# Patient Record
Sex: FEMALE | Race: WHITE | NOT HISPANIC OR LATINO | Employment: OTHER | URBAN - METROPOLITAN AREA
[De-identification: names, ages, dates, MRNs, and addresses within clinical notes are randomized per-mention and may not be internally consistent; named-entity substitution may affect disease eponyms.]

---

## 2017-01-06 ENCOUNTER — ALLSCRIPTS OFFICE VISIT (OUTPATIENT)
Dept: OTHER | Facility: OTHER | Age: 67
End: 2017-01-06

## 2017-02-20 ENCOUNTER — APPOINTMENT (OUTPATIENT)
Dept: LAB | Age: 67
End: 2017-02-20
Payer: MEDICARE

## 2017-02-20 DIAGNOSIS — C43.59 MALIGNANT MELANOMA OF OTHER PART OF TRUNK (HCC): ICD-10-CM

## 2017-02-20 DIAGNOSIS — Z85.71 HISTORY OF HODGKIN'S LYMPHOMA: ICD-10-CM

## 2017-02-20 DIAGNOSIS — C50.919 MALIGNANT NEOPLASM OF FEMALE BREAST (HCC): ICD-10-CM

## 2017-02-20 LAB
ALBUMIN SERPL BCP-MCNC: 3 G/DL (ref 3.5–5)
ALP SERPL-CCNC: 122 U/L (ref 46–116)
ALT SERPL W P-5'-P-CCNC: 13 U/L (ref 12–78)
ANION GAP SERPL CALCULATED.3IONS-SCNC: 6 MMOL/L (ref 4–13)
AST SERPL W P-5'-P-CCNC: 14 U/L (ref 5–45)
BASOPHILS # BLD AUTO: 0.08 THOUSANDS/ΜL (ref 0–0.1)
BASOPHILS NFR BLD AUTO: 1 % (ref 0–1)
BILIRUB SERPL-MCNC: 0.37 MG/DL (ref 0.2–1)
BUN SERPL-MCNC: 20 MG/DL (ref 5–25)
CALCIUM SERPL-MCNC: 9.6 MG/DL (ref 8.3–10.1)
CANCER AG27-29 SERPL-ACNC: 37.5 U/ML (ref 0–42.3)
CHLORIDE SERPL-SCNC: 107 MMOL/L (ref 100–108)
CO2 SERPL-SCNC: 29 MMOL/L (ref 21–32)
CREAT SERPL-MCNC: 1.29 MG/DL (ref 0.6–1.3)
EOSINOPHIL # BLD AUTO: 0.5 THOUSAND/ΜL (ref 0–0.61)
EOSINOPHIL NFR BLD AUTO: 7 % (ref 0–6)
ERYTHROCYTE [DISTWIDTH] IN BLOOD BY AUTOMATED COUNT: 13.8 % (ref 11.6–15.1)
ERYTHROCYTE [SEDIMENTATION RATE] IN BLOOD: 80 MM/HOUR (ref 0–20)
GFR SERPL CREATININE-BSD FRML MDRD: 41 ML/MIN/1.73SQ M
GLUCOSE SERPL-MCNC: 147 MG/DL (ref 65–140)
HCT VFR BLD AUTO: 37.8 % (ref 34.8–46.1)
HGB BLD-MCNC: 11.8 G/DL (ref 11.5–15.4)
LDH SERPL-CCNC: 136 U/L (ref 81–234)
LYMPHOCYTES # BLD AUTO: 1.7 THOUSANDS/ΜL (ref 0.6–4.47)
LYMPHOCYTES NFR BLD AUTO: 23 % (ref 14–44)
MCH RBC QN AUTO: 28 PG (ref 26.8–34.3)
MCHC RBC AUTO-ENTMCNC: 31.2 G/DL (ref 31.4–37.4)
MCV RBC AUTO: 90 FL (ref 82–98)
MONOCYTES # BLD AUTO: 0.63 THOUSAND/ΜL (ref 0.17–1.22)
MONOCYTES NFR BLD AUTO: 8 % (ref 4–12)
NEUTROPHILS # BLD AUTO: 4.54 THOUSANDS/ΜL (ref 1.85–7.62)
NEUTS SEG NFR BLD AUTO: 61 % (ref 43–75)
NRBC BLD AUTO-RTO: 0 /100 WBCS
PLATELET # BLD AUTO: 498 THOUSANDS/UL (ref 149–390)
PMV BLD AUTO: 10.1 FL (ref 8.9–12.7)
POTASSIUM SERPL-SCNC: 5.4 MMOL/L (ref 3.5–5.3)
PROT SERPL-MCNC: 9 G/DL (ref 6.4–8.2)
RBC # BLD AUTO: 4.22 MILLION/UL (ref 3.81–5.12)
SODIUM SERPL-SCNC: 142 MMOL/L (ref 136–145)
WBC # BLD AUTO: 7.46 THOUSAND/UL (ref 4.31–10.16)

## 2017-02-20 PROCEDURE — 85652 RBC SED RATE AUTOMATED: CPT

## 2017-02-20 PROCEDURE — 83615 LACTATE (LD) (LDH) ENZYME: CPT

## 2017-02-20 PROCEDURE — 36415 COLL VENOUS BLD VENIPUNCTURE: CPT

## 2017-02-20 PROCEDURE — 85025 COMPLETE CBC W/AUTO DIFF WBC: CPT

## 2017-02-20 PROCEDURE — 86300 IMMUNOASSAY TUMOR CA 15-3: CPT

## 2017-02-20 PROCEDURE — 80053 COMPREHEN METABOLIC PANEL: CPT

## 2017-02-28 ENCOUNTER — ALLSCRIPTS OFFICE VISIT (OUTPATIENT)
Dept: OTHER | Facility: OTHER | Age: 67
End: 2017-02-28

## 2017-04-04 ENCOUNTER — GENERIC CONVERSION - ENCOUNTER (OUTPATIENT)
Dept: OTHER | Facility: OTHER | Age: 67
End: 2017-04-04

## 2017-04-04 PROCEDURE — 88305 TISSUE EXAM BY PATHOLOGIST: CPT | Performed by: SPECIALIST

## 2017-04-05 ENCOUNTER — LAB REQUISITION (OUTPATIENT)
Dept: LAB | Facility: HOSPITAL | Age: 67
End: 2017-04-05
Payer: MEDICARE

## 2017-04-05 DIAGNOSIS — D48.5 NEOPLASM OF UNCERTAIN BEHAVIOR OF SKIN: ICD-10-CM

## 2017-04-10 DIAGNOSIS — I10 ESSENTIAL (PRIMARY) HYPERTENSION: ICD-10-CM

## 2017-04-10 DIAGNOSIS — Z85.71 HISTORY OF HODGKIN'S LYMPHOMA: ICD-10-CM

## 2017-04-10 DIAGNOSIS — E78.5 HYPERLIPIDEMIA: ICD-10-CM

## 2017-04-10 DIAGNOSIS — R77.9 ABNORMALITY OF PLASMA PROTEIN: ICD-10-CM

## 2017-04-11 ENCOUNTER — TRANSCRIBE ORDERS (OUTPATIENT)
Dept: LAB | Facility: CLINIC | Age: 67
End: 2017-04-11

## 2017-04-11 ENCOUNTER — APPOINTMENT (OUTPATIENT)
Dept: LAB | Facility: CLINIC | Age: 67
End: 2017-04-11
Payer: MEDICARE

## 2017-04-11 DIAGNOSIS — Z85.71 HISTORY OF HODGKIN'S LYMPHOMA: ICD-10-CM

## 2017-04-11 LAB
ANION GAP SERPL CALCULATED.3IONS-SCNC: 9 MMOL/L (ref 4–13)
BUN SERPL-MCNC: 19 MG/DL (ref 5–25)
CALCIUM SERPL-MCNC: 9 MG/DL (ref 8.3–10.1)
CHLORIDE SERPL-SCNC: 105 MMOL/L (ref 100–108)
CO2 SERPL-SCNC: 28 MMOL/L (ref 21–32)
CREAT SERPL-MCNC: 1.2 MG/DL (ref 0.6–1.3)
ERYTHROCYTE [SEDIMENTATION RATE] IN BLOOD: 36 MM/HOUR (ref 0–20)
GFR SERPL CREATININE-BSD FRML MDRD: 44.5 ML/MIN/1.73SQ M
GLUCOSE SERPL-MCNC: 105 MG/DL (ref 65–140)
LDH SERPL-CCNC: 166 U/L (ref 81–234)
POTASSIUM SERPL-SCNC: 4.2 MMOL/L (ref 3.5–5.3)
SODIUM SERPL-SCNC: 142 MMOL/L (ref 136–145)

## 2017-04-11 PROCEDURE — 83615 LACTATE (LD) (LDH) ENZYME: CPT

## 2017-04-11 PROCEDURE — 80048 BASIC METABOLIC PNL TOTAL CA: CPT

## 2017-04-11 PROCEDURE — 36415 COLL VENOUS BLD VENIPUNCTURE: CPT

## 2017-04-11 PROCEDURE — 85652 RBC SED RATE AUTOMATED: CPT

## 2017-05-04 ENCOUNTER — ALLSCRIPTS OFFICE VISIT (OUTPATIENT)
Dept: OTHER | Facility: OTHER | Age: 67
End: 2017-05-04

## 2017-07-07 ENCOUNTER — APPOINTMENT (OUTPATIENT)
Dept: RADIOLOGY | Facility: CLINIC | Age: 67
End: 2017-07-07
Payer: MEDICARE

## 2017-07-07 ENCOUNTER — TRANSCRIBE ORDERS (OUTPATIENT)
Dept: RADIOLOGY | Facility: CLINIC | Age: 67
End: 2017-07-07

## 2017-07-07 DIAGNOSIS — N20.0 URIC ACID NEPHROLITHIASIS: Primary | ICD-10-CM

## 2017-07-07 DIAGNOSIS — N20.0 URIC ACID NEPHROLITHIASIS: ICD-10-CM

## 2017-07-07 PROCEDURE — 74000 HB X-RAY EXAM OF ABDOMEN (SINGLE ANTEROPOSTERIOR VIEW): CPT

## 2017-07-11 ENCOUNTER — GENERIC CONVERSION - ENCOUNTER (OUTPATIENT)
Dept: OTHER | Facility: OTHER | Age: 67
End: 2017-07-11

## 2017-07-12 ENCOUNTER — GENERIC CONVERSION - ENCOUNTER (OUTPATIENT)
Dept: OTHER | Facility: OTHER | Age: 67
End: 2017-07-12

## 2017-08-07 DIAGNOSIS — N20.0 CALCULUS OF KIDNEY: ICD-10-CM

## 2017-08-07 DIAGNOSIS — Z85.3 PERSONAL HISTORY OF MALIGNANT NEOPLASM OF BREAST: ICD-10-CM

## 2017-08-07 DIAGNOSIS — C50.919 MALIGNANT NEOPLASM OF FEMALE BREAST (HCC): ICD-10-CM

## 2017-08-11 ENCOUNTER — APPOINTMENT (OUTPATIENT)
Dept: LAB | Facility: HOSPITAL | Age: 67
End: 2017-08-11
Attending: UROLOGY
Payer: MEDICARE

## 2017-08-11 ENCOUNTER — ALLSCRIPTS OFFICE VISIT (OUTPATIENT)
Dept: OTHER | Facility: OTHER | Age: 67
End: 2017-08-11

## 2017-08-11 DIAGNOSIS — N20.0 CALCULUS OF KIDNEY: ICD-10-CM

## 2017-08-11 LAB
BILIRUB UR QL STRIP: NORMAL
CLARITY UR: NORMAL
COLOR UR: YELLOW
GLUCOSE (HISTORICAL): NORMAL
HGB UR QL STRIP.AUTO: NORMAL
KETONES UR STRIP-MCNC: NORMAL MG/DL
LEUKOCYTE ESTERASE UR QL STRIP: NORMAL
NITRITE UR QL STRIP: POSITIVE
PH UR STRIP.AUTO: 6.5 [PH]
PROT UR STRIP-MCNC: NORMAL MG/DL
SP GR UR STRIP.AUTO: 1.01
UROBILINOGEN UR QL STRIP.AUTO: 0.2

## 2017-08-11 PROCEDURE — 87086 URINE CULTURE/COLONY COUNT: CPT

## 2017-08-12 LAB — BACTERIA UR CULT: NORMAL

## 2017-08-15 ENCOUNTER — APPOINTMENT (OUTPATIENT)
Dept: LAB | Facility: CLINIC | Age: 67
End: 2017-08-15
Payer: MEDICARE

## 2017-08-15 ENCOUNTER — HOSPITAL ENCOUNTER (OUTPATIENT)
Dept: NUCLEAR MEDICINE | Facility: HOSPITAL | Age: 67
Discharge: HOME | End: 2017-08-15
Attending: UROLOGY
Payer: MEDICARE

## 2017-08-15 DIAGNOSIS — I10 ESSENTIAL (PRIMARY) HYPERTENSION: ICD-10-CM

## 2017-08-15 DIAGNOSIS — E78.5 HYPERLIPIDEMIA: ICD-10-CM

## 2017-08-15 DIAGNOSIS — C50.919 MALIGNANT NEOPLASM OF FEMALE BREAST (HCC): ICD-10-CM

## 2017-08-15 DIAGNOSIS — R77.9 ABNORMALITY OF PLASMA PROTEIN: ICD-10-CM

## 2017-08-15 DIAGNOSIS — N20.0 CALCULUS OF KIDNEY: ICD-10-CM

## 2017-08-15 LAB
ALBUMIN SERPL BCP-MCNC: 3.4 G/DL (ref 3.5–5)
ALP SERPL-CCNC: 119 U/L (ref 46–116)
ALT SERPL W P-5'-P-CCNC: 23 U/L (ref 12–78)
ANION GAP SERPL CALCULATED.3IONS-SCNC: 8 MMOL/L (ref 4–13)
AST SERPL W P-5'-P-CCNC: 21 U/L (ref 5–45)
BASOPHILS # BLD AUTO: 0.08 THOUSANDS/ΜL (ref 0–0.1)
BASOPHILS NFR BLD AUTO: 1 % (ref 0–1)
BILIRUB SERPL-MCNC: 0.4 MG/DL (ref 0.2–1)
BUN SERPL-MCNC: 26 MG/DL (ref 5–25)
CALCIUM SERPL-MCNC: 9.3 MG/DL (ref 8.3–10.1)
CANCER AG27-29 SERPL-ACNC: 17.2 U/ML (ref 0–42.3)
CHLORIDE SERPL-SCNC: 105 MMOL/L (ref 100–108)
CHOLEST SERPL-MCNC: 171 MG/DL (ref 50–200)
CO2 SERPL-SCNC: 28 MMOL/L (ref 21–32)
CREAT SERPL-MCNC: 1.24 MG/DL (ref 0.6–1.3)
EOSINOPHIL # BLD AUTO: 0.85 THOUSAND/ΜL (ref 0–0.61)
EOSINOPHIL NFR BLD AUTO: 12 % (ref 0–6)
ERYTHROCYTE [DISTWIDTH] IN BLOOD BY AUTOMATED COUNT: 15 % (ref 11.6–15.1)
GFR SERPL CREATININE-BSD FRML MDRD: 44 ML/MIN/1.73SQ M
GLUCOSE P FAST SERPL-MCNC: 90 MG/DL (ref 65–99)
HCT VFR BLD AUTO: 40.7 % (ref 34.8–46.1)
HDLC SERPL-MCNC: 55 MG/DL (ref 40–60)
HGB BLD-MCNC: 12.7 G/DL (ref 11.5–15.4)
LDLC SERPL CALC-MCNC: 98 MG/DL (ref 0–100)
LYMPHOCYTES # BLD AUTO: 1.6 THOUSANDS/ΜL (ref 0.6–4.47)
LYMPHOCYTES NFR BLD AUTO: 23 % (ref 14–44)
MCH RBC QN AUTO: 27.5 PG (ref 26.8–34.3)
MCHC RBC AUTO-ENTMCNC: 31.2 G/DL (ref 31.4–37.4)
MCV RBC AUTO: 88 FL (ref 82–98)
MONOCYTES # BLD AUTO: 0.75 THOUSAND/ΜL (ref 0.17–1.22)
MONOCYTES NFR BLD AUTO: 11 % (ref 4–12)
NEUTROPHILS # BLD AUTO: 3.82 THOUSANDS/ΜL (ref 1.85–7.62)
NEUTS SEG NFR BLD AUTO: 53 % (ref 43–75)
PLATELET # BLD AUTO: 315 THOUSANDS/UL (ref 149–390)
PMV BLD AUTO: 10.1 FL (ref 8.9–12.7)
POTASSIUM SERPL-SCNC: 4.5 MMOL/L (ref 3.5–5.3)
PROT SERPL-MCNC: 8.2 G/DL (ref 6.4–8.2)
RBC # BLD AUTO: 4.62 MILLION/UL (ref 3.81–5.12)
SODIUM SERPL-SCNC: 141 MMOL/L (ref 136–145)
T4 FREE SERPL-MCNC: 1.08 NG/DL (ref 0.76–1.46)
TRIGL SERPL-MCNC: 92 MG/DL
TSH SERPL DL<=0.05 MIU/L-ACNC: 3.78 UIU/ML (ref 0.36–3.74)
WBC # BLD AUTO: 7.1 THOUSAND/UL (ref 4.31–10.16)

## 2017-08-15 PROCEDURE — 84439 ASSAY OF FREE THYROXINE: CPT

## 2017-08-15 PROCEDURE — A9562 TC99M MERTIATIDE: HCPCS

## 2017-08-15 PROCEDURE — 84443 ASSAY THYROID STIM HORMONE: CPT

## 2017-08-15 PROCEDURE — 85025 COMPLETE CBC W/AUTO DIFF WBC: CPT

## 2017-08-15 PROCEDURE — 84165 PROTEIN E-PHORESIS SERUM: CPT

## 2017-08-15 PROCEDURE — 80061 LIPID PANEL: CPT

## 2017-08-15 PROCEDURE — 36415 COLL VENOUS BLD VENIPUNCTURE: CPT

## 2017-08-15 PROCEDURE — 78707 K FLOW/FUNCT IMAGE W/O DRUG: CPT

## 2017-08-15 PROCEDURE — 86300 IMMUNOASSAY TUMOR CA 15-3: CPT

## 2017-08-15 PROCEDURE — 84166 PROTEIN E-PHORESIS/URINE/CSF: CPT

## 2017-08-15 PROCEDURE — 80053 COMPREHEN METABOLIC PANEL: CPT

## 2017-08-16 LAB
ALBUMIN SERPL ELPH-MCNC: 3.88 G/DL (ref 3.5–5)
ALBUMIN SERPL ELPH-MCNC: 51.1 % (ref 52–65)
ALPHA1 GLOB SERPL ELPH-MCNC: 0.32 G/DL (ref 0.1–0.4)
ALPHA1 GLOB SERPL ELPH-MCNC: 4.2 % (ref 2.5–5)
ALPHA2 GLOB SERPL ELPH-MCNC: 0.82 G/DL (ref 0.4–1.2)
ALPHA2 GLOB SERPL ELPH-MCNC: 10.8 % (ref 7–13)
BETA GLOB ABNORMAL SERPL ELPH-MCNC: 0.46 G/DL (ref 0.4–0.8)
BETA1 GLOB SERPL ELPH-MCNC: 6.1 % (ref 5–13)
BETA2 GLOB SERPL ELPH-MCNC: 5.8 % (ref 2–8)
BETA2+GAMMA GLOB SERPL ELPH-MCNC: 0.44 G/DL (ref 0.2–0.5)
GAMMA GLOB ABNORMAL SERPL ELPH-MCNC: 1.67 G/DL (ref 0.5–1.6)
GAMMA GLOB SERPL ELPH-MCNC: 22 % (ref 12–22)
IGG/ALB SER: 1.04 {RATIO} (ref 1.1–1.8)
PROT PATTERN SERPL ELPH-IMP: ABNORMAL
PROT SERPL-MCNC: 7.6 G/DL (ref 6.4–8.2)

## 2017-08-17 ENCOUNTER — GENERIC CONVERSION - ENCOUNTER (OUTPATIENT)
Dept: OTHER | Facility: OTHER | Age: 67
End: 2017-08-17

## 2017-08-18 LAB
ALBUMIN UR ELPH-MCNC: 52 %
ALPHA1 GLOB MFR UR ELPH: 2.8 %
ALPHA2 GLOB MFR UR ELPH: 11.6 %
B-GLOBULIN MFR UR ELPH: 11.7 %
GAMMA GLOB MFR UR ELPH: 21.9 %
PROT PATTERN UR ELPH-IMP: ABNORMAL
PROT UR-MCNC: 75 MG/DL

## 2017-08-22 ENCOUNTER — GENERIC CONVERSION - ENCOUNTER (OUTPATIENT)
Dept: OTHER | Facility: OTHER | Age: 67
End: 2017-08-22

## 2017-08-24 ENCOUNTER — GENERIC CONVERSION - ENCOUNTER (OUTPATIENT)
Dept: OTHER | Facility: OTHER | Age: 67
End: 2017-08-24

## 2017-08-29 ENCOUNTER — ALLSCRIPTS OFFICE VISIT (OUTPATIENT)
Dept: OTHER | Facility: OTHER | Age: 67
End: 2017-08-29

## 2017-11-08 ENCOUNTER — ALLSCRIPTS OFFICE VISIT (OUTPATIENT)
Dept: OTHER | Facility: OTHER | Age: 67
End: 2017-11-08

## 2017-11-10 NOTE — PROGRESS NOTES
Assessment    1  Encounter for preventive health examination (V70 0) (Z00 00)   2  Hypertension (401 9) (I10)   3  Hyperlipidemia (272 4) (E78 5)   4  Hypothyroidism (244 9) (E03 9)   5  Osteoporosis (733 00) (M81 0)   6  Vitamin D deficiency (268 9) (E55 9)   7  Alkaline phosphatase elevation (790 5) (R74 8)   8  Melanoma of skin of trunk (172 5) (C43 59)   9  Mitral regurgitation (424 0) (I34 0)   10  Pulmonary artery hypertension (416 8) (I27 21)    Plan  Hyperlipidemia, Hypothyroidism    · (1) LIPID PANEL, FASTING; Status:Active; Requested for:16Apr2018;    · (1) TSH WITH FT4 REFLEX; Status:Active; Requested for:16Apr2018;   Hyperlipidemia, Hypothyroidism, Vitamin D deficiency    · (1) VITAMIN D 25-HYDROXY; Status:Active; Requested for:16Apr2018;   Hypertension    · Bystolic 5 MG Oral Tablet; take 1 tablet by mouth every day   · Follow-up visit in 6 months Evaluation and Treatment  Follow-up  Status: Hold For -Scheduling  Requested for: 64RXB2337   · Eat a low fat and low cholesterol diet ; Status:Complete;   Done: 14KSJ5051 02:43PM   · We encourage you to begin to make lifestyle changes to help control your bloodpressure  These may include losing weight, increasing your activity level, limiting salt inyour diet, decreasing alcohol intake, and eating a diet low in fat and rich in fruitsand vegetables ; Status:Complete;   Done: 71BTC4021 02:43PM    Discussion/Summary    Continue with current medications  No changes in thyroid dosage at this time  Repeat labs prior to OV 6 months  follow up with various specialists including Urology  Stay hydrated  Avoid NSAIDs  Up-to-date with flu vaccine, pneumococcal vaccine and Zostavax  She is not interested colonoscopy  Possible side effects of new medications were reviewed with the patient/guardian today  The treatment plan was reviewed with the patient/guardian   The patient/guardian understands and agrees with the treatment plan      History of Present Illness  Follow-up visit follow-up visit  Medications reviewed  Hyperlipidemia on Simvastatin 40 mg daily  Lipid profile 08/2017 Cholesterol 171  Triglycerides 92  HDL 55  LDL 98  LFTs normal except for alkaline phosphatase 119 decreased from 122    Albumin low at 3 4  total protein 8 2  SPEP hypergammaglobulinemia  No monoclonal bands  UPEP no monoclonal bands  Hypertension blood pressure stable on Bystolic 5 mg daily  08/2017 creatinine 1 24  Electrolytes normal  FBS 90 Hgb 12 7  Review of Systems   Constitutional: no recent weight gain-- and-- no recent weight loss  Eyes: no eyesight problems  ENT: no earache,-- no sore throat,-- no nasal discharge-- and-- no hoarseness  Cardiovascular: no chest pain,-- no palpitations-- and-- no lower extremity edema  Respiratory: no cough,-- no orthopnea,-- no wheezing-- and-- no shortness of breath during exertion  Gastrointestinal:  no prior colonoscopy  repeat CT scan abdomen 12/2016 + cholelithiasis asymptomatic  Hernia left flank containing fat and nonobstructive bowel  Small subscapular area of enhancement right hepatic lobe  Stable retroperitoneal nodule  hiatal hernia  Stable nephrolithiasis and renal scarring/complex renal cystic mass on the right , but-- no abdominal pain,-- no nausea,-- no vomiting,-- no constipation,-- no diarrhea-- and-- no blood in stools  Genitourinary: see GI ROS  history of nephrolithiasis including staghorn calculi  08/2017 nuclear renal flow scan abnormal delayed perfusion and diminished functioning within the right kidney with a split function measuring only 17% on the right  83% on the left , but-- no dysuria-- and-- no incontinence  Musculoskeletal: no arthralgias-- and-- no myalgias  Integumentary: history of melanoma right flank  biopsy left arm 11/2016 SCC in situ  Neurological: no falls  , but-- no headache-- and-- no dizziness  Endocrine: Hypothyroidism on Levothyroxine 75 Âµg daily  TSH 08/2017 3 783   Osteoporosis on vitamin-D supplement  Hematologic/Lymphatic: remote history of Hodgkin's disease  breast CA s/p left mastectomy with chemotherapy  she completed a course of Arimidex in 2011  followed by oncology  Active Problems  1  Advance directive declined by patient (V49 89) (Z78 9)   2  Alkaline phosphatase elevation (790 5) (R74 8)   3  Elevated serum protein level (790 99) (R77 9)   4  History of Hodgkin's disease (V10 72) (Z85 71)   5  History of left breast cancer (V10 3) (Z85 3)   6  Hyperlipidemia (272 4) (E78 5)   7  Hypertension (401 9) (I10)   8  Hypothyroidism (244 9) (E03 9)   9  Melanoma of skin of trunk (172 5) (C43 59)   10  Mitral regurgitation (424 0) (I34 0)   11  Osteoporosis (733 00) (M81 0)   12  Pulmonary artery hypertension (416 8) (I27 21)   13  Retroperitoneal mass (789 30) (R19 00)   14  Screening for genitourinary condition (V81 6) (Z13 89)   15  Vitamin D deficiency (268 9) (E55 9)    Past Medical History    1  History of Age At First Period 13 Years Old (Menarche)   2  History of Age At First Pregnancy 19 Years Old   3  History of Cervical Cancer (V10 41)   4  History of Colon cancer screening (V76 51) (Z12 11)   5  History of Ganglion (727 43) (M67 40)   6  History of osteopenia (V13 59) (Z87 39)   7  History of paroxysmal supraventricular tachycardia (V12 59) (Z86 79)   8  History of renal calculi (V13 01) (Z87 442)   9  History of Previously Pregnant With 20  Term Deliveries   10  History of Shoulder Impingement (726 2)    Surgical History  1  History of Appendectomy   2  History of Breast Surgery Mastectomy   3  History of Breast Surgery Reconstruction   4  History of Chemotherapeutics (V87 41)   5  History of Hysterectomy   6  History of Kidney Surgery   7  History of Radiation Therapy   8  History of [place] Lymph Node Biopsy   9  History of Splenectomy    Family History  Mother    1  Family history of Leukemia (V16 6)  Paternal Grandmother    2   Family history of Colon Cancer (V16 0)  Family History    3  Family history of Reported Family History Of Heart Disease    Social History     · Advance directive declined by patient (V49 89) (Z78 9)   · Being A Social Drinker   · Currently sexually active   · Drinks coffee   · Exercise habits   · Former smoker (V15 82) (S08 534)   · Denied: History of Uses drugs daily    Current Meds   1  Aspirin 81 MG TABS; Therapy: (Recorded:25Llv3030) to Recorded   2  Bystolic 5 MG Oral Tablet; take 1 tablet by mouth every day; Therapy: 41AVJ3210 to ()  Requested for: 16Gal2263; Last Rx:39Esx5899 Ordered   3  Claritin CAPS; Therapy: (Recorded:85Cdl4979) to Recorded   4  Levothyroxine Sodium 75 MCG Oral Tablet; take 1 tablet by mouth daily; Therapy: 08ZMF5273 to (Evaluate:22Bef9227)  Requested for: 74Pkf8630; Last Rx:68Vlb7973 Ordered   5  Simvastatin 40 MG Oral Tablet (Zocor); take 1 tablet by mouth every day; Therapy: 88ZDD6550 to (Evaluate:56Yzd7851)  Requested for: 41Yfe3199; Last Rx:28Ogf9128 Ordered   6  Vitamin D TABS; Therapy: (Recorded:45Qxv7767) to Recorded    Allergies  1  Hydrocodone-Acetaminophen CAPS   2  Bactrim   3  Ciprofloxacin-Ciproflox HCl ER TB24  4  Grass    Vitals  Vital Signs    Recorded: 83JFL2161 11:12AM   Temperature 96 5 F   Heart Rate 78   Respiration 16   Systolic 505   Diastolic 76   Height 5 ft 2 in   Weight 165 lb    BMI Calculated 30 18   BSA Calculated 1 76       Physical Exam   Constitutional  General appearance: No acute distress, well appearing and well nourished  Eyes  Conjunctiva and lids: No swelling, erythema or discharge  Pupils and irises: Equal, round, reactive to light  Ophthalmoscopic examination: Normal fundi and optic discs  Ears, Nose, Mouth, and Throat  Otoscopic examination: Tympanic membranes translucent with normal light reflex  Canals patent without erythema  Oropharynx: Normal with no erythema, edema, exudate or lesions     Neck  Neck: Supple, symmetric, trachea midline, no masses  Thyroid: Normal, no thyromegaly  There were no thyroid nodules  Pulmonary  Auscultation of lungs: Clear to auscultation  Cardiovascular  Auscultation of heart: Normal rate and rhythm, normal S1 and S2, no murmurs  Heart sounds: no gallop heard  Carotid pulses: 2+ bilaterally  no bruit heard over the right carotid-- and-- no bruit heard over the left carotid  Examination of extremities for edema and/or varicosities: Normal    Lymphatic  Palpation of lymph nodes in neck: No lymphadenopathy  no anterior cervical node enlargement,-- no posterior cervical node enlargement,-- no submandibular node enlargement-- and-- no supraclavicular node enlargement  Musculoskeletal  Gait and station: Normal    Skin  Skin and subcutaneous tissue: Normal without rashes or lesions  Psychiatric  Recent and remote memory: Intact  Mood and affect: Normal        Results/Data  PHQ-2 Adult Depression Screening 46MFR7562 01:14PM Evonne Ross     Test Name Result Flag Reference   PHQ-2 Adult Depression Score 0       Over the last two weeks, how often have you been bothered by any of the following problems? Little interest or pleasure in doing things: Not at all - 0 Feeling down, depressed, or hopeless: Not at all - 0   PHQ-2 Adult Depression Screening Negative       * NM KIDNEY W FLOW AND FUNCTION 12Dgf5587 10:13AM Radha Number Order Number: [id number]   - Patient Instructions: To schedule this appointment, please contact Central Scheduling at [phone]  Test Name Result Flag Reference   NM KIDNEY W FLOW AND FUNCTION (Report)       RENAL SCAN    INDICATION: History of extensive renal calculus with large right staghorn calculus noted on recent CT complex lesion noted within the lower pole right kidney   COMPARISON:  CT December 27, 2016   TECHNIQUE:   The study was obtained following the intravenous administration of 10 0 mCi Tc-99m MAG-3   Posterior, dynamic flow images were obtained at one minute intervals for thirty minutes  Static, posterior, pre and post void images were then obtained  FINDINGS:   PERFUSION:    Left kidney: 78%  Right kidney: 22%  Perfusion images of the kidneys demonstrated delayed perfusion of both kidneys relative to the spleen  There is marked delayed perfusion to the right kidney  UPTAKE/EXCRETION:   Time to 1/2 Peak:  Left kidney: 1 3 minutes  Right kidney: 1 3 minutes  The right kidney demonstrates only minimal function within the mid to lower pole region  The left kidney demonstrated a normal cortical transit time of 2 to 3 m  Time to Peak:  Left kidney: 4 9 minutes  Right kidney: 6 9 minutes      Peak to 1/2 Peak:  Left kidney: 16 minutes  Right kidney: 37 minutes      Split renal Function:  Left kidney: 83%  Right kidney: 17%   The dynamic images continue to demonstrate clearance of the radiotracer from the renal parenchyma on the left with mild persistent activity in the renal pelvis on the right  IMPRESSION:   1  Abnormal delayed perfusion and diminished function within the right kidney with split function measuring only 17% on the right and 83% on the left  Workstation performed: [id number]   Signed by:  Bianca Edge MD  8/15/17     (1) CBC/PLT/DIFF 90OUO2127 09:10AM Lyndon Bedoya Chance Order Number: [id number]     Test Name Result Flag Reference   WBC COUNT 7 10 Thousand/uL  4 31-10 16   RBC COUNT 4 62 Million/uL  3 81-5 12   HEMOGLOBIN 12 7 g/dL  11 5-15 4   HEMATOCRIT 40 7 %  34 8-46  1   MCV 88 fL  82-98   MCH 27 5 pg  26 8-34 3   MCHC 31 2 g/dL L 31 4-37 4   RDW 15 0 %  11 6-15 1   MPV 10 1 fL  8 9-12 7   PLATELET COUNT 744 Thousands/uL  149-390   NEUTROPHILS RELATIVE PERCENT 53 %  43-75   LYMPHOCYTES RELATIVE PERCENT 23 %  14-44   MONOCYTES RELATIVE PERCENT 11 %  4-12   EOSINOPHILS RELATIVE PERCENT 12 % H 0-6   BASOPHILS RELATIVE PERCENT 1 %  0-1   NEUTROPHILS ABSOLUTE COUNT 3 82 Thousands/? ??L  1 85-7 62   LYMPHOCYTES ABSOLUTE COUNT 1 60 Thousands/? ??L  0 60-4 47   MONOCYTES ABSOLUTE COUNT 0 75 Thousand/? ??L  0 17-1 22   EOSINOPHILS ABSOLUTE COUNT 0 85 Thousand/? ??L H 0 00-0 61   BASOPHILS ABSOLUTE COUNT 0 08 Thousands/? ??L  0 00-0 10     - Patient Instructions: This bloodwork is non-fasting  Please drink two glasses of water morning of bloodwork  (1) COMPREHENSIVE METABOLIC PANEL 45WOT6364 13:37TU Garnett Juan Ramon Order Number: [id number]     Test Name Result Flag Reference   SODIUM 141 mmol/L  136-145   POTASSIUM 4 5 mmol/L  3 5-5 3   CHLORIDE 105 mmol/L  100-108   CARBON DIOXIDE 28 mmol/L  21-32   ANION GAP (CALC) 8 mmol/L  4-13   BLOOD UREA NITROGEN 26 mg/dL H 5-25   CREATININE 1 24 mg/dL  0 60-1 30   Standardized to IDMS reference method   CALCIUM 9 3 mg/dL  8 3-10 1   BILI, TOTAL 0 40 mg/dL  0 20-1 00   ALK PHOSPHATAS 119 U/L H    ALT (SGPT) 23 U/L  12-78   Specimen collection should occur prior to Sulfasalazine administration due to the potential for falsely depressed results  AST(SGOT) 21 U/L  5-45   Specimen collection should occur prior to Sulfasalazine administration due to the potential for falsely depressed results  ALBUMIN 3 4 g/dL L 3 5-5 0   TOTAL PROTEIN 8 2 g/dL  6 4-8 2   eGFR 44 ml/min/1 73sq m       [facility] Disease Education Program recommendations are as follows: GFR calculation is accurate only with a steady state creatinine Chronic Kidney disease less than 60 ml/min/1 73 sq  meters Kidney failure less than 15 ml/min/1 73 sq  meters  GLUCOSE FASTING 90 mg/dL  65-99   Specimen collection should occur prior to Sulfasalazine administration due to the potential for falsely depressed results  Specimen collection should occur prior to Sulfapyridine administration due to the potential for falsely elevated results       (1) CA 27 29 82Unf0901 09:10AM Zoey Bedoya Order Number: [id number]     Test Name Result Flag Reference   CA 27 29(NEW) 17 2 U/mL  0 0-42 3     (1) LIPID PANEL, FASTING 51XDU8860 09:10AM Philippe Nam     Test Name Result Flag Reference   CHOLESTEROL 171 mg/dL     HDL,DIRECT 55 mg/dL  40-60   Specimen collection should occur prior to Metamizole administration due to the potential for falsley depressed results  LDL CHOLESTEROL CALCULATED 98 mg/dL  0-100     Triglyceride:       Normal ??? ??? ??? ??? ??? ??? ??? <150 mg/dl  ??? ??? ???Borderline High ??? ??? 150-199 mg/dl  ??? ??? ? ?? High ??? ??? ??? ??? ??? ??? ??? 200-499 mg/dl  ??? ??? ? ??Very High ??? ??? ??? ??? ??? >499 mg/dl   Cholesterol:      Desirable ??? ??? ??? ??? <200 mg/dl  ??? ??? Borderline High ??? 200-239 mg/dl  ??? ??? High ??? ??? ??? ??? ??? ??? >239 mg/dl   HDL Cholesterol:      High ??? ???>59 mg/dL  ??? ??? Low ??? ??? <41 mg/dL   This screening LDL is a calculated result  It does not have the accuracy of the Direct Measured LDL in the monitoring of patients with hyperlipidemia and/or statin therapy  Direct Measure LDL (QAB311) must be ordered separately in these patients  TRIGLYCERIDES 92 mg/dL  <=150   Specimen collection should occur prior to N-Acetylcysteine or Metamizole administration due to the potential for falsely depressed results  (1) TSH WITH FT4 REFLEX 25Lbw9847 09:10AM Philippe Nam     Test Name Result Flag Reference   TSH 3 783 uIU/mL H 0 358-3 740     A FT4 has been ordered   Patients undergoing fluorescein dye angiography may retain small amounts of fluorescein in the body for 48-72 hours post procedure  Samples containing fluorescein can produce falsely depressed TSH values  If the patient had this procedure,a specimen should be resubmitted post fluorescein clearance       The recommended reference ranges for TSH during pregnancy are as follows: First trimester 0 1 to 2 5 uIU/mL Second trimester  0 2 to 3 0 uIU/mL Third trimester 0 3 to 3 0 uIU/m   T4,FREE 1 08 ng/dL  0 76-1 46   Specimen collection should occur prior to Sulfasalazine administration due to the potential for falsely elevated results  (1) PROTEIN ELECTRO, SERUM 44Fvs9470 09:10AM Gabino Ferguson     Test Name Result Flag Reference   A/G RATIO 1 04 L 1 10-1 80   Albumin 51 1 % L 52 0-65 0   Albumin Conc  3 88 g/dl  3 50-5 00   Alpha 1 Conc  0 32 g/dL  0 10-0 40   ALPHA 1 4 2 %  2 5-5 0   Alpha 2 Conc  0 82 g/dL  0 40-1 20   ALPHA 2 10 8 %  7 0-13 0   Beta 1 Conc  0 46 g/dL  0 40-0 80   BETA-1 6 1 %  5 0-13 0   Beta 2 Conc 0 44 g/dL  0 20-0 50   BETA-2 5 8 %  2 0-8 0   Gamma Conc 1 67 g/dL H 0 50-1 60   GAMMA GLOBULIN 22 0 %  12 0-22 0   Interpretation      The serum total protein and albumin are within normal limits  The serum protein electrophoresis shows hypergammaglobulinemia  No monoclonal bands noted  Reviewed by: Iván Gasteulm MD ([id number]) **Electronic Signature**   TOTAL PROTEIN  7 6 g/dL  6 4-8 2     (1) PROTEIN ELECTRO, URINE 44Ucv3734 09:10AM Janusz Castillo Order Number: [id number]     Test Name Result Flag Reference   ALPHA 1 URINE 2 8 %     ALPHA 2 URINE 11 6 %     BETA URINE 11 7 %     GAMMA GLOBULIN URINE 21 9 %     UPEP INTERPRETATION      The urine total protein is increased  The urine protein electrophoresis shows non-selective proteinuria  No monoclonal bands noted  Reviewed by: Kelli Novoa MD ([id number]) **Electronic Signature**   ALBUMIN URINE ELP 52 0 %     URINE PROTEIN 75 0 mg/dL H 2 0-17 5     (1) URINE CULTURE 11Aug2017 07:45PM Mathilda Gitelman Order Number: [id number]     Test Name Result Flag Reference   CLINICAL REPORT (Report)       Test:        Urine culture Specimen Type:   Urine Specimen Date:   8/11/2017 7:45 PM Result Date:    8/12/2017 4:06 PM Result Status:   Final result Resulting Lab:   [facility] [address] [address]           Tel: [phone]   CULTURE                                      ------------------                                 60,000-69,000 cfu/ml Mixed Contaminants X4   *** Suggest repeat specimen ***     * XR ABDOMEN 1 VIEW KUB [id number] 10:16AM Antonio Masterson     Test Name Result Flag Reference   XR ABDOMEN 1 VIEW KUB (Report)       ABDOMEN   INDICATION: History of previous calculus  COMPARISON: CT December 27, 2016   VIEWS: AP supine   IMAGES: 2   FINDINGS:   There are extensive renal calculi within the right kidney filling majority of the pelvis  The largest calcification appears bilobed measuring 3 2 x 1 9 cm  The left kidney also demonstrates cluster of calcification in the mid upper pole region measuring   1 4 x 0 9 cm  No ureteral calculi identified  Nonobstructive bowel gas pattern  Extensive surgical clips are seen throughout the abdomen and pelvis  IMPRESSION:   Extensive bilateral renal calculi are present  Workstation performed: [id number]   Signed by:  Sushil Taylor MD  7/10/17     (1) LD (LDH) 08Dku7030 10:22AM [employer]   TW Order Number: [id number]     Test Name Result Flag Reference   LD (LDH) 166 U/L       (1) SED RATE 48Egd3990 10:22AM [employer]   TW Order Number: [id number]     Test Name Result Flag Reference   SED RATE 36 mm/hour H 0-20     (1) TISSUE EXAM 53GQE9447 12:00AM EPIC, Provider   Test ordered by: Fidelia Contreras     Test Name Result Flag Reference   LAB AP CASE REPORT (Report)       Surgical Pathology Report             Case: [id number]                  Authorizing Provider: Eduarda Salgado  Collected:      04/04/2017          Pathologist:      Nghia Knapp MD        Received:      04/05/2017 1511       Specimen:  Skin, Other, Right thigh   LAB AP FINAL DIAGNOSIS (Report)       A  Skin, Right thigh, shave biopsy: - Epidermal hyperplasia with cornoid lamellae; see note  Note: Sections of the bisected shave biopsy show epidermal hyperplasia  On one of the tissue profile, there are two cornoid lamellae  The  superficial dermis shows mild lichenoid inflammation   Although the  cornoid lamellae are not placed at both ends of the lesion, in an  appropriate clinical setting, the changes may still represent  porokeratosis  The differential diagnosis could also include  proliferative keratosis  Interpretation performed at [facility], [address] [place] [address], [address] [place] [address]    Electronically signed by Lynda Everett MD on 4/10/2017 at 1:57 PM   LAB AP SURGICAL ADDITIONAL INFORMATION (Report)       These tests were developed and their performance characteristics  determined by [facility] or [facility]  They may not be cleared or approved by the [place]  Food and  Drug Administration  The FDA has determined that such clearance or  approval is not necessary  These tests are used for clinical purposes  They should not be regarded as investigational or for research  This  laboratory has been approved by [place] [address], designated as a high-complexity  laboratory and is qualified to perform these tests  LAB AP GROSS DESCRIPTION (Report)       A  The specimen is received in formalin, labeled with the patient's name  and hospital number, and is designated right thigh shave  The specimen  consists of a tan superficial shave of skin measuring 0 8 x 0 6 x 0 1 cm  The skin surface appears somewhat keratotic  The margin of resection is  inked green  The skin surface is inked red  The specimen is bisected  lengthwise  Entirely submitted  One cassette  Note: The estimated total formalin fixation time based upon information  provided by the submitting clinician and the standard processing schedule  is 27 5 hours  MAC     MAMMO SCREENING RIGHT W CAD 35Mae1112 02:09PM Flaco LAWRENCE Order Number: [id number]     Test Name Result Flag Reference   MAMMO SCREENING RIGHT W CAD (Report)       Patient History:  Patient is postmenopausal, has history of breast cancer at age   60, and has history of other cancer at age 26    Family history of colorectal cancer in father at age 80 and   colorectal cancer in paternal grandmother at age 80  Malignant mastectomy of the left breast, November 9, 2006  Malignant ultrasound-guided core biopsy of the left breast,   September 29, 2006  Reduction of the right breast, 2006  Took hormonal contraceptives for 11 years beginning at age 19  Patient is a former smoker, and smoked for 10 years  Patient's   BMI is 30 6  Reason for exam: screening (asymptomatic)  Mammo Screening Right W CAD: December 27, 2016 - Check In #:   [de-identified]  CC, MLO, and XCCL view(s) were taken of the right breast    Technologist: Marika Bender, RT(R)(M)  Prior study comparison: December 24, 2015, right breast DIGITAL   UNILATERAL SCREENING MAMMOGRAM WITH CAD performed at [address] [place] [address]  November 17, 2014, bilateral DIGITAL UNILATERAL  SCREENING MAMMOGRAM WITH CAD performed at [address] [place] [address]  November 11, 2013, right breast digital unilat mammo/CAD   performed at [address] [place] [address]  November 5, 2012,   bilateral DIGITAL UNILATERAL SCREENING MAMMOGRAM WITH CAD   performed at [address] [place] [address]  October 7, 2011, right  breast unilateral diagnostic mammogram, performed at [address] [place] [address]  There are scattered fibroglandular densities  The parenchymal pattern appears stable  No dominant soft tissue   mass or suspicious calcifications are noted  The skin and nipple  contours are within normal limits  No mammographic evidence of malignancy  No   significant changes when compared with prior studies  ASSESSMENT: BiRad:1 - Negative   Recommendation:  Routine screening mammogram in 1 year  A reminder letter will be  scheduled  Analyzed by CAD   8-10% of cancers will be missed on mammography  Management of a   palpable abnormality must be based on clinical grounds  Patients  will be notified of their results via letter from our facility  Accredited by [employer] of Radiology and FDA     Transcription Location: [address] [place], [place] [address]way: [id number]   Risk Value(s):  Myriad Table: 2 2%  Signed by:  Bertha Peters MD  12/27/16     * CT ABDOMEN PELVIS W CONTRAST 70Jgm3691 02:08PM Irais LAWRENCE Order Number: [id number]  Performing Comments: Rusty 66  - Patient Instructions: To schedule this appointment, please contact Central Scheduling at [phone]  Test Name Result Flag Reference   CT ABDOMEN PELVIS W CONTRAST (Report)       CT ABDOMEN AND PELVIS WITH IV CONTRAST   INDICATION: History of melanoma and breast cancer and Hodgkin's lymphoma  Mastectomy  Chemotherapy  Retroperitoneal nodules  Follow-up  COMPARISON: 3/21/2016 as well as a PET scan from 5/11/2009   TECHNIQUE: CT examination of the abdomen and pelvis was performed  In addition to typical postcontrast scanning, delayed phase postcontrast scanning was performed through the upper abdominal viscera  Axial, sagittal and coronal reformatted projections   were created  This examination, like all CT scans performed in the [facility], was performed utilizing techniques to minimize radiation dose exposure, including the use of iterative reconstruction and automated exposure control  IV Contrast: iodixanol (VISIPAQUE) 320 MG/ML injection 100 mL Note: (SINGLE DOSE/MULTI DOSE) information refers to the container from which the contrast was acquired  Contrast was injected one time intravenously without immediate complication  Because of borderline renal function, standard department hydration protocol was recommended to minimize risk of contrast induced nephropathy  Enteric Contrast: Enteric contrast was administered  FINDINGS:  ABDOMEN   LOWER CHEST: The base of the chest appears stable from previous exam  Left mastectomy and implant noted  Coronary calcifications are present  Left axillary surgical clips  Possible small sliding hiatal hernia     LIVER/BILIARY TREE: Near the anterior surface of the right lobe of the liver on image 19 series 2 there is a small area of enhancement which is 6 x 9 mm  It does not appear to distort the surface of the liver  In retrospect, this was probably present   on the prior study  Seems slightly more prominent on today's exam although it may be nothing other than a small transient flow phenomenon  The difference from the prior study is very minimal  The remainder of the liver appears within normal limits  GALLBLADDER: There are gallstone(s) within the gallbladder, without pericholecystic inflammatory changes  SPLEEN: Splenectomy is suggested although there appears to be a small amount of soft tissue adjacent to surgical clips in the left upper quadrant which might represent some minimal splenic tissue  This is stable  PANCREAS: Unremarkable  ADRENAL GLANDS: Unremarkable  KIDNEYS/URETERS: No suspicious interval change from prior  There are small cysts and nonobstructing stones present in the left kidney and there is cortical thinning posteriorly in the upper and lower poles left kidney  The right kidney contains   staghorn type calculus and there is a large area of fluid attenuation at the mid to upper pole with severe thinning of the cortex again suggesting chronic obstruction  A complex cystic mass posteriorly at the mid to lower pole contains internal   septations  It measures 2 9 x 3 9 cm on image 35 series 2 which is stable from prior study  There is thickened slightly inflamed appearance of the right ureteral wall along its upper half although this is unchanged from prior study  STOMACH AND BOWEL: Unremarkable  APPENDIX: No findings to suggest appendicitis  ABDOMINOPELVIC CAVITY: The small retroperitoneal nodules seen on the previous examination and appear unchanged  No ascites or free air  No suspicious lymphadenopathy identified  VESSELS: Atherosclerotic changes are noted  PELVIS   REPRODUCTIVE ORGANS: Patient is status post hysterectomy     URINARY BLADDER: Unremarkable  ABDOMINAL WALL/INGUINAL REGIONS: There is a hernia along the left flank containing fat and nonobstructed bowel  OSSEOUS STRUCTURES: No acute fracture or destructive osseous lesion  IMPRESSION:   Small subcapsular area of enhancement in the right hepatic lobe is only very slightly more prominent than on the previous examination and only is visible during the 1st phase of contrast suggesting that it may simply be a transient flow phenomenon  Consider reevaluation in 6 months  Stable retroperitoneal nodules  Small hiatal hernia   Stable nephrolithiasis and renal scarring and complex renal cystic mass on the right  The complex right renal cystic mass should be reassessed on the next follow-up to exclude any growth  Cholelithiasis    ##sigslh##sigslh   ##fuslh6##fuslh6    Workstation performed: [id number]   Signed by:  Sid Cisse MD  12/28/16     Future Appointments    Date/Time Provider Specialty Site   01/03/2018 01:00 PM GEORGIA Emery  Surgical Oncology CANCER CARE [facility] SURGICAL ONCOLOGY   12/08/2017 11:45 AM Fadi Rausch MD Urology [address]   05/08/2018 11:00 AM GEORGIA Sage   Family Medicine [address] [place] [address],[address]   08/28/2018 10:00 AM Goldy Bedoya MD Hematology Oncology CANCER [address] [place] [address],[address] [facility]   Electronically signed by : GEORGIA Owusu ; Nov 8 2017  2:45PM EST                       (Author)

## 2017-12-08 ENCOUNTER — TRANSCRIBE ORDERS (OUTPATIENT)
Dept: ADMINISTRATIVE | Facility: HOSPITAL | Age: 67
End: 2017-12-08

## 2017-12-08 ENCOUNTER — ALLSCRIPTS OFFICE VISIT (OUTPATIENT)
Dept: OTHER | Facility: OTHER | Age: 67
End: 2017-12-08

## 2017-12-08 DIAGNOSIS — N20.0 KIDNEY STONE: Primary | ICD-10-CM

## 2017-12-08 LAB
BILIRUB UR QL STRIP: NORMAL
CLARITY UR: NORMAL
COLOR UR: YELLOW
GLUCOSE (HISTORICAL): NORMAL
HGB UR QL STRIP.AUTO: NORMAL
KETONES UR STRIP-MCNC: NORMAL MG/DL
LEUKOCYTE ESTERASE UR QL STRIP: NORMAL
NITRITE UR QL STRIP: POSITIVE
PH UR STRIP.AUTO: 7 [PH]
PROT UR STRIP-MCNC: NORMAL MG/DL
SP GR UR STRIP.AUTO: 1.02
UROBILINOGEN UR QL STRIP.AUTO: 0.2

## 2018-01-02 ENCOUNTER — HOSPITAL ENCOUNTER (OUTPATIENT)
Dept: RADIOLOGY | Age: 68
Discharge: HOME | End: 2018-01-02
Payer: MEDICARE

## 2018-01-02 DIAGNOSIS — Z12.31 ENCOUNTER FOR SCREENING MAMMOGRAM FOR MALIGNANT NEOPLASM OF BREAST: ICD-10-CM

## 2018-01-02 DIAGNOSIS — C50.919 MALIGNANT NEOPLASM OF FEMALE BREAST (HCC): ICD-10-CM

## 2018-01-02 PROCEDURE — 77067 SCR MAMMO BI INCL CAD: CPT

## 2018-01-09 ENCOUNTER — GENERIC CONVERSION - ENCOUNTER (OUTPATIENT)
Dept: OTHER | Facility: OTHER | Age: 68
End: 2018-01-09

## 2018-01-10 NOTE — MISCELLANEOUS
Message   Recorded as Task   Date: 08/22/2017 01:39 PM, Created By: Clarence Boyce   Task Name: Call Patient with results   Assigned To: Clarence Boyce   Regarding Patient: Ruthie Sue, Status: Active   Comment:    John Sherwood - 22 Aug 2017 1:39 PM     Patient Phone: [phone]    call pt- right kidney is functioning poorly- keep f/u   Mandi Moore - 24 Aug 2017 9:48 AM     TASK EDITED  Pt notified  Active Problems    1  Advance directive declined by patient (V49 89) (Z78 9)   2  Alkaline phosphatase elevation (790 5) (R74 8)   3  Colon cancer screening (V76 51) (Z12 11)   4  Elevated serum protein level (790 99) (R77 9)   5  Encounter for screening mammogram for high-risk patient (V76 11) (Z12 31)   6  History of Hodgkin's disease (V10 72) (Z85 71)   7  Hyperlipidemia (272 4) (E78 5)   8  Hypertension (401 9) (I10)   9  Hypothyroidism (244 9) (E03 9)   10  Melanoma of skin of trunk (172 5) (C43 59)   11  Mitral regurgitation (424 0) (I34 0)   12  Nephrolithiasis (592 0) (N20 0)   13  Osteoporosis (733 00) (M81 0)   14  Pulmonary artery hypertension (416 8) (I27 2)   15  Retroperitoneal mass (789 30) (R19 00)   16  Screening for genitourinary condition (V81 6) (Z13 89)   17  Vitamin D deficiency (268 9) (E55 9)    Current Meds   1  Aspirin 81 MG TABS; Therapy: (Recorded:12Egx2652) to Recorded   2  Bystolic 5 MG Oral Tablet; take 1 tablet by mouth every day; Therapy: 72LUI9984 to ()  Requested for: 28Hnf2108; Last   Rx:12Nbl0049 Ordered   3  Claritin CAPS; Therapy: (Recorded:37Sys7268) to Recorded   4  Levothyroxine Sodium 75 MCG Oral Tablet; take 1 tablet by mouth daily; Therapy: 74OMG3514 to (Evaluate:81Izx2708)  Requested for: 37Jwk8997; Last   Rx:38Zpu7809 Ordered   5  Simvastatin 40 MG Oral Tablet (Zocor); take 1 tablet by mouth every day; Therapy: 87PLN3980 to (Evaluate:39Kli2887)  Requested for: 94LZR0921; Last   Rx:08Owl8180 Ordered   6   Vitamin D TABS; Therapy: (Recorded:77Hfd0725) to Recorded    Allergies    1  Hydrocodone-Acetaminophen CAPS   2  Bactrim   3  Ciprofloxacin-Ciproflox HCl ER TB24    4   Grass    Signatures   Electronically signed by : Inge Gil, ; Aug 24 2017  9:49AM EST                       (Author)

## 2018-01-11 ENCOUNTER — GENERIC CONVERSION - ENCOUNTER (OUTPATIENT)
Dept: SURGICAL ONCOLOGY | Facility: CLINIC | Age: 68
End: 2018-01-11

## 2018-01-11 NOTE — MISCELLANEOUS
Message  Post-procedure call  Pt reports that she was nauseous and dizzy after taking vicodin yesterday; I added it to her chart as an allergy  Otherwise, pt has no complaints  Active Problems    1  Alkaline phosphatase elevation (790 5) (R74 8)   2  Atypical mole (709 00) (L81 9)   3  History of Hodgkin's disease (V10 72) (Z85 71)   4  Hyperlipidemia (272 4) (E78 5)   5  Hypertension (401 9) (I10)   6  Hypothyroidism (244 9) (E03 9)   7  Malignant neoplasm of breast (174 9) (C50 919)   8  History of Malignant neoplasm of lower-outer quadrant of left female breast (174 5)   (C50 512)   9  Melanoma of skin of trunk (172 5) (C43 59)   10  Mitral regurgitation (424 0) (I34 0)   11  Osteoporosis (733 00) (M81 0)   12  Pulmonary artery hypertension (416 8) (I27 2)   13  Skin lesion (709 9) (L98 9)   14  Vitamin D deficiency (268 9) (E55 9)    Current Meds   1  Aspirin 81 MG Oral Tablet; Therapy: (Recorded:83Sds1443) to Recorded   2  Bystolic 5 MG Oral Tablet; take 1 tablet by mouth every day; Therapy: 11CHU6022 to (Evaluate:55Uhw5153)  Requested for: 51Htf2643; Last   Rx:93Nkr9461 Ordered   3  Hydrocodone-Acetaminophen 5-325 MG Oral Tablet (Norco); TAKE 1 TABLET EVERY 4   TO 6 HOURS AS NEEDED; Therapy: 67LBB7619 to (Evaluate:19Mar2016); Last Rx:14Mar2016 Ordered   4  Levothyroxine Sodium 75 MCG Oral Tablet; take 1 tablet by mouth daily; Therapy: 56KDG1051 to (Evaluate:29Cpl8990)  Requested for: 22Xjv5472; Last   Rx:37Jru3295 Ordered   5  LORazepam 1 MG Oral Tablet; Take one table one hour prior to procedure  May repeat if   needed; Therapy: 60GQD5533 to (Last Rx:11Mar2016) Ordered   6  Simvastatin 40 MG Oral Tablet (Zocor); take 1 tablet by mouth once daily; Therapy: 93RLG3605 to (Evaluate:15Oct2016)  Requested for: 59QOI0061; Last   Rx:21Oct2015 Ordered   7  Vitamin D TABS; Therapy: (Recorded:29Lgk4914) to Recorded    Allergies    1   Ciprofloxacin-Ciproflox HCl ER TB24    Signatures Electronically signed by :  Glenn Friedman, ; Mar 25 2016  1:07PM EST                       (Author)

## 2018-01-12 VITALS
WEIGHT: 166 LBS | DIASTOLIC BLOOD PRESSURE: 62 MMHG | SYSTOLIC BLOOD PRESSURE: 118 MMHG | HEIGHT: 62 IN | BODY MASS INDEX: 30.55 KG/M2

## 2018-01-13 VITALS
OXYGEN SATURATION: 98 % | DIASTOLIC BLOOD PRESSURE: 70 MMHG | TEMPERATURE: 97.9 F | BODY MASS INDEX: 30.36 KG/M2 | WEIGHT: 165 LBS | RESPIRATION RATE: 16 BRPM | HEIGHT: 62 IN | SYSTOLIC BLOOD PRESSURE: 128 MMHG | HEART RATE: 93 BPM

## 2018-01-13 VITALS
RESPIRATION RATE: 18 BRPM | BODY MASS INDEX: 29.74 KG/M2 | HEART RATE: 84 BPM | WEIGHT: 161.6 LBS | HEIGHT: 62 IN | TEMPERATURE: 97.1 F | SYSTOLIC BLOOD PRESSURE: 130 MMHG | DIASTOLIC BLOOD PRESSURE: 74 MMHG

## 2018-01-13 VITALS
OXYGEN SATURATION: 96 % | WEIGHT: 168 LBS | RESPIRATION RATE: 15 BRPM | SYSTOLIC BLOOD PRESSURE: 146 MMHG | TEMPERATURE: 98.3 F | DIASTOLIC BLOOD PRESSURE: 92 MMHG | HEIGHT: 62 IN | HEART RATE: 106 BPM | BODY MASS INDEX: 30.91 KG/M2

## 2018-01-13 NOTE — RESULT NOTES
Verified Results  * NM KIDNEY W FLOW AND FUNCTION [id number] 10:13AM Levell Guise Order Number: [id number]    - Patient Instructions: To schedule this appointment, please contact Central Scheduling at [phone]  Test Name Result Flag Reference   NM KIDNEY W FLOW AND FUNCTION (Report)     RENAL SCAN      INDICATION: History of extensive renal calculus with large right staghorn calculus noted on recent CT complex lesion noted within the lower pole right kidney     COMPARISON:  CT December 27, 2016     TECHNIQUE:    The study was obtained following the intravenous administration of 10 0 mCi Tc-99m MAG-3  Posterior, dynamic flow images were obtained at one minute intervals for thirty minutes  Static, posterior, pre and post void images were then obtained  FINDINGS:     PERFUSION:     Left kidney: 78%   Right kidney: 22%   Perfusion images of the kidneys demonstrated delayed perfusion of both kidneys relative to the spleen  There is marked delayed perfusion to the right kidney  UPTAKE/EXCRETION:     Time to 1/2 Peak:   Left kidney: 1 3 minutes   Right kidney: 1 3 minutes   The right kidney demonstrates only minimal function within the mid to lower pole region  The left kidney demonstrated a normal cortical transit time of 2 to 3 m  Time to Peak:   Left kidney: 4 9 minutes   Right kidney: 6 9 minutes         Peak to 1/2 Peak:   Left kidney: 16 minutes   Right kidney: 37 minutes         Split renal Function:   Left kidney: 83%   Right kidney: 17%     The dynamic images continue to demonstrate clearance of the radiotracer from the renal parenchyma on the left with mild persistent activity in the renal pelvis on the right  IMPRESSION:     1  Abnormal delayed perfusion and diminished function within the right kidney with split function measuring only 17% on the right and 83% on the left         Workstation performed: [id number]     Signed by:   Fabian Mackay MD   8/15/17

## 2018-01-14 VITALS
RESPIRATION RATE: 16 BRPM | WEIGHT: 165 LBS | TEMPERATURE: 96.5 F | HEIGHT: 62 IN | DIASTOLIC BLOOD PRESSURE: 76 MMHG | BODY MASS INDEX: 30.36 KG/M2 | HEART RATE: 78 BPM | SYSTOLIC BLOOD PRESSURE: 128 MMHG

## 2018-01-14 VITALS
SYSTOLIC BLOOD PRESSURE: 126 MMHG | HEART RATE: 95 BPM | DIASTOLIC BLOOD PRESSURE: 72 MMHG | RESPIRATION RATE: 16 BRPM | WEIGHT: 165.31 LBS | BODY MASS INDEX: 30.42 KG/M2 | TEMPERATURE: 96.8 F | OXYGEN SATURATION: 92 % | HEIGHT: 62 IN

## 2018-01-16 NOTE — RESULT NOTES
Discussion/Summary   Maribel Stanton TSH slightly out of range  no changes in Levothyroxine dose at this time  repeat TSH 6 months  Verified Results  (1) LIPID PANEL, FASTING [id number] 09:10AM [facility]     Test Name Result Flag Reference   CHOLESTEROL 171 mg/dL     HDL,DIRECT 55 mg/dL  40-60   Specimen collection should occur prior to Metamizole administration due to the potential for falsley depressed results  LDL CHOLESTEROL CALCULATED 98 mg/dL  0-100   Triglyceride:        Normal ??? ??? ??? ??? ??? ??? ??? <150 mg/dl   ??? ??? ???Borderline High ??? ??? 150-199 mg/dl   ??? ??? ? ?? High ??? ??? ??? ??? ??? ??? ??? 200-499 mg/dl   ??? ??? ? ??Very High ??? ??? ??? ??? ??? >499 mg/dl      Cholesterol:       Desirable ??? ??? ??? ??? <200 mg/dl   ??? ??? Borderline High ??? 200-239 mg/dl   ??? ??? High ??? ??? ??? ??? ??? ??? >239 mg/dl      HDL Cholesterol:       High ??? ???>59 mg/dL   ??? ??? Low ??? ??? <41 mg/dL      This screening LDL is a calculated result  It does not have the accuracy of the Direct Measured LDL in the monitoring of patients with hyperlipidemia and/or statin therapy  Direct Measure LDL (QYH847) must be ordered separately in these patients  TRIGLYCERIDES 92 mg/dL  <=150   Specimen collection should occur prior to N-Acetylcysteine or Metamizole administration due to the potential for falsely depressed results  (1) TSH WITH FT4 REFLEX [id number] 09:10AM [facility]     Test Name Result Flag Reference   TSH 3 783 uIU/mL H 0 358-3 740   A FT4 has been ordered      Patients undergoing fluorescein dye angiography may retain small amounts of fluorescein in the body for 48-72 hours post procedure  Samples containing fluorescein can produce falsely depressed TSH values  If the patient had this procedure,a specimen should be resubmitted post fluorescein clearance            The recommended reference ranges for TSH during pregnancy are as follows:  First trimester 0 1 to 2 5 uIU/mL  Second trimester  0 2 to 3 0 uIU/mL  Third trimester 0 3 to 3 0 uIU/m   T4,FREE 1 08 ng/dL  0 76-1 46   Specimen collection should occur prior to Sulfasalazine administration due to the potential for falsely elevated results

## 2018-01-17 NOTE — RESULT NOTES
Leon Whittaker I have enclosed a copy of your recent labs  continue with current medications        Results/Data     (1) LIPID PANEL, FASTING   CHOLESTEROL: 148 mg/dL Reference Range   TRIGLYCERIDES: 107 mg/dL Reference Range <=150  HDL,DIRECT: 51 mg/dL Reference Range 40-60  LDL CHOLESTEROL CALCULATED: 76 mg/dL Reference Range 0-100  (1) TSH   TSH: 1 520 uIU/mL Reference Range 0 358-3 740    Signatures   Electronically signed by : GEORGIA Balderrama ; Dec 12 2016  5:27PM EST                       (Author)

## 2018-01-17 NOTE — RESULT NOTES
Verified Results  * XR ABDOMEN 1 VIEW KUB [id number] 10:16AM Fadi Rausch     Test Name Result Flag Reference   XR ABDOMEN 1 VIEW KUB (Report)     ABDOMEN     INDICATION: History of previous calculus  COMPARISON: CT December 27, 2016     VIEWS: AP supine     IMAGES: 2     FINDINGS:     There are extensive renal calculi within the right kidney filling majority of the pelvis  The largest calcification appears bilobed measuring 3 2 x 1 9 cm  The left kidney also demonstrates cluster of calcification in the mid upper pole region measuring    1 4 x 0 9 cm  No ureteral calculi identified  Nonobstructive bowel gas pattern  Extensive surgical clips are seen throughout the abdomen and pelvis  IMPRESSION:     Extensive bilateral renal calculi are present         Workstation performed: [id number]     Signed by:   Mira Pearl MD   7/10/17

## 2018-01-22 VITALS
SYSTOLIC BLOOD PRESSURE: 118 MMHG | HEIGHT: 61 IN | DIASTOLIC BLOOD PRESSURE: 62 MMHG | BODY MASS INDEX: 31.72 KG/M2 | WEIGHT: 168 LBS

## 2018-01-23 NOTE — PROGRESS NOTES
Assessment    1  Encounter for preventive health examination (V70 0) (Z00 00)   2  Hypertension (401 9) (I10)   3  Hyperlipidemia (272 4) (E78 5)   4  Hypothyroidism (244 9) (E03 9)   5  Osteoporosis (733 00) (M81 0)   6  Vitamin D deficiency (268 9) (E55 9)   7  Alkaline phosphatase elevation (790 5) (R74 8)   8  Melanoma of skin of trunk (172 5) (C43 59)   9  Mitral regurgitation (424 0) (I34 0)   10  Pulmonary artery hypertension (416 8) (I27 21)    Plan  Hyperlipidemia, Hypothyroidism    · (1) LIPID PANEL, FASTING; Status:Active; Requested for:74Ayl2166;    · (1) TSH WITH FT4 REFLEX; Status:Active; Requested for:36Jgs6111;   Hyperlipidemia, Hypothyroidism, Vitamin D deficiency    · (1) VITAMIN D 25-HYDROXY; Status:Active; Requested for:16Apr2018;   Hypertension    · Bystolic 5 MG Oral Tablet; take 1 tablet by mouth every day    Discussion/Summary    Continue with current medications  No changes in thyroid dosage at this time  Repeat labs prior to OV 6 months  follow up with various specialists including Urology  Stay hydrated  Avoid NSAIDs  Up-to-date with flu vaccine, pneumococcal vaccine and Zostavax  She is not interested colonoscopy  Impression: Subsequent Annual Wellness Visit, with preventive exam as well as age and risk appropriate counseling completed  Cardiovascular screening and counseling: screening not indicated, counseling was given on maintaining a healthy diet and counseling was given on maintaining a healthy weight  Diabetes screening and counseling: screening is current  Colorectal cancer screening and counseling: the risks and benefits of screening were discussed and the patient declines screening  Breast cancer screening and counseling: screening is current and mammogram 12/2016  Cervical cancer screening and counseling: screening not indicated  Osteoporosis screening and counseling: screening is current     Abdominal aortic aneurysm screening and counseling: screening not indicated  Glaucoma screening and counseling: screening is current  Immunizations: influenza vaccine is due today, the lifetime pneumococcal vaccine has been completed and Zostavax vaccination up to date  Advice and education were given regarding increasing physical activity, nutrition (non-diabetic), skin self-exam, sunscreen use and weight loss  She was referred to hematology/oncology, urology and surgical oncology  Patient Discussion: follow-up visit needed in 6 montths  Possible side effects of new medications were reviewed with the patient/guardian today  The treatment plan was reviewed with the patient/guardian  The patient/guardian understands and agrees with the treatment plan      History of Present Illness  HPI: Follow-up visit follow-up visit  Medications reviewed  Hyperlipidemia on Simvastatin 40 mg daily  Lipid profile 08/2017 Cholesterol 171  Triglycerides 92  HDL 55  LDL 98  LFTs normal except for alkaline phosphatase 119 decreased from 122    Albumin low at 3 4  total protein 8 2  SPEP hypergammaglobulinemia  No monoclonal bands  UPEP no monoclonal bands  Hypertension blood pressure stable on Bystolic 5 mg daily  08/2017 creatinine 1 24  Electrolytes normal  FBS 90 Hgb 12 7  Welcome to [employer] and Wellness Visits: The patient is being seen for the subsequent annual wellness visit  Medicare Screening and Risk Factors   Hospitalizations: she has been previously hospitalizied  Once per lifetime medicare screening tests: ECG  Medicare Screening Tests Risk Questions   Abdominal aortic aneurysm risk assessment: none indicated  Osteoporosis risk assessment: , female gender and over 48years of age  Drug and Alcohol Use: The patient is a former cigarette smoker  The patient reports occasional alcohol use  Diet and Physical Activity: Current diet includes well balanced meals  Exercise: walking     Mood Disorder and Cognitive Impairment Screening: She denies feeling down, depressed, or hopeless over the past two weeks  She denies feeling little interest or pleasure in doing things over the past two weeks  Cognitive impairment screening: denies difficulty learning/retaining new information, denies difficulty handling complex tasks, denies difficulty with reasoning, denies difficulty with spatial ability and orientation, denies difficulty with language and denies difficulty with behavior  Functional Ability/Level of Safety: Hearing is normal bilaterally  She denies hearing difficulties  The patient is currently able to do activities of daily living without limitations, able to do instrumental activities of daily living without limitations, able to participate in social activities without limitations and able to drive without limitations  Fall risk factors: The patient fell 0 times in the past 12 months  Injury History: no polypharmacy, no mobility impairment, no antidepressant use, no deconditioning, no postural hypotension, no sedative use, no visual impairment, no urinary incontinence, no antihypertensive use, no cognitive impairment and previous fall  Advance Directives: Advance directives: durable power of  for health care directives, but no living will  Co-Managers and Medical Equipment/Suppliers: See Patient Care Team      Patient Care Team    Care Team Member Role Specialty Office Number   [id number] [facility] Specialist Hematology Oncology [phone]   Lisy Barrientos MD  Surgical Oncology [phone]   Ashley Osuna MD  Urology [phone]     Review of Systems    Constitutional: no recent weight gain and no recent weight loss  Eyes: no eyesight problems  ENT: no earache, no sore throat, no nasal discharge and no hoarseness  Cardiovascular: no chest pain, no palpitations and no lower extremity edema  Respiratory: no cough, no orthopnea, no wheezing and no shortness of breath during exertion  Gastrointestinal:  no prior colonoscopy   repeat CT scan abdomen 12/2016 + cholelithiasis asymptomatic  Hernia left flank containing fat and nonobstructive bowel  Small subscapular area of enhancement right hepatic lobe  Stable retroperitoneal nodule  hiatal hernia  Stable nephrolithiasis and renal scarring/complex renal cystic mass on the right , but no abdominal pain, no nausea, no vomiting, no constipation, no diarrhea and no blood in stools  Genitourinary: see GI ROS  history of nephrolithiasis including staghorn calculi  08/2017 nuclear renal flow scan abnormal delayed perfusion and diminished functioning within the right kidney with a split function measuring only 17% on the right  83% on the left , but no dysuria and no incontinence  Musculoskeletal: no arthralgias and no myalgias  Integumentary: history of melanoma right flank  biopsy left arm 11/2016 SCC in situ  Neurological: no falls  , but no headache and no dizziness  Endocrine: Hypothyroidism on Levothyroxine 75 Âµg daily  TSH 08/2017 3 783  Hematologic/Lymphatic: remote history of Hodgkin's disease  breast CA s/p left mastectomy with chemotherapy  she completed a course of Arimidex in 2011  followed by oncology  Active Problems    1  Advance directive declined by patient (V49 89) (Z78 9)   2  Alkaline phosphatase elevation (790 5) (R74 8)   3  Elevated serum protein level (790 99) (R77 9)   4  History of Hodgkin's disease (V10 72) (Z85 71)   5  History of left breast cancer (V10 3) (Z85 3)   6  Hyperlipidemia (272 4) (E78 5)   7  Hypertension (401 9) (I10)   8  Hypothyroidism (244 9) (E03 9)   9  Melanoma of skin of trunk (172 5) (C43 59)   10  Mitral regurgitation (424 0) (I34 0)   11  Osteoporosis (733 00) (M81 0)   12  Pulmonary artery hypertension (416 8) (I27 21)   13  Retroperitoneal mass (789 30) (R19 00)   14  Screening for genitourinary condition (V81 6) (Z13 89)   15   Vitamin D deficiency (268 9) (E55 9)    Past Medical History    · History of Age At First Period 13 Years Old (Menarche)   · History of Age At First Pregnancy 19 Years Old   · History of Cervical Cancer (V10 41)   · History of Ganglion (727 43) (M67 40)   · History of osteopenia (V13 59) (Z87 39)   · History of paroxysmal supraventricular tachycardia (V12 59) (Z86 79)   · History of Previously Pregnant With 20  [employer]iveries   · History of Shoulder Impingement (726 2)    Surgical History    · History of Appendectomy   · History of Breast Surgery Mastectomy   · History of Breast Surgery Reconstruction   · History of Chemotherapeutics (V87 41)   · History of Hysterectomy   · History of Kidney Surgery   · History of Radiation Therapy   · History of [place] Lymph Node Biopsy   · History of Splenectomy    Family History  Mother    · Family history of Leukemia (V16 6)  Paternal Grandmother    · Family history of Colon Cancer (V16 0)  Family History    · Family history of Reported Family History Of Heart Disease    The family history was reviewed and updated today  Social History    · Advance directive declined by patient (V49 89) (Z78 9)   · Being A Social Drinker   · Currently sexually active   · Drinks coffee   · Exercise habits   · Former smoker (N49 26) (J92 312)   · Denied: History of Uses drugs daily  The social history was reviewed and updated today  Current Meds   1  Aspirin 81 MG TABS; Therapy: (Recorded:56Kpe7788) to Recorded   2  Bystolic 5 MG Oral Tablet; take 1 tablet by mouth every day; Therapy: 42PFL4280 to ()  Requested for: 78Qsv7287; Last   Rx:79Int0175 Ordered   3  Claritin CAPS; Therapy: (Recorded:90Lfc2928) to Recorded   4  Levothyroxine Sodium 75 MCG Oral Tablet; take 1 tablet by mouth daily; Therapy: 83WEK5150 to (Evaluate:84Ogu2047)  Requested for: 75Fpw8143; Last   Rx:38Ghr9070 Ordered   5  Simvastatin 40 MG Oral Tablet; take 1 tablet by mouth every day; Therapy: 97FEF0967 to (Evaluate:71Awz8762)  Requested for: 74Nbl7141; Last   Rx:21Yzl3498 Ordered   6   Vitamin D TABS; Therapy: (Recorded:40Tjn1746) to Recorded    Allergies    1  Hydrocodone-Acetaminophen CAPS   2  Bactrim   3  Ciprofloxacin-Ciproflox HCl ER TB24    4  Grass    Immunizations   ** Printed in Appendix #1 below  Vitals  Signs    Temperature: 96 5 F  Heart Rate: 78  Respiration: 16  Systolic: 389  Diastolic: 76  Height: 5 ft 2 in  Weight: 165 lb   BMI Calculated: 30 18  BSA Calculated: 1 76    Physical Exam    Constitutional   General appearance: No acute distress, well appearing and well nourished  Eyes   Conjunctiva and lids: No swelling, erythema or discharge  Pupils and irises: Equal, round, reactive to light  Ophthalmoscopic examination: Normal fundi and optic discs  Ears, Nose, Mouth, and Throat   Otoscopic examination: Tympanic membranes translucent with normal light reflex  Canals patent without erythema  Oropharynx: Normal with no erythema, edema, exudate or lesions  Neck   Neck: Supple, symmetric, trachea midline, no masses  Thyroid: Normal, no thyromegaly  There were no thyroid nodules  Pulmonary   Auscultation of lungs: Clear to auscultation  Cardiovascular   Auscultation of heart: Normal rate and rhythm, normal S1 and S2, no murmurs  Heart sounds: no gallop heard  Carotid pulses: 2+ bilaterally  no bruit heard over the right carotid and no bruit heard over the left carotid  Examination of extremities for edema and/or varicosities: Normal     Lymphatic   Palpation of lymph nodes in neck: No lymphadenopathy  no anterior cervical node enlargement, no posterior cervical node enlargement, no submandibular node enlargement and no supraclavicular node enlargement  Musculoskeletal   Gait and station: Normal     Skin   Skin and subcutaneous tissue: Normal without rashes or lesions  Psychiatric   Recent and remote memory: Intact      Mood and affect: Normal        Results/Data  PHQ-2 Adult Depression Screening 64USL3405 01:14PM User, [employer]     Test Name Result Flag Reference   PHQ-2 Adult Depression Score 0     Over the last two weeks, how often have you been bothered by any of the following problems? Little interest or pleasure in doing things: Not at all - 0  Feeling down, depressed, or hopeless: Not at all - 0   PHQ-2 Adult Depression Screening Negative       * NM KIDNEY W FLOW AND FUNCTION 35Lnj9119 10:13AM Pamjuanita Ortiz Order Number: [id number]    - Patient Instructions: To schedule this appointment, please contact Central Scheduling at [phone]  Test Name Result Flag Reference   NM KIDNEY W FLOW AND FUNCTION (Report)     RENAL SCAN      INDICATION: History of extensive renal calculus with large right staghorn calculus noted on recent CT complex lesion noted within the lower pole right kidney     COMPARISON:  CT December 27, 2016     TECHNIQUE:    The study was obtained following the intravenous administration of 10 0 mCi Tc-99m MAG-3  Posterior, dynamic flow images were obtained at one minute intervals for thirty minutes  Static, posterior, pre and post void images were then obtained  FINDINGS:     PERFUSION:     Left kidney: 78%   Right kidney: 22%   Perfusion images of the kidneys demonstrated delayed perfusion of both kidneys relative to the spleen  There is marked delayed perfusion to the right kidney  UPTAKE/EXCRETION:     Time to 1/2 Peak:   Left kidney: 1 3 minutes   Right kidney: 1 3 minutes   The right kidney demonstrates only minimal function within the mid to lower pole region  The left kidney demonstrated a normal cortical transit time of 2 to 3 m       Time to Peak:   Left kidney: 4 9 minutes   Right kidney: 6 9 minutes         Peak to 1/2 Peak:   Left kidney: 16 minutes   Right kidney: 37 minutes         Split renal Function:   Left kidney: 83%   Right kidney: 17%     The dynamic images continue to demonstrate clearance of the radiotracer from the renal parenchyma on the left with mild persistent activity in the renal pelvis on the right  IMPRESSION:     1  Abnormal delayed perfusion and diminished function within the right kidney with split function measuring only 17% on the right and 83% on the left  Workstation performed: [id number]     Signed by:   Christina Monteiro MD   8/15/17     (1) CBC/PLT/DIFF 20GZA7221 09:10AM Proothi, Myrtie Merlin Order Number: [id number]     Test Name Result Flag Reference   WBC COUNT 7 10 Thousand/uL  4 31-10 16   RBC COUNT 4 62 Million/uL  3 81-5 12   HEMOGLOBIN 12 7 g/dL  11 5-15 4   HEMATOCRIT 40 7 %  34 8-46  1   MCV 88 fL  82-98   MCH 27 5 pg  26 8-34 3   MCHC 31 2 g/dL L 31 4-37 4   RDW 15 0 %  11 6-15 1   MPV 10 1 fL  8 9-12 7   PLATELET COUNT 512 Thousands/uL  149-390   NEUTROPHILS RELATIVE PERCENT 53 %  43-75   LYMPHOCYTES RELATIVE PERCENT 23 %  14-44   MONOCYTES RELATIVE PERCENT 11 %  4-12   EOSINOPHILS RELATIVE PERCENT 12 % H 0-6   BASOPHILS RELATIVE PERCENT 1 %  0-1   NEUTROPHILS ABSOLUTE COUNT 3 82 Thousands/? ??L  1 85-7 62   LYMPHOCYTES ABSOLUTE COUNT 1 60 Thousands/? ??L  0 60-4 47   MONOCYTES ABSOLUTE COUNT 0 75 Thousand/? ??L  0 17-1 22   EOSINOPHILS ABSOLUTE COUNT 0 85 Thousand/? ??L H 0 00-0 61   BASOPHILS ABSOLUTE COUNT 0 08 Thousands/? ??L  0 00-0 10   - Patient Instructions: This bloodwork is non-fasting  Please drink two glasses of water morning of bloodwork       (1) COMPREHENSIVE METABOLIC PANEL 50OBW6077 00:02NV Anh Clark Order Number: [id number]     Test Name Result Flag Reference   SODIUM 141 mmol/L  136-145   POTASSIUM 4 5 mmol/L  3 5-5 3   CHLORIDE 105 mmol/L  100-108   CARBON DIOXIDE 28 mmol/L  21-32   ANION GAP (CALC) 8 mmol/L  4-13   BLOOD UREA NITROGEN 26 mg/dL H 5-25   CREATININE 1 24 mg/dL  0 60-1 30   Standardized to IDMS reference method   CALCIUM 9 3 mg/dL  8 3-10 1   BILI, TOTAL 0 40 mg/dL  0 20-1 00   ALK PHOSPHATAS 119 U/L H    ALT (SGPT) 23 U/L  12-78   Specimen collection should occur prior to Sulfasalazine administration due to the potential for falsely depressed results  AST(SGOT) 21 U/L  5-45   Specimen collection should occur prior to Sulfasalazine administration due to the potential for falsely depressed results  ALBUMIN 3 4 g/dL L 3 5-5 0   TOTAL PROTEIN 8 2 g/dL  6 4-8 2   eGFR 44 ml/min/1 73sq m     [facility] Disease Education Program recommendations are as follows:  GFR calculation is accurate only with a steady state creatinine  Chronic Kidney disease less than 60 ml/min/1 73 sq  meters  Kidney failure less than 15 ml/min/1 73 sq  meters  GLUCOSE FASTING 90 mg/dL  65-99   Specimen collection should occur prior to Sulfasalazine administration due to the potential for falsely depressed results  Specimen collection should occur prior to Sulfapyridine administration due to the potential for falsely elevated results  (1) CA 27 29 87Bpp8557 09:10AM Elke Rebecca Null Order Number: [id number]     Test Name Result Flag Reference   CA 27 29(NEW) 17 2 U/mL  0 0-42 3     (1) LIPID PANEL, FASTING 73Nnb4263 09:10AM Antoine Nevarez     Test Name Result Flag Reference   CHOLESTEROL 171 mg/dL     HDL,DIRECT 55 mg/dL  40-60   Specimen collection should occur prior to Metamizole administration due to the potential for falsley depressed results  LDL CHOLESTEROL CALCULATED 98 mg/dL  0-100   Triglyceride:        Normal ??? ??? ??? ??? ??? ??? ??? <150 mg/dl   ??? ??? ???Borderline High ??? ??? 150-199 mg/dl   ??? ??? ? ?? High ??? ??? ??? ??? ??? ??? ??? 200-499 mg/dl   ??? ??? ? ??Very High ??? ??? ??? ??? ??? >499 mg/dl      Cholesterol:       Desirable ??? ??? ??? ??? <200 mg/dl   ??? ??? Borderline High ??? 200-239 mg/dl   ??? ??? High ??? ??? ??? ??? ??? ??? >239 mg/dl      HDL Cholesterol:       High ??? ???>59 mg/dL   ??? ??? Low ??? ??? <41 mg/dL      This screening LDL is a calculated result     It does not have the accuracy of the Direct Measured LDL in the monitoring of patients with hyperlipidemia and/or statin therapy  Direct Measure LDL (CYF076) must be ordered separately in these patients  TRIGLYCERIDES 92 mg/dL  <=150   Specimen collection should occur prior to N-Acetylcysteine or Metamizole administration due to the potential for falsely depressed results  (1) TSH WITH FT4 REFLEX 81Tub1434 09:10AM [employer]     Test Name Result Flag Reference   TSH 3 783 uIU/mL H 0 358-3 740   A FT4 has been ordered      Patients undergoing fluorescein dye angiography may retain small amounts of fluorescein in the body for 48-72 hours post procedure  Samples containing fluorescein can produce falsely depressed TSH values  If the patient had this procedure,a specimen should be resubmitted post fluorescein clearance  The recommended reference ranges for TSH during pregnancy are as follows:  First trimester 0 1 to 2 5 uIU/mL  Second trimester  0 2 to 3 0 uIU/mL  Third trimester 0 3 to 3 0 uIU/m   T4,FREE 1 08 ng/dL  0 76-1 46   Specimen collection should occur prior to Sulfasalazine administration due to the potential for falsely elevated results  (1) PROTEIN ELECTRO, SERUM 72Psu8016 09:10AM [employer]     Test Name Result Flag Reference   A/G RATIO 1 04 L 1 10-1 80   Albumin 51 1 % L 52 0-65 0   Albumin Conc  3 88 g/dl  3 50-5 00   Alpha 1 Conc  0 32 g/dL  0 10-0 40   ALPHA 1 4 2 %  2 5-5 0   Alpha 2 Conc  0 82 g/dL  0 40-1 20   ALPHA 2 10 8 %  7 0-13 0   Beta 1 Conc  0 46 g/dL  0 40-0 80   BETA-1 6 1 %  5 0-13 0   Beta 2 Conc 0 44 g/dL  0 20-0 50   BETA-2 5 8 %  2 0-8 0   Gamma Conc 1 67 g/dL H 0 50-1 60   GAMMA GLOBULIN 22 0 %  12 0-22 0   Interpretation      The serum total protein and albumin are within normal limits  The serum protein electrophoresis shows hypergammaglobulinemia  No monoclonal bands noted  Reviewed by: Valerie Bahena MD ([id number]) **Electronic Signature**   TOTAL PROTEIN   7 6 g/dL  6 4-8 2     (1) PROTEIN ELECTRO, URINE 69GCJ0000 09:10AM [employer]   TW Order Number: [id number]     Test Name Result Flag Reference   ALPHA 1 URINE 2 8 %     ALPHA 2 URINE 11 6 %     BETA URINE 11 7 %     GAMMA GLOBULIN URINE 21 9 %     UPEP INTERPRETATION      The urine total protein is increased  The urine protein electrophoresis shows non-selective proteinuria  No monoclonal bands noted  Reviewed by: Frank Novoa MD ([id number]) **Electronic Signature**   ALBUMIN URINE ELP 52 0 %     URINE PROTEIN 75 0 mg/dL H 2 0-17 5     (1) URINE CULTURE 11Aug2017 07:45PM Lisandro Ortiz Order Number: [id number]     Test Name Result Flag Reference   CLINICAL REPORT (Report)     Test:        Urine culture  Specimen Type:   Urine  Specimen Date:   8/11/2017 7:45 PM  Result Date:    8/12/2017 4:06 PM  Result Status:   Final result  Resulting Lab:   [facility] [address] [place] [address]            Tel: [phone]      CULTURE                                       ------------------                                   60,000-69,000 cfu/ml Mixed Contaminants X4     *** Suggest repeat specimen ***     * XR ABDOMEN 1 VIEW KUB 12Jcz9450 10:16AM Mariluz Matthew     Test Name Result Flag Reference   XR ABDOMEN 1 VIEW KUB (Report)     ABDOMEN     INDICATION: History of previous calculus  COMPARISON: CT December 27, 2016     VIEWS: AP supine     IMAGES: 2     FINDINGS:     There are extensive renal calculi within the right kidney filling majority of the pelvis  The largest calcification appears bilobed measuring 3 2 x 1 9 cm  The left kidney also demonstrates cluster of calcification in the mid upper pole region measuring    1 4 x 0 9 cm  No ureteral calculi identified  Nonobstructive bowel gas pattern  Extensive surgical clips are seen throughout the abdomen and pelvis  IMPRESSION:     Extensive bilateral renal calculi are present         Workstation performed: [id number]     Signed by:   Pacheco Argueta MD   7/10/17 (1) LD (LDH) 91Pbx5011 10:22AM Marika Pluck   TW Order Number: [id number]     Test Name Result Flag Reference   LD (LDH) 166 U/L       (1) SED RATE [id number] 10:22AM Marika Pluck   TW Order Number: [id number]     Test Name Result Flag Reference   SED RATE 36 mm/hour H 0-20     Future Appointments    Date/Time Provider Specialty Site   2018 01:00 PM Ingrid Mohs, M D  Surgical Oncology [facility]   2017 11:45 AM Lydia Da Silva MD Urology [facility] [address] [facility]   2018 11:00 AM GE[place]GIA Mchugh   [facility] [address] [facility]   2018 10:00 AM Omkar Bedoya MD Hematology Oncology [facility]     Signatures   Electronically signed by : GE[place]IA Ayers ; 2017  2:41PM EST                       (Author)    Appendix #1     Patient: Deni Fishman ; : 1950; MRN: [id number]      1 2 3 4 5    Influenza  10-Nov-2012  (62y) 18-Oct-2013  (63y) 01-Oct-2014  (64y) 21-Oct-2015  (65y) 2016  (66y)    PCV  21-Oct-2015  (65y)        PPSV  2013  (63y) 2013  (63y)

## 2018-01-24 VITALS
BODY MASS INDEX: 31.34 KG/M2 | RESPIRATION RATE: 16 BRPM | HEIGHT: 61 IN | HEART RATE: 101 BPM | TEMPERATURE: 98.3 F | SYSTOLIC BLOOD PRESSURE: 126 MMHG | WEIGHT: 166 LBS | OXYGEN SATURATION: 98 % | DIASTOLIC BLOOD PRESSURE: 78 MMHG

## 2018-03-05 ENCOUNTER — HOSPITAL ENCOUNTER (OUTPATIENT)
Dept: ULTRASOUND IMAGING | Facility: HOSPITAL | Age: 68
Discharge: HOME | End: 2018-03-05
Attending: UROLOGY
Payer: MEDICARE

## 2018-03-05 DIAGNOSIS — N20.0 KIDNEY STONE: ICD-10-CM

## 2018-03-05 PROCEDURE — 76770 US EXAM ABDO BACK WALL COMP: CPT

## 2018-03-08 DIAGNOSIS — N20.0 CALCULUS OF KIDNEY: ICD-10-CM

## 2018-03-08 RX ORDER — NEBIVOLOL 5 MG/1
1 TABLET ORAL DAILY
COMMUNITY
Start: 2011-06-30 | End: 2018-06-26 | Stop reason: SDUPTHER

## 2018-03-08 RX ORDER — SIMVASTATIN 40 MG
1 TABLET ORAL DAILY
COMMUNITY
Start: 2013-02-25 | End: 2018-09-20 | Stop reason: SDUPTHER

## 2018-03-08 RX ORDER — LORATADINE 10 MG/1
CAPSULE, LIQUID FILLED ORAL AS NEEDED
COMMUNITY
End: 2020-01-01

## 2018-03-08 RX ORDER — LEVOTHYROXINE SODIUM 0.07 MG/1
1 TABLET ORAL DAILY
COMMUNITY
Start: 2012-11-27 | End: 2018-08-26 | Stop reason: SDUPTHER

## 2018-03-09 ENCOUNTER — OFFICE VISIT (OUTPATIENT)
Dept: UROLOGY | Facility: MEDICAL CENTER | Age: 68
End: 2018-03-09
Payer: MEDICARE

## 2018-03-09 VITALS
DIASTOLIC BLOOD PRESSURE: 70 MMHG | HEIGHT: 61 IN | WEIGHT: 162 LBS | BODY MASS INDEX: 30.58 KG/M2 | SYSTOLIC BLOOD PRESSURE: 118 MMHG

## 2018-03-09 DIAGNOSIS — N28.1 ACQUIRED COMPLEX RENAL CYST: ICD-10-CM

## 2018-03-09 DIAGNOSIS — N20.0 BILATERAL KIDNEY STONES: Primary | ICD-10-CM

## 2018-03-09 PROCEDURE — 99214 OFFICE O/P EST MOD 30 MIN: CPT | Performed by: UROLOGY

## 2018-03-09 NOTE — PROGRESS NOTES
Assessment/Plan:    Bilateral kidney stones  Right staghorn-the patient is asymptomatic  We discussed the natural history of staghorn calculi and their  destructive affects on kidney function and in general   She is not yet ready to proceed with treatment  She will return in 1 year  We will repeat her upper tract testing at that time  If she develops symptoms or if she desires treatment she will return sooner  Acquired complex renal cyst  Right renal complex cysts are stable and asymptomatic  We will re-evaluate in 1 year  Diagnoses and all orders for this visit:    Bilateral kidney stones    Acquired complex renal cyst    Other orders  -     aspirin 81 MG tablet; Take by mouth  -     nebivolol (BYSTOLIC) 5 mg tablet; Take 1 tablet by mouth daily  -     Loratadine (CLARITIN) 10 MG CAPS; Take by mouth  -     levothyroxine 75 mcg tablet; Take 1 tablet by mouth daily  -     simvastatin (ZOCOR) 40 mg tablet; Take 1 tablet by mouth daily  -     cholecalciferol (VITAMIN D3) 1,000 units tablet; Take by mouth          Subjective:      Patient ID: Josy Hill is a 77 y o  female  25-year-old female followed for history of kidney stones and complex right renal cysts  She has a right-sided staghorn calculus with a poorly functioning right kidney and an asymptomatic left renal stone  She notes that she has been doing well lately  She has no flank pain  She has not had any urinary infections  She feels well  She understands that the staghorn stone should be treated or the kidney removed  She is not yet ready to do this  She has no fevers or constitutional symptoms  The following portions of the patient's history were reviewed and updated as appropriate: allergies, current medications, past family history, past medical history, past social history, past surgical history and problem list     Review of Systems   Constitutional: Negative for activity change, appetite change, fatigue and fever  HENT: Negative  Eyes: Negative  Respiratory: Negative  Cardiovascular: Negative  Gastrointestinal: Negative for blood in stool, constipation, diarrhea and vomiting  Endocrine: Negative  Musculoskeletal: Negative  Skin: Negative  Allergic/Immunologic: Negative  Neurological: Negative  Hematological: Negative  Psychiatric/Behavioral: Negative  Objective:      /70   Ht 5' 1" (1 549 m)   Wt 73 5 kg (162 lb)   BMI 30 61 kg/m²          Physical Exam   Constitutional: She is oriented to person, place, and time  She appears well-developed and well-nourished  HENT:   Head: Normocephalic and atraumatic  Eyes: Conjunctivae are normal    Neck: Neck supple  Cardiovascular: Normal rate  Pulmonary/Chest: Effort normal    Abdominal: Soft  She exhibits no distension and no mass  There is no tenderness  There is no rebound and no guarding  No CVA tenderness   Neurological: She is alert and oriented to person, place, and time  Skin: Skin is warm and dry  Psychiatric: She has a normal mood and affect   Her behavior is normal  Judgment and thought content normal

## 2018-03-09 NOTE — PATIENT INSTRUCTIONS
Kidney Stones   WHAT YOU NEED TO KNOW:   What is a kidney stone? Kidney stones form in the urinary system when the water and waste in your urine are out of balance  When this happens, certain types of waste crystals separate from the urine  The crystals build up and form kidney stones  Kidney stones can be made of uric acid, calcium, phosphate, or oxalate crystals  You may have 1 or more kidney stones  What increases my risk for kidney stones? · You do not drink enough liquids (especially water) each day  · You have urinary tract infections often  · You follow a certain type of diet  For example, people who eat a diet high in meat or salt may be at higher risk for kidney stones  People who eat foods high in oxalate may also be at higher risk  Foods that are high in oxalate include nuts, chocolate, coffee, and green leafy vegetables  · You take certain medicines such as diuretics, steroids, and antacids  · A family member has had kidney stones  · You were born with a kidney or bowel disorder, or you have other medical problems such as gout  What are the signs and symptoms of kidney stones? · Pain in the middle of your back that moves across to your side or that may spread to your groin    · Nausea and vomiting    · Urge to urinate often, burning feeling when you urinate, or pink or red urine    · Tenderness in your lower back, side, or stomach  How are kidney stones diagnosed? Your healthcare provider will ask about your health, diet, and lifestyle  He may refer you to a urologist  Sohail Silverman may need tests to find out what type of kidney stones you have  Tests can show the size of your kidney stones and where they are in your urinary system  You may have one or more of the following:  · Urine tests  may show if you have blood in your urine  They may also show high amounts of the substances that form kidney stones, such as uric acid  · Blood tests  show how well your kidneys are working   They may also be used to check the levels of calcium or uric acid in your blood  · A noncontrast helical CT scan  is a type of x-ray that uses a computer to take pictures of your kidneys  Healthcare providers use the pictures to check for kidney stones or other problems  · X-rays  of your kidneys, bladder, and ureters may be done  You may be given a dye before the pictures are taken to help healthcare providers see the pictures better  You may need to have more than one x-ray  Tell the healthcare provider if you have ever had an allergic reaction to contrast dye  · An abdominal ultrasound  uses sound waves to show pictures of your abdomen on a monitor  How are kidney stones treated? · NSAIDs , such as ibuprofen, help decrease swelling, pain, and fever  This medicine is available with or without a doctor's order  NSAIDs can cause stomach bleeding or kidney problems in certain people  If you take blood thinner medicine, always ask your healthcare provider if NSAIDs are safe for you  Always read the medicine label and follow directions  · Prescription medicine  may be given  Ask how to take this medicine safely  · Medicines  to balance your electrolytes may be needed  · A procedure or surgery  to remove the kidney stones may be needed if they do not pass on their own  Your treatment will depend on the size and location of your kidney stones  How can I manage my symptoms? · Drink plenty of liquids  Your healthcare provider may tell you to drink at least 8 to 12 (eight-ounce) cups of liquids each day  This helps flush out the kidney stones when you urinate  Water is the best liquid to drink  · Strain your urine every time you go to the bathroom  Urinate through a strainer or a piece of thin cloth to catch the stones  Take the stones to your healthcare provider so they can be sent to the lab for tests  This will help your healthcare providers plan the best treatment for you             · Eat a variety of healthy foods  Healthy foods include fruits, vegetables, whole-grain breads, low-fat dairy products, beans, and fish  You may need to limit how much sodium (salt) or protein you eat  Ask for information about the best foods for you  · Exercise regularly  Your stones may pass more easily if you stay active  Ask about the best activities for you  When should I seek immediate care? · You have vomiting that is not relieved by medicine  When should I contact my healthcare provider? · You have a fever  · You have trouble passing urine  · You see blood in your urine  · You have severe pain  · You have any questions or concerns about your condition or care  CARE AGREEMENT:   You have the right to help plan your care  Learn about your health condition and how it may be treated  Discuss treatment options with your caregivers to decide what care you want to receive  You always have the right to refuse treatment  The above information is an  only  It is not intended as medical advice for individual conditions or treatments  Talk to your doctor, nurse or pharmacist before following any medical regimen to see if it is safe and effective for you  © 2017 [employer] Information is for End User's use only and may not be sold, redistributed or otherwise used for commercial purposes  All illustrations and images included in CareNotes® are the copyrighted property of A D [employer] M [employer]  or Vinay Guerrero

## 2018-03-09 NOTE — LETTER
March 9, 2018     Yahaira Gacria MD  [address] [place] [address]    Patient: Heather Joel   YOB: 1950   Date of Visit: 3/9/2018       Dear Dr Marley Sacks:    Thank you for referring Erika Taveras to me for evaluation  Below are my notes for this consultation  If you have questions, please do not hesitate to call me  I look forward to following your patient along with you  Sincerely,        Chela Madrigal MD        CC: No Recipients  Chela Madrigal MD  3/9/2018  2:12 PM  Sign at close encounter  Assessment/Plan:    Bilateral kidney stones  Right staghorn-the patient is asymptomatic  We discussed the natural history of staghorn calculi and their  destructive affects on kidney function and in general   She is not yet ready to proceed with treatment  She will return in 1 year  We will repeat her upper tract testing at that time  If she develops symptoms or if she desires treatment she will return sooner  Acquired complex renal cyst  Right renal complex cysts are stable and asymptomatic  We will re-evaluate in 1 year  Diagnoses and all orders for this visit:    Bilateral kidney stones    Acquired complex renal cyst    Other orders  -     aspirin 81 MG tablet; Take by mouth  -     nebivolol (BYSTOLIC) 5 mg tablet; Take 1 tablet by mouth daily  -     Loratadine (CLARITIN) 10 MG CAPS; Take by mouth  -     levothyroxine 75 mcg tablet; Take 1 tablet by mouth daily  -     simvastatin (ZOCOR) 40 mg tablet; Take 1 tablet by mouth daily  -     cholecalciferol (VITAMIN D3) 1,000 units tablet; Take by mouth          Subjective:      Patient ID: Heather Joel is a 77 y o  female  64-year-old female followed for history of kidney stones and complex right renal cysts  She has a right-sided staghorn calculus with a poorly functioning right kidney and an asymptomatic left renal stone  She notes that she has been doing well lately  She has no flank pain    She has not had any urinary infections  She feels well  She understands that the staghorn stone should be treated or the kidney removed  She is not yet ready to do this  She has no fevers or constitutional symptoms  The following portions of the patient's history were reviewed and updated as appropriate: allergies, current medications, past family history, past medical history, past social history, past surgical history and problem list     Review of Systems   Constitutional: Negative for activity change, appetite change, fatigue and fever  HENT: Negative  Eyes: Negative  Respiratory: Negative  Cardiovascular: Negative  Gastrointestinal: Negative for blood in stool, constipation, diarrhea and vomiting  Endocrine: Negative  Musculoskeletal: Negative  Skin: Negative  Allergic/Immunologic: Negative  Neurological: Negative  Hematological: Negative  Psychiatric/Behavioral: Negative  Objective:      /70   Ht 5' 1" (1 549 m)   Wt 73 5 kg (162 lb)   BMI 30 61 kg/m²           Physical Exam   Constitutional: She is oriented to person, place, and time  She appears well-developed and well-nourished  HENT:   Head: Normocephalic and atraumatic  Eyes: Conjunctivae are normal    Neck: Neck supple  Cardiovascular: Normal rate  Pulmonary/Chest: Effort normal    Abdominal: Soft  She exhibits no distension and no mass  There is no tenderness  There is no rebound and no guarding  No CVA tenderness   Neurological: She is alert and oriented to person, place, and time  Skin: Skin is warm and dry  Psychiatric: She has a normal mood and affect   Her behavior is normal  Judgment and thought content normal

## 2018-03-09 NOTE — ASSESSMENT & PLAN NOTE
Right staghorn-the patient is asymptomatic  We discussed the natural history of staghorn calculi and their  destructive affects on kidney function and in general   She is not yet ready to proceed with treatment  She will return in 1 year  We will repeat her upper tract testing at that time  If she develops symptoms or if she desires treatment she will return sooner

## 2018-04-16 DIAGNOSIS — E03.9 HYPOTHYROIDISM: ICD-10-CM

## 2018-04-16 DIAGNOSIS — E55.9 VITAMIN D DEFICIENCY: ICD-10-CM

## 2018-04-16 DIAGNOSIS — E78.5 HYPERLIPIDEMIA: ICD-10-CM

## 2018-06-15 ENCOUNTER — APPOINTMENT (OUTPATIENT)
Dept: LAB | Facility: CLINIC | Age: 68
End: 2018-06-15
Payer: MEDICARE

## 2018-06-15 ENCOUNTER — TRANSCRIBE ORDERS (OUTPATIENT)
Dept: LAB | Facility: CLINIC | Age: 68
End: 2018-06-15

## 2018-06-15 DIAGNOSIS — E78.5 HYPERLIPIDEMIA: ICD-10-CM

## 2018-06-15 DIAGNOSIS — E03.9 HYPOTHYROIDISM: ICD-10-CM

## 2018-06-15 DIAGNOSIS — E55.9 VITAMIN D DEFICIENCY: ICD-10-CM

## 2018-06-15 LAB
25(OH)D3 SERPL-MCNC: 24.9 NG/ML (ref 30–100)
CHOLEST SERPL-MCNC: 164 MG/DL (ref 50–200)
HDLC SERPL-MCNC: 51 MG/DL (ref 40–60)
LDLC SERPL CALC-MCNC: 92 MG/DL (ref 0–100)
NONHDLC SERPL-MCNC: 113 MG/DL
TRIGL SERPL-MCNC: 107 MG/DL
TSH SERPL DL<=0.05 MIU/L-ACNC: 2.25 UIU/ML (ref 0.36–3.74)

## 2018-06-15 PROCEDURE — 82306 VITAMIN D 25 HYDROXY: CPT

## 2018-06-15 PROCEDURE — 84443 ASSAY THYROID STIM HORMONE: CPT

## 2018-06-15 PROCEDURE — 80061 LIPID PANEL: CPT

## 2018-06-15 PROCEDURE — 36415 COLL VENOUS BLD VENIPUNCTURE: CPT

## 2018-06-18 PROCEDURE — 88305 TISSUE EXAM BY PATHOLOGIST: CPT | Performed by: PATHOLOGY

## 2018-06-19 ENCOUNTER — LAB REQUISITION (OUTPATIENT)
Dept: LAB | Facility: HOSPITAL | Age: 68
End: 2018-06-19
Payer: MEDICARE

## 2018-06-19 DIAGNOSIS — D48.5 NEOPLASM OF UNCERTAIN BEHAVIOR OF SKIN: ICD-10-CM

## 2018-06-26 ENCOUNTER — OFFICE VISIT (OUTPATIENT)
Dept: FAMILY MEDICINE CLINIC | Facility: CLINIC | Age: 68
End: 2018-06-26
Payer: MEDICARE

## 2018-06-26 VITALS
BODY MASS INDEX: 30.55 KG/M2 | RESPIRATION RATE: 16 BRPM | DIASTOLIC BLOOD PRESSURE: 70 MMHG | TEMPERATURE: 99 F | HEART RATE: 80 BPM | SYSTOLIC BLOOD PRESSURE: 126 MMHG | HEIGHT: 62 IN | WEIGHT: 166 LBS

## 2018-06-26 DIAGNOSIS — E03.9 ACQUIRED HYPOTHYROIDISM: ICD-10-CM

## 2018-06-26 DIAGNOSIS — I10 ESSENTIAL HYPERTENSION: Primary | ICD-10-CM

## 2018-06-26 DIAGNOSIS — E78.00 PURE HYPERCHOLESTEROLEMIA: ICD-10-CM

## 2018-06-26 DIAGNOSIS — E55.9 VITAMIN D DEFICIENCY: ICD-10-CM

## 2018-06-26 PROBLEM — R77.9 ELEVATED SERUM PROTEIN LEVEL: Status: ACTIVE | Noted: 2017-05-04

## 2018-06-26 PROCEDURE — 99214 OFFICE O/P EST MOD 30 MIN: CPT | Performed by: FAMILY MEDICINE

## 2018-06-26 RX ORDER — NEBIVOLOL 5 MG/1
5 TABLET ORAL DAILY
Qty: 90 TABLET | Refills: 3 | Status: SHIPPED | OUTPATIENT
Start: 2018-06-26 | End: 2019-01-15 | Stop reason: HOSPADM

## 2018-06-26 NOTE — PROGRESS NOTES
Assessment/Plan:     Diagnoses and all orders for this visit:    Essential hypertension  -     nebivolol (BYSTOLIC) 5 mg tablet; Take 1 tablet (5 mg total) by mouth daily for 90 days    Pure hypercholesterolemia  -     Lipid panel    Vitamin D deficiency  -     Vitamin D 25 hydroxy    Acquired hypothyroidism  -     TSH, 3rd generation with Free T4 reflex         continue with current medications  Increased vitamin D to 2000 units daily  Follow up with Dermatology regarding BCC  Office visit in 6 months with repeat labs at that time     Patient ID: Yina Schuler is a 77 y o  female  Follow-up visit follow-up visit  Medications reviewed  Hyperlipidemia on Simvastatin 40 mg daily  Lipid profile 06/2018 Cholesterol 164  Triglycerides 107  HDL 51  LDL 92  08/2017 LFTs normal except for alkaline phosphatase 119 decreased from 122    Albumin low at 3 4  total protein 8 2  SPEP hypergammaglobulinemia  No monoclonal bands  UPEP no monoclonal bands  Hypertension blood pressure stable on Bystolic 5 mg daily  08/2017 creatinine 1 24  Electrolytes normal  FBS 90 Hgb 12 7  The following portions of the patient's history were reviewed and updated as appropriate: allergies, current medications, past family history, past medical history, past social history, past surgical history and problem list     Review of Systems   Constitutional: Negative for chills, fever and unexpected weight change  HENT: Negative for congestion, ear pain, rhinorrhea, sore throat, trouble swallowing and voice change  Eyes: Negative for visual disturbance  Respiratory: Negative for cough, shortness of breath and wheezing  Cardiovascular: Negative for chest pain, palpitations and leg swelling  Gastrointestinal: Negative for abdominal pain, blood in stool, constipation, diarrhea, nausea and vomiting  No prior colonoscopy  repeat CT scan abdomen 12/2016 + cholelithiasis asymptomatic   Hernia left flank containing fat and nonobstructive bowel  Small subscapular area of enhancement right hepatic lobe  Stable retroperitoneal nodule  hiatal hernia  Stable nephrolithiasis and renal scarring/complex renal cystic mass on the right  Endocrine:        Hypothyroidism on Levothyroxine 75 mcg daily  06/2018 TSH 2 251 Osteoporosis on vitamin D 1,000 IU daily  Genitourinary: Negative for difficulty urinating, dysuria and hematuria  History of nephrolithiasis including staghorn calculi followed by urology  03/2018 renal ultrasound large right kidney with upper pole complex cystic mass and lower pole complex solid mass  Large right-sided staghorn calculus  No obvious hydronephrosis  Nonobstructing left renal collecting system calculus  Lower pole cortical scarring  08/2017 nuclear renal flow scan abnormal delayed perfusion and diminished functioning within the right kidney with a split function measuring only 17% on the right  83% on the left  Musculoskeletal: Negative for arthralgias and myalgias  Skin: Negative for rash  history of melanoma right flank  biopsy left arm 11/2016 SCC in situ  06/2018 biopsy lesion left side of nose BCC  Allergic/Immunologic: Positive for environmental allergies  On Loratadine  Neurological: Negative for dizziness and headaches  Hematological: Negative for adenopathy  Does not bruise/bleed easily  remote history of Hodgkin's disease  breast CA s/p left mastectomy with chemotherapy  she completed a course of Arimidex in 2011  followed by oncology  Objective:      /70   Pulse 80   Temp 99 °F (37 2 °C)   Resp 16   Ht 5' 1 6" (1 565 m)   Wt 75 3 kg (166 lb)   BMI 30 76 kg/m²          Physical Exam   Constitutional: She is oriented to person, place, and time  She appears well-developed and well-nourished     HENT:   Right Ear: Tympanic membrane normal    Left Ear: Tympanic membrane normal    Mouth/Throat: Oropharynx is clear and moist and mucous membranes are normal  No oral lesions  Normal dentition  No oropharyngeal exudate  Eyes: Conjunctivae and EOM are normal  Pupils are equal, round, and reactive to light  No scleral icterus  Neck: Neck supple  No JVD present  No tracheal deviation present  No thyroid mass and no thyromegaly present  Cardiovascular: Normal rate, regular rhythm and normal heart sounds  Exam reveals no gallop  No murmur heard  Pulmonary/Chest: Effort normal and breath sounds normal  No respiratory distress  She has no wheezes  She has no rales  Abdominal: Soft  Bowel sounds are normal  She exhibits no distension and no mass  There is no tenderness  There is no rebound and no guarding  Musculoskeletal: Normal range of motion  She exhibits no edema  Lymphadenopathy:     She has no cervical adenopathy  Neurological: She is alert and oriented to person, place, and time  No cranial nerve deficit  Skin: No rash noted  Psychiatric: She has a normal mood and affect  Her behavior is normal    Nursing note and vitals reviewed  Recent Results (from the past 672 hour(s))   Lipid panel    Collection Time: 06/15/18 10:42 AM   Result Value Ref Range    Cholesterol 164 50 - 200 mg/dL    Triglycerides 107 <=150 mg/dL    HDL, Direct 51 40 - 60 mg/dL    LDL Calculated 92 0 - 100 mg/dL    Non-HDL-Chol (CHOL-HDL) 113 mg/dl   TSH, 3rd generation with T4 reflex    Collection Time: 06/15/18 10:42 AM   Result Value Ref Range    TSH 3RD GENERATON 2 251 0 358 - 3 740 uIU/mL   Vitamin D 25 hydroxy    Collection Time: 06/15/18 10:42 AM   Result Value Ref Range    Vit D, 25-Hydroxy 24 9 (L) 30 0 - 100 0 ng/mL   Tissue Exam    Collection Time: 06/18/18 12:00 AM   Result Value Ref Range    Case Report       Surgical Pathology Report                         Case: [id number]                                   Authorizing Provider:  [facility]       Collected:           06/18/2018                   Pathologist:           Javier Mazariegos MD Received:            06/19/2018 1033              Specimen:    Skin, Other, Upper left nose                                                               Final Diagnosis       A  Skin, Upper left nose, shave biopsy:  - Basal cell carcinoma, extends to base of biopsy  Interpretation performed at [facility], [address] [place] [address], [address]             Additional Information       All controls performed with the immunohistochemical stains reported above reacted appropriately  These tests were developed and their performance characteristics determined by [facility] or [facility]  They may not be cleared or approved by the [place]  Food and Drug Administration  The FDA has determined that such clearance or approval is not necessary  These tests are used for clinical purposes  They should not be regarded as investigational or for research  This laboratory has been approved by [facility], designated as a high-complexity laboratory and is qualified to perform these tests  Gross Description        A  The specimen is received in formalin, labeled with the patient's name and hospital number, and is designated "upper left nose, is a shave biopsy of skin measuring 0 5 x 0 4 x 0 1 cm  The epidermal surface is tan-white and roughened  The resection margin is inked green  The specimen is bisected and entirely submitted  1 cassette  Note: The estimated total formalin fixation time based upon information provided by the submitting clinician and the standard processing schedule is under 72 hours    Gabriela Check

## 2018-07-30 ENCOUNTER — OFFICE VISIT (OUTPATIENT)
Dept: PLASTIC SURGERY | Facility: CLINIC | Age: 68
End: 2018-07-30
Payer: MEDICARE

## 2018-07-30 VITALS — HEIGHT: 62 IN | BODY MASS INDEX: 29.44 KG/M2 | WEIGHT: 160 LBS

## 2018-07-30 DIAGNOSIS — C44.311 BASAL CELL CARCINOMA (BCC) OF LEFT SIDE OF NOSE: Primary | ICD-10-CM

## 2018-07-30 PROCEDURE — 99213 OFFICE O/P EST LOW 20 MIN: CPT | Performed by: PHYSICIAN ASSISTANT

## 2018-07-30 NOTE — LETTER
July 30, 2018     Gavin Nichols MD  [address]    Patient: Thi Cedeno   YOB: 1950   Date of Visit: 7/30/2018       Dear Dr Allison Carcamo:    Thank you for referring Hoa Gillis to me for evaluation  Below are my notes for this consultation  If you have questions, please do not hesitate to call me  I look forward to following your patient along with you  Sincerely,        Lennox Pellegrini, PA-C        CC: Charlies Hoard, MD Lennox Pellegrini, PA-C  7/30/2018  3:48 PM  Sign at close encounter  Assessment/Plan:  Cindy Muse is a pleasant 78-year-old female who presents for evaluation of a left lateral nose basal cell carcinoma  Please see HPI  I discussed with her excision of the left lateral nose with frozen section and reconstruction with possible complex closure, possible flap and possible full-thickness skin graft  She understood and agreed  We discussed with the patient the options, benefits, and risks of surgery such as anesthesia, bleeding, infection, scarring and the need for additional procedures  Consent was obtained and all questions answered to her satisfaction  We will plan for surgery at her earliest convenience  There are no diagnoses linked to this encounter  Subjective:      Patient ID: Thi Cedeno is a 77 y o  female  HPI  Cindy Muse is a pleasant 78-year-old female who presents for evaluation of a left lateral nose basal cell carcinoma  We have treated her back in 2006 for breast reconstruction  She presents now with left nose basal cell carcinoma that was biopsied by Dr Allison Carcamo  She states that the lesion had been present within a year        The following portions of the patient's history were reviewed and updated as appropriate: allergies, current medications, past family history, past medical history, past social history, past surgical history and problem list     Review of Systems   HENT: Negative for hearing loss  Eyes: Negative for visual disturbance  Respiratory: Negative for shortness of breath  Cardiovascular: Negative for chest pain  Gastrointestinal: Negative for abdominal pain, blood in stool, constipation, diarrhea, nausea and vomiting  Genitourinary: Negative for hematuria  Musculoskeletal: Negative for gait problem  Skin:        As per HPI  She also reports a history of melanoma  Neurological: Negative for seizures and headaches  Hematological: Does not bruise/bleed easily  Psychiatric/Behavioral: The patient is not nervous/anxious  Objective: There were no vitals taken for this visit  Physical Exam   Constitutional: She is oriented to person, place, and time  She appears well-developed and well-nourished  No distress  HENT:   Head: Normocephalic and atraumatic  Eyes: EOM are normal  Pupils are equal, round, and reactive to light  No scleral icterus  Neck: Neck supple  No tracheal deviation present  No thyromegaly present  Cardiovascular: Normal rate and regular rhythm  Exam reveals no gallop and no friction rub  No murmur heard  Pulmonary/Chest: Effort normal and breath sounds normal  She has no wheezes  She has no rales  Abdominal: Soft  Bowel sounds are normal  She exhibits no distension  There is no tenderness  There is no rebound and no guarding  Musculoskeletal: Normal range of motion  Lymphadenopathy:     She has no cervical adenopathy  Neurological: She is alert and oriented to person, place, and time  No cranial nerve deficit  Skin:   Left lateral side of nose biopsy site noted to be well healed  The area measures approximately 8 mm  It is located approximately 1 5 cm from the left nasal alar crease and approximately 1 and 0 5 cm from the medial left brow line  Please see photos  Psychiatric: She has a normal mood and affect

## 2018-08-13 PROBLEM — C44.310 BCC (BASAL CELL CARCINOMA), FACE: Status: ACTIVE | Noted: 2018-08-13

## 2018-08-22 ENCOUNTER — APPOINTMENT (OUTPATIENT)
Dept: LAB | Facility: CLINIC | Age: 68
End: 2018-08-22
Payer: MEDICARE

## 2018-08-22 DIAGNOSIS — Z85.3 PERSONAL HISTORY OF MALIGNANT NEOPLASM OF BREAST: ICD-10-CM

## 2018-08-22 LAB
ALBUMIN SERPL BCP-MCNC: 3.3 G/DL (ref 3.5–5)
ALP SERPL-CCNC: 134 U/L (ref 46–116)
ALT SERPL W P-5'-P-CCNC: 23 U/L (ref 12–78)
ANION GAP SERPL CALCULATED.3IONS-SCNC: 7 MMOL/L (ref 4–13)
AST SERPL W P-5'-P-CCNC: 23 U/L (ref 5–45)
BASOPHILS # BLD AUTO: 0.07 THOUSANDS/ΜL (ref 0–0.1)
BASOPHILS NFR BLD AUTO: 1 % (ref 0–1)
BILIRUB SERPL-MCNC: 0.4 MG/DL (ref 0.2–1)
BUN SERPL-MCNC: 24 MG/DL (ref 5–25)
CALCIUM SERPL-MCNC: 9.1 MG/DL (ref 8.3–10.1)
CHLORIDE SERPL-SCNC: 104 MMOL/L (ref 100–108)
CO2 SERPL-SCNC: 28 MMOL/L (ref 21–32)
CREAT SERPL-MCNC: 1.38 MG/DL (ref 0.6–1.3)
EOSINOPHIL # BLD AUTO: 0.61 THOUSAND/ΜL (ref 0–0.61)
EOSINOPHIL NFR BLD AUTO: 7 % (ref 0–6)
ERYTHROCYTE [DISTWIDTH] IN BLOOD BY AUTOMATED COUNT: 14.4 % (ref 11.6–15.1)
GFR SERPL CREATININE-BSD FRML MDRD: 39 ML/MIN/1.73SQ M
GLUCOSE SERPL-MCNC: 89 MG/DL (ref 65–140)
HCT VFR BLD AUTO: 41.7 % (ref 34.8–46.1)
HGB BLD-MCNC: 12.8 G/DL (ref 11.5–15.4)
IMM GRANULOCYTES # BLD AUTO: 0.02 THOUSAND/UL (ref 0–0.2)
IMM GRANULOCYTES NFR BLD AUTO: 0 % (ref 0–2)
LYMPHOCYTES # BLD AUTO: 1.8 THOUSANDS/ΜL (ref 0.6–4.47)
LYMPHOCYTES NFR BLD AUTO: 21 % (ref 14–44)
MCH RBC QN AUTO: 27.4 PG (ref 26.8–34.3)
MCHC RBC AUTO-ENTMCNC: 30.7 G/DL (ref 31.4–37.4)
MCV RBC AUTO: 89 FL (ref 82–98)
MONOCYTES # BLD AUTO: 0.75 THOUSAND/ΜL (ref 0.17–1.22)
MONOCYTES NFR BLD AUTO: 9 % (ref 4–12)
NEUTROPHILS # BLD AUTO: 5.26 THOUSANDS/ΜL (ref 1.85–7.62)
NEUTS SEG NFR BLD AUTO: 62 % (ref 43–75)
NRBC BLD AUTO-RTO: 0 /100 WBCS
PLATELET # BLD AUTO: 335 THOUSANDS/UL (ref 149–390)
PMV BLD AUTO: 9.8 FL (ref 8.9–12.7)
POTASSIUM SERPL-SCNC: 4.5 MMOL/L (ref 3.5–5.3)
PROT SERPL-MCNC: 8.2 G/DL (ref 6.4–8.2)
RBC # BLD AUTO: 4.67 MILLION/UL (ref 3.81–5.12)
SODIUM SERPL-SCNC: 139 MMOL/L (ref 136–145)
WBC # BLD AUTO: 8.51 THOUSAND/UL (ref 4.31–10.16)

## 2018-08-22 PROCEDURE — 86300 IMMUNOASSAY TUMOR CA 15-3: CPT

## 2018-08-22 PROCEDURE — 36415 COLL VENOUS BLD VENIPUNCTURE: CPT

## 2018-08-22 PROCEDURE — 85025 COMPLETE CBC W/AUTO DIFF WBC: CPT

## 2018-08-22 PROCEDURE — 80053 COMPREHEN METABOLIC PANEL: CPT

## 2018-08-23 LAB — CANCER AG27-29 SERPL-ACNC: 6.3 U/ML (ref 0–42.3)

## 2018-08-26 DIAGNOSIS — E03.9 ACQUIRED HYPOTHYROIDISM: Primary | ICD-10-CM

## 2018-08-27 RX ORDER — LEVOTHYROXINE SODIUM 0.07 MG/1
TABLET ORAL DAILY
Qty: 90 TABLET | Refills: 3 | Status: SHIPPED | OUTPATIENT
Start: 2018-08-27 | End: 2019-10-18 | Stop reason: SDUPTHER

## 2018-08-28 ENCOUNTER — OFFICE VISIT (OUTPATIENT)
Dept: HEMATOLOGY ONCOLOGY | Facility: CLINIC | Age: 68
End: 2018-08-28
Payer: MEDICARE

## 2018-08-28 VITALS
DIASTOLIC BLOOD PRESSURE: 80 MMHG | RESPIRATION RATE: 16 BRPM | OXYGEN SATURATION: 94 % | SYSTOLIC BLOOD PRESSURE: 124 MMHG | HEIGHT: 62 IN | HEART RATE: 91 BPM | WEIGHT: 164 LBS | TEMPERATURE: 97.4 F | BODY MASS INDEX: 30.18 KG/M2

## 2018-08-28 DIAGNOSIS — Z85.3 HISTORY OF BREAST CANCER: ICD-10-CM

## 2018-08-28 DIAGNOSIS — C43.59 MELANOMA OF SKIN OF TRUNK (HCC): Primary | ICD-10-CM

## 2018-08-28 DIAGNOSIS — Z85.71 HISTORY OF HODGKIN'S DISEASE: ICD-10-CM

## 2018-08-28 PROBLEM — Z85.828 HISTORY OF BASAL CELL CANCER: Status: ACTIVE | Noted: 2018-08-28

## 2018-08-28 PROCEDURE — 99214 OFFICE O/P EST MOD 30 MIN: CPT | Performed by: INTERNAL MEDICINE

## 2018-08-28 NOTE — LETTER
August 28, 2018     Nilda Rivero MD  [address]    Patient: Charu Helton   YOB: 1950   Date of Visit: 8/28/2018       Dear Dr Lisa Salmon:    Thank you for referring Francisca Sintiathomas to me for evaluation  Below are my notes for this consultation  If you have questions, please do not hesitate to call me  I look forward to following your patient along with you  Sincerely,        Sabrina Crowe MD        CC: No Recipients  Sabrina Crowe MD  8/28/2018 11:16 AM  Sign at close encounter    HPI:  Follow-up visit for multiple cancers  Remote past history of Hodgkin's disease  History of left breast cancer  History of malignant melanoma  History of basal cell cancers  She does not want genetic testing  She will be undergoing surgery are freezing for basal cell cancer near her left eye  All the other cancers have been in remission  in [address] [place] [address] and patient was diagnosed to have stage II Hodgkin's disease above the diaphragm  Patient had staging laparotomy, splenectomy and extended field radiation therapy and she has been getting pneumococcal vaccine every 5 years from her primary physician  No chemotherapy  No recurrence of Hodgkin's disease  In 2006 patient was diagnosed to have left breast cancer, hormone receptor positive, HER-2 negative T1 N1, stage IIa  Patient had left mastectomy, reconstruction surgery, 4 cycles of Taxotere plus Cytoxan chemotherapy followed by adjuvant Arimidex for 5 years that she finished in 2011  She did not receive radiation because of previous radiation for Hodgkin's disease in 1995  No recurrence of breast cancer  March 2016 she had melanoma surgery for T1a, 0 6 mm melanoma in the right flank area  No recurrence     She has left flank hernia and she uses a belt for that  She used to get frequent kidney infections but none lately  She follows with her urologist     She has some tiredness and minor arthritic symptoms   states she goes to her dermatologist twice a year for skin mapping  Current Outpatient Prescriptions:     aspirin 81 MG tablet, Take by mouth, Disp: , Rfl:     cholecalciferol (VITAMIN D3) 1,000 units tablet, Take by mouth, Disp: , Rfl:     levothyroxine 75 mcg tablet, TAKE 1 TABLET BY MOUTH DAILY, Disp: 90 tablet, Rfl: 3    nebivolol (BYSTOLIC) 5 mg tablet, Take 1 tablet (5 mg total) by mouth daily for 90 days, Disp: 90 tablet, Rfl: 3    simvastatin (ZOCOR) 40 mg tablet, Take 1 tablet by mouth daily, Disp: , Rfl:     Loratadine (CLARITIN) 10 MG CAPS, Take by mouth, Disp: , Rfl:     Allergies   Allergen Reactions    Ciprofloxacin Other (See Comments)     Reaction Date: [id number]; Hives/Uticaria    Other     Oxycodone-Acetaminophen Other (See Comments) and Nausea Only    Sulfamethoxazole-Trimethoprim         No history exists  ROS:  08/28/18 Reviewed 13 systems:  Presently no headaches, seizures, dizziness, diplopia, dysphagia, hoarseness, chest pain, palpitations, shortness of breath, cough, hemoptysis, abdominal pain, nausea, vomiting, change in bowel habits, melena, hematuria, fever, chills, bleeding, bone pains, skin rash, weight loss,  weakness, numbness,  claudication and gait problem  No frequent infections  Not unusually sensitive to heat or cold  No swelling of the ankles  No swollen glands  Patient is anxious   No GYN symptoms Other symptoms are in HPI        /80 (BP Location: Left arm, Cuff Size: Standard)   Pulse 91   Temp (!) 97 4 °F (36 3 °C) (Tympanic)   Resp 16   Ht 5' 1 5" (1 562 m)   Wt 74 4 kg (164 lb)   SpO2 94%   BMI 30 49 kg/m²      Physical Exam:  Alert, oriented, not in distress, no icterus, no oral thrush, no palpable neck mass, clear lung fields, regular heart rate, abdomen  soft and non tender, no palpable abdominal mass except for a large left flank hernia, no ascites, no edema of ankles, no calf tenderness, no focal neurological deficit, no skin rash, no palpable lymphadenopathy, good arterial pulses, no clubbing  Patient is anxious  No lymphedema  She goes to her breast surgeon for examination and imaging studies  Performance status 1      IMAGING:  No orders to display       LABS:  Results for orders placed or performed in visit on 08/22/18   Cancer antigen 27 29   Result Value Ref Range    CA 27 29 6 3 0 0 - 42 3 U/mL   CBC and differential   Result Value Ref Range    WBC 8 51 4 31 - 10 16 Thousand/uL    RBC 4 67 3 81 - 5 12 Million/uL    Hemoglobin 12 8 11 5 - 15 4 g/dL    Hematocrit 41 7 34 8 - 46 1 %    MCV 89 82 - 98 fL    MCH 27 4 26 8 - 34 3 pg    MCHC 30 7 (L) 31 4 - 37 4 g/dL    RDW 14 4 11 6 - 15 1 %    MPV 9 8 8 9 - 12 7 fL    Platelets 793 540 - 258 Thousands/uL    nRBC 0 /100 WBCs    Neutrophils Relative 62 43 - 75 %    Immat GRANS % 0 0 - 2 %    Lymphocytes Relative 21 14 - 44 %    Monocytes Relative 9 4 - 12 %    Eosinophils Relative 7 (H) 0 - 6 %    Basophils Relative 1 0 - 1 %    Neutrophils Absolute 5 26 1 85 - 7 62 Thousands/µL    Immature Grans Absolute 0 02 0 00 - 0 20 Thousand/uL    Lymphocytes Absolute 1 80 0 60 - 4 47 Thousands/µL    Monocytes Absolute 0 75 0 17 - 1 22 Thousand/µL    Eosinophils Absolute 0 61 0 00 - 0 61 Thousand/µL    Basophils Absolute 0 07 0 00 - 0 10 Thousands/µL   Comprehensive metabolic panel   Result Value Ref Range    Sodium 139 136 - 145 mmol/L    Potassium 4 5 3 5 - 5 3 mmol/L    Chloride 104 100 - 108 mmol/L    CO2 28 21 - 32 mmol/L    ANION GAP 7 4 - 13 mmol/L    BUN 24 5 - 25 mg/dL    Creatinine 1 38 (H) 0 60 - 1 30 mg/dL    Glucose 89 65 - 140 mg/dL    Calcium 9 1 8 3 - 10 1 mg/dL    AST 23 5 - 45 U/L    ALT 23 12 - 78 U/L    Alkaline Phosphatase 134 (H) 46 - 116 U/L    Total Protein 8 2 6 4 - 8 2 g/dL    Albumin 3 3 (L) 3 5 - 5 0 g/dL    Total Bilirubin 0 40 0 20 - 1 00 mg/dL    eGFR 39 ml/min/1 73sq m     Labs, Imaging, & Other studies:   All pertinent labs and imaging studies were personally reviewed      Reviewed and discussed with patient  Assessment and plan: Follow-up visit for multiple cancers  Remote past history of Hodgkin's disease  History of left breast cancer  History of malignant melanoma  History of basal cell cancers  She does not want genetic testing  She will be undergoing surgery are freezing for basal cell cancer near her left eye  All the other cancers have been in remission  in [address] [place] [address],[facility] and patient was diagnosed to have stage II Hodgkin's disease above the diaphragm  Patient had staging laparotomy, splenectomy and extended field radiation therapy and she has been getting pneumococcal vaccine every 5 years from her primary physician  No chemotherapy  No recurrence of Hodgkin's disease  In 2006 patient was diagnosed to have left breast cancer, hormone receptor positive, HER-2 negative T1 N1, stage IIa  Patient had left mastectomy, reconstruction surgery, 4 cycles of Taxotere plus Cytoxan chemotherapy followed by adjuvant Arimidex for 5 years that she finished in 2011  She did not receive radiation because of previous radiation for Hodgkin's disease in 1995  No recurrence of breast cancer  March 2016 she had melanoma surgery for T1a, 0 6 mm melanoma in the right flank area  No recurrence     She has left flank hernia and she uses a belt for that  She used to get frequent kidney infections but none lately  She follows with her urologist     She has some tiredness and minor arthritic symptoms  states she goes to her dermatologist twice a year for skin mapping  Jasbir Kent Physical examination and test results are as recorded and discussed  Patient will come back in 1 year with blood tests  Condition discussed and explained  Questions answered  See above  Also discussed the importance of self-breast examination, eating healthy foods, staying active and health screening tests  Goal is cure from all the above cancers    1  Melanoma of skin of trunk (HCC)    - CBC and differential; Future  - Comprehensive metabolic panel; Future    2  History of breast cancer    - Cancer antigen 27 29; Future  - CBC and differential; Future  - Comprehensive metabolic panel; Future    3  History of Hodgkin's disease    - CBC and differential; Future  - Comprehensive metabolic panel; Future        Patient voiced understanding and agreement in the discussion  Counseling / Coordination of Care   Greater than 50% of total time was spent with the patient and / or family counseling and / or coordination of care

## 2018-08-28 NOTE — PROGRESS NOTES
HPI:  Follow-up visit for multiple cancers  Remote past history of Hodgkin's disease  History of left breast cancer  History of malignant melanoma  History of basal cell cancers  She does not want genetic testing  She will be undergoing surgery are freezing for basal cell cancer near her left eye  All the other cancers have been in remission  in [address] [place] [address],[facility] and patient was diagnosed to have stage II Hodgkin's disease above the diaphragm  Patient had staging laparotomy, splenectomy and extended field radiation therapy and she has been getting pneumococcal vaccine every 5 years from her primary physician  No chemotherapy  No recurrence of Hodgkin's disease  In 2006 patient was diagnosed to have left breast cancer, hormone receptor positive, HER-2 negative T1 N1, stage IIa  Patient had left mastectomy, reconstruction surgery, 4 cycles of Taxotere plus Cytoxan chemotherapy followed by adjuvant Arimidex for 5 years that she finished in 2011  She did not receive radiation because of previous radiation for Hodgkin's disease in 1995  No recurrence of breast cancer  March 2016 she had melanoma surgery for T1a, 0 6 mm melanoma in the right flank area  No recurrence     She has left flank hernia and she uses a belt for that  She used to get frequent kidney infections but none lately  She follows with her urologist     She has some tiredness and minor arthritic symptoms  states she goes to her dermatologist twice a year for skin mapping           Current Outpatient Prescriptions:     aspirin 81 MG tablet, Take by mouth, Disp: , Rfl:     cholecalciferol (VITAMIN D3) 1,000 units tablet, Take by mouth, Disp: , Rfl:     levothyroxine 75 mcg tablet, TAKE 1 TABLET BY MOUTH DAILY, Disp: 90 tablet, Rfl: 3    nebivolol (BYSTOLIC) 5 mg tablet, Take 1 tablet (5 mg total) by mouth daily for 90 days, Disp: 90 tablet, Rfl: 3    simvastatin (ZOCOR) 40 mg tablet, Take 1 tablet by mouth daily, Disp: , Rfl:     Loratadine (CLARITIN) 10 MG CAPS, Take by mouth, Disp: , Rfl:     Allergies   Allergen Reactions    Ciprofloxacin Other (See Comments)     Reaction Date: [id number]; Hives/Uticaria    Other     Oxycodone-Acetaminophen Other (See Comments) and Nausea Only    Sulfamethoxazole-Trimethoprim         No history exists  ROS:  08/28/18 Reviewed 13 systems:  Presently no headaches, seizures, dizziness, diplopia, dysphagia, hoarseness, chest pain, palpitations, shortness of breath, cough, hemoptysis, abdominal pain, nausea, vomiting, change in bowel habits, melena, hematuria, fever, chills, bleeding, bone pains, skin rash, weight loss,  weakness, numbness,  claudication and gait problem  No frequent infections  Not unusually sensitive to heat or cold  No swelling of the ankles  No swollen glands  Patient is anxious  No GYN symptoms Other symptoms are in HPI        /80 (BP Location: Left arm, Cuff Size: Standard)   Pulse 91   Temp (!) 97 4 °F (36 3 °C) (Tympanic)   Resp 16   Ht 5' 1 5" (1 562 m)   Wt 74 4 kg (164 lb)   SpO2 94%   BMI 30 49 kg/m²     Physical Exam:  Alert, oriented, not in distress, no icterus, no oral thrush, no palpable neck mass, clear lung fields, regular heart rate, abdomen  soft and non tender, no palpable abdominal mass except for a large left flank hernia, no ascites, no edema of ankles, no calf tenderness, no focal neurological deficit, no skin rash, no palpable lymphadenopathy, good arterial pulses, no clubbing  Patient is anxious  No lymphedema  She goes to her breast surgeon for examination and imaging studies  Performance status 1      IMAGING:  No orders to display       LABS:  Results for orders placed or performed in visit on 08/22/18   Cancer antigen 27 29   Result Value Ref Range    CA 27 29 6 3 0 0 - 42 3 U/mL   CBC and differential   Result Value Ref Range    WBC 8 51 4 31 - 10 16 Thousand/uL    RBC 4 67 3 81 - 5 12 Million/uL    Hemoglobin 12 8 11 5 - 15 4 g/dL    Hematocrit 41 7 34 8 - 46 1 %    MCV 89 82 - 98 fL    MCH 27 4 26 8 - 34 3 pg    MCHC 30 7 (L) 31 4 - 37 4 g/dL    RDW 14 4 11 6 - 15 1 %    MPV 9 8 8 9 - 12 7 fL    Platelets 295 595 - 056 Thousands/uL    nRBC 0 /100 WBCs    Neutrophils Relative 62 43 - 75 %    Immat GRANS % 0 0 - 2 %    Lymphocytes Relative 21 14 - 44 %    Monocytes Relative 9 4 - 12 %    Eosinophils Relative 7 (H) 0 - 6 %    Basophils Relative 1 0 - 1 %    Neutrophils Absolute 5 26 1 85 - 7 62 Thousands/µL    Immature Grans Absolute 0 02 0 00 - 0 20 Thousand/uL    Lymphocytes Absolute 1 80 0 60 - 4 47 Thousands/µL    Monocytes Absolute 0 75 0 17 - 1 22 Thousand/µL    Eosinophils Absolute 0 61 0 00 - 0 61 Thousand/µL    Basophils Absolute 0 07 0 00 - 0 10 Thousands/µL   Comprehensive metabolic panel   Result Value Ref Range    Sodium 139 136 - 145 mmol/L    Potassium 4 5 3 5 - 5 3 mmol/L    Chloride 104 100 - 108 mmol/L    CO2 28 21 - 32 mmol/L    ANION GAP 7 4 - 13 mmol/L    BUN 24 5 - 25 mg/dL    Creatinine 1 38 (H) 0 60 - 1 30 mg/dL    Glucose 89 65 - 140 mg/dL    Calcium 9 1 8 3 - 10 1 mg/dL    AST 23 5 - 45 U/L    ALT 23 12 - 78 U/L    Alkaline Phosphatase 134 (H) 46 - 116 U/L    Total Protein 8 2 6 4 - 8 2 g/dL    Albumin 3 3 (L) 3 5 - 5 0 g/dL    Total Bilirubin 0 40 0 20 - 1 00 mg/dL    eGFR 39 ml/min/1 73sq m     Labs, Imaging, & Other studies:   All pertinent labs and imaging studies were personally reviewed      Reviewed and discussed with patient  Assessment and plan: Follow-up visit for multiple cancers  Remote past history of Hodgkin's disease  History of left breast cancer  History of malignant melanoma  History of basal cell cancers  She does not want genetic testing  She will be undergoing surgery are freezing for basal cell cancer near her left eye  All the other cancers have been in remission  in [address] [place] [address] and patient was diagnosed to have stage II Hodgkin's disease above the diaphragm   Patient had staging laparotomy, splenectomy and extended field radiation therapy and she has been getting pneumococcal vaccine every 5 years from her primary physician  No chemotherapy  No recurrence of Hodgkin's disease  In 2006 patient was diagnosed to have left breast cancer, hormone receptor positive, HER-2 negative T1 N1, stage IIa  Patient had left mastectomy, reconstruction surgery, 4 cycles of Taxotere plus Cytoxan chemotherapy followed by adjuvant Arimidex for 5 years that she finished in 2011  She did not receive radiation because of previous radiation for Hodgkin's disease in 1995  No recurrence of breast cancer  March 2016 she had melanoma surgery for T1a, 0 6 mm melanoma in the right flank area  No recurrence     She has left flank hernia and she uses a belt for that  She used to get frequent kidney infections but none lately  She follows with her urologist     She has some tiredness and minor arthritic symptoms  states she goes to her dermatologist twice a year for skin mapping  Kristen Moses Physical examination and test results are as recorded and discussed  Patient will come back in 1 year with blood tests  Condition discussed and explained  Questions answered  See above  Also discussed the importance of self-breast examination, eating healthy foods, staying active and health screening tests  Goal is cure from all the above cancers  1  Melanoma of skin of trunk (HCC)    - CBC and differential; Future  - Comprehensive metabolic panel; Future    2  History of breast cancer    - Cancer antigen 27 29; Future  - CBC and differential; Future  - Comprehensive metabolic panel; Future    3  History of Hodgkin's disease    - CBC and differential; Future  - Comprehensive metabolic panel; Future        Patient voiced understanding and agreement in the discussion  Counseling / Coordination of Care   Greater than 50% of total time was spent with the patient and / or family counseling and / or coordination of care

## 2018-09-13 NOTE — PRE-PROCEDURE INSTRUCTIONS
Pre-Surgery Instructions:   Medication Instructions    aspirin 81 MG tablet Instructed patient per Anesthesia Guidelines   cholecalciferol (VITAMIN D3) 1,000 units tablet Instructed patient per Anesthesia Guidelines   levothyroxine 75 mcg tablet Instructed patient per Anesthesia Guidelines   Loratadine (CLARITIN) 10 MG CAPS Instructed patient per Anesthesia Guidelines   nebivolol (BYSTOLIC) 5 mg tablet Instructed patient per Anesthesia Guidelines   simvastatin (ZOCOR) 40 mg tablet Instructed patient per Anesthesia Guidelines  Pre-op and bathing instructions given  Patient will ask for hibiclens when she has EKG done at [facility] (9-14-18)

## 2018-09-14 ENCOUNTER — OFFICE VISIT (OUTPATIENT)
Dept: LAB | Facility: CLINIC | Age: 68
End: 2018-09-14
Payer: MEDICARE

## 2018-09-14 ENCOUNTER — TRANSCRIBE ORDERS (OUTPATIENT)
Dept: LAB | Facility: CLINIC | Age: 68
End: 2018-09-14

## 2018-09-14 DIAGNOSIS — C44.310 BCC (BASAL CELL CARCINOMA), FACE: ICD-10-CM

## 2018-09-14 LAB
ATRIAL RATE: 93 BPM
P AXIS: 61 DEGREES
PR INTERVAL: 146 MS
QRS AXIS: 17 DEGREES
QRSD INTERVAL: 78 MS
QT INTERVAL: 358 MS
QTC INTERVAL: 445 MS
T WAVE AXIS: 246 DEGREES
VENTRICULAR RATE: 93 BPM

## 2018-09-14 PROCEDURE — 93010 ELECTROCARDIOGRAM REPORT: CPT | Performed by: INTERNAL MEDICINE

## 2018-09-14 PROCEDURE — 93005 ELECTROCARDIOGRAM TRACING: CPT

## 2018-09-20 DIAGNOSIS — E78.00 HYPERCHOLESTEREMIA: Primary | ICD-10-CM

## 2018-09-20 RX ORDER — SIMVASTATIN 40 MG
TABLET ORAL
Qty: 90 TABLET | Refills: 3 | Status: SHIPPED | OUTPATIENT
Start: 2018-09-20 | End: 2019-10-18

## 2018-09-24 ENCOUNTER — ANESTHESIA EVENT (OUTPATIENT)
Dept: PERIOP | Facility: AMBULARY SURGERY CENTER | Age: 68
End: 2018-09-24
Payer: MEDICARE

## 2018-10-02 NOTE — H&P
Assessment/Plan:  Arturo Valentin is a pleasant 78-year-old female who presents for evaluation of a left lateral nose basal cell carcinoma  Please see HPI  I discussed with her excision of the left lateral nose with frozen section and reconstruction with possible complex closure, possible flap and possible full-thickness skin graft  She understood and agreed  We discussed with the patient the options, benefits, and risks of surgery such as anesthesia, bleeding, infection, scarring and the need for additional procedures  Consent was obtained and all questions answered to her satisfaction  We will plan for surgery at her earliest convenience       There are no diagnoses linked to this encounter        Subjective:       Patient ID: Isai Schwartz is a 77 y o  female      HPI  Arturo Valentin is a pleasant 78-year-old female who presents for evaluation of a left lateral nose basal cell carcinoma  We have treated her back in 2006 for breast reconstruction  She presents now with left nose basal cell carcinoma that was biopsied by Dr Tristan Samuel  She states that the lesion had been present within a year        The following portions of the patient's history were reviewed and updated as appropriate: allergies, current medications, past family history, past medical history, past social history, past surgical history and problem list      Review of Systems   HENT: Negative for hearing loss  Eyes: Negative for visual disturbance  Respiratory: Negative for shortness of breath  Cardiovascular: Negative for chest pain  Gastrointestinal: Negative for abdominal pain, blood in stool, constipation, diarrhea, nausea and vomiting  Genitourinary: Negative for hematuria  Musculoskeletal: Negative for gait problem  Skin:        As per HPI  She also reports a history of melanoma  Neurological: Negative for seizures and headaches  Hematological: Does not bruise/bleed easily     Psychiatric/Behavioral: The patient is not nervous/anxious            Objective:        There were no vitals taken for this visit             Physical Exam   Constitutional: She is oriented to person, place, and time  She appears well-developed and well-nourished  No distress  HENT:   Head: Normocephalic and atraumatic  Eyes: EOM are normal  Pupils are equal, round, and reactive to light  No scleral icterus  Neck: Neck supple  No tracheal deviation present  No thyromegaly present  Cardiovascular: Normal rate and regular rhythm  Exam reveals no gallop and no friction rub  No murmur heard  Pulmonary/Chest: Effort normal and breath sounds normal  She has no wheezes  She has no rales  Abdominal: Soft  Bowel sounds are normal  She exhibits no distension  There is no tenderness  There is no rebound and no guarding  Musculoskeletal: Normal range of motion  Lymphadenopathy:     She has no cervical adenopathy  Neurological: She is alert and oriented to person, place, and time  No cranial nerve deficit  Skin:   Left lateral side of nose biopsy site noted to be well healed  The area measures approximately 8 mm  It is located approximately 1 5 cm from the left nasal alar crease and approximately 1 and 0 5 cm from the medial left brow line  Please see photos  Psychiatric: She has a normal mood and affect

## 2018-10-09 ENCOUNTER — ANESTHESIA (OUTPATIENT)
Dept: PERIOP | Facility: AMBULARY SURGERY CENTER | Age: 68
End: 2018-10-09
Payer: MEDICARE

## 2018-10-09 ENCOUNTER — HOSPITAL ENCOUNTER (OUTPATIENT)
Facility: AMBULARY SURGERY CENTER | Age: 68
Setting detail: OUTPATIENT SURGERY
Discharge: HOME | End: 2018-10-09
Attending: SURGERY | Admitting: SURGERY
Payer: MEDICARE

## 2018-10-09 VITALS
WEIGHT: 160 LBS | RESPIRATION RATE: 16 BRPM | HEIGHT: 61 IN | SYSTOLIC BLOOD PRESSURE: 117 MMHG | DIASTOLIC BLOOD PRESSURE: 59 MMHG | OXYGEN SATURATION: 96 % | HEART RATE: 90 BPM | BODY MASS INDEX: 30.21 KG/M2 | TEMPERATURE: 97.4 F

## 2018-10-09 DIAGNOSIS — C44.310 BCC (BASAL CELL CARCINOMA), FACE: ICD-10-CM

## 2018-10-09 PROCEDURE — 11642 EXC F/E/E/N/L MAL+MRG 1.1-2: CPT | Performed by: SURGERY

## 2018-10-09 PROCEDURE — 88331 PATH CONSLTJ SURG 1 BLK 1SPC: CPT | Performed by: PATHOLOGY

## 2018-10-09 PROCEDURE — 88305 TISSUE EXAM BY PATHOLOGIST: CPT | Performed by: PATHOLOGY

## 2018-10-09 PROCEDURE — 88332 PATH CONSLTJ SURG EA ADD BLK: CPT | Performed by: PATHOLOGY

## 2018-10-09 PROCEDURE — 15260 FTH/GFT FR N/E/E/L 20 SQCM/<: CPT | Performed by: SURGERY

## 2018-10-09 RX ORDER — LIDOCAINE HYDROCHLORIDE 10 MG/ML
INJECTION, SOLUTION INFILTRATION; PERINEURAL AS NEEDED
Status: DISCONTINUED | OUTPATIENT
Start: 2018-10-09 | End: 2018-10-09 | Stop reason: SURG

## 2018-10-09 RX ORDER — GINSENG 100 MG
CAPSULE ORAL AS NEEDED
Status: DISCONTINUED | OUTPATIENT
Start: 2018-10-09 | End: 2018-10-09 | Stop reason: HOSPADM

## 2018-10-09 RX ORDER — ACETAMINOPHEN AND CODEINE PHOSPHATE 300; 30 MG/1; MG/1
1 TABLET ORAL EVERY 4 HOURS PRN
Qty: 30 TABLET | Refills: 0 | Status: SHIPPED | OUTPATIENT
Start: 2018-10-09 | End: 2019-03-19 | Stop reason: ALTCHOICE

## 2018-10-09 RX ORDER — MAGNESIUM HYDROXIDE 1200 MG/15ML
LIQUID ORAL AS NEEDED
Status: DISCONTINUED | OUTPATIENT
Start: 2018-10-09 | End: 2018-10-09 | Stop reason: HOSPADM

## 2018-10-09 RX ORDER — PROPOFOL 10 MG/ML
INJECTION, EMULSION INTRAVENOUS AS NEEDED
Status: DISCONTINUED | OUTPATIENT
Start: 2018-10-09 | End: 2018-10-09 | Stop reason: SURG

## 2018-10-09 RX ORDER — ACETAMINOPHEN 325 MG/1
650 TABLET ORAL EVERY 6 HOURS
Status: DISCONTINUED | OUTPATIENT
Start: 2018-10-09 | End: 2018-10-09 | Stop reason: HOSPADM

## 2018-10-09 RX ORDER — ONDANSETRON 2 MG/ML
INJECTION INTRAMUSCULAR; INTRAVENOUS AS NEEDED
Status: DISCONTINUED | OUTPATIENT
Start: 2018-10-09 | End: 2018-10-09 | Stop reason: SURG

## 2018-10-09 RX ORDER — EPHEDRINE SULFATE 50 MG/ML
INJECTION, SOLUTION INTRAVENOUS AS NEEDED
Status: DISCONTINUED | OUTPATIENT
Start: 2018-10-09 | End: 2018-10-09 | Stop reason: SURG

## 2018-10-09 RX ORDER — LIDOCAINE HYDROCHLORIDE AND EPINEPHRINE 10; 10 MG/ML; UG/ML
INJECTION, SOLUTION INFILTRATION; PERINEURAL AS NEEDED
Status: DISCONTINUED | OUTPATIENT
Start: 2018-10-09 | End: 2018-10-09 | Stop reason: HOSPADM

## 2018-10-09 RX ORDER — SODIUM CHLORIDE, SODIUM LACTATE, POTASSIUM CHLORIDE, CALCIUM CHLORIDE 600; 310; 30; 20 MG/100ML; MG/100ML; MG/100ML; MG/100ML
125 INJECTION, SOLUTION INTRAVENOUS CONTINUOUS
Status: DISCONTINUED | OUTPATIENT
Start: 2018-10-09 | End: 2018-10-09 | Stop reason: HOSPADM

## 2018-10-09 RX ORDER — FENTANYL CITRATE 50 UG/ML
INJECTION, SOLUTION INTRAMUSCULAR; INTRAVENOUS AS NEEDED
Status: DISCONTINUED | OUTPATIENT
Start: 2018-10-09 | End: 2018-10-09 | Stop reason: SURG

## 2018-10-09 RX ORDER — MEPERIDINE HYDROCHLORIDE 25 MG/ML
12.5 INJECTION INTRAMUSCULAR; INTRAVENOUS; SUBCUTANEOUS ONCE AS NEEDED
Status: DISCONTINUED | OUTPATIENT
Start: 2018-10-09 | End: 2018-10-09 | Stop reason: HOSPADM

## 2018-10-09 RX ORDER — ONDANSETRON 2 MG/ML
4 INJECTION INTRAMUSCULAR; INTRAVENOUS ONCE AS NEEDED
Status: DISCONTINUED | OUTPATIENT
Start: 2018-10-09 | End: 2018-10-09 | Stop reason: HOSPADM

## 2018-10-09 RX ORDER — FENTANYL CITRATE/PF 50 MCG/ML
25 SYRINGE (ML) INJECTION
Status: DISCONTINUED | OUTPATIENT
Start: 2018-10-09 | End: 2018-10-09 | Stop reason: HOSPADM

## 2018-10-09 RX ADMIN — EPHEDRINE SULFATE 5 MG: 50 INJECTION, SOLUTION INTRAMUSCULAR; INTRAVENOUS; SUBCUTANEOUS at 12:25

## 2018-10-09 RX ADMIN — FENTANYL CITRATE 25 MCG: 50 INJECTION, SOLUTION INTRAMUSCULAR; INTRAVENOUS at 12:56

## 2018-10-09 RX ADMIN — FENTANYL CITRATE 25 MCG: 50 INJECTION, SOLUTION INTRAMUSCULAR; INTRAVENOUS at 12:30

## 2018-10-09 RX ADMIN — DEXAMETHASONE SODIUM PHOSPHATE 5 MG: 10 INJECTION INTRAMUSCULAR; INTRAVENOUS at 12:25

## 2018-10-09 RX ADMIN — ONDANSETRON 4 MG: 2 INJECTION INTRAMUSCULAR; INTRAVENOUS at 12:25

## 2018-10-09 RX ADMIN — PROPOFOL 200 MG: 10 INJECTION, EMULSION INTRAVENOUS at 12:10

## 2018-10-09 RX ADMIN — SODIUM CHLORIDE, SODIUM LACTATE, POTASSIUM CHLORIDE, AND CALCIUM CHLORIDE: .6; .31; .03; .02 INJECTION, SOLUTION INTRAVENOUS at 11:35

## 2018-10-09 RX ADMIN — LIDOCAINE HYDROCHLORIDE 50 MG: 10 INJECTION, SOLUTION INFILTRATION; PERINEURAL at 12:10

## 2018-10-09 RX ADMIN — FENTANYL CITRATE 25 MCG: 50 INJECTION, SOLUTION INTRAMUSCULAR; INTRAVENOUS at 12:50

## 2018-10-09 RX ADMIN — CEFAZOLIN SODIUM 1000 MG: 1 SOLUTION INTRAVENOUS at 12:10

## 2018-10-09 RX ADMIN — EPHEDRINE SULFATE 10 MG: 50 INJECTION, SOLUTION INTRAMUSCULAR; INTRAVENOUS; SUBCUTANEOUS at 12:20

## 2018-10-09 RX ADMIN — FENTANYL CITRATE 25 MCG: 50 INJECTION, SOLUTION INTRAMUSCULAR; INTRAVENOUS at 12:35

## 2018-10-09 NOTE — ANESTHESIA POSTPROCEDURE EVALUATION
Post-Op Assessment Note      CV Status:  Stable    Mental Status:  Alert and awake    Hydration Status:  Euvolemic    PONV Controlled:  Controlled    Airway Patency:  Patent    Post Op Vitals Reviewed: Yes          Staff: Anesthesiologist, CRNA           BP   136/62   Temp  97 4   Pulse  97   Resp   18   SpO2   99%

## 2018-10-09 NOTE — OP NOTE
OPERATIVE REPORT  PATIENT NAME: Barry Guan    :  1950  MRN: [id number]  Pt Location: [facility] OR ROOM 04    SURGERY DATE: 10/9/2018    Surgeon(s) and Role:     * Kelin Barber MD - Primary     * Alex Mckay PA-C - Assisting    Preop Diagnosis:  BCC (basal cell carcinoma), face [C44 310]    Post-Op Diagnosis Codes:     * BCC (basal cell carcinoma), face [C44 310]     Procedure:  1  Excision basal cell carcinoma of nose 1 2 cm, frozen section 2  Reconstruction left nose defect with full-thickness skin graft 1 2 x 1 2 cm   Specimen(s):  ID Type Source Tests Collected by Time Destination   1 : LEFT nose/cheek BCC excision- long suture 12 oclock superior/ short 6 oclock inferior Tissue Skin, Other TISSUE Iván Van MD 10/9/2018 1229        Estimated Blood Loss:   Minimal    Drains:       Anesthesia Type:   IV Sedation with Anesthesia    Operative Indications:  BCC (basal cell carcinoma), face [C44 310]      Operative Findings:  As above    Complications:   None    Procedure and Technique:  Meme Davenport was seen in the holding area preoperatively, the surgical site was marked with her participation, and we reviewed the planned procedure as well as potential risks, complications, and limitations  She was taken to the operating room and underwent induction of anesthesia by the anesthesia personnel  The operative field was prepped and draped in sterile fashion a proper time-out was performed  2 5 loupe magnification was used aid visualization  The area of concern was marked for excision with a narrow margin of normal-appearing skin and was infiltrated with xylocaine with epinephrine  A 15 blade used to create skin incision, this was carried down through the dermis and lesion was then excised in the plane of subcutaneous fat utilizing a 15 blade  It was marked with suture and sent for frozen section    The pathologist later called back into the room that the wound margins were free of invasive carcinoma  The reconstruction was then planned with a full-thickness skin graft  The graft was harvested from the left lateral neck utilizing a 15 blade  It was defatted on the back table  It was trimmed to fit the defect, and inset with 6 0 chromic sutures  The graft was protected with a bolster dressing of sterile foam coated in bacitracin, this was secured with 5 0 silk bolster type sutures  The donor site was closed with 5 O Monocryl buried at the level of the deep dermis and this was followed by running subcuticular 5 O Monocryl  Benzoin and Steri-Strips were applied to the donor site and the patient was transferred to the recovery room     I was present for the entire procedure    Patient Disposition:  PACU     SIGNATURE: Alejandra Vyas MD  DATE: October 9, 2018  TIME: 2:18 PM

## 2018-10-09 NOTE — DISCHARGE INSTRUCTIONS
[facility]  Dr Robinson Tapia   [address] [facility], [address]  Phone: [phone]     Postoperative Instructions for Outpatient Surgery     These instructions are being provided by your doctor to give you basic guidelines during your post-op recovery  Please let our office know if your contact information has changed       Please call the office today for an appointment on Friday afternoon (10/12/18) for postoperative care      Dressings: Keep nose dressing dry  Left neck steri strips, replace if they fall off       Activity Restrictions: Nothing strenuous for 48 hours       Bathing:  May shower tomorrow evening but, keep nose dressing dry  Pat neck incision dry       Medications:    Resume pre-op medications  You may take tylenol, aleve, or ibuprofen for pain control             Tylenol #3 as needed for pain  Other: Elevate head of bed at night to sleep for 48 hours  May apply ice to left neck for 15 min intervals as needed for pain or swelling

## 2018-10-09 NOTE — ANESTHESIA PREPROCEDURE EVALUATION
Review of Systems/Medical History  Patient summary reviewed    No history of anesthetic complications     Cardiovascular  Exercise tolerance (METS): >4,  Hyperlipidemia,   Pulmonary hypertension Pulmonary  Smoker ex-smoker  ,        GI/Hepatic       Kidney stones,        Endo/Other  History of thyroid disease , hypothyroidism,   Obesity    GYN    Hysterectomy, Breast cancer left mastectomy and axillary node dissection       Hematology  Negative hematology ROS      Musculoskeletal  Negative musculoskeletal ROS        Neurology  Negative neurology ROS      Psychology   Negative psychology ROS              Physical Exam    Airway    Mallampati score: II  TM Distance: >3 FB  Neck ROM: full     Dental   Comment: No loose,     Cardiovascular  Rhythm: regular, Rate: normal,     Pulmonary  Breath sounds clear to auscultation,     Other Findings        Anesthesia Plan  ASA Score- 2     Anesthesia Type- general with ASA Monitors  Additional Monitors:   Airway Plan: LMA  Plan Factors-  Patient did not smoke on day of surgery  Induction- intravenous  Postoperative Plan-     Informed Consent- Anesthetic plan and risks discussed with patient and spouse  I personally reviewed this patient with the CRNA  Discussed and agreed on the Anesthesia Plan with the CRNA  Odalys Mac

## 2018-10-09 NOTE — INTERIM OP NOTE
NOSE/CHEEK BCC EXCISION; FROZEN SECTION, COMPLEX CLOSURE RECONSTRUCTION, FLAP RECONSTRUCTION, FULL THICKNESS SKIN GRAFT RECONSTRUCTION  Postoperative Note  PATIENT NAME: Juan A Bagley  : 1950  MRN: [id number]  [facility] OR ROOM 04    Surgery Date: 10/9/2018    Preop Diagnosis:  BCC (basal cell carcinoma), face [C44 310]    Post-Op Diagnosis Codes:     * BCC (basal cell carcinoma), face [C44 310]    Procedure(s) (LRB):  NOSE/CHEEK BCC EXCISION; FROZEN SECTION (Left)  COMPLEX CLOSURE RECONSTRUCTION (Left)  FLAP RECONSTRUCTION (Left)  FULL THICKNESS SKIN GRAFT RECONSTRUCTION (Left)    Surgeon(s) and Role:     * Greta Calzada MD - Primary     * Pradeep Alonzo PA-C - Assisting    Specimens:  ID Type Source Tests Collected by Time Destination   1 : LEFT nose/cheek BCC excision- long suture 12 oclock superior/ short 6 oclock inferior Tissue Skin, Other TISSUE EXAM Greta Calzada MD 10/9/2018 1229        Estimated Blood Loss:   Minimal    Anesthesia Type:   IV Sedation with Anesthesia     Findings:    None  Complications:   None    SIGNATURE: Pradeep Alonzo PA-C   DATE: 2018   TIME: 1:10 PM

## 2018-10-10 ENCOUNTER — TELEPHONE (OUTPATIENT)
Dept: PLASTIC SURGERY | Facility: CLINIC | Age: 68
End: 2018-10-10

## 2018-10-12 ENCOUNTER — OFFICE VISIT (OUTPATIENT)
Dept: PLASTIC SURGERY | Facility: CLINIC | Age: 68
End: 2018-10-12

## 2018-10-12 VITALS — BODY MASS INDEX: 29.81 KG/M2 | WEIGHT: 162 LBS | HEIGHT: 62 IN

## 2018-10-12 DIAGNOSIS — C44.311 BASAL CELL CARCINOMA (BCC) OF LEFT SIDE OF NOSE: Primary | ICD-10-CM

## 2018-10-12 PROCEDURE — 99024 POSTOP FOLLOW-UP VISIT: CPT | Performed by: PHYSICIAN ASSISTANT

## 2018-10-12 NOTE — LETTER
October 12, 2018     Korye Ware MD  [address] [place] [address]    Patient: Derek Jones   YOB: 1950   Date of Visit: 10/12/2018       Dear Dr Ben Pretty:    Thank you for referring Kasey Gusman to me for evaluation  Below are my notes for this consultation  If you have questions, please do not hesitate to call me  I look forward to following your patient along with you  Sincerely,        Valdemar Neal PA-C        CC: MD Valdemar Car PA-C  10/12/2018  4:15 PM  Sign at close encounter  Assessment/Plan:   Sonia Traore is a pleasant 77-year-old female who is 3 days status post excision of a left nose basal cell carcinoma with full-thickness skin graft  She was originally referred to us by Dr Ines Peterson  Please see HPI  The bolster was removed today and the graft is adhered  She was given instructions on how to care for her graft  She will use Aquaphor and Xeroform gauze for 1 week  We will see her back in approximately 10-13 days for suture removal from the left neck  Diagnoses and all orders for this visit:    Basal cell carcinoma (BCC) of left side of nose          Subjective:     Patient ID: Derek Jones is a 77 y o  female  HPI   Sonia Traore is a pleasant 77-year-old female who is 3 days status post excision of a left nose basal cell carcinoma with full-thickness skin graft  She is feeling well and denies any complaints regarding her graft site  She reports feeling itchy  Review of Systems   Skin:        As per HPI  Objective:     Physical Exam   Skin:   Left nose graft site is healing well  It is adhered  Photos were taken  Left neck donor site Steri-Strips are intact

## 2018-10-12 NOTE — PROGRESS NOTES
Assessment/Plan:   Hernan Francisco is a pleasant 68-year-old female who is 3 days status post excision of a left nose basal cell carcinoma with full-thickness skin graft  She was originally referred to us by Dr Ivanna Achayra  Please see HPI  The bolster was removed today and the graft is adhered  She was given instructions on how to care for her graft  She will use Aquaphor and Xeroform gauze for 1 week  We will see her back in approximately 10-13 days for suture removal from the left neck  Diagnoses and all orders for this visit:    Basal cell carcinoma (BCC) of left side of nose          Subjective:     Patient ID: Lidia Chan is a 77 y o  female  HPI   Hernan Francisco is a pleasant 68-year-old female who is 3 days status post excision of a left nose basal cell carcinoma with full-thickness skin graft  She is feeling well and denies any complaints regarding her graft site  She reports feeling itchy  Review of Systems   Skin:        As per HPI  Objective:     Physical Exam   Skin:   Left nose graft site is healing well  It is adhered  Photos were taken  Left neck donor site Steri-Strips are intact

## 2018-10-22 ENCOUNTER — OFFICE VISIT (OUTPATIENT)
Dept: PLASTIC SURGERY | Facility: CLINIC | Age: 68
End: 2018-10-22

## 2018-10-22 DIAGNOSIS — Z98.890 POST-OPERATIVE STATE: Primary | ICD-10-CM

## 2018-10-22 PROCEDURE — 99024 POSTOP FOLLOW-UP VISIT: CPT

## 2018-10-22 NOTE — PATIENT INSTRUCTIONS
Wear SPF 30+ on graft and neck incision at all times  Follow up with Dr Iraida Leon or MARYANN Flynn in 4 weeks to discuss plan for re-excision of remaining lesion  Call with questions or concerns

## 2018-10-22 NOTE — PROGRESS NOTES
Patient presents for suture removal s/p BCC excision of left side of nose with skin graft reconstruction 10/9/18  Donor site benign  Suture loops removed  Skin graft healing well  Reviewed with patient that pathology showed there was a margin still positive for BCC  Aware that area will need to heal and then re-excision will be necessary  Will discuss further with MARYANN Lowery at follow up visit in 4 weeks  Encouraged to call sooner with questions

## 2018-11-20 ENCOUNTER — OFFICE VISIT (OUTPATIENT)
Dept: PLASTIC SURGERY | Facility: CLINIC | Age: 68
End: 2018-11-20

## 2018-11-20 VITALS — BODY MASS INDEX: 30.59 KG/M2 | HEIGHT: 62 IN | WEIGHT: 166.2 LBS

## 2018-11-20 DIAGNOSIS — C44.311 BASAL CELL CARCINOMA (BCC) OF LEFT SIDE OF NOSE: Primary | ICD-10-CM

## 2018-11-20 PROCEDURE — 99024 POSTOP FOLLOW-UP VISIT: CPT | Performed by: PHYSICIAN ASSISTANT

## 2018-11-20 NOTE — LETTER
November 26, 2018     David Siddiqui MD  [address] [place] [address]    Patient: Grayson Sultana   YOB: 1950   Date of Visit: 11/20/2018       Dear Dr Kelin Hassan:    Thank you for referring Brody Bedolla to me for evaluation  Below are my notes for this consultation  If you have questions, please do not hesitate to call me  I look forward to following your patient along with you  Sincerely,        Edmundo Guevara PA-C        CC: MD Edmundo Montana PA-C  11/20/2018 11:09 AM  Sign at close encounter  Assessment/Plan:  Lindsey Pederson is a pleasant 23-year-old female who is 6 weeks status post excision of a left nose basal cell carcinoma with full-thickness skin graft  Please see HPI  She is originally referred to us by Dr Somers Nearing  Her pathology revealed positive margin between the 6 and 9 o'clock location  This was reviewed with her  I recommended reexcision of this area with complex closure and frozen section  She understood and agreed  We discussed with the patient the options, benefits, and risks of surgery such as anesthesia, bleeding, infection, scarring and the need for additional procedures  Consent was obtained and all questions answered to her satisfaction  We will plan for surgery at her earliest convenience  Diagnoses and all orders for this visit:    Basal cell carcinoma (BCC) of left side of nose          Subjective:      Patient ID: Grayson Sultana is a 77 y o  female  HPI   Lindsey Pederson is a pleasant 23-year-old female who is 6 weeks status post excision of a left nose basal cell carcinoma with full-thickness skin graft  She denies any complaints regarding the graft site  She is pleased with the results      The following portions of the patient's history were reviewed and updated as appropriate: allergies, current medications, past family history, past medical history, past social history, past surgical history and problem list     Review of Systems   HENT: Negative for hearing loss  Eyes: Negative for visual disturbance  Respiratory: Negative for shortness of breath  Cardiovascular: Negative for chest pain  Gastrointestinal: Negative for abdominal pain, blood in stool, constipation, diarrhea, nausea and vomiting  Genitourinary: Negative for hematuria  Musculoskeletal: Negative for gait problem  Skin:        As per HPI  Neurological: Negative for seizures and headaches  Hematological: Does not bruise/bleed easily  Psychiatric/Behavioral: The patient is not nervous/anxious  Objective:      Ht 5' 1 5" (1 562 m)   Wt 75 4 kg (166 lb 3 2 oz)   BMI 30 89 kg/m²           Physical Exam   Constitutional: She is oriented to person, place, and time  She appears well-developed and well-nourished  No distress  HENT:   Head: Normocephalic and atraumatic  Eyes: Pupils are equal, round, and reactive to light  EOM are normal  No scleral icterus  Neck: Neck supple  No tracheal deviation present  No thyromegaly present  Cardiovascular: Normal rate and regular rhythm  Exam reveals no gallop and no friction rub  No murmur heard  Pulmonary/Chest: Effort normal and breath sounds normal  She has no wheezes  She has no rales  Abdominal: Soft  Bowel sounds are normal  She exhibits no distension  There is no tenderness  There is no rebound and no guarding  Musculoskeletal: Normal range of motion  Lymphadenopathy:     She has no cervical adenopathy  Neurological: She is alert and oriented to person, place, and time  No cranial nerve deficit  Skin:   Left nose graft site is healing well  Mild tan pigment noted  Psychiatric: She has a normal mood and affect

## 2018-11-20 NOTE — PROGRESS NOTES
Assessment/Plan:  Oly Edwards is a pleasant 13-year-old female who is 6 weeks status post excision of a left nose basal cell carcinoma with full-thickness skin graft  Please see HPI  She is originally referred to us by Dr Lb Chavez  Her pathology revealed positive margin between the 6 and 9 o'clock location  This was reviewed with her  I recommended reexcision of this area with complex closure and frozen section  She understood and agreed  We discussed with the patient the options, benefits, and risks of surgery such as anesthesia, bleeding, infection, scarring and the need for additional procedures  Consent was obtained and all questions answered to her satisfaction  We will plan for surgery at her earliest convenience  Diagnoses and all orders for this visit:    Basal cell carcinoma (BCC) of left side of nose          Subjective:      Patient ID: Lillian Corbett is a 77 y o  female  HPI   Oly Edwards is a pleasant 13-year-old female who is 6 weeks status post excision of a left nose basal cell carcinoma with full-thickness skin graft  She denies any complaints regarding the graft site  She is pleased with the results  The following portions of the patient's history were reviewed and updated as appropriate: allergies, current medications, past family history, past medical history, past social history, past surgical history and problem list     Review of Systems   HENT: Negative for hearing loss  Eyes: Negative for visual disturbance  Respiratory: Negative for shortness of breath  Cardiovascular: Negative for chest pain  Gastrointestinal: Negative for abdominal pain, blood in stool, constipation, diarrhea, nausea and vomiting  Genitourinary: Negative for hematuria  Musculoskeletal: Negative for gait problem  Skin:        As per HPI  Neurological: Negative for seizures and headaches  Hematological: Does not bruise/bleed easily     Psychiatric/Behavioral: The patient is not nervous/anxious  Objective:      Ht 5' 1 5" (1 562 m)   Wt 75 4 kg (166 lb 3 2 oz)   BMI 30 89 kg/m²          Physical Exam   Constitutional: She is oriented to person, place, and time  She appears well-developed and well-nourished  No distress  HENT:   Head: Normocephalic and atraumatic  Eyes: Pupils are equal, round, and reactive to light  EOM are normal  No scleral icterus  Neck: Neck supple  No tracheal deviation present  No thyromegaly present  Cardiovascular: Normal rate and regular rhythm  Exam reveals no gallop and no friction rub  No murmur heard  Pulmonary/Chest: Effort normal and breath sounds normal  She has no wheezes  She has no rales  Abdominal: Soft  Bowel sounds are normal  She exhibits no distension  There is no tenderness  There is no rebound and no guarding  Musculoskeletal: Normal range of motion  Lymphadenopathy:     She has no cervical adenopathy  Neurological: She is alert and oriented to person, place, and time  No cranial nerve deficit  Skin:   Left nose graft site is healing well  Mild tan pigment noted  Psychiatric: She has a normal mood and affect

## 2018-12-27 ENCOUNTER — APPOINTMENT (OUTPATIENT)
Dept: LAB | Facility: CLINIC | Age: 68
End: 2018-12-27
Payer: MEDICARE

## 2018-12-27 ENCOUNTER — TRANSCRIBE ORDERS (OUTPATIENT)
Dept: ADMINISTRATIVE | Age: 68
End: 2018-12-27

## 2018-12-27 ENCOUNTER — HOSPITAL ENCOUNTER (OUTPATIENT)
Dept: RADIOLOGY | Age: 68
Discharge: HOME | End: 2018-12-27
Payer: MEDICARE

## 2018-12-27 ENCOUNTER — OFFICE VISIT (OUTPATIENT)
Dept: FAMILY MEDICINE CLINIC | Facility: CLINIC | Age: 68
End: 2018-12-27
Payer: MEDICARE

## 2018-12-27 ENCOUNTER — APPOINTMENT (OUTPATIENT)
Dept: RADIOLOGY | Age: 68
End: 2018-12-27
Payer: MEDICARE

## 2018-12-27 VITALS
HEART RATE: 86 BPM | BODY MASS INDEX: 30.13 KG/M2 | SYSTOLIC BLOOD PRESSURE: 120 MMHG | WEIGHT: 159.6 LBS | DIASTOLIC BLOOD PRESSURE: 74 MMHG | TEMPERATURE: 100.6 F | HEIGHT: 61 IN

## 2018-12-27 DIAGNOSIS — R10.11 RIGHT UPPER QUADRANT ABDOMINAL PAIN: ICD-10-CM

## 2018-12-27 DIAGNOSIS — R05.9 COUGH: ICD-10-CM

## 2018-12-27 DIAGNOSIS — R11.0 NAUSEA: ICD-10-CM

## 2018-12-27 DIAGNOSIS — R11.0 NAUSEA: Primary | ICD-10-CM

## 2018-12-27 DIAGNOSIS — R07.81 PLEURITIC PAIN: ICD-10-CM

## 2018-12-27 DIAGNOSIS — R50.9 FEVER, UNSPECIFIED FEVER CAUSE: ICD-10-CM

## 2018-12-27 DIAGNOSIS — R10.11 RIGHT UPPER QUADRANT ABDOMINAL PAIN: Primary | ICD-10-CM

## 2018-12-27 DIAGNOSIS — E87.1 HYPONATREMIA: ICD-10-CM

## 2018-12-27 DIAGNOSIS — D64.9 ANEMIA, UNSPECIFIED TYPE: ICD-10-CM

## 2018-12-27 DIAGNOSIS — R19.00 RETROPERITONEAL MASS: ICD-10-CM

## 2018-12-27 DIAGNOSIS — R74.8 ELEVATED LIVER ENZYMES: ICD-10-CM

## 2018-12-27 LAB
ALBUMIN SERPL BCP-MCNC: 2.1 G/DL (ref 3.5–5)
ALP SERPL-CCNC: 261 U/L (ref 46–116)
ALT SERPL W P-5'-P-CCNC: 50 U/L (ref 12–78)
AMYLASE SERPL-CCNC: 38 IU/L (ref 25–115)
ANION GAP SERPL CALCULATED.3IONS-SCNC: 10 MMOL/L (ref 4–13)
AST SERPL W P-5'-P-CCNC: 68 U/L (ref 5–45)
BACTERIA UR QL AUTO: ABNORMAL /HPF
BASOPHILS # BLD AUTO: 0.06 THOUSANDS/ΜL (ref 0–0.1)
BASOPHILS NFR BLD AUTO: 1 % (ref 0–1)
BILIRUB SERPL-MCNC: 0.5 MG/DL (ref 0.2–1)
BILIRUB UR QL STRIP: NEGATIVE
BUN SERPL-MCNC: 24 MG/DL (ref 5–25)
CALCIUM SERPL-MCNC: 9.1 MG/DL (ref 8.3–10.1)
CHLORIDE SERPL-SCNC: 96 MMOL/L (ref 100–108)
CLARITY UR: ABNORMAL
CO2 SERPL-SCNC: 26 MMOL/L (ref 21–32)
COLOR UR: YELLOW
CREAT SERPL-MCNC: 1.32 MG/DL (ref 0.6–1.3)
EOSINOPHIL # BLD AUTO: 0.07 THOUSAND/ΜL (ref 0–0.61)
EOSINOPHIL NFR BLD AUTO: 1 % (ref 0–6)
ERYTHROCYTE [DISTWIDTH] IN BLOOD BY AUTOMATED COUNT: 14.3 % (ref 11.6–15.1)
ERYTHROCYTE [SEDIMENTATION RATE] IN BLOOD: 100 MM/HOUR (ref 0–20)
GFR SERPL CREATININE-BSD FRML MDRD: 41 ML/MIN/1.73SQ M
GLUCOSE P FAST SERPL-MCNC: 97 MG/DL (ref 65–99)
GLUCOSE UR STRIP-MCNC: NEGATIVE MG/DL
HCT VFR BLD AUTO: 32.6 % (ref 34.8–46.1)
HGB BLD-MCNC: 10.2 G/DL (ref 11.5–15.4)
HGB UR QL STRIP.AUTO: ABNORMAL
IMM GRANULOCYTES # BLD AUTO: 0.08 THOUSAND/UL (ref 0–0.2)
IMM GRANULOCYTES NFR BLD AUTO: 1 % (ref 0–2)
KETONES UR STRIP-MCNC: NEGATIVE MG/DL
LEUKOCYTE ESTERASE UR QL STRIP: ABNORMAL
LIPASE SERPL-CCNC: 105 U/L (ref 73–393)
LYMPHOCYTES # BLD AUTO: 0.94 THOUSANDS/ΜL (ref 0.6–4.47)
LYMPHOCYTES NFR BLD AUTO: 7 % (ref 14–44)
MCH RBC QN AUTO: 26.4 PG (ref 26.8–34.3)
MCHC RBC AUTO-ENTMCNC: 31.3 G/DL (ref 31.4–37.4)
MCV RBC AUTO: 85 FL (ref 82–98)
MONOCYTES # BLD AUTO: 1.33 THOUSAND/ΜL (ref 0.17–1.22)
MONOCYTES NFR BLD AUTO: 10 % (ref 4–12)
NEUTROPHILS # BLD AUTO: 10.47 THOUSANDS/ΜL (ref 1.85–7.62)
NEUTS SEG NFR BLD AUTO: 80 % (ref 43–75)
NITRITE UR QL STRIP: NEGATIVE
NON-SQ EPI CELLS URNS QL MICRO: ABNORMAL /HPF
NRBC BLD AUTO-RTO: 0 /100 WBCS
PH UR STRIP.AUTO: 6.5 [PH] (ref 4.5–8)
PLATELET # BLD AUTO: 530 THOUSANDS/UL (ref 149–390)
PMV BLD AUTO: 9.2 FL (ref 8.9–12.7)
POTASSIUM SERPL-SCNC: 4.3 MMOL/L (ref 3.5–5.3)
PROT SERPL-MCNC: 8.8 G/DL (ref 6.4–8.2)
PROT UR STRIP-MCNC: ABNORMAL MG/DL
RBC # BLD AUTO: 3.86 MILLION/UL (ref 3.81–5.12)
RBC #/AREA URNS AUTO: ABNORMAL /HPF
SODIUM SERPL-SCNC: 132 MMOL/L (ref 136–145)
SP GR UR STRIP.AUTO: 1.02 (ref 1–1.03)
UROBILINOGEN UR QL STRIP.AUTO: 2 E.U./DL
WBC # BLD AUTO: 12.95 THOUSAND/UL (ref 4.31–10.16)
WBC #/AREA URNS AUTO: ABNORMAL /HPF

## 2018-12-27 PROCEDURE — 80053 COMPREHEN METABOLIC PANEL: CPT

## 2018-12-27 PROCEDURE — 71101 X-RAY EXAM UNILAT RIBS/CHEST: CPT

## 2018-12-27 PROCEDURE — 83690 ASSAY OF LIPASE: CPT

## 2018-12-27 PROCEDURE — 36415 COLL VENOUS BLD VENIPUNCTURE: CPT

## 2018-12-27 PROCEDURE — 82150 ASSAY OF AMYLASE: CPT

## 2018-12-27 PROCEDURE — 80074 ACUTE HEPATITIS PANEL: CPT

## 2018-12-27 PROCEDURE — 85652 RBC SED RATE AUTOMATED: CPT

## 2018-12-27 PROCEDURE — 87147 CULTURE TYPE IMMUNOLOGIC: CPT

## 2018-12-27 PROCEDURE — 87086 URINE CULTURE/COLONY COUNT: CPT

## 2018-12-27 PROCEDURE — 81001 URINALYSIS AUTO W/SCOPE: CPT

## 2018-12-27 PROCEDURE — 99214 OFFICE O/P EST MOD 30 MIN: CPT | Performed by: NURSE PRACTITIONER

## 2018-12-27 PROCEDURE — 76705 ECHO EXAM OF ABDOMEN: CPT

## 2018-12-27 PROCEDURE — 85025 COMPLETE CBC W/AUTO DIFF WBC: CPT

## 2018-12-27 RX ORDER — NEBIVOLOL HYDROCHLORIDE 5 MG/1
5 TABLET ORAL DAILY
Refills: 3 | COMMUNITY
Start: 2018-11-27 | End: 2019-01-29 | Stop reason: SDUPTHER

## 2018-12-27 RX ORDER — ONDANSETRON HYDROCHLORIDE 8 MG/1
8 TABLET, FILM COATED ORAL EVERY 8 HOURS PRN
Qty: 20 TABLET | Refills: 1 | Status: SHIPPED | OUTPATIENT
Start: 2018-12-27 | End: 2019-01-15 | Stop reason: HOSPADM

## 2018-12-28 DIAGNOSIS — N30.90 CYSTITIS: Primary | ICD-10-CM

## 2018-12-28 LAB
BACTERIA UR CULT: ABNORMAL
HAV IGM SER QL: NORMAL
HBV CORE IGM SER QL: NORMAL
HBV SURFACE AG SER QL: NORMAL
HCV AB SER QL: NORMAL

## 2018-12-28 RX ORDER — CEFDINIR 300 MG/1
300 CAPSULE ORAL EVERY 12 HOURS SCHEDULED
Qty: 14 CAPSULE | Refills: 0 | Status: SHIPPED | OUTPATIENT
Start: 2018-12-28 | End: 2019-01-04

## 2019-01-03 ENCOUNTER — OFFICE VISIT (OUTPATIENT)
Dept: FAMILY MEDICINE CLINIC | Facility: CLINIC | Age: 69
End: 2019-01-03
Payer: MEDICARE

## 2019-01-03 VITALS
SYSTOLIC BLOOD PRESSURE: 120 MMHG | DIASTOLIC BLOOD PRESSURE: 70 MMHG | BODY MASS INDEX: 31.15 KG/M2 | WEIGHT: 165 LBS | TEMPERATURE: 99.1 F | HEIGHT: 61 IN | HEART RATE: 84 BPM

## 2019-01-03 DIAGNOSIS — R74.8 ALKALINE PHOSPHATASE ELEVATION: ICD-10-CM

## 2019-01-03 DIAGNOSIS — D64.9 ANEMIA, UNSPECIFIED TYPE: ICD-10-CM

## 2019-01-03 DIAGNOSIS — E87.1 HYPONATREMIA: ICD-10-CM

## 2019-01-03 DIAGNOSIS — R74.8 ELEVATED LIVER ENZYMES: ICD-10-CM

## 2019-01-03 DIAGNOSIS — R10.11 RIGHT UPPER QUADRANT ABDOMINAL PAIN: ICD-10-CM

## 2019-01-03 DIAGNOSIS — R19.00 RETROPERITONEAL MASS: Primary | ICD-10-CM

## 2019-01-03 PROBLEM — R11.0 NAUSEA: Status: RESOLVED | Noted: 2018-12-27 | Resolved: 2019-01-03

## 2019-01-03 PROCEDURE — 99214 OFFICE O/P EST MOD 30 MIN: CPT | Performed by: NURSE PRACTITIONER

## 2019-01-03 NOTE — PROGRESS NOTES
Assessment/Plan:         Problem List Items Addressed This Visit     Retroperitoneal mass + S/p complicated cystitis  --Symptoms significantly improved with antibiotic  Complete as ordered  --Continue frequent fluids  --Repeat UA, urine culture in 1 month  --Follow up with urologist as scheduled  Renal CT, as recommended by radiologist, to be done prior (orderered previously)  --Call for recurrent, worsening symptoms  Relevant Orders    Urine culture    Urinalysis with reflex to microscopic    Right upper quadrant abdominal pain   ALP elevation + elevated liver enzymes    Anemia  --Recent normal abdominal ultrasound except for kidney (per above)  --GI referral -->appointment scheduled in 2 weeks  --Slip given for repeat labs before the end of the month    Relevant Orders    CBC and differential    Ferritin    Iron Panel    Vitamin B12    Retic Count    Folate  Gamma GT      Hyponatremia (mild)    Relevant Orders    Comprehensive metabolic panel            Subjective:      Patient ID: Emmanuel Betancur is a 77 y o  female  Here as 1 week follow-up to abdominal pain, nausea, fevers, decreased appetite, fatigue  She previous note for details  Test results reviewed with patient  Notable for positive urine culture (>10^5 group B strep)  Also WBC 12 95, elevated , IRN204, AST 68, mild hyponatremia 132, stable mildly elevated creatinine 1 32, new onset normocytic anemia Hgb 10 2  Ultrasound with normal liver, gallbladder  Large complex right cystic mass  CT recommended-->ordered, but patient hasn't yet scheduled  Also referred to GI  Earliest available apparently was March  Started on antibiotic [facility]) for UTI which she has been taken as prescribed, without AE's  Feeling significantly better  Improved energy level, appetite  Nausea resolved, no further fevers  Improved urination, no dysuria, hematuria  Drinking plenty of fluids  No further loose stools    Right upper abdominal pain improved  No right mid back pain  No vomiting  Patient has appointment with her urologist in March also  The following portions of the patient's history were reviewed and updated as appropriate: current medications, past family history, past medical history, past social history, past surgical history and problem list     Review of Systems   Constitutional:        Per HPI   HENT: Negative for sore throat  Cardiovascular: Negative for chest pain  Gastrointestinal: Positive for abdominal pain  Per HPI   Genitourinary:        Per HPI   Musculoskeletal: Negative for myalgias  Neurological: Negative for dizziness and headaches  Objective:      /70 (BP Location: Right arm, Patient Position: Sitting, Cuff Size: Standard)   Pulse 84   Temp 99 1 °F (37 3 °C)   Ht 5' 1" (1 549 m)   Wt 74 8 kg (165 lb)   BMI 31 18 kg/m²          Physical Exam   Constitutional: She is oriented to person, place, and time  She appears well-developed and well-nourished  HENT:   Head: Normocephalic  Neck: Normal range of motion  Neck supple  Cardiovascular: Normal rate, regular rhythm and normal heart sounds  Pulmonary/Chest: Effort normal and breath sounds normal    Abdominal: Soft  Bowel sounds are normal  She exhibits no distension and no mass  There is tenderness  There is no rebound and no guarding  Continued mild RUQ abdominal tenderness with no guarding or rebound  No mass palpated  No CVA tenderness  Lymphadenopathy:     She has no cervical adenopathy  Neurological: She is alert and oriented to person, place, and time  She has normal reflexes  Skin: Skin is warm and dry  Psychiatric: She has a normal mood and affect

## 2019-01-04 DIAGNOSIS — Z85.3 PERSONAL HISTORY OF MALIGNANT NEOPLASM OF BREAST: ICD-10-CM

## 2019-01-11 ENCOUNTER — APPOINTMENT (EMERGENCY)
Dept: RADIOLOGY | Facility: HOSPITAL | Age: 69
DRG: 176 | End: 2019-01-11
Payer: MEDICARE

## 2019-01-11 ENCOUNTER — HOSPITAL ENCOUNTER (INPATIENT)
Facility: HOSPITAL | Age: 69
LOS: 3 days | Discharge: HOME | DRG: 176 | End: 2019-01-15
Attending: EMERGENCY MEDICINE | Admitting: INTERNAL MEDICINE
Payer: MEDICARE

## 2019-01-11 DIAGNOSIS — I26.99 PULMONARY EMBOLISM (HCC): ICD-10-CM

## 2019-01-11 DIAGNOSIS — R06.02 SHORTNESS OF BREATH: ICD-10-CM

## 2019-01-11 DIAGNOSIS — R07.9 CHEST PAIN: Primary | ICD-10-CM

## 2019-01-11 DIAGNOSIS — R77.8 ELEVATED TROPONIN: ICD-10-CM

## 2019-01-11 DIAGNOSIS — I47.1 PAROXYSMAL SUPRAVENTRICULAR TACHYCARDIA (HCC): ICD-10-CM

## 2019-01-11 DIAGNOSIS — I35.0 MODERATE TO SEVERE AORTIC STENOSIS: ICD-10-CM

## 2019-01-11 DIAGNOSIS — I82.409 DVT (DEEP VENOUS THROMBOSIS) (HCC): ICD-10-CM

## 2019-01-11 LAB
BASOPHILS # BLD AUTO: 0.06 THOUSANDS/ΜL (ref 0–0.1)
BASOPHILS NFR BLD AUTO: 1 % (ref 0–1)
EOSINOPHIL # BLD AUTO: 0.29 THOUSAND/ΜL (ref 0–0.61)
EOSINOPHIL NFR BLD AUTO: 3 % (ref 0–6)
ERYTHROCYTE [DISTWIDTH] IN BLOOD BY AUTOMATED COUNT: 15 % (ref 11.6–15.1)
HCT VFR BLD AUTO: 33.4 % (ref 34.8–46.1)
HGB BLD-MCNC: 10.3 G/DL (ref 11.5–15.4)
IMM GRANULOCYTES # BLD AUTO: 0.04 THOUSAND/UL (ref 0–0.2)
IMM GRANULOCYTES NFR BLD AUTO: 0 % (ref 0–2)
LYMPHOCYTES # BLD AUTO: 1.76 THOUSANDS/ΜL (ref 0.6–4.47)
LYMPHOCYTES NFR BLD AUTO: 19 % (ref 14–44)
MCH RBC QN AUTO: 26.3 PG (ref 26.8–34.3)
MCHC RBC AUTO-ENTMCNC: 30.8 G/DL (ref 31.4–37.4)
MCV RBC AUTO: 85 FL (ref 82–98)
MONOCYTES # BLD AUTO: 0.93 THOUSAND/ΜL (ref 0.17–1.22)
MONOCYTES NFR BLD AUTO: 10 % (ref 4–12)
NEUTROPHILS # BLD AUTO: 6.37 THOUSANDS/ΜL (ref 1.85–7.62)
NEUTS SEG NFR BLD AUTO: 67 % (ref 43–75)
NRBC BLD AUTO-RTO: 0 /100 WBCS
PLATELET # BLD AUTO: 765 THOUSANDS/UL (ref 149–390)
PMV BLD AUTO: 9 FL (ref 8.9–12.7)
RBC # BLD AUTO: 3.92 MILLION/UL (ref 3.81–5.12)
WBC # BLD AUTO: 9.45 THOUSAND/UL (ref 4.31–10.16)

## 2019-01-11 PROCEDURE — 85379 FIBRIN DEGRADATION QUANT: CPT | Performed by: EMERGENCY MEDICINE

## 2019-01-11 PROCEDURE — 36415 COLL VENOUS BLD VENIPUNCTURE: CPT | Performed by: EMERGENCY MEDICINE

## 2019-01-11 PROCEDURE — 96360 HYDRATION IV INFUSION INIT: CPT

## 2019-01-11 PROCEDURE — 85025 COMPLETE CBC W/AUTO DIFF WBC: CPT | Performed by: EMERGENCY MEDICINE

## 2019-01-11 PROCEDURE — 71046 X-RAY EXAM CHEST 2 VIEWS: CPT

## 2019-01-11 PROCEDURE — 83605 ASSAY OF LACTIC ACID: CPT | Performed by: EMERGENCY MEDICINE

## 2019-01-11 PROCEDURE — 93005 ELECTROCARDIOGRAM TRACING: CPT

## 2019-01-11 PROCEDURE — 80053 COMPREHEN METABOLIC PANEL: CPT | Performed by: EMERGENCY MEDICINE

## 2019-01-11 PROCEDURE — 1124F ACP DISCUSS-NO DSCNMKR DOCD: CPT | Performed by: INTERNAL MEDICINE

## 2019-01-11 PROCEDURE — 84484 ASSAY OF TROPONIN QUANT: CPT | Performed by: EMERGENCY MEDICINE

## 2019-01-11 PROCEDURE — 99285 EMERGENCY DEPT VISIT HI MDM: CPT

## 2019-01-11 RX ADMIN — SODIUM CHLORIDE 1000 ML: 0.9 INJECTION, SOLUTION INTRAVENOUS at 23:20

## 2019-01-12 ENCOUNTER — APPOINTMENT (INPATIENT)
Dept: ULTRASOUND IMAGING | Facility: HOSPITAL | Age: 69
DRG: 176 | End: 2019-01-12
Payer: MEDICARE

## 2019-01-12 ENCOUNTER — APPOINTMENT (INPATIENT)
Dept: CT IMAGING | Facility: HOSPITAL | Age: 69
DRG: 176 | End: 2019-01-12
Payer: MEDICARE

## 2019-01-12 ENCOUNTER — APPOINTMENT (EMERGENCY)
Dept: CT IMAGING | Facility: HOSPITAL | Age: 69
DRG: 176 | End: 2019-01-12
Payer: MEDICARE

## 2019-01-12 PROBLEM — I77.6 VASCULITIS (HCC): Status: ACTIVE | Noted: 2019-01-12

## 2019-01-12 PROBLEM — I21.4 NSTEMI (NON-ST ELEVATED MYOCARDIAL INFARCTION) (HCC): Status: ACTIVE | Noted: 2019-01-12

## 2019-01-12 PROBLEM — N17.9 AKI (ACUTE KIDNEY INJURY) (HCC): Status: RESOLVED | Noted: 2019-01-12 | Resolved: 2019-01-12

## 2019-01-12 PROBLEM — R07.9 CHEST PAIN: Status: ACTIVE | Noted: 2019-01-12

## 2019-01-12 PROBLEM — N17.9 AKI (ACUTE KIDNEY INJURY) (HCC): Status: ACTIVE | Noted: 2019-01-12

## 2019-01-12 PROBLEM — N18.30 CKD (CHRONIC KIDNEY DISEASE), STAGE III (HCC): Status: ACTIVE | Noted: 2019-01-12

## 2019-01-12 LAB
ALBUMIN SERPL BCP-MCNC: 2.3 G/DL (ref 3.5–5)
ALP SERPL-CCNC: 218 U/L (ref 46–116)
ALT SERPL W P-5'-P-CCNC: 45 U/L (ref 12–78)
ANION GAP SERPL CALCULATED.3IONS-SCNC: 10 MMOL/L (ref 4–13)
ANION GAP SERPL CALCULATED.3IONS-SCNC: 11 MMOL/L (ref 4–13)
APTT PPP: 30 SECONDS (ref 26–38)
APTT PPP: 33 SECONDS (ref 26–38)
APTT PPP: 50 SECONDS (ref 26–38)
AST SERPL W P-5'-P-CCNC: 50 U/L (ref 5–45)
BILIRUB SERPL-MCNC: 0.3 MG/DL (ref 0.2–1)
BUN SERPL-MCNC: 16 MG/DL (ref 5–25)
BUN SERPL-MCNC: 20 MG/DL (ref 5–25)
CALCIUM SERPL-MCNC: 9.1 MG/DL (ref 8.3–10.1)
CALCIUM SERPL-MCNC: 9.3 MG/DL (ref 8.3–10.1)
CHLORIDE SERPL-SCNC: 101 MMOL/L (ref 100–108)
CHLORIDE SERPL-SCNC: 98 MMOL/L (ref 100–108)
CHOLEST SERPL-MCNC: 115 MG/DL (ref 50–200)
CO2 SERPL-SCNC: 24 MMOL/L (ref 21–32)
CO2 SERPL-SCNC: 26 MMOL/L (ref 21–32)
CREAT SERPL-MCNC: 1.22 MG/DL (ref 0.6–1.3)
CREAT SERPL-MCNC: 1.46 MG/DL (ref 0.6–1.3)
DEPRECATED D DIMER PPP: ABNORMAL NG/ML (FEU)
ERYTHROCYTE [DISTWIDTH] IN BLOOD BY AUTOMATED COUNT: 14.8 % (ref 11.6–15.1)
GFR SERPL CREATININE-BSD FRML MDRD: 36 ML/MIN/1.73SQ M
GFR SERPL CREATININE-BSD FRML MDRD: 45 ML/MIN/1.73SQ M
GLUCOSE SERPL-MCNC: 106 MG/DL (ref 65–140)
GLUCOSE SERPL-MCNC: 94 MG/DL (ref 65–140)
HCT VFR BLD AUTO: 31.6 % (ref 34.8–46.1)
HDLC SERPL-MCNC: 34 MG/DL (ref 40–60)
HGB BLD-MCNC: 9.9 G/DL (ref 11.5–15.4)
LACTATE SERPL-SCNC: 1.2 MMOL/L (ref 0.5–2)
LDLC SERPL CALC-MCNC: 61 MG/DL (ref 0–100)
MCH RBC QN AUTO: 26.5 PG (ref 26.8–34.3)
MCHC RBC AUTO-ENTMCNC: 31.3 G/DL (ref 31.4–37.4)
MCV RBC AUTO: 85 FL (ref 82–98)
NONHDLC SERPL-MCNC: 81 MG/DL
NT-PROBNP SERPL-MCNC: 3376 PG/ML
PLATELET # BLD AUTO: 719 THOUSANDS/UL (ref 149–390)
PMV BLD AUTO: 8.8 FL (ref 8.9–12.7)
POTASSIUM SERPL-SCNC: 4 MMOL/L (ref 3.5–5.3)
POTASSIUM SERPL-SCNC: 4 MMOL/L (ref 3.5–5.3)
PROT SERPL-MCNC: 9.6 G/DL (ref 6.4–8.2)
RBC # BLD AUTO: 3.74 MILLION/UL (ref 3.81–5.12)
SODIUM SERPL-SCNC: 134 MMOL/L (ref 136–145)
SODIUM SERPL-SCNC: 136 MMOL/L (ref 136–145)
TRIGL SERPL-MCNC: 99 MG/DL
TROPONIN I SERPL-MCNC: 0.02 NG/ML
TROPONIN I SERPL-MCNC: 0.02 NG/ML
TROPONIN I SERPL-MCNC: 0.06 NG/ML
WBC # BLD AUTO: 9.69 THOUSAND/UL (ref 4.31–10.16)

## 2019-01-12 PROCEDURE — 85730 THROMBOPLASTIN TIME PARTIAL: CPT | Performed by: EMERGENCY MEDICINE

## 2019-01-12 PROCEDURE — 84484 ASSAY OF TROPONIN QUANT: CPT | Performed by: PHYSICIAN ASSISTANT

## 2019-01-12 PROCEDURE — 85730 THROMBOPLASTIN TIME PARTIAL: CPT | Performed by: INTERNAL MEDICINE

## 2019-01-12 PROCEDURE — 80048 BASIC METABOLIC PNL TOTAL CA: CPT | Performed by: PHYSICIAN ASSISTANT

## 2019-01-12 PROCEDURE — 71275 CT ANGIOGRAPHY CHEST: CPT

## 2019-01-12 PROCEDURE — 99223 1ST HOSP IP/OBS HIGH 75: CPT | Performed by: INTERNAL MEDICINE

## 2019-01-12 PROCEDURE — 83880 ASSAY OF NATRIURETIC PEPTIDE: CPT | Performed by: PHYSICIAN ASSISTANT

## 2019-01-12 PROCEDURE — 84484 ASSAY OF TROPONIN QUANT: CPT | Performed by: INTERNAL MEDICINE

## 2019-01-12 PROCEDURE — 85027 COMPLETE CBC AUTOMATED: CPT | Performed by: PHYSICIAN ASSISTANT

## 2019-01-12 PROCEDURE — 93970 EXTREMITY STUDY: CPT | Performed by: SURGERY

## 2019-01-12 PROCEDURE — 80061 LIPID PANEL: CPT | Performed by: PHYSICIAN ASSISTANT

## 2019-01-12 PROCEDURE — 93970 EXTREMITY STUDY: CPT

## 2019-01-12 PROCEDURE — 96361 HYDRATE IV INFUSION ADD-ON: CPT

## 2019-01-12 PROCEDURE — 36415 COLL VENOUS BLD VENIPUNCTURE: CPT | Performed by: EMERGENCY MEDICINE

## 2019-01-12 PROCEDURE — 74178 CT ABD&PLV WO CNTR FLWD CNTR: CPT

## 2019-01-12 RX ORDER — ACETYLCYSTEINE 200 MG/ML
600 SOLUTION ORAL; RESPIRATORY (INHALATION) 2 TIMES DAILY
Status: DISCONTINUED | OUTPATIENT
Start: 2019-01-12 | End: 2019-01-13

## 2019-01-12 RX ORDER — ASPIRIN 81 MG/1
81 TABLET, CHEWABLE ORAL DAILY
Status: DISCONTINUED | OUTPATIENT
Start: 2019-01-12 | End: 2019-01-15 | Stop reason: HOSPADM

## 2019-01-12 RX ORDER — ASPIRIN 81 MG/1
81 TABLET, CHEWABLE ORAL DAILY
Status: DISCONTINUED | OUTPATIENT
Start: 2019-01-13 | End: 2019-01-12 | Stop reason: SDUPTHER

## 2019-01-12 RX ORDER — SODIUM CHLORIDE 9 MG/ML
100 INJECTION, SOLUTION INTRAVENOUS CONTINUOUS
Status: DISCONTINUED | OUTPATIENT
Start: 2019-01-12 | End: 2019-01-13

## 2019-01-12 RX ORDER — HEPARIN SODIUM 1000 [USP'U]/ML
4000 INJECTION, SOLUTION INTRAVENOUS; SUBCUTANEOUS ONCE
Status: COMPLETED | OUTPATIENT
Start: 2019-01-12 | End: 2019-01-12

## 2019-01-12 RX ORDER — PRAVASTATIN SODIUM 80 MG/1
80 TABLET ORAL
Status: DISCONTINUED | OUTPATIENT
Start: 2019-01-12 | End: 2019-01-15 | Stop reason: HOSPADM

## 2019-01-12 RX ORDER — NITROGLYCERIN 0.4 MG/1
0.4 TABLET SUBLINGUAL
Status: DISCONTINUED | OUTPATIENT
Start: 2019-01-12 | End: 2019-01-15 | Stop reason: HOSPADM

## 2019-01-12 RX ORDER — ONDANSETRON 2 MG/ML
4 INJECTION INTRAMUSCULAR; INTRAVENOUS EVERY 6 HOURS PRN
Status: DISCONTINUED | OUTPATIENT
Start: 2019-01-12 | End: 2019-01-15 | Stop reason: HOSPADM

## 2019-01-12 RX ORDER — ACETAMINOPHEN 325 MG/1
650 TABLET ORAL EVERY 6 HOURS PRN
Status: DISCONTINUED | OUTPATIENT
Start: 2019-01-12 | End: 2019-01-15 | Stop reason: HOSPADM

## 2019-01-12 RX ORDER — NEBIVOLOL 5 MG/1
5 TABLET ORAL DAILY
Status: DISCONTINUED | OUTPATIENT
Start: 2019-01-12 | End: 2019-01-15 | Stop reason: HOSPADM

## 2019-01-12 RX ORDER — HEPARIN SODIUM 10000 [USP'U]/100ML
3-20 INJECTION, SOLUTION INTRAVENOUS
Status: DISCONTINUED | OUTPATIENT
Start: 2019-01-12 | End: 2019-01-13

## 2019-01-12 RX ORDER — MELATONIN
1000 DAILY
Status: DISCONTINUED | OUTPATIENT
Start: 2019-01-12 | End: 2019-01-15 | Stop reason: HOSPADM

## 2019-01-12 RX ORDER — HEPARIN SODIUM 1000 [USP'U]/ML
2000 INJECTION, SOLUTION INTRAVENOUS; SUBCUTANEOUS AS NEEDED
Status: DISCONTINUED | OUTPATIENT
Start: 2019-01-12 | End: 2019-01-13

## 2019-01-12 RX ORDER — LEVOTHYROXINE SODIUM 0.07 MG/1
75 TABLET ORAL
Status: DISCONTINUED | OUTPATIENT
Start: 2019-01-12 | End: 2019-01-15 | Stop reason: HOSPADM

## 2019-01-12 RX ORDER — LORATADINE 10 MG/1
10 TABLET ORAL DAILY
Status: DISCONTINUED | OUTPATIENT
Start: 2019-01-12 | End: 2019-01-15 | Stop reason: HOSPADM

## 2019-01-12 RX ORDER — HEPARIN SODIUM 1000 [USP'U]/ML
4000 INJECTION, SOLUTION INTRAVENOUS; SUBCUTANEOUS AS NEEDED
Status: DISCONTINUED | OUTPATIENT
Start: 2019-01-12 | End: 2019-01-13

## 2019-01-12 RX ADMIN — LEVOTHYROXINE SODIUM 75 MCG: 75 TABLET ORAL at 06:29

## 2019-01-12 RX ADMIN — ASPIRIN 81 MG 81 MG: 81 TABLET ORAL at 08:57

## 2019-01-12 RX ADMIN — HEPARIN SODIUM AND DEXTROSE 12 UNITS/KG/HR: 10000; 5 INJECTION INTRAVENOUS at 01:41

## 2019-01-12 RX ADMIN — HEPARIN SODIUM AND DEXTROSE 16 UNITS/KG/HR: 10000; 5 INJECTION INTRAVENOUS at 09:10

## 2019-01-12 RX ADMIN — IODIXANOL 100 ML: 320 INJECTION, SOLUTION INTRAVASCULAR at 15:13

## 2019-01-12 RX ADMIN — HEPARIN SODIUM 4000 UNITS: 1000 INJECTION, SOLUTION INTRAVENOUS; SUBCUTANEOUS at 01:41

## 2019-01-12 RX ADMIN — SODIUM CHLORIDE 100 ML/HR: 0.9 INJECTION, SOLUTION INTRAVENOUS at 08:55

## 2019-01-12 RX ADMIN — HEPARIN SODIUM 2000 UNITS: 1000 INJECTION, SOLUTION INTRAVENOUS; SUBCUTANEOUS at 18:29

## 2019-01-12 RX ADMIN — HEPARIN SODIUM AND DEXTROSE 18 UNITS/KG/HR: 10000; 5 INJECTION INTRAVENOUS at 18:28

## 2019-01-12 RX ADMIN — SODIUM CHLORIDE 100 ML/HR: 0.9 INJECTION, SOLUTION INTRAVENOUS at 19:29

## 2019-01-12 RX ADMIN — HEPARIN SODIUM 4000 UNITS: 1000 INJECTION, SOLUTION INTRAVENOUS; SUBCUTANEOUS at 09:09

## 2019-01-12 RX ADMIN — LORATADINE 10 MG: 10 TABLET ORAL at 08:57

## 2019-01-12 RX ADMIN — HEPARIN SODIUM AND DEXTROSE 18 UNITS/KG/HR: 10000; 5 INJECTION INTRAVENOUS at 23:19

## 2019-01-12 RX ADMIN — ACETYLCYSTEINE 600 MG: 200 SOLUTION ORAL; RESPIRATORY (INHALATION) at 10:58

## 2019-01-12 RX ADMIN — ACETYLCYSTEINE 600 MG: 200 SOLUTION ORAL; RESPIRATORY (INHALATION) at 18:30

## 2019-01-12 RX ADMIN — PRAVASTATIN SODIUM 80 MG: 80 TABLET ORAL at 18:29

## 2019-01-12 RX ADMIN — VITAMIN D, TAB 1000IU (100/BT) 1000 UNITS: 25 TAB at 10:52

## 2019-01-12 RX ADMIN — SODIUM CHLORIDE 500 ML: 0.9 INJECTION, SOLUTION INTRAVENOUS at 06:14

## 2019-01-12 NOTE — ED PROVIDER NOTES
History  Chief Complaint   Patient presents with    Chest Pain     C/o chest tightness and SOB starting 2200 tonight, presently resolved  Additional complaints: "can feel my pulse in both my legs" BL LE rash developed Saturday, taking antibiotics for strep  76 y/o female presents today complaining of chest heaviness and shortness of breath which started just prior to arrival   She states she was lying in bed and felt her 'pulse pounding in her legs', then started to feel heavy in her chest and short of breath  History provided by:  Patient  Chest Pain   Pain location:  Substernal area  Pain quality: pressure    Pain radiates to:  Does not radiate  Pain radiates to the back: no    Pain severity:  Mild  Onset quality:  Sudden  Duration:  1 hour  Timing:  Constant  Progression:  Resolved  Chronicity:  New  Context: at rest    Relieved by:  None tried  Worsened by:  Nothing tried  Ineffective treatments:  None tried  Associated symptoms: anxiety and shortness of breath    Associated symptoms: no abdominal pain, no altered mental status, no back pain, no diaphoresis, no dizziness, no fatigue, no fever and no headache    Risk factors: high cholesterol, hypertension and smoking (Former smoker)    Risk factors: no coronary artery disease        Prior to Admission Medications   Prescriptions Last Dose Informant Patient Reported? Taking?    BYSTOLIC 5 MG tablet   Yes No   Sig: Take 5 mg by mouth daily     Loratadine (CLARITIN) 10 MG CAPS  Self Yes No   Sig: Take by mouth   acetaminophen-codeine (TYLENOL #3) 300-30 mg per tablet  Self No No   Sig: Take 1 tablet by mouth every 4 (four) hours as needed for moderate pain for up to 6 doses   aspirin 81 MG tablet  Self Yes No   Sig: Take by mouth   cholecalciferol (VITAMIN D3) 1,000 units tablet  Self Yes No   Sig: Take 1,000 Units by mouth daily     levothyroxine 75 mcg tablet  Self No No   Sig: TAKE 1 TABLET BY MOUTH DAILY   nebivolol (BYSTOLIC) 5 mg tablet   No No Sig: Take 1 tablet (5 mg total) by mouth daily for 90 days   ondansetron (ZOFRAN) 8 mg tablet   No No   Sig: Take 1 tablet (8 mg total) by mouth every 8 (eight) hours as needed for nausea or vomiting   simvastatin (ZOCOR) 40 mg tablet  Self No No   Sig: TAKE 1 TABLET BY MOUTH EVERY DAY      Facility-Administered Medications: None       Past Medical History:   Diagnosis Date    BCC (basal cell carcinoma of skin)     facial    Calculus, kidney 2013    Cervical cancer ([facility] [address] ) 1983    Disease of thyroid gland     Ganglion     Last Assessed:7/9/13    Heart murmur     mitral valve regurgitation    [facility]     Hydronephrosis 2013    Hyperlipidemia     Hypertension     Osteopenia     Last Assessed:7/9/13    [facility]ox[facility]cardia ([facility]     Renal calculi     Last Assessed:3/19/14    Renal cyst     Shoulder impingement     Last Assessed:1/18/13    UTI (urinary tract infection) 2013       Past Surgical History:   Procedure Laterality Date    APPENDECTOMY      BREAST RECONSTRUCTION      COMPLEX WOUND CLOSURE TO EXTREMITY Left 10/9/2018    Procedure: COMPLEX CLOSURE RECONSTRUCTION;  Surgeon: Fiazan Khan MD;  [facility];  Service: Plastics    CYSTOSCOPY W/ RETROGRADES  2009    CYSTOSCOPY W/ URETEROSCOPY  2002    EXTRACORPOREAL SHOCK WAVE LITHOTRIPSY Right 2005    FLAP LOCAL HEAD / NECK Left 10/9/2018    Procedure: FLAP RECONSTRUCTION;  Surgeon: Faizan Khan MD;  [facility];  Service: Plastics    FULL THICKNESS SKIN GRAFT Left 10/9/2018    Procedure: FULL THICKNESS SKIN GRAFT RECONSTRUCTION;  Surgeon: Faizan Khan MD;  [facility];  Service: Plastics    HYSTERECTOMY      1983-cervical cancer    INTRAOPERATIVE RADIATION THERAPY ([facility])      Radiation Therapy    KIDNEY SURGERY      MASTECTOMY Left 2006    OTHER SURGICAL HISTORY      Chemotherapeutics    SENTINEL LYMPH NODE BIOPSY      SPLENECTOMY      1985-Hodgkin's Disease    SQUAMOUS CELL CARCINOMA EXCISION Left 10/9/2018    Procedure: NOSE/CHEEK BCC EXCISION; FROZEN SECTION;  Surgeon: Jessica Farrell MD;  [facility];  Service: Plastics       Family History   Problem Relation Age of Onset    Leukemia Mother     Colon cancer Paternal Grandmother     Heart disease Family      I have reviewed and agree with the history as documented  Social History   Substance Use Topics    Smoking status: Former Smoker     Quit date: 2011    Smokeless tobacco: Never Used    Alcohol use Yes      Comment: caffeine use; social drinker-1 beer every 2/3 months        Review of Systems   Constitutional: Negative for chills, diaphoresis, fatigue and fever  HENT: Negative for congestion  Eyes: Negative for visual disturbance  Respiratory: Positive for chest tightness and shortness of breath  Cardiovascular: Positive for chest pain  Gastrointestinal: Negative for abdominal pain  Genitourinary: Negative for difficulty urinating  Musculoskeletal: Negative for back pain  Skin: Positive for rash  Allergic/Immunologic: Negative for immunocompromised state  Neurological: Negative for dizziness and headaches  Psychiatric/Behavioral: Negative for confusion  Physical Exam  Physical Exam   Constitutional: She is oriented to person, place, and time  She appears well-developed and well-nourished  HENT:   Head: Normocephalic and atraumatic  Mouth/Throat: Uvula is midline, oropharynx is clear and moist and mucous membranes are normal  No tonsillar exudate  Eyes: Pupils are equal, round, and reactive to light  Neck: Normal range of motion  Neck supple  Cardiovascular: Normal rate and regular rhythm  Pulmonary/Chest: Effort normal and breath sounds normal    Abdominal: Soft  Bowel sounds are normal  There is no tenderness  There is no rebound and no guarding  Musculoskeletal: Normal range of motion     Neurological: She is alert and oriented to person, place, and time  Patient moving all extremities equally, no focal neuro deficits noted  Skin: Skin is warm and dry  Psychiatric: She has a normal mood and affect  Nursing note and vitals reviewed  Vital Signs  ED Triage Vitals [01/11/19 2308]   Temp Pulse Respirations Blood Pressure SpO2   -- (!) 110 18 147/77 98 %      Temp src Heart Rate Source Patient Position - Orthostatic VS BP Location FiO2 (%)   -- Monitor Lying Right arm --      Pain Score       No Pain           Vitals:    01/11/19 2308 01/12/19 0000 01/12/19 0100   BP: 147/77 112/55 117/65   Pulse: (!) 110 104 104   Patient Position - Orthostatic VS: Lying Lying Lying       Visual Acuity      ED Medications  Medications   heparin (porcine) injection 4,000 Units (not administered)   heparin (porcine) 25,000 units in 250 mL infusion (premix) (not administered)   heparin (porcine) injection 4,000 Units (not administered)   heparin (porcine) injection 2,000 Units (not administered)   sodium chloride 0 9 % bolus 1,000 mL (0 mL Intravenous Stopped 1/12/19 0126)       Diagnostic Studies  Results Reviewed     Procedure Component Value Units Date/Time    APTT six (6) hours after Heparin bolus/drip initiation or dosing change [[id number]]     Lab Status:  No result Specimen:  Blood     D-Dimer [[id number]]  (Abnormal) Collected:  01/11/19 2319    Lab Status:  Final result Specimen:  Blood from Arm, Right Updated:  01/12/19 0019     D-Dimer, Quant >10,000 (H) ng/ml (FEU)     Troponin I [[id number]]  (Abnormal) Collected:  01/11/19 2319    Lab Status:  Final result Specimen:  Blood from Arm, Right Updated:  01/12/19 0015     Troponin I 0 06 (H) ng/mL     Lactic acid, plasma [[id number]]  (Normal) Collected:  01/11/19 2319    Lab Status:  Final result Specimen:  Blood from Arm, Right Updated:  01/12/19 0015     LACTIC ACID 1 2 mmol/L     Narrative:         Result may be elevated if tourniquet was used during collection      Comprehensive metabolic panel [[id number]]  (Abnormal) Collected:  01/11/19 2319    Lab Status:  Final result Specimen:  Blood from Arm, Right Updated:  01/12/19 0011     Sodium 134 (L) mmol/L      Potassium 4 0 mmol/L      Chloride 98 (L) mmol/L      CO2 26 mmol/L      ANION GAP 10 mmol/L      BUN 20 mg/dL      Creatinine 1 46 (H) mg/dL      Glucose 106 mg/dL      Calcium 9 3 mg/dL      AST 50 (H) U/L      ALT 45 U/L      Alkaline Phosphatase 218 (H) U/L      Total Protein 9 6 (H) g/dL      Albumin 2 3 (L) g/dL      Total Bilirubin 0 30 mg/dL      eGFR 36 ml/min/1 73sq m     Narrative:         National Kidney Disease Education Program recommendations are as follows:  GFR calculation is accurate only with a steady state creatinine  Chronic Kidney disease less than 60 ml/min/1 73 sq  meters  Kidney failure less than 15 ml/min/1 73 sq  meters      CBC and differential [[id number]]  (Abnormal) Collected:  01/11/19 2319    Lab Status:  Final result Specimen:  Blood from Arm, Right Updated:  01/11/19 2351     WBC 9 45 Thousand/uL      RBC 3 92 Million/uL      Hemoglobin 10 3 (L) g/dL      Hematocrit 33 4 (L) %      MCV 85 fL      MCH 26 3 (L) pg      MCHC 30 8 (L) g/dL      RDW 15 0 %      MPV 9 0 fL      Platelets 248 (H) Thousands/uL      nRBC 0 /100 WBCs      Neutrophils Relative 67 %      Immat GRANS % 0 %      Lymphocytes Relative 19 %      Monocytes Relative 10 %      Eosinophils Relative 3 %      Basophils Relative 1 %      Neutrophils Absolute 6 37 Thousands/µL      Immature Grans Absolute 0 04 Thousand/uL      Lymphocytes Absolute 1 76 Thousands/µL      Monocytes Absolute 0 93 Thousand/µL      Eosinophils Absolute 0 29 Thousand/µL      Basophils Absolute 0 06 Thousands/µL                  XR chest 2 views    (Results Pending)              Procedures  ECG 12 Lead Documentation  Date/Time: 1/12/2019 1:23 AM  Performed by: Dillon Hill  Authorized by: Dillon Hill     Indications / Diagnosis:  Chest pain, shortness of breath  ECG reviewed by me, the ED Provider: yes    Patient location:  ED  Previous ECG:     Previous ECG:  Compared to current  Comments:      Sinus tachycardia at 103 beats per minute  Slight leftward axis, nonspecific ST T wave abnormalities inferior laterally which may be related to ischemia, however these changes were present on prior EKG from 09/14/2018  Phone Contacts  ED Phone Contact    ED Course                               MDM  Number of Diagnoses or Management Options  Chest pain: new and requires workup  Elevated troponin: new and requires workup  Shortness of breath: new and requires workup  Diagnosis management comments: 1:26 AM  MDM: Patient presents to the Emergency Department and was diagnosed with acute chest pain with mild troponin elevation and shortness of breath  Cannot have PE scan due to low GFR  Will start heparin and plan VQ scan in AM   This is a new problem that will require additional planned work-up in a hospitalized setting  Clinical laboratory testing, radiology imaging, and medical testing (EKG) were ordered  I independently reviewed the radiologic imaging, EKG, and laboratory studies  This case is considered high risk secondary to the above listed disease process that poses a threat to bodily function that requires further diagnostic testing and management which may include the administration of parenteral controlled substances  Discussed with JUNIOR  We had a detailed discussion of the patient's condition and case,  including need for admission  Accepts to his/her service  Bed request/bridging orders placed             Amount and/or Complexity of Data Reviewed  Clinical lab tests: ordered and reviewed  Tests in the radiology section of CPT®: ordered and reviewed  Tests in the medicine section of CPT®: reviewed and ordered  Decide to obtain previous medical records or to obtain history from someone other than the patient: yes  Review and summarize past medical records: yes  Discuss the patient with other providers: yes  Independent visualization of images, tracings, or specimens: yes    Risk of Complications, Morbidity, and/or Mortality  Presenting problems: high  Diagnostic procedures: high  Management options: high    Patient Progress  Patient progress: stable    CritCare Time    Disposition  Final diagnoses:   Chest pain   Shortness of breath   Elevated troponin     Time reflects when diagnosis was documented in both MDM as applicable and the Disposition within this note     Time User Action Codes Description Comment    1/12/2019  1:21 AM Uma Europe Add [R07 9] Chest pain     1/12/2019  1:21 AM Zuleyka Wylie Add [R06 02] Shortness of breath     1/12/2019  1:21 AM Zuleyka Wylie Add [R74 8] Elevated troponin       ED Disposition     ED Disposition Condition Comment    Admit  Case was discussed with JUNIOR and the patient's admission status was agreed to be Admission Status: inpatient status to the service of Dr Orie Dancer   Follow-up Information    None         Patient's Medications   Discharge Prescriptions    No medications on file     No discharge procedures on file      ED Provider  Electronically Signed by           Jaun Saenz DO  01/12/19 0127

## 2019-01-12 NOTE — ASSESSMENT & PLAN NOTE
· Diagnosed in 1995 as stage II Hodgkin's disease above the diaphragm  · S/p staging laparotomy, splenectomy and extended field radiation  · Following with Dr Daniel Hopper as outpatient

## 2019-01-12 NOTE — ASSESSMENT & PLAN NOTE
· Currently undergoing workup  · Sent for RUQ US by PCP as she was having RUQ abdominal pain  · RUQ US 12/27/18- markedly enlarged right kidney with large complex cystic mass in the upper pole and smaller solid mass in the lower pole  Upper pole mass measures 18x11 1x12 2cm  Lower pole mass measures 3 7x2  9x3 2cm  Staghorn calculus  Cholelithiasis  · Due to have a renal CT as outpatient with follow up with urology

## 2019-01-12 NOTE — PLAN OF CARE
Problem: Potential for Falls  Goal: Patient will remain free of falls  INTERVENTIONS:  - Assess patient frequently for physical needs  -  Identify cognitive and physical deficits and behaviors that affect risk of falls    -  [place] fall precautions as indicated by assessment   - Educate patient/family on patient safety including physical limitations  - Instruct patient to call for assistance with activity based on assessment  - Modify environment to reduce risk of injury  - Consider OT/PT consult to assist with strengthening/mobility   Outcome: Progressing      Problem: SAFETY ADULT  Goal: Maintain or return to baseline ADL function  INTERVENTIONS:  -  Assess patient's ability to carry out ADLs; assess patient's baseline for ADL function and identify physical deficits which impact ability to perform ADLs (bathing, care of mouth/teeth, toileting, grooming, dressing, etc )  - Assess/evaluate cause of self-care deficits   - Assess range of motion  - Assess patient's mobility; develop plan if impaired  - Assess patient's need for assistive devices and provide as appropriate  - Encourage maximum independence but intervene and supervise when necessary  ¯ Involve family in performance of ADLs  ¯ Assess for home care needs following discharge   ¯ Request OT consult to assist with ADL evaluation and planning for discharge  ¯ Provide patient education as appropriate  Outcome: Progressing    Goal: Maintain or return mobility status to optimal level  INTERVENTIONS:  - Assess patient's baseline mobility status (ambulation, transfers, stairs, etc )    - Identify cognitive and physical deficits and behaviors that affect mobility  - Identify mobility aids required to assist with transfers and/or ambulation (gait belt, sit-to-stand, lift, walker, cane, etc )  - [place] fall precautions as indicated by assessment  - Record patient progress and toleration of activity level on Mobility SBAR; progress patient to next Phase/Stage  - Instruct patient to call for assistance with activity based on assessment  - Request Rehabilitation consult to assist with strengthening/weightbearing, etc   Outcome: Progressing      Problem: DISCHARGE PLANNING  Goal: Discharge to home or other facility with appropriate resources  INTERVENTIONS:  - Identify barriers to discharge w/patient and caregiver  - Arrange for needed discharge resources and transportation as appropriate  - Identify discharge learning needs (meds, wound care, etc )  - Arrange for interpretive services to assist at discharge as needed  - Refer to Case Management Department for coordinating discharge planning if the patient needs post-hospital services based on physician/advanced practitioner order or complex needs related to functional status, cognitive ability, or social support system  Outcome: Progressing      Problem: Knowledge Deficit  Goal: Patient/family/caregiver demonstrates understanding of disease process, treatment plan, medications, and discharge instructions  Complete learning assessment and assess knowledge base    Interventions:  - Provide teaching at level of understanding  - Provide teaching via preferred learning methods  Outcome: Progressing

## 2019-01-12 NOTE — ASSESSMENT & PLAN NOTE
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

## 2019-01-12 NOTE — PLAN OF CARE
Problem: Potential for Falls  Goal: Patient will remain free of falls  INTERVENTIONS:  - Assess patient frequently for physical needs  -  Identify cognitive and physical deficits and behaviors that affect risk of falls    -  [place] fall precautions as indicated by assessment   - Educate patient/family on patient safety including physical limitations  - Instruct patient to call for assistance with activity based on assessment  - Modify environment to reduce risk of injury  - Consider OT/PT consult to assist with strengthening/mobility  Outcome: Progressing

## 2019-01-12 NOTE — ASSESSMENT & PLAN NOTE
· Presents with chest pain and SOB  · CXR- no acute disease  · D-Dimer >10,000  · Concern for PE with chest pain, SOB, tachycardia, slight elevation in troponin, significant cancer history (Hodgkin's disease, malignant melanoma, basal cell carcinomas, breast cancer and a retroperitoneal mass undergoing workup)  · Unable to get CTA Chest secondary to ENEDELIA  · LE Dopplers, VQ Scan  · Start heparin drip  · Echo  · Telemetry

## 2019-01-12 NOTE — ASSESSMENT & PLAN NOTE
· History malignant melanoma, s/p resection of T1a, 0 6mm melanoma in right flank  · History of basel cell carcinomas  · S/p excision of left nose basal cell carcinoma with full-thickness skin graft on 10/9/18  · Continue outpatient follow up

## 2019-01-12 NOTE — H&P
H&P- Reddy Grounds 1950, 77 y o  female MRN: [id number]    Unit/Bed#: ED 11 Encounter: [id number]    Primary Care Provider: Brigido Woodall MD   Date and time admitted to hospital: 1/11/2019 11:02 PM    * Chest pain   Assessment & Plan    · Presents with chest pain and SOB  · CXR- no acute disease  · D-Dimer >10,000  · Concern for PE with chest pain, SOB, tachycardia, slight elevation in troponin, significant cancer history (Hodgkin's disease, malignant melanoma, basal cell carcinomas, breast cancer and a retroperitoneal mass undergoing workup)  · Unable to get CTA Chest secondary to ENEDELIA  · LE Dopplers, VQ Scan  · Start heparin drip  · Echo  · Telemetry  NSTEMI (non-ST elevated myocardial infarction) [facility])   Assessment & Plan    · Troponin 0 06 on arrival   · EKG- Sinus tachycardia, rate 103  Non-specific ST/T wave abnormalities  · ? Type II NSTEMI secondary to PE  · Trend troponin  · Telemetry  · Nitro, ASA, Heparin drip  · Consider cardiology consult if troponin continues to rise  ENEDELIA (acute kidney injury) ([facility] )   Assessment & Plan    · Cr 1 46  · Baseline 1-1 2  · Likely prerenal   · IVF  · Hold nephrotoxic agents  · Monitor BMP  Vasculitis ([facility] )   Assessment & Plan    · Patient noted rash of bilateral LE for approximately the past week  Started after she completed course of Omnicef for beta-hemolytic group B strep UTI  She has been putting cortisone cream on it due to pruritis  · 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  · Possibly secondary to strep infection vs paraneoplastic syndrome with significant cancer history/new retroperitoneal mass  · Monitor for now  · Consider dermatologic evaluation  Hypertension   Assessment & Plan    · /58  · Continue Bystolic  Paroxysmal supraventricular tachycardia (HCC)   Assessment & Plan    · Continue Bystolic  Hypothyroidism   Assessment & Plan    · Continue levothyroxine       Hyperlipidemia Assessment & Plan    · Continue statin  Retroperitoneal mass   Assessment & Plan    · Currently undergoing workup  · Sent for RUQ US by PCP as she was having RUQ abdominal pain  · RUQ US 12/27/18- markedly enlarged right kidney with large complex cystic mass in the upper pole and smaller solid mass in the lower pole  Upper pole mass measures 18x11 1x12 2cm  Lower pole mass measures 3 7x2  9x3 2cm  Staghorn calculus  Cholelithiasis  · Due to have a renal CT as outpatient with follow up with urology  Basal cell carcinoma (BCC) of left side of nose   Assessment & Plan    · History malignant melanoma, s/p resection of T1a, 0 6mm melanoma in right flank  · History of basel cell carcinomas  · S/p excision of left nose basal cell carcinoma with full-thickness skin graft on 10/9/18  · Continue outpatient follow up  History of Hodgkin's disease   Assessment & Plan    · Diagnosed in 1995 as stage II Hodgkin's disease above the diaphragm  · S/p staging laparotomy, splenectomy and extended field radiation  · Following with Dr Yanet Strauss as outpatient  VTE Prophylaxis: Heparin Drip  / sequential compression device   Code Status: Full Code  POLST: POLST form is not discussed and not completed at this time  Discussion with family: Discussed with patient at bedside  Anticipated Length of Stay:  Patient will be admitted on an Inpatient basis with an anticipated length of stay of  Greater than 2 midnights  Justification for Hospital Stay: chest pain, SOB- r/o PE  Total Time for Visit, including Counseling / Coordination of Care: 45 minutes  Greater than 50% of this total time spent on direct patient counseling and coordination of care  Chief Complaint:   Chest pain, SOB      History of Present Illness:    Cari Ramirez is a 77 y o  female, PMHx of HTN, HLD, hypothyroidism, paroxsymal SVT, Hodgkin's disease/breast cancer/malignant melanoma/basal cell carcinoma/retroperitoneal mass, who presents with chest pain, SOB and leg rash  Patient states that she was being treated for a strep UTI by her PCP  She finished the antibiotic about a week ago  She then developed a rash on both of her legs that is very itchy  She has been putting cortisone lotion on it, which seems to help  She has also noticed that her legs are swollen  She denies any calf pain or tenderness  She reports tonight that she felt a throbbing sensation in both of her legs, stating that it felt like her heart was in her legs  She then had chest tightness and felt SOB  She states that she could feel her heart racing, and was trying to take deep breathes to relax  She reports a dry, ongoing cough since December than has not worsened  Denies fever, chills, diaphoresis, abdominal pain, N/V, change in BM, urinary symptoms, dizziness, HA  Denies personal or FHx of blood clots  Denies personal history of heart disease  +FHx of CAD in father  Review of Systems:    Review of Systems   Constitutional: Negative for chills, diaphoresis and fever  Respiratory: Positive for cough and shortness of breath  Negative for wheezing  Cardiovascular: Positive for chest pain, palpitations and leg swelling  Gastrointestinal: Negative for abdominal pain, constipation, diarrhea, nausea and vomiting  Genitourinary: Negative for dysuria, frequency, hematuria and urgency  Neurological: Negative for dizziness, light-headedness and headaches  All other systems reviewed and are negative        Past Medical and Surgical History:     Past Medical History:   Diagnosis Date    [facility] 1/12/2019    BCC (basal cell carcinoma of skin)     facial    Calculus, kidney 2013    Cervical cancer ([facility] [address] ) 1983    Disease of thyroid gland     Ganglion     Last Assessed:7/9/13    Heart murmur     mitral valve regurgitation    Hodgkin disease ([facility] [address] )     Hydronephrosis 2013    Hyperlipidemia     Hypertension     NSTEMI (non-ST elevated myocardial infarction) ([facility] [address] ) 1/12/2019    Osteopenia     Last Assessed:7/9/13    Paroxysmal supraventricular tachycardia ([facility] )     Renal calculi     Last Assessed:3/19/14    Renal cyst     Shoulder impingement     Last Assessed:1/18/13    UTI (urinary tract infection) 2013    Vasculitis ([facility] [address] ) 1/12/2019       Past Surgical History:   Procedure Laterality Date    APPENDECTOMY      BREAST RECONSTRUCTION      COMPLEX WOUND CLOSURE TO EXTREMITY Left 10/9/2018    Procedure: COMPLEX CLOSURE RECONSTRUCTION;  Surgeon: Abbie Gustafson MD;  [facility];  Service: Plastics    CYSTOSCOPY W/ RETROGRADES  2009    CYSTOSCOPY W/ URETEROSCOPY  2002    EXTRACORPOREAL SHOCK WAVE LITHOTRIPSY Right 2005    FLAP LOCAL HEAD / NECK Left 10/9/2018    Procedure: FLAP RECONSTRUCTION;  Surgeon: Abbie Gustafson MD;  [facility];  Service: Plastics    FULL THICKNESS SKIN GRAFT Left 10/9/2018    Procedure: FULL THICKNESS SKIN GRAFT RECONSTRUCTION;  Surgeon: Abbie Gustafson MD;  [facility];  Service: Plastics    HYSTERECTOMY      1983-cervical cancer    INTRAOPERATIVE RADIATION THERAPY ([facility])      Radiation Therapy    KIDNEY SURGERY      MASTECTOMY Left 2006    OTHER SURGICAL HISTORY      Chemotherapeutics    SENTINEL LYMPH NODE BIOPSY      SPLENECTOMY      1985-Hodgkin's Disease    SQUAMOUS CELL CARCINOMA EXCISION Left 10/9/2018    Procedure: NOSE/CHEEK [address] EXCISION; FROZEN SECTION;  Surgeon: Abbie Gustafson MD;  [facility]: [facility];  Service: Plastics       Meds/Allergies:    Prior to Admission medications    Medication Sig Start Date End Date Taking?  Authorizing Provider   acetaminophen-codeine (TYLENOL #3) 300-30 mg per tablet Take 1 tablet by mouth every 4 (four) hours as needed for moderate pain for up to 6 doses 10/9/18   Aleshia Bergeron PA-C   aspirin 81 MG tablet Take by mouth    Historical Provider, MD   BYSTOLIC 5 MG tablet Take 5 mg by mouth daily   11/27/18 Historical Provider, MD   cholecalciferol (VITAMIN D3) 1,000 units tablet Take 1,000 Units by mouth daily      Historical Provider, MD   levothyroxine 75 mcg tablet TAKE 1 TABLET BY MOUTH DAILY 8/27/18   Mandy Carvajal MD   Loratadine (CLARITIN) 10 MG CAPS Take by mouth    Historical Provider, MD   nebivolol (BYSTOLIC) 5 mg tablet Take 1 tablet (5 mg total) by mouth daily for 90 days 6/26/18 9/24/18  Mandy Carvajal MD   ondansetron [facility]) 8 mg tablet Take 1 tablet (8 mg total) by mouth every 8 (eight) hours as needed for nausea or vomiting 12/27/18   ARACELY Pham   simvastatin (ZOCOR) 40 mg tablet TAKE 1 TABLET BY MOUTH EVERY DAY 9/20/18   Mandy Carvajal MD     I have reviewed home medications with patient personally  Allergies: Allergies   Allergen Reactions    Ciprofloxacin Other (See Comments)     Reaction Date: 24Jun2011; Hives/Uticaria    Sulfamethoxazole-Trimethoprim      Patient does not remember rx       Social History:     Marital Status: /Civil Union   Occupation: Unknown  Patient Pre-hospital Living Situation: Home  Patient Pre-hospital Level of Mobility: Independent  Patient Pre-hospital Diet Restrictions: None    Substance Use History:   History   Alcohol Use    Yes     Comment: caffeine use; social drinker-1 beer every 2/3 months     History   Smoking Status    Former Smoker    Quit date: 2011   Smokeless Tobacco    Never Used     History   Drug Use No       Family History:    Family History   Problem Relation Age of Onset    Leukemia Mother     Colon cancer Paternal Grandmother     Heart disease Family        Physical Exam:     Vitals:   Blood Pressure: 117/57 (01/12/19 0330)  Pulse: (!) 106 (01/12/19 0330)  Temperature: 98 7 °F (37 1 °C) (01/12/19 0330)  Temp Source: Oral (01/12/19 0330)  Respirations: 18 (01/12/19 0330)  Weight - Scale: 72 2 kg (159 lb 2 8 oz) (01/11/19 2308)  SpO2: (!) 67 % (01/12/19 0330)    Physical Exam   Constitutional: She is oriented to person, place, and time  She appears well-developed and well-nourished  She is cooperative  She does not appear ill  No distress  HENT:   Head: Normocephalic and atraumatic  Eyes: Pupils are equal, round, and reactive to light  Conjunctivae, EOM and lids are normal    Cardiovascular: Normal rate, regular rhythm, normal heart sounds and normal pulses  No murmur heard  Pulmonary/Chest: Effort normal and breath sounds normal  She has no wheezes  She has no rhonchi  She has no rales  Abdominal: Soft  Normal appearance and bowel sounds are normal  There is no tenderness  Musculoskeletal: Normal range of motion  Neurological: She is alert and oriented to person, place, and time  She has normal strength  She is not disoriented  No sensory deficit  Skin: Skin is warm and dry  Purpura and rash noted  She is not diaphoretic    +2 pitting edema of bilateral LE with palpable, non-blanching purpura  Psychiatric: She has a normal mood and affect  Her speech is normal and behavior is normal  Cognition and memory are normal    Vitals reviewed  Additional Data:     Lab Results: I have personally reviewed pertinent reports  Results from last 7 days  Lab Units 01/11/19  2319   WBC Thousand/uL 9 45   HEMOGLOBIN g/dL 10 3*   HEMATOCRIT % 33 4*   PLATELETS Thousands/uL 765*   NEUTROS PCT % 67   LYMPHS PCT % 19   MONOS PCT % 10   EOS PCT % 3       Results from last 7 days  Lab Units 01/11/19  2319   SODIUM mmol/L 134*   POTASSIUM mmol/L 4 0   CHLORIDE mmol/L 98*   CO2 mmol/L 26   BUN mg/dL 20   CREATININE mg/dL 1 46*   ANION GAP mmol/L 10   CALCIUM mg/dL 9 3   ALBUMIN g/dL 2 3*   TOTAL BILIRUBIN mg/dL 0 30   ALK PHOS U/L 218*   ALT U/L 45   AST U/L 50*   GLUCOSE RANDOM mg/dL 106                   Results from last 7 days  Lab Units 01/11/19  2319   LACTIC ACID mmol/L 1 2       Imaging: I have personally reviewed pertinent reports        XR chest 2 views    (Results Pending)       EKG, Pathology, and Other Studies Reviewed on Admission:   · EKG: Sinus tachycardia, rate 103  Non-specific ST/T wave abnormalities  Allscripts / Epic Records Reviewed: Yes     ** Please Note: This note has been constructed using a voice recognition system   **

## 2019-01-12 NOTE — UTILIZATION REVIEW
Initial Clinical Review    Admission: Date/Time/Statement: 1/12/19 @ [id number] [facility]   ED start time on 1/11/2019    Orders Placed This Encounter   Procedures    Inpatient Admission (expected length of stay for this patient is greater than two midnights)     Standing Status:   Standing     Number of Occurrences:   1     Order Specific Question:   Admitting Physician     Answer:   Eual Pulse [[id number]]     Order Specific Question:   Level of Care     Answer:   Med Surg [16]     Order Specific Question:   Estimated length of stay     Answer:   More than 2 Midnights     Order Specific Question:   Certification     Answer:   I certify that inpatient services are medically necessary for this patient for a duration of greater than two midnights  See H&P and MD Progress Notes for additional information about the patient's course of treatment  ED: Date/Time/Mode of Arrival:   ED Arrival Information     Expected Arrival Acuity Means of Arrival Escorted By Service Admission Type    - 1/11/2019 22:57 Urgent Walk-In Spouse Hospitalist Urgent    Arrival Complaint    CHEST PAIN          Chief Complaint:   Chief Complaint   Patient presents with    Chest Pain     C/o chest tightness and SOB starting 2200 tonight, presently resolved  Additional complaints: "can feel my pulse in both my legs" BL LE rash developed Saturday, taking antibiotics for strep  History of Illness: The patient presents to the hospital with a chest tightness as well as shortness of breath which worsens with exertion  The patient has also had lower extremity edema but denies any orthopnea  The patient's edema has been associated with a purpuric type rash that started after she was on antibiotic for urinary tract infection with strep  The patient currently feels that the edema is a little bit better now than it was yesterday    She still intermittently gets the chest tightness, and does not feel significant change from when she came into the hospital  The patient's creatinine was up slightly when she 1st came in and she thinks that she was probably not drinking very much  She denies any hemoptysis  No fevers or chills  She denies any history of heart failure previously  In the emergency department D-dimer is found to be greater than 10,000 and NT proBNP is found to be 3376  ED Vital Signs:   ED Triage Vitals   Temperature Pulse Respirations Blood Pressure SpO2   01/12/19 0330 01/11/19 2308 01/11/19 2308 01/11/19 2308 01/11/19 2308   98 7 °F (37 1 °C) (!) 110 18 147/77 98 %      Temp Source Heart Rate Source Patient Position - Orthostatic VS BP Location FiO2 (%)   01/12/19 0330 01/11/19 2308 01/11/19 2308 01/11/19 2308 --   Oral Monitor Lying Right arm       Pain Score       01/11/19 2308       No Pain        Wt Readings from Last 1 Encounters:   01/12/19 75 9 kg (167 lb 5 3 oz)       Vital Signs (abnormal):   01/12/19 0500  --  98  18   96/47  95 %  None (Room air)  Lying   01/12/19 0330  98 7 °F (37 1 °C)   106  18  117/57   67 %  None (Room air)  Lying   01/12/19 0130  --   106  18  122/58  95 %  None (Room air)         Abnormal Labs/Diagnostic Test Results:   D dimer >10,000  Troponin 0 06  Na 134  Cl 98  Bun 20  Creatinine 1 46   ast 50  Alkaline phosphatase 218  Total protein 9 6  Albumin 2 3  Wbc 9 45   hgb 10 3, hct 33 4  Bilateral venous doppler pending  Serial troponin negative  1/12/2019-  NT-proBNP 3376  Wbc 9 69   hgb 9 9, hct 31  6      cta chest - Acute bibasilar pulmonary emboli  Mild diffuse patchy groundglass alveolar opacity, trace pleural effusions, and smooth septal thickening suggestive of pulmonary vascular congestion /fluid overload    Trace bibasilar and lingular subsegmental atelectasis    ED Treatment:   Medication Administration from 01/11/2019 2257 to 01/12/2019 1217       Date/Time Order Dose Route Action Comments     01/12/2019 0126 sodium chloride 0 9 % bolus 1,000 mL 0 mL Intravenous Stopped 01/11/2019 2320 sodium chloride 0 9 % bolus 1,000 mL 1,000 mL Intravenous New Bag      01/12/2019 0141 heparin (porcine) injection 4,000 Units 4,000 Units Intravenous Given      01/12/2019 0910 heparin (porcine) 25,000 units in 250 mL infusion (premix) 16 Units/kg/hr Intravenous New Bag ptt 33     01/12/2019 0141 heparin (porcine) 25,000 units in 250 mL infusion (premix) 12 Units/kg/hr Intravenous New Bag      01/12/2019 0909 heparin (porcine) injection 4,000 Units 4,000 Units Intravenous Given      01/12/2019 0857 aspirin chewable tablet 81 mg 81 mg Oral Given      01/12/2019 0857 nebivolol (BYSTOLIC) tablet 5 mg 5 mg Oral Not Given      01/12/2019 1052 cholecalciferol (VITAMIN D3) tablet 1,000 Units 1,000 Units Oral Given      01/12/2019 1209 levothyroxine tablet 75 mcg 75 mcg Oral Given      01/12/2019 0857 loratadine (CLARITIN) tablet 10 mg 10 mg Oral Given      01/12/2019 0855 sodium chloride 0 9 % infusion 100 mL/hr Intravenous New Bag      01/12/2019 0614 sodium chloride 0 9 % bolus 500 mL 500 mL Intravenous New Bag      01/12/2019 1058 acetylcysteine (MUCOMYST) 200 mg/mL oral solution 600 mg 600 mg Oral Given           Past Medical/Surgical History:   Past Medical History:   Diagnosis Date    [facility] ) 1/12/2019    BCC (basal cell carcinoma of skin)     Calculus, kidney 2013    [facility]    Disease of thyroid gland     Ganglion     Heart murmur     Hodgkin disease [facility] [address] )     Hydronephrosis 2013    Hyperlipidemia     Hypertension     NSTEMI (non-ST elevated [facility] infarction) [facility] 1/12/2019    Osteopenia     Paroxysmal supraventricular tachycardia ([facility] )     Renal calculi     Renal cyst     Shoulder impingement     UTI (urinary tract infection) 2013    Vascul[facility] 1/12/2019       Admitting Diagnosis: Chest pain [R07 9]    Age/Sex: 77 y o  female    · Assessment/Plan: Chest Pain and Dyspnea -   ?  Rule out PE - with D-dimer > 10,000 and history of malignancy patient is at higher risk for PE  Heparin drip until ruled out  Will get CT chest PE study  Also follow up lower extremity dopplers  Give acetylcysteine to minimize any dye risk  ? Rule out ACS - first troponin was 0 06  Second was 0 02     ? Evaluation for CHF - Coexistent lower extremity edema  Will get NT-proBNP and will get echocardiogram   Cardiology evaluation may be considered  ? 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 Outpatient follow ups  For Hodgkin disease follows with Dr Mendoza Mai    Has had laparotomy with splenectomy and extended field radiation       Admission Orders:  1/12/2019  0122 INPATIENT   Scheduled Meds:   Current Facility-Administered Medications:  acetaminophen 650 mg Oral Q6H PRN    acetylcysteine 600 mg Oral BID    aspirin 81 mg Oral Daily    cholecalciferol 1,000 Units Oral Daily    heparin (porcine) 3-20 Units/kg/hr (Order-Specific) Intravenous Titrated Last Rate: 16 Units/kg/hr (01/12/19 0910)   heparin (porcine) 2,000 Units Intravenous PRN    heparin (porcine) 4,000 Units Intravenous PRN    hydrocortisone  Topical 4x Daily PRN    levothyroxine 75 mcg Oral Early Morning    loratadine 10 mg Oral Daily    nebivolol 5 mg Oral Daily    nitroglycerin 0 4 mg Sublingual Q5 Min PRN    ondansetron 4 mg Intravenous Q6H PRN    pravastatin 80 mg Oral Daily With Dinner    sodium chloride 100 mL/hr Intravenous Continuous Last Rate: 100 mL/hr (01/12/19 0855)     Continuous Infusions:   heparin (porcine) 3-20 Units/kg/hr (Order-Specific) Last Rate: 16 Units/kg/hr (01/12/19 0910)   sodium chloride 100 mL/hr Last Rate: 100 mL/hr (01/12/19 0855)     PRN Meds:     heparin (porcine) 4000 units IV - used x 1       Telemetry  Respiratory protocol  scds  CTA chest  Ct renal protocol  echo

## 2019-01-12 NOTE — ASSESSMENT & PLAN NOTE
· Troponin 0 06 on arrival   · EKG- Sinus tachycardia, rate 103  Non-specific ST/T wave abnormalities  · ? Type II NSTEMI secondary to PE  · Trend troponin  · Telemetry  · Nitro, ASA, Heparin drip  · Consider cardiology consult if troponin continues to rise

## 2019-01-13 ENCOUNTER — APPOINTMENT (INPATIENT)
Dept: NON INVASIVE DIAGNOSTICS | Facility: HOSPITAL | Age: 69
DRG: 176 | End: 2019-01-13
Payer: MEDICARE

## 2019-01-13 PROBLEM — I26.99 PULMONARY EMBOLISM (HCC): Status: ACTIVE | Noted: 2019-01-13

## 2019-01-13 PROBLEM — I82.409 DVT (DEEP VENOUS THROMBOSIS) (HCC): Status: ACTIVE | Noted: 2019-01-13

## 2019-01-13 LAB
ANION GAP SERPL CALCULATED.3IONS-SCNC: 10 MMOL/L (ref 4–13)
APTT PPP: 67 SECONDS (ref 26–38)
APTT PPP: 68 SECONDS (ref 26–38)
ATRIAL RATE: 103 BPM
BUN SERPL-MCNC: 12 MG/DL (ref 5–25)
CALCIUM SERPL-MCNC: 8.4 MG/DL (ref 8.3–10.1)
CHLORIDE SERPL-SCNC: 105 MMOL/L (ref 100–108)
CO2 SERPL-SCNC: 22 MMOL/L (ref 21–32)
CREAT SERPL-MCNC: 1.06 MG/DL (ref 0.6–1.3)
ERYTHROCYTE [DISTWIDTH] IN BLOOD BY AUTOMATED COUNT: 15.1 % (ref 11.6–15.1)
GFR SERPL CREATININE-BSD FRML MDRD: 53 ML/MIN/1.73SQ M
GLUCOSE SERPL-MCNC: 93 MG/DL (ref 65–140)
HCT VFR BLD AUTO: 27.2 % (ref 34.8–46.1)
HGB BLD-MCNC: 8.5 G/DL (ref 11.5–15.4)
MCH RBC QN AUTO: 26.7 PG (ref 26.8–34.3)
MCHC RBC AUTO-ENTMCNC: 31.3 G/DL (ref 31.4–37.4)
MCV RBC AUTO: 86 FL (ref 82–98)
P AXIS: 81 DEGREES
PLATELET # BLD AUTO: 626 THOUSANDS/UL (ref 149–390)
PMV BLD AUTO: 9.1 FL (ref 8.9–12.7)
POTASSIUM SERPL-SCNC: 4.3 MMOL/L (ref 3.5–5.3)
PR INTERVAL: 134 MS
QRS AXIS: 29 DEGREES
QRSD INTERVAL: 80 MS
QT INTERVAL: 340 MS
QTC INTERVAL: 445 MS
RBC # BLD AUTO: 3.18 MILLION/UL (ref 3.81–5.12)
SODIUM SERPL-SCNC: 137 MMOL/L (ref 136–145)
T WAVE AXIS: -27 DEGREES
VENTRICULAR RATE: 103 BPM
WBC # BLD AUTO: 10.39 THOUSAND/UL (ref 4.31–10.16)

## 2019-01-13 PROCEDURE — 93306 TTE W/DOPPLER COMPLETE: CPT | Performed by: INTERNAL MEDICINE

## 2019-01-13 PROCEDURE — 99232 SBSQ HOSP IP/OBS MODERATE 35: CPT | Performed by: INTERNAL MEDICINE

## 2019-01-13 PROCEDURE — 93010 ELECTROCARDIOGRAM REPORT: CPT | Performed by: INTERNAL MEDICINE

## 2019-01-13 PROCEDURE — 85027 COMPLETE CBC AUTOMATED: CPT | Performed by: INTERNAL MEDICINE

## 2019-01-13 PROCEDURE — 85730 THROMBOPLASTIN TIME PARTIAL: CPT | Performed by: INTERNAL MEDICINE

## 2019-01-13 PROCEDURE — 80048 BASIC METABOLIC PNL TOTAL CA: CPT | Performed by: INTERNAL MEDICINE

## 2019-01-13 PROCEDURE — 93306 TTE W/DOPPLER COMPLETE: CPT

## 2019-01-13 RX ADMIN — ASPIRIN 81 MG 81 MG: 81 TABLET ORAL at 08:37

## 2019-01-13 RX ADMIN — PRAVASTATIN SODIUM 80 MG: 80 TABLET ORAL at 17:30

## 2019-01-13 RX ADMIN — NEBIVOLOL HYDROCHLORIDE 5 MG: 5 TABLET ORAL at 08:37

## 2019-01-13 RX ADMIN — SODIUM CHLORIDE 100 ML/HR: 0.9 INJECTION, SOLUTION INTRAVENOUS at 14:34

## 2019-01-13 RX ADMIN — APIXABAN 10 MG: 5 TABLET, FILM COATED ORAL at 21:30

## 2019-01-13 RX ADMIN — LEVOTHYROXINE SODIUM 75 MCG: 75 TABLET ORAL at 05:20

## 2019-01-13 RX ADMIN — SODIUM CHLORIDE 100 ML/HR: 0.9 INJECTION, SOLUTION INTRAVENOUS at 04:42

## 2019-01-13 RX ADMIN — LORATADINE 10 MG: 10 TABLET ORAL at 08:37

## 2019-01-13 RX ADMIN — ACETYLCYSTEINE 600 MG: 200 SOLUTION ORAL; RESPIRATORY (INHALATION) at 17:30

## 2019-01-13 RX ADMIN — ACETYLCYSTEINE 600 MG: 200 SOLUTION ORAL; RESPIRATORY (INHALATION) at 11:11

## 2019-01-13 RX ADMIN — VITAMIN D, TAB 1000IU (100/BT) 1000 UNITS: 25 TAB at 08:37

## 2019-01-13 NOTE — PLAN OF CARE
Problem: DISCHARGE PLANNING - CARE MANAGEMENT  Goal: Discharge to post-acute care or home with appropriate resources  INTERVENTIONS:  - Conduct assessment to determine patient/family and health care team treatment goals, and need for post-acute services based on payer coverage, community resources, and patient preferences, and barriers to discharge  - Address psychosocial, clinical, and financial barriers to discharge as identified in assessment in conjunction with the patient/family and health care team  - Arrange appropriate level of post-acute services according to patients   needs and preference and payer coverage in collaboration with the physician and health care team  - Communicate with and update the patient/family, physician, and health care team regarding progress on the discharge plan  - Arrange appropriate transportation to post-acute venues  Outcome: Progressing  CM met with patient at bedside to discuss Eliquis Rx  Patient utilizes [facility] on [address]  CM called and spoke with [facility] at the [facility], [phone]  [facility] stated he would need to order the Eliquis which he won't be able to get until tomorrow afternoon  Fax number is [phone]  CM did not fax Rx at this time  SLIM made aware  SLIM states patient will most likely discharge tomorrow  Rx placed on chart  CM Department will continue to follow patient

## 2019-01-13 NOTE — SOCIAL WORK
CM met with patient at bedside to discuss Eliquis Rx  Patient utilizes [facility] on [address]  CM called and spoke with Marium Kebede at the [facility], [phone]  Marium Kebede stated he would need to order the Eliquis which he won't be able to get until tomorrow afternoon  Fax number is [phone]  CM did not fax Rx at this time  SLIM made aware  SLIM states patient will most likely discharge tomorrow  Rx placed on chart  CM Department will continue to follow patient

## 2019-01-13 NOTE — PROGRESS NOTES
Pt has a hx of Left mastectomy  Pt has refused [place] limb alert on Left arm, wants the staff to use the Left arm for blood draws

## 2019-01-13 NOTE — PROGRESS NOTES
[facility] Progress Note  Patient: Barry Guan 68 y o  female   MRN: [id number]  PCP: Brigido Woodall MD  Unit/Bed#: [facility] Encounter: [id number]  Date Of Visit: 01/13/19    Assessment:    Principal Problem:    Pulmonary embolism (HCC)  Active Problems:    Hypertension    Hyperlipidemia    Hypothyroidism    Retroperitoneal mass    Basal cell carcinoma (BCC) of left side of nose    History of Hodgkin's disease    Paroxysmal supraventricular tachycardia (HCC)    Chest pain    Vasculitis (HCC)    CKD (chronic kidney disease), stage III (HCC)    DVT (deep venous thrombosis) ([facility] [address] )      Plan:    · Acute Bibasilar Pulmonary Embolism and Acute Left Popliteal and Gastrocnemius DVTs -   · Anticoagulation - Heparin drip with transition today to eliquis  Rx for outpatient Eliquis given to case management  · Oxygenation / ventilation - Check sats on room air and with ambulation  · Chest Pain and Dyspnea -   · PE present on CT chest   See above for management  This is the likely cause for symptoms  · Rule out ACS - troponins overall negative  No further workup needed as we already have another clear cause for symptoms  · Evaluation for CHF -   · An echocardiogram showing moderate to severe aortic stenosis increases the risk of this being a heart failure presentation  We will consult Cardiology for their opinion  I am hesitant to start diuretic therapy with Lasix just yet until we make sure the creatinine is stable by tomorrow  Stop IV fluids currently  · DVT may be a cause for edema though  · Cardiology evaluation may be considered  · Evaluate for Arrhythmias - On telemetry, but negative so far  Less likely a cause than PE and CHF  Discontinue telemetry  · CKD III with ENEDELIA POA - Baseline Cr is 1 - 1 2  IV fluids stopped  Creatinine is at baseline  Recheck BMP tomorrow to make sure no contrast nephropathy  Can stop acetylcysteine    · Moderate to Severe Aortic Stenosis - cardiologist evaluation  Avoid dropping blood pressures too much  Increases risk of this being CHF  · 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  Outpatient dermatologist evaluation  Hydrocortisone cream locally  · Essential Hypertension - Bystolic  Monitor Blood Pressures  · PSVT - Bystolic  Monitor Heart rates  · Hypothyroidism - Synthroid  · Hyperlipidemia - statin therapy  · Renal Mass - already scheduled to follow up with urology  Due to have a renal CT as outpatient  · Hodgkin Disease, Basal Cell Carcinoma, and history of Malignant Melanoma -   · Outpatient follow ups  For Hodgkin disease follows with Dr Mendoza Mai  Has had laparotomy with splenectomy and extended field radiation       VTE Pharmacologic Prophylaxis:   Pharmacologic: Heparin Drip  Changing to eliquis  Mechanical VTE Prophylaxis in Place: Yes    Patient Centered Rounds: I have performed bedside rounds with nursing staff today  Discussions with Specialists or Other Care Team Provider: None    Education and Discussions with Family / Patient: Patient's  at bedside  Time Spent for Care: 30 minutes  More than 50% of total time spent on counseling and coordination of care as described above  Current Length of Stay: 1 day(s)    Current Patient Status: Inpatient   Certification Statement: The patient will continue to require additional inpatient hospital stay due to need for additional testing including echocardiogram and to be able to get Eliquis as outpatient  Discharge Plan / Estimated Discharge Date: Next 24 - 48 hours dependent on stability of creatinine and also based on cardiologist recommendations  Code Status: Level 1 - Full Code      Subjective: The patient still has edema in the lower extremities  She has no chest pain currently  The patient feels that her breathing is doing okay currently  Still some slight exertional dyspnea    The patient tells me that sometimes when she is sleeping at night she will regurgitate some clear liquid  However, she does not cough up clear liquid  She denies any fevers or chills  I did review all test results with the patient and her  as well as the overall plan of care going forth  Objective:     Vitals:   Temp (24hrs), Av 2 °F (36 8 °C), Min:97 5 °F (36 4 °C), Max:98 9 °F (37 2 °C)    Temp:  [97 5 °F (36 4 °C)-98 9 °F (37 2 °C)] 98 6 °F (37 °C)  HR:  [] 103  Resp:  [18] 18  BP: (105-148)/(56-69) 105/69  SpO2:  [97 %-98 %] 98 %  Body mass index is 30 46 kg/m²  Input and Output Summary (last 24 hours): Intake/Output Summary (Last 24 hours) at 19 1247  Last data filed at 19 0442   Gross per 24 hour   Intake          1921 67 ml   Output                0 ml   Net          1921 67 ml       Physical Exam:     Physical Exam   Constitutional: She is oriented to person, place, and time  No distress  HENT:   Mouth/Throat: Oropharynx is clear and moist  No oropharyngeal exudate  Eyes: Pupils are equal, round, and reactive to light  Conjunctivae are normal    Cardiovascular: Normal rate and regular rhythm  Murmur (mild systolic (2/6)  ) heard  Pulmonary/Chest: Effort normal  She has no wheezes  She has rales (right lung base only  )  Abdominal: Soft  Bowel sounds are normal  She exhibits no distension  There is no tenderness  Musculoskeletal: She exhibits edema (bilateral lower extremities 1+)  Neurological: She is alert and oriented to person, place, and time  No cranial nerve deficit  Skin: Rash (stable purpuric rash with coalescence bilateral lower extremities) noted  Psychiatric: She has a normal mood and affect  Vitals reviewed      Additional Data:     Labs:      Results from last 7 days  Lab Units 19  0618  19  2319   WBC Thousand/uL 10 39*  < > 9 45   HEMOGLOBIN g/dL 8 5*  < > 10 3*   HEMATOCRIT % 27 2*  < > 33 4*   PLATELETS Thousands/uL 626*  < > 765*   NEUTROS PCT %  --   -- 67   LYMPHS PCT %  --   --  19   MONOS PCT %  --   --  10   EOS PCT %  --   --  3   < > = values in this interval not displayed  Results from last 7 days  Lab Units 01/13/19  0618  01/11/19 2319   POTASSIUM mmol/L 4 3  < > 4 0   CHLORIDE mmol/L 105  < > 98*   CO2 mmol/L 22  < > 26   BUN mg/dL 12  < > 20   CREATININE mg/dL 1 06  < > 1 46*   CALCIUM mg/dL 8 4  < > 9 3   ALK PHOS U/L  --   --  218*   ALT U/L  --   --  45   AST U/L  --   --  50*   < > = values in this interval not displayed  * I Have Reviewed All Lab Data Listed Above  * Additional Pertinent Lab Tests Reviewed: All Labs Within Last 24 Hours Reviewed    Imaging:    Imaging Reports Reviewed Today Include: CT renal, CT chest, lower extremity dopplers, echo    Imaging Personally Reviewed by Myself Includes:  CT chest    Recent Cultures (last 7 days):           Last 24 Hours Medication List:     Current Facility-Administered Medications:  acetaminophen 650 mg Oral Q6H PRN Verline Bays, PA-C    acetylcysteine 600 mg Oral BID Parmjit Torrey, DO    aspirin 81 mg Oral Daily Verline Bays, PA-C    cholecalciferol 1,000 Units Oral Daily Verline Bays, PA-C    heparin (porcine) 3-20 Units/kg/hr (Order-Specific) Intravenous Titrated Vidya Lipa [place], DO Last Rate: 18 Units/kg/hr (01/12/19 2319)   heparin (porcine) 2,000 Units Intravenous PRN Vidya Lipa Dejan, DO    heparin (porcine) 4,000 Units Intravenous PRN Vidya Lipa [place], DO    hydrocortisone  Topical 4x Daily PRN Parmjit Torrey, DO    levothyroxine 75 mcg Oral Early Morning Verline Bays, PA-C    loratadine 10 mg Oral Daily Verline Bays, PA-C    nebivolol 5 mg Oral Daily Verline Bays, PA-C    nitroglycerin 0 4 mg Sublingual Q5 Min PRN Verline Bays, PA-C    ondansetron 4 mg Intravenous Q6H PRN Verline Bays, PA-C    pravastatin 80 mg Oral Daily With Dinner Verline Bays, PA-C    sodium chloride 100 mL/hr Intravenous Continuous Verline Bays, PA-C Last Rate: 100 mL/hr (01/13/19 0442)        Today, Patient Was Seen By: Oni Mccain DO    ** Please Note: This note has been constructed using a voice recognition system   **

## 2019-01-14 LAB
ANION GAP SERPL CALCULATED.3IONS-SCNC: 9 MMOL/L (ref 4–13)
BUN SERPL-MCNC: 9 MG/DL (ref 5–25)
CALCIUM SERPL-MCNC: 8.9 MG/DL (ref 8.3–10.1)
CHLORIDE SERPL-SCNC: 104 MMOL/L (ref 100–108)
CO2 SERPL-SCNC: 23 MMOL/L (ref 21–32)
CREAT SERPL-MCNC: 1.06 MG/DL (ref 0.6–1.3)
ERYTHROCYTE [DISTWIDTH] IN BLOOD BY AUTOMATED COUNT: 15.4 % (ref 11.6–15.1)
GFR SERPL CREATININE-BSD FRML MDRD: 53 ML/MIN/1.73SQ M
GLUCOSE SERPL-MCNC: 87 MG/DL (ref 65–140)
HCT VFR BLD AUTO: 28.4 % (ref 34.8–46.1)
HGB BLD-MCNC: 8.8 G/DL (ref 11.5–15.4)
MCH RBC QN AUTO: 26.6 PG (ref 26.8–34.3)
MCHC RBC AUTO-ENTMCNC: 31 G/DL (ref 31.4–37.4)
MCV RBC AUTO: 86 FL (ref 82–98)
PLATELET # BLD AUTO: 627 THOUSANDS/UL (ref 149–390)
PMV BLD AUTO: 9 FL (ref 8.9–12.7)
POTASSIUM SERPL-SCNC: 4 MMOL/L (ref 3.5–5.3)
RBC # BLD AUTO: 3.31 MILLION/UL (ref 3.81–5.12)
SODIUM SERPL-SCNC: 136 MMOL/L (ref 136–145)
WBC # BLD AUTO: 7.74 THOUSAND/UL (ref 4.31–10.16)

## 2019-01-14 PROCEDURE — 99223 1ST HOSP IP/OBS HIGH 75: CPT | Performed by: INTERNAL MEDICINE

## 2019-01-14 PROCEDURE — 80048 BASIC METABOLIC PNL TOTAL CA: CPT | Performed by: INTERNAL MEDICINE

## 2019-01-14 PROCEDURE — 85027 COMPLETE CBC AUTOMATED: CPT | Performed by: INTERNAL MEDICINE

## 2019-01-14 PROCEDURE — 99232 SBSQ HOSP IP/OBS MODERATE 35: CPT | Performed by: INTERNAL MEDICINE

## 2019-01-14 RX ORDER — FUROSEMIDE 10 MG/ML
40 INJECTION INTRAMUSCULAR; INTRAVENOUS ONCE
Status: COMPLETED | OUTPATIENT
Start: 2019-01-14 | End: 2019-01-14

## 2019-01-14 RX ADMIN — LORATADINE 10 MG: 10 TABLET ORAL at 09:12

## 2019-01-14 RX ADMIN — LEVOTHYROXINE SODIUM 75 MCG: 75 TABLET ORAL at 06:18

## 2019-01-14 RX ADMIN — APIXABAN 10 MG: 5 TABLET, FILM COATED ORAL at 17:16

## 2019-01-14 RX ADMIN — APIXABAN 10 MG: 5 TABLET, FILM COATED ORAL at 09:12

## 2019-01-14 RX ADMIN — PRAVASTATIN SODIUM 80 MG: 80 TABLET ORAL at 17:16

## 2019-01-14 RX ADMIN — FUROSEMIDE 40 MG: 10 INJECTION, SOLUTION INTRAMUSCULAR; INTRAVENOUS at 17:16

## 2019-01-14 RX ADMIN — ASPIRIN 81 MG 81 MG: 81 TABLET ORAL at 09:12

## 2019-01-14 RX ADMIN — VITAMIN D, TAB 1000IU (100/BT) 1000 UNITS: 25 TAB at 09:12

## 2019-01-14 RX ADMIN — NEBIVOLOL HYDROCHLORIDE 5 MG: 5 TABLET ORAL at 09:12

## 2019-01-14 NOTE — CONSULTS
Consultation - Cardiology   Jillian Vanegas 68 y o  female MRN: [id number]  Unit/Bed#: -01 Encounter: [id number]    Assessment/Plan     Principal Problem:    Pulmonary embolism ([address] )  Active Problems:    Hypertension    Hyperlipidemia    Hypothyroidism    Retroperitoneal mass    Basal cell carcinoma (BCC) of left side of nose    History of Hodgkin's disease    Paroxysmal supraventricular tachycardia (HCC)    Chest pain    Vasculitis (HCC)    CKD (chronic kidney disease), stage III (HCC)    DVT (deep venous thrombosis) (HCC)      Assessment/Plan    1  Shortness of breath/chest tightness  Presenting symptom  Improved since arrival    Has not worsened with IV hydration  Likely secondary to acute by basilar pulmonary embolism  No hypoxia documented other than one isolated check  She said she was temporarily put on oxygen  Troponin 0  0 6 in the setting of pulmonary embolism  EKG with mild inferior lateral ST depressions  Consider outpatient stress testing once recovered from PEs    2  Acute bibasilar pulmonary embolism  Acute left popliteal had a gastrocnemius DVTs  Heparin drip transitioned to Eliquis  3  Moderate to severe aortic regurgitation/normal LV function  Prior echocardiogram noted in the system 2007  There is no mention of any valvular heart disease at that time  Patient is concerned that it may have been related to her chest radiation in the [de-identified]  Her symptomatology on presentation with chest discomfort and shortness of breath at this point I believe we can attribute to other etiologies  No diuretics have been given thus far and her symptoms are improved  She will need outpatient follow-up in regards to her valvular heart disease  ProBNP is in the 3000 range  Chest CT scan did suggest pulmonary vascular congestion  At this time I do not see evidence of JVD  She has mild edema which she said has improved during hospitalization  Her lungs are fairly clear    Will review with attending regarding administration of diuretics  4  CKD/AK I-now at baseline  IV fluids now stopped  She was suspected to be intravascular depleted on presentation  4  HTN-controlled on bystolic  5  History of PSVT-takes Bystolic  With no recent palpitations  She is no longer on telemetry but it is documented she has not had any arrhythmias during her hospitalization  She previously had palpitations with her SVT and is not bothered by palpitations for quite some time  6  History of Hodgkin's disease/basal cell carcinoma and history of malignant melanoma/breast cancer  Patient follows with Dr Emily Fontaine    7  Renal mass-patient has follow-up scheduled with Urology    8  Moderate TR with severe pulmonary hypertension      History of Present Illness   Physician Requesting Consult: Maggi Granados,   Reason for Consult / Principal Problem: Aortic stenosis    HPI: Shannon Tavarez is a 68y o  year old female with CKD 3, HTN, SVT, breast cancer, leukocytoclastic vasculitis, HLD, renal mass (scheduled for follow-up with urology), history of malignant melanoma as well as basal cell carcinoma and remote Hodgkin's disease (with chest radiation in the 1980s) who presents with shortness of breath, lower extremity edema and chest tightness  The leg edema also started around the time of a red itchy rash starting  This occurred after taking an antibiotic for strep UTI  She noted her legs began throbbing  D-dimer greater than 10,000  She was also noted a dry cough  She said it was more when pressure was placed on her back  Was necessarily worse lying down  She denies PND orthopnea  Reports recent lower extremity edema which she said has improved since she has gotten here  Lower extremity Doppler no acute DVT of the right  There is an acute nonocclusive DVT in the popliteal and one of the paired gastrocnemius  veins  CTA of the chest shows acute basilar pulmonary emboli    Additionally there is mild diffuse patchy ground-glass alveolar opacity, trace pleural effusions and smooth septal thickening suggestive of pulmonary vascular congestion and fluid overload  Echocardiogram shows normal LV function with normal RV function  There is mild MR and moderate to severe AI  Moderate TR with severe pulmonary hypertension  She said little before 2012 she was followed by Dr Jeanne Medina  She said he told her that at some point a heart valve may be affected from her chest radiation  Troponin 0 06, 0 02, 0 02  ProBNP 3, 376  Inpatient consult to Cardiology  Consult performed by: Irena Amador ordered by: Warner Bishop          Review of Systems   Constitutional: Positive for activity change and fever  HENT: Negative  Eyes: Negative  Respiratory: Positive for cough and shortness of breath  Cardiovascular: Positive for chest pain and leg swelling  Negative for palpitations  Gastrointestinal: Positive for diarrhea  Genitourinary: Negative  Neurological: Negative for syncope  Hematological: Bruises/bleeds easily  Psychiatric/Behavioral: Negative          Historical Information   Past Medical History:   Diagnosis Date    ENEDELIA (acute kidney injury) ([facility] ) 1/12/2019    BCC (basal cell carcinoma of skin)     facial    Calculus, kidney 2013    Cervical cancer ([facility] ) 1983    Disease of thyroid gland     Ganglion     Last Assessed:7/9/13    Heart murmur     mitral valve regurgitation    Hodgkin disease ([facility] )     Hydronephrosis 2013    Hyperlipidemia     Hypertension     NSTEMI (non-ST elevated myocardial infarction) ([facility] ) 1/12/2019    Osteopenia     Last Assessed:7/9/13    Paroxysmal supraventricular tachycardia (HCC)     Renal calculi     Last Assessed:3/19/14    Renal cyst     Shoulder impingement     Last Assessed:1/18/13    UTI (urinary tract infection) 2013    Vasculitis ([facility] ) 1/12/2019     Past Surgical History:   Procedure Laterality Date    APPENDECTOMY      BREAST RECONSTRUCTION      COMPLEX WOUND CLOSURE TO EXTREMITY Left 10/9/2018    Procedure: COMPLEX CLOSURE RECONSTRUCTION;  Surgeon: Carmencita Merino MD;  [facility];  Service: Plastics    CYSTOSCOPY W/ RETROGRADES  2009    CYSTOSCOPY W/ URETEROSCOPY  2002    EXTRACORPOREAL SHOCK WAVE LITHOTRIPSY Right 2005    FLAP LOCAL HEAD / NECK Left 10/9/2018    Procedure: FLAP RECONSTRUCTION;  Surgeon: Carmencita Merino MD;  [facility];  Service: Plastics    FULL THICKNESS SKIN GRAFT Left 10/9/2018    Procedure: FULL THICKNESS SKIN GRAFT RECONSTRUCTION;  Surgeon: Carmencita Merino MD;  [facility];  Service: Plastics    HYSTERECTOMY      1983-cervical cancer    INTRAOPERATIVE RADIATION THERAPY ([facility])      Radiation Therapy    KIDNEY SURGERY      MASTECTOMY Left 2006    OTHER SURGICAL HISTORY      Chemotherapeutics    SENTINEL LYMPH NODE BIOPSY      SPLENECTOMY      1985-Hodgkin's Disease    SQUAMOUS CELL CARCINOMA EXCISION Left 10/9/2018    Procedure: NOSE/CHEEK BCC EXCISION; FROZEN SECTION;  Surgeon: Carmencita Merino MD;  [facility];  Service: Plastics     History   Alcohol Use    Yes     Comment: caffeine use; social drinker-1 beer every 2/3 months     History   Drug Use No     History   Smoking Status    Former Smoker    Quit date: 2011   Smokeless Tobacco    Never Used     Family History:   Family History   Problem Relation Age of Onset    Leukemia Mother     Colon cancer Paternal Grandmother     Heart disease Family        Meds/Allergies   current meds:   Current Facility-Administered Medications   Medication Dose Route Frequency    acetaminophen (TYLENOL) tablet 650 mg  650 mg Oral Q6H PRN    apixaban (ELIQUIS) tablet 10 mg  10 mg Oral BID    [START ON 1/21/2019] apixaban (ELIQUIS) tablet 5 mg  5 mg Oral BID    aspirin chewable tablet 81 mg  81 mg Oral Daily    cholecalciferol (VITAMIN D3) tablet 1,000 Units  1,000 Units Oral Daily    hydrocortisone 2 5 % cream   Topical 4x Daily PRN    levothyroxine tablet 75 mcg  75 mcg Oral Early Morning    loratadine (CLARITIN) tablet 10 mg  10 mg Oral Daily    nebivolol (BYSTOLIC) tablet 5 mg  5 mg Oral Daily    nitroglycerin (NITROSTAT) SL tablet 0 4 mg  0 4 mg Sublingual Q5 Min PRN    ondansetron (ZOFRAN) injection 4 mg  4 mg Intravenous Q6H PRN    pravastatin (PRAVACHOL) tablet 80 mg  80 mg Oral Daily With Dinner    and PTA meds:  Prescriptions Prior to Admission   Medication    acetaminophen-codeine (TYLENOL #3) 300-30 mg per tablet    aspirin 81 MG tablet    BYSTOLIC 5 MG tablet    cholecalciferol (VITAMIN D3) 1,000 units tablet    levothyroxine 75 mcg tablet    Loratadine (CLARITIN) 10 MG CAPS    nebivolol (BYSTOLIC) 5 mg tablet    ondansetron (ZOFRAN) 8 mg tablet    simvastatin (ZOCOR) 40 mg tablet     Allergies   Allergen Reactions    Ciprofloxacin Other (See Comments)     Reaction Date: 24Jun2011; Hives/Uticaria    Sulfamethoxazole-Trimethoprim      Patient does not remember rx       Objective   Vitals: Blood pressure 130/61, pulse 95, temperature 98 5 °F (36 9 °C), temperature source Oral, resp  rate 18, height 5' 1" (1 549 m), weight 73 1 kg (161 lb 3 2 oz), SpO2 94 %    Orthostatic Blood Pressures      Most Recent Value   Blood Pressure  130/61 filed at 01/14/2019 0707   Patient Position - Orthostatic VS  Lying filed at 01/14/2019 0707            Intake/Output Summary (Last 24 hours) at 01/14/19 0927  Last data filed at 01/13/19 2300   Gross per 24 hour   Intake                0 ml   Output                0 ml   Net                0 ml       Invasive Devices     Peripheral Intravenous Line            Peripheral IV 01/12/19 Right Wrist 2 days                Physical Exam: /61 (BP Location: Left arm)   Pulse 95   Temp 98 5 °F (36 9 °C) (Oral)   Resp 18   Ht 5' 1" (1 549 m)   Wt 73 1 kg (161 lb 3 2 oz)   SpO2 94%   BMI 30 46 kg/m²   General Appearance:    Alert, cooperative, no distress, appears stated age   Head:    Normocephalic, no scleral icterus   Eyes:    PERRL   Nose:   Nares normal, septum midline, mucosa normal, no drainage    Throat:   Lips, mucosa, and tongue normal   Neck:   Supple, symmetrical, trachea midline     no JVD   Back:     Symmetric   Lungs:     No rales to auscultation bilaterally, respirations unlabored   Chest Wall:    No tenderness or deformity    Heart:    Regular rate and rhythm, S1 and S2 normal, no murmur, rub   or gallop   Abdomen:     Soft, non-tender, bowel sounds active all four quadrants,     no masses, no organomegaly   Extremities:   Extremities normal, atraumatic, no cyanosis ,1+edema  Predict rash on legs right greater than left   Pulses:   2+ and symmetric all extremities   Skin:   Skin warm   Neurologic:   Alert and oriented to person place and time   No focal deficits       Lab Results:   Recent Results (from the past 72 hour(s))   CBC and differential    Collection Time: 01/11/19 11:19 PM   Result Value Ref Range    WBC 9 45 4 31 - 10 16 Thousand/uL    RBC 3 92 3 81 - 5 12 Million/uL    Hemoglobin 10 3 (L) 11 5 - 15 4 g/dL    Hematocrit 33 4 (L) 34 8 - 46 1 %    MCV 85 82 - 98 fL    MCH 26 3 (L) 26 8 - 34 3 pg    MCHC 30 8 (L) 31 4 - 37 4 g/dL    RDW 15 0 11 6 - 15 1 %    MPV 9 0 8 9 - 12 7 fL    Platelets 281 (H) 085 - 390 Thousands/uL    nRBC 0 /100 WBCs    Neutrophils Relative 67 43 - 75 %    Immat GRANS % 0 0 - 2 %    Lymphocytes Relative 19 14 - 44 %    Monocytes Relative 10 4 - 12 %    Eosinophils Relative 3 0 - 6 %    Basophils Relative 1 0 - 1 %    Neutrophils Absolute 6 37 1 85 - 7 62 Thousands/µL    Immature Grans Absolute 0 04 0 00 - 0 20 Thousand/uL    Lymphocytes Absolute 1 76 0 60 - 4 47 Thousands/µL    Monocytes Absolute 0 93 0 17 - 1 22 Thousand/µL    Eosinophils Absolute 0 29 0 00 - 0 61 Thousand/µL    Basophils Absolute 0 06 0 00 - 0 10 Thousands/µL   Comprehensive metabolic panel    Collection Time: 01/11/19 11:19 PM   Result Value Ref Range    Sodium 134 (L) 136 - 145 mmol/L    Potassium 4 0 3 5 - 5 3 mmol/L    Chloride 98 (L) 100 - 108 mmol/L    CO2 26 21 - 32 mmol/L    ANION GAP 10 4 - 13 mmol/L    BUN 20 5 - 25 mg/dL    Creatinine 1 46 (H) 0 60 - 1 30 mg/dL    Glucose 106 65 - 140 mg/dL    Calcium 9 3 8 3 - 10 1 mg/dL    AST 50 (H) 5 - 45 U/L    ALT 45 12 - 78 U/L    Alkaline Phosphatase 218 (H) 46 - 116 U/L    Total Protein 9 6 (H) 6 4 - 8 2 g/dL    Albumin 2 3 (L) 3 5 - 5 0 g/dL    Total Bilirubin 0 30 0 20 - 1 00 mg/dL    eGFR 36 ml/min/1 73sq m   D-Dimer    Collection Time: 01/11/19 11:19 PM   Result Value Ref Range    D-Dimer, Quant >10,000 (H) <500 ng/ml (FEU)   Troponin I    Collection Time: 01/11/19 11:19 PM   Result Value Ref Range    Troponin I 0 06 (H) <=0 04 ng/mL   Lactic acid, plasma    Collection Time: 01/11/19 11:19 PM   Result Value Ref Range    LACTIC ACID 1 2 0 5 - 2 0 mmol/L   ECG 12 lead    Collection Time: 01/11/19 11:54 PM   Result Value Ref Range    Ventricular Rate 103 BPM    Atrial Rate 103 BPM    CT Interval 134 ms    QRSD Interval 80 ms    QT Interval 340 ms    QTC Interval 445 ms    P Axis 81 degrees    QRS Axis 29 degrees    T Wave Axis -27 degrees   APTT six (6) hours after Heparin bolus/drip initiation or dosing change    Collection Time: 01/12/19  1:46 AM   Result Value Ref Range    PTT 30 26 - 38 seconds   Troponin I    Collection Time: 01/12/19  6:14 AM   Result Value Ref Range    Troponin I 0 02 <=0 04 ng/mL   Basic metabolic panel    Collection Time: 01/12/19  6:14 AM   Result Value Ref Range    Sodium 136 136 - 145 mmol/L    Potassium 4 0 3 5 - 5 3 mmol/L    Chloride 101 100 - 108 mmol/L    CO2 24 21 - 32 mmol/L    ANION GAP 11 4 - 13 mmol/L    BUN 16 5 - 25 mg/dL    Creatinine 1 22 0 60 - 1 30 mg/dL    Glucose 94 65 - 140 mg/dL    Calcium 9 1 8 3 - 10 1 mg/dL    eGFR 45 ml/min/1 73sq m   CBC (With Platelets)    Collection Time: 01/12/19  6:14 AM   Result Value Ref Range    WBC 9  69 4 31 - 10 16 Thousand/uL    RBC 3 74 (L) 3 81 - 5 12 Million/uL    Hemoglobin 9 9 (L) 11 5 - 15 4 g/dL    Hematocrit 31 6 (L) 34 8 - 46 1 %    MCV 85 82 - 98 fL    MCH 26 5 (L) 26 8 - 34 3 pg    MCHC 31 3 (L) 31 4 - 37 4 g/dL    RDW 14 8 11 6 - 15 1 %    Platelets 984 (H) 466 - 390 Thousands/uL    MPV 8 8 (L) 8 9 - 12 7 fL   Lipid panel    Collection Time: 01/12/19  6:14 AM   Result Value Ref Range    Cholesterol 115 50 - 200 mg/dL    Triglycerides 99 <=150 mg/dL    HDL, Direct 34 (L) 40 - 60 mg/dL    LDL Calculated 61 0 - 100 mg/dL    Non-HDL-Chol (CHOL-HDL) 81 mg/dl   NT-BNP PRO    Collection Time: 01/12/19  8:16 AM   Result Value Ref Range    NT-proBNP 3,376 (H) <125 pg/mL   APTT    Collection Time: 01/12/19  8:16 AM   Result Value Ref Range    PTT 33 26 - 38 seconds   Troponin I    Collection Time: 01/12/19  9:23 AM   Result Value Ref Range    Troponin I 0 02 <=0 04 ng/mL   APTT    Collection Time: 01/12/19  3:45 PM   Result Value Ref Range    PTT 50 (H) 26 - 38 seconds   APTT    Collection Time: 01/12/19 11:26 PM   Result Value Ref Range    PTT 68 (H) 26 - 38 seconds   Basic metabolic panel    Collection Time: 01/13/19  6:18 AM   Result Value Ref Range    Sodium 137 136 - 145 mmol/L    Potassium 4 3 3 5 - 5 3 mmol/L    Chloride 105 100 - 108 mmol/L    CO2 22 21 - 32 mmol/L    ANION GAP 10 4 - 13 mmol/L    BUN 12 5 - 25 mg/dL    Creatinine 1 06 0 60 - 1 30 mg/dL    Glucose 93 65 - 140 mg/dL    Calcium 8 4 8 3 - 10 1 mg/dL    eGFR 53 ml/min/1 73sq m   CBC    Collection Time: 01/13/19  6:18 AM   Result Value Ref Range    WBC 10 39 (H) 4 31 - 10 16 Thousand/uL    RBC 3 18 (L) 3 81 - 5 12 Million/uL    Hemoglobin 8 5 (L) 11 5 - 15 4 g/dL    Hematocrit 27 2 (L) 34 8 - 46 1 %    MCV 86 82 - 98 fL    MCH 26 7 (L) 26 8 - 34 3 pg    MCHC 31 3 (L) 31 4 - 37 4 g/dL    RDW 15 1 11 6 - 15 1 %    Platelets 509 (H) 012 - 390 Thousands/uL    MPV 9 1 8 9 - 12 7 fL   APTT    Collection Time: 01/13/19  6:18 AM   Result Value Ref Range    PTT 67 (H) 26 - 38 seconds   Basic metabolic panel    Collection Time: 19  5:59 AM   Result Value Ref Range    Sodium 136 136 - 145 mmol/L    Potassium 4 0 3 5 - 5 3 mmol/L    Chloride 104 100 - 108 mmol/L    CO2 23 21 - 32 mmol/L    ANION GAP 9 4 - 13 mmol/L    BUN 9 5 - 25 mg/dL    Creatinine 1 06 0 60 - 1 30 mg/dL    Glucose 87 65 - 140 mg/dL    Calcium 8 9 8 3 - 10 1 mg/dL    eGFR 53 ml/min/1 73sq m   CBC    Collection Time: 19  5:59 AM   Result Value Ref Range    WBC 7 74 4 31 - 10 16 Thousand/uL    RBC 3 31 (L) 3 81 - 5 12 Million/uL    Hemoglobin 8 8 (L) 11 5 - 15 4 g/dL    Hematocrit 28 4 (L) 34 8 - 46 1 %    MCV 86 82 - 98 fL    MCH 26 6 (L) 26 8 - 34 3 pg    MCHC 31 0 (L) 31 4 - 37 4 g/dL    RDW 15 4 (H) 11 6 - 15 1 %    Platelets 868 (H) 026 - 390 Thousands/uL    MPV 9 0 8 9 - 12 7 fL       [place] [address] [address] [place] [address]  [phone]     Transthoracic Echocardiogram  2D, M-mode, Doppler, and Color Doppler     Study date:  2019     Patient: Nadya Latham  MR number: [id number]  Account number: [de-identified]  : 1950  Age: 68 years  Gender: Female  Status: Inpatient  Location: Bedside  Height: 61 in  Weight: 160 6 lb  BP:     Indications: Pulmonary embolism      Diagnoses: 415 19 - PULM EMBOL/INFARCT NEC     Sonographer:  SHANTELL Cortez  Primary Physician:  Nano James MD  Referring Physician:  Ana Aleman PA-C  Group:  [facility]  Interpreting Physician:  Salome Roth MD     SUMMARY     PROCEDURE INFORMATION:  This was a technically difficult study      LEFT VENTRICLE:  Size was normal   Systolic function was normal  Ejection fraction was estimated to be 55 %    Wall thickness was normal   Left ventricular diastolic function parameters were normal      RIGHT VENTRICLE:  The size was normal   Systolic function was normal      MITRAL VALVE:  There was mild regurgitation      AORTIC VALVE:  There was moderate to severe regurgitation      TRICUSPID VALVE:  There was moderate regurgitation  Pulmonary artery systolic pressure was markedly increased  The findings suggest severe pulmonary hypertension      HISTORY: PRIOR HISTORY: Murmur, breast cancer, cervical cancer, Hodgkin disease, hyperlipidemia, hypertension, non-ST elevation MI, former smoker      PROCEDURE: The procedure was performed at the bedside  This was a routine study  The transthoracic approach was used  The study included complete 2D imaging, M-mode, complete spectral Doppler, and color Doppler  Images were obtained from  the parasternal, apical, subcostal, and suprasternal notch acoustic windows  Echocardiographic views were limited due to chest wall deformity  This was a technically difficult study      LEFT VENTRICLE: Size was normal  Systolic function was normal  Ejection fraction was estimated to be 55 %  There were no regional wall motion abnormalities  Wall thickness was normal  DOPPLER: Left ventricular diastolic function parameters  were normal      RIGHT VENTRICLE: The size was normal  Systolic function was normal  Wall thickness was normal      LEFT ATRIUM: Size was normal      RIGHT ATRIUM: Size was normal      MITRAL VALVE: Valve structure was normal  There was normal leaflet separation  DOPPLER: The transmitral velocity was within the normal range  There was no evidence for stenosis  There was mild regurgitation      AORTIC VALVE: The valve was probably trileaflet  Leaflets exhibited normal thickness and normal cuspal separation  DOPPLER: Transaortic velocity was within the normal range  There was no evidence for stenosis  There was moderate to severe  regurgitation      TRICUSPID VALVE: The valve structure was normal  There was normal leaflet separation  DOPPLER: The transtricuspid velocity was within the normal range  There was no evidence for stenosis  There was moderate regurgitation   Pulmonary artery  systolic pressure was markedly increased  Estimated peak PA pressure was 72 mmHg  The findings suggest severe pulmonary hypertension      PULMONIC VALVE: Not well visualized      PERICARDIUM: There was no pericardial effusion  The pericardium was normal in appearance      AORTA: The root exhibited normal size      SYSTEMIC VEINS: IVC: The inferior vena cava was not well visualized      SYSTEM MEASUREMENT TABLES     2D  LA Area: 16 46 cm2  LVEDV MOD A4C: 58 76 ml  LVEF MOD A4C: 58 65 %  LVESV MOD A4C: 24 3 ml  LVLd A4C: 6 9 cm  LVLs A4C: 6 21 cm  RA Area: 16 95 cm2  RVIDd: 4 25 cm  SV MOD A4C: 34 47 ml     CW  AR Dec [place]: 4 22 m/s2  AR Dec Time: 1148 61 ms  AR PHT: 333 1 ms  AR Vmax: 4 83 m/s  AR maxP 29 mmHg  AV Env  Ti: 239 91 ms  AV MaxP 87 mmHg  AV VTI: 20 19 cm  AV Vmax: 1 21 m/s  AV Vmean: 0 84 m/s  AV meanPG: 3 1 mmHg  TR MaxP 92 mmHg  TR Vmax: 3 93 m/s     MM  TAPSE: 1 91 cm     PW  E': 0 08 m/s  E/E': 9 85  LVOT Env  Ti: 235 29 ms  LVOT VTI: 13 cm  LVOT Vmax: 0 79 m/s  LVOT Vmean: 0 55 m/s  LVOT maxP 51 mmHg  LVOT meanP 36 mmHg  MV A [place]: 0 41 m/s  MV Dec [place]: 7 18 m/s2  MV DecT: 114 3 ms  MV E [place]: 0 82 m/s  MV E/A Ratio: 1 98  MV PHT: 33 15 ms  MVA By PHT: 6 64 cm2     [employer] Accredited Echocardiography Laboratory     Prepared and electronically signed by     Daisy Sanchez MD  Signed 2019 16:01:18     Imaging: I have personally reviewed pertinent reports  EKG:  Normal sinus rhythm with inferolateral ST depression  VTE Prophylaxis: eliquis    Code Status: Level 1 - Full Code  Advance Directive and Living Will:      Power of :    POLST:      Counseling / Coordination of Care  Total floor / unit time spent today 50 minutes  Greater than 50% of total time was spent with the patient and / or family counseling and / or coordination of care

## 2019-01-14 NOTE — PROGRESS NOTES
[facility] Progress Note  Patient: Juan A Elders 68 y o  female   MRN: [id number]  PCP: Jeana Hodge MD  Unit/Bed#: -99 Encounter: [id number]  Date Of Visit: 01/14/19    Assessment:    Principal Problem:    Pulmonary embolism ([place] [address] )  Active Problems:    Hypertension    Hyperlipidemia    Hypothyroidism    Retroperitoneal mass    Basal cell carcinoma (BCC) of left side of nose    History of Hodgkin's disease    Paroxysmal supraventricular tachycardia (HCC)    Chest pain    Vasculitis ([place] [address] )    CKD (chronic kidney disease), stage III ([place] [address] )    DVT (deep venous thrombosis) ([place] [address] )      Plan:    · Acute Bibasilar Pulmonary Embolism and Acute Left Popliteal and Gastrocnemius DVTs -   · Anticoagulation - Eliquis  · Oxygenation / ventilation - does ok on room air  · Chest Pain and Dyspnea -   · PE present on CT chest   See above for management  This is the likely cause for symptoms  · Rule out ACS - troponins overall negative  No further workup needed as we already have another clear cause for symptoms  · Evaluation for CHF -   · An echocardiogram showing moderate to severe aortic stenosis increases the risk of this being a heart failure presentation  We consulted cardiology who ordered a dose of IV lasix today to see how patient responds  Will recheck BMP in am and monitor I/O  · Cardiology following  · Evaluate for Arrhythmias - no significant arrhythmias on initial telemetry monitoring  · CKD III with ENEDELIA POA - Baseline Cr is 1 - 1 2  Initially was on IV fluids to correct  Will need to monitor as cardiology currently recommending IV diuretic  BMP in am   · Moderate to Severe Aortic Stenosis - cardiologist evaluation  Avoid dropping blood pressures too much  Increases risk of this being CHF  Outpatient [facility] on elective basis  · 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  Outpatient dermatologist evaluation    Hydrocortisone cream locally  · Essential Hypertension - Bystolic  Monitor Blood Pressures  · PSVT - Bystolic  Monitor Heart rates  · Hypothyroidism - Synthroid  · Hyperlipidemia - statin therapy  · Renal Mass - renal CT done this admission as outpatient test  was missed due to the fact that patient was here  Reviewed  · Hodgkin Disease, Basal Cell Carcinoma, and history of Malignant Melanoma -   · Outpatient follow ups  For Hodgkin disease follows with Dr Mistry La  Has had laparotomy with splenectomy and extended field radiation       VTE Pharmacologic Prophylaxis:   Pharmacologic: Apixaban (Eliquis)  Mechanical VTE Prophylaxis in Place: Yes    Patient Centered Rounds: I have performed bedside rounds with nursing staff today  Discussions with Specialists or Other Care Team Provider: Freddy Andrea with Cardiology  Education and Discussions with Family / Patient:  Patient's  at bedside  Time Spent for Care: 30 minutes  More than 50% of total time spent on counseling and coordination of care as described above  Current Length of Stay: 2 day(s)    Current Patient Status: Inpatient   Certification Statement: The patient will continue to require additional inpatient hospital stay due to IV diuresis recommended by cardiology team     Discharge Plan / Estimated Discharge Date: Next 24 - 48 hours dependent diuresis plan from cardiology  Code Status: Level 1 - Full Code      Subjective: The patient overall feels better today  She denies any dyspnea, dizziness, chest pain, or palpitations  The patient feels like she has more energy today  She has no nausea, headache, or vision change    Patient is assessed by Cardiology who recommends IV diuretic trial     Objective:     Vitals:   Temp (24hrs), Av 5 °F (36 9 °C), Min:98 3 °F (36 8 °C), Max:98 8 °F (37 1 °C)    Temp:  [98 3 °F (36 8 °C)-98 8 °F (37 1 °C)] 98 3 °F (36 8 °C)  HR:  [95-98] 98  Resp:  [18] 18  BP: (108-130)/(56-61) 109/58  SpO2:  [93 %-97 %] 93 %  Body mass index is 30 46 kg/m²  Input and Output Summary (last 24 hours): Intake/Output Summary (Last 24 hours) at 01/14/19 1644  Last data filed at 01/13/19 2300   Gross per 24 hour   Intake                0 ml   Output                0 ml   Net                0 ml       Physical Exam:     Physical Exam   Constitutional: She is oriented to person, place, and time  No distress  HENT:   Mouth/Throat: Oropharynx is clear and moist  No oropharyngeal exudate  Eyes: Pupils are equal, round, and reactive to light  Conjunctivae are normal    Cardiovascular: Normal rate and regular rhythm  Murmur (mild systolic (2/6)  ) heard  Pulmonary/Chest: Effort normal  She has no wheezes  Decreased breath sounds at the right base   Abdominal: Soft  Bowel sounds are normal  She exhibits no distension  There is no tenderness  Musculoskeletal: She exhibits edema (bilateral lower extremities 1+)  Neurological: She is alert and oriented to person, place, and time  No cranial nerve deficit  Skin: Rash (stable purpuric rash with coalescence bilateral lower extremities) noted  Psychiatric: She has a normal mood and affect  Vitals reviewed  Additional Data:     Labs:      Results from last 7 days  Lab Units 01/14/19  0559  01/11/19  2319   WBC Thousand/uL 7 74  < > 9 45   HEMOGLOBIN g/dL 8 8*  < > 10 3*   HEMATOCRIT % 28 4*  < > 33 4*   PLATELETS Thousands/uL 627*  < > 765*   NEUTROS PCT %  --   --  67   LYMPHS PCT %  --   --  19   MONOS PCT %  --   --  10   EOS PCT %  --   --  3   < > = values in this interval not displayed  Results from last 7 days  Lab Units 01/14/19  0559  01/11/19  2319   POTASSIUM mmol/L 4 0  < > 4 0   CHLORIDE mmol/L 104  < > 98*   CO2 mmol/L 23  < > 26   BUN mg/dL 9  < > 20   CREATININE mg/dL 1 06  < > 1 46*   CALCIUM mg/dL 8 9  < > 9 3   ALK PHOS U/L  --   --  218*   ALT U/L  --   --  45   AST U/L  --   --  50*   < > = values in this interval not displayed          * I Have Reviewed All Lab Data Listed Above  * Additional Pertinent Lab Tests Reviewed: All Labs Within Last 24 Hours Reviewed    Imaging:    Imaging Reports Reviewed Today Include: No new imaging  Imaging Personally Reviewed by Myself Includes:  None    Recent Cultures (last 7 days):           Last 24 Hours Medication List:     Current Facility-Administered Medications:  acetaminophen 650 mg Oral Q6H PRN Konrad Meléndez PA-C   apixaban 10 mg Oral BID Raymond Everett DO   [START ON 1/21/2019] apixaban 5 mg Oral BID Raymond Everett DO   aspirin 81 mg Oral Daily Konrad Meléndez PA-C   cholecalciferol 1,000 Units Oral Daily Konrad Meléndez PA-C   furosemide 40 mg Intravenous Once ARACELY Jones   hydrocortisone  Topical 4x Daily PRN Raymond Everett DO   levothyroxine 75 mcg Oral Early Morning Konrad Meléndez PA-C   loratadine 10 mg Oral Daily Konrad Meléndez PA-C   nebivolol 5 mg Oral Daily Konrad Meléndez PA-C   nitroglycerin 0 4 mg Sublingual Q5 Min PRN Konrad Meléndez PA-C   ondansetron 4 mg Intravenous Q6H PRN Konrad Meléndez PA-C   pravastatin 80 mg Oral Daily With  Marga Koch PA-C        Today, Patient Was Seen By: Raymond Everett DO    ** Please Note: This note has been constructed using a voice recognition system   **

## 2019-01-14 NOTE — PLAN OF CARE
Problem: DISCHARGE PLANNING - CARE MANAGEMENT  Goal: Discharge to post-acute care or home with appropriate resources  INTERVENTIONS:  - Conduct assessment to determine patient/family and health care team treatment goals, and need for post-acute services based on payer coverage, community resources, and patient preferences, and barriers to discharge  - Address psychosocial, clinical, and financial barriers to discharge as identified in assessment in conjunction with the patient/family and health care team  - Arrange appropriate level of post-acute services according to patient's   needs and preference and payer coverage in collaboration with the physician and health care team  - Communicate with and update the patient/family, physician, and health care team regarding progress on the discharge plan  - Arrange appropriate transportation to post-acute venues   Outcome: Progressing  LOS 2   Cm sent Eliquis script to [address] [place] [address] Marina Garcia to have medication run through pt's insurance to find co pay  Pt's insurance system is down for upgrade and pharmacy will check the insurance tomorrow  Cm will follow

## 2019-01-14 NOTE — PLAN OF CARE
DISCHARGE PLANNING     Discharge to home or other facility with appropriate resources Progressing        DISCHARGE PLANNING - CARE MANAGEMENT     Discharge to post-acute care or home with appropriate resources Progressing        HEMATOLOGIC - ADULT     Maintains hematologic stability Progressing        Knowledge Deficit     Patient/family/caregiver demonstrates understanding of disease process, treatment plan, medications, and discharge instructions Progressing        Potential for Falls     Patient will remain free of falls Progressing        SAFETY ADULT     Maintain or return to baseline ADL function Progressing     Maintain or return mobility status to optimal level Progressing

## 2019-01-14 NOTE — SOCIAL WORK
LOS 2  Cm sent Eliquis script to [facility] to have medication run through pt's insurance to find co pay  Pt's insurance system is down for upgrade and pharmacy will check the insurance tomorrow  Cm will follow

## 2019-01-14 NOTE — PLAN OF CARE
DISCHARGE PLANNING     Discharge to home or other facility with appropriate resources Progressing        DISCHARGE PLANNING - CARE MANAGEMENT     Discharge to post-acute care or home with appropriate resources Progressing        Knowledge Deficit     Patient/family/caregiver demonstrates understanding of disease process, treatment plan, medications, and discharge instructions Progressing        Potential for Falls     Patient will remain free of falls Progressing        SAFETY ADULT     Maintain or return to baseline ADL function Progressing     Maintain or return mobility status to optimal level Progressing

## 2019-01-15 VITALS
HEART RATE: 100 BPM | SYSTOLIC BLOOD PRESSURE: 126 MMHG | RESPIRATION RATE: 20 BRPM | DIASTOLIC BLOOD PRESSURE: 62 MMHG | WEIGHT: 161.2 LBS | OXYGEN SATURATION: 97 % | BODY MASS INDEX: 30.43 KG/M2 | HEIGHT: 61 IN | TEMPERATURE: 97.9 F

## 2019-01-15 LAB
ANION GAP SERPL CALCULATED.3IONS-SCNC: 8 MMOL/L (ref 4–13)
BUN SERPL-MCNC: 15 MG/DL (ref 5–25)
CALCIUM SERPL-MCNC: 8.6 MG/DL (ref 8.3–10.1)
CHLORIDE SERPL-SCNC: 104 MMOL/L (ref 100–108)
CO2 SERPL-SCNC: 26 MMOL/L (ref 21–32)
CREAT SERPL-MCNC: 1.11 MG/DL (ref 0.6–1.3)
GFR SERPL CREATININE-BSD FRML MDRD: 50 ML/MIN/1.73SQ M
GLUCOSE SERPL-MCNC: 88 MG/DL (ref 65–140)
MAGNESIUM SERPL-MCNC: 1.8 MG/DL (ref 1.6–2.6)
POTASSIUM SERPL-SCNC: 3.8 MMOL/L (ref 3.5–5.3)
SODIUM SERPL-SCNC: 138 MMOL/L (ref 136–145)

## 2019-01-15 PROCEDURE — 80048 BASIC METABOLIC PNL TOTAL CA: CPT | Performed by: INTERNAL MEDICINE

## 2019-01-15 PROCEDURE — 99239 HOSP IP/OBS DSCHRG MGMT >30: CPT | Performed by: INTERNAL MEDICINE

## 2019-01-15 PROCEDURE — 99232 SBSQ HOSP IP/OBS MODERATE 35: CPT | Performed by: INTERNAL MEDICINE

## 2019-01-15 PROCEDURE — 83735 ASSAY OF MAGNESIUM: CPT | Performed by: INTERNAL MEDICINE

## 2019-01-15 RX ORDER — POTASSIUM CHLORIDE 750 MG/1
10 TABLET, EXTENDED RELEASE ORAL DAILY
Qty: 7 TABLET | Refills: 0 | Status: SHIPPED | OUTPATIENT
Start: 2019-01-15 | End: 2019-03-19 | Stop reason: ALTCHOICE

## 2019-01-15 RX ORDER — POTASSIUM CHLORIDE 750 MG/1
10 TABLET, EXTENDED RELEASE ORAL DAILY
Status: DISCONTINUED | OUTPATIENT
Start: 2019-01-15 | End: 2019-01-15 | Stop reason: HOSPADM

## 2019-01-15 RX ORDER — FUROSEMIDE 40 MG/1
40 TABLET ORAL DAILY
Status: DISCONTINUED | OUTPATIENT
Start: 2019-01-15 | End: 2019-01-15 | Stop reason: HOSPADM

## 2019-01-15 RX ORDER — FUROSEMIDE 40 MG/1
40 TABLET ORAL DAILY
Qty: 30 TABLET | Refills: 0 | Status: SHIPPED | OUTPATIENT
Start: 2019-01-15 | End: 2019-03-19 | Stop reason: ALTCHOICE

## 2019-01-15 RX ADMIN — VITAMIN D, TAB 1000IU (100/BT) 1000 UNITS: 25 TAB at 08:59

## 2019-01-15 RX ADMIN — HYDROCORTISONE: 25 CREAM TOPICAL at 09:01

## 2019-01-15 RX ADMIN — ASPIRIN 81 MG 81 MG: 81 TABLET ORAL at 08:59

## 2019-01-15 RX ADMIN — POTASSIUM CHLORIDE 10 MEQ: 750 TABLET, EXTENDED RELEASE ORAL at 11:08

## 2019-01-15 RX ADMIN — LORATADINE 10 MG: 10 TABLET ORAL at 08:59

## 2019-01-15 RX ADMIN — LEVOTHYROXINE SODIUM 75 MCG: 75 TABLET ORAL at 05:47

## 2019-01-15 RX ADMIN — APIXABAN 10 MG: 5 TABLET, FILM COATED ORAL at 08:59

## 2019-01-15 RX ADMIN — FUROSEMIDE 40 MG: 40 TABLET ORAL at 11:08

## 2019-01-15 NOTE — DISCHARGE SUMMARY
Discharge- Ti Gain 1950, 68 y o  female MRN: [id number]    Unit/Bed#: -01 Encounter: [id number]    Primary Care Provider: Laron Starkey MD   Date and time admitted to hospital: 1/11/2019 11:02 PM      Acute Bibasilar Pulmonary Embolism and Acute Left Popliteal and Gastrocnemius DVTs -   Anticoagulation - Eliquis  - discussed with CM - scripts already sorted out  Oxygenation / ventilation - does ok on room air  Chest Pain and Dyspnea -   PE present on CT chest   See above for management   This is the likely cause for symptoms  Rule out ACS - troponins overall negative   No further workup needed as we already have another clear cause for symptoms  Evaluation for CHF -   An echocardiogram showing moderate to severe aortic stenosis increases the risk of this being a heart failure presentation  Seen by cardiology today -  Home on 40 mg daily of lasix  Will need follow up BMP  Evaluate for Arrhythmias   no significant arrhythmias on initial telemetry monitoring  CKD III with ENEDELIA POA   Baseline Cr is 1 - 1 2  Initially was on IV fluids to correct     Stable after IV lasix ast 1  11  Moderate to Severe Aortic Stenosis - cardiologist evaluation     Avoid dropping blood pressures too much   Increases risk of this being CHF   Outpatient [facility] on elective basis  Leukocytoclastic Vasculitis - secondary to strep infection vs  Paraneoplastic syndrome   Outpatient dermatologist evaluation   Hydrocortisone cream locally     Essential Hypertension -   Bystolic     BP is 97/80 - will discuss with cardiology regarding home DC dose of lasix  PSVT - Bystolic     Hypothyroidism - Synthroid  Hyperlipidemia - statin therapy  Renal Mass - renal CT done this admission as outpatient test  was missed due to the fact that patient was here  Vincente Baumgarten    Hodgkin Disease, Basal Cell Carcinoma, and history of Malignant Melanoma -   Outpatient follow ups   For Hodgkin disease follows with Dr Franz Laherrera had laparotomy with splenectomy and extended field radiation     Discharging Physician / Practitioner: Staci Booth MD  PCP: Charo Pittman MD  Admission Date:   Admission Orders     Ordered        01/12/19 0121  Inpatient Admission (expected length of stay for this patient is greater than two midnights)  Once             Discharge Date: 01/15/19    Resolved Problems  Date Reviewed: 1/15/2019          Resolved    ENEDELIA (acute kidney injury) ([facility] [address] ) 1/12/2019     Resolved by  Nell Corral DO          Consultations During Hospital Stay:  · Cardiology - Dr Ya Puga  Procedures Performed:   · CT PE study -   "Acute bibasilar pulmonary emboli  Mild diffuse patchy groundglass alveolar opacity, trace pleural effusions, and smooth septal thickening suggestive of pulmonary vascular congestion /fluid overload  Trace bibasilar and lingular subsegmental atelectasis  I personally discussed this study with Radha Carrington on 1/12/2019 at 4:01 PM "    Significant Findings / Test Results:   ·   As above  Incidental Findings:   · None  Test Results Pending at Discharge (will require follow up):   None  Outpatient Tests Requested:  · Needs BMP outpatient  Complications:    None  Reason for Admission:   Chest pain and SOB  Hospital Course:     Jillian Vanegas is a 68 y o  female patient who originally presented to the hospital on 1/11/2019 due to chest pain and shortness of breath  Diagnosis of pulmonary emboli but also had signs and symptoms of fluid overload  Given this echocardiogram and cardiology were consulted  The patient was started on Eliquis and was discharged on this  At the time of discharge the patient no longer chest pain and shortness of breath  Furthermore she appeared euvolemic  Cardiology cleared the patient from their standpoint for discharge as well and the patient was discharged on 40 mg once a day of Lasix      She will have to have a BMP checked in 3-4 days time and follow up with her cardiologist           Please see above list of diagnoses and related plan for additional information  Condition at Discharge: stable     Discharge Day Visit / Exam:     Subjective:    Patient seen and examined      " I feel great"  Vitals: Blood Pressure: 126/62 (01/15/19 1500)  Pulse: 100 (01/15/19 1500)  Temperature: 97 9 °F (36 6 °C) (01/15/19 1500)  Temp Source: Oral (01/15/19 1500)  Respirations: 20 (01/15/19 1500)  Height: 5' 1" (154 9 cm) (01/12/19 1348)  Weight - Scale: 73 1 kg (161 lb 3 2 oz) (01/12/19 1348)  SpO2: 97 % (01/15/19 1500)  Exam:   Physical Exam   Constitutional: She appears well-developed  No distress  HENT:   Head: Normocephalic and atraumatic  Mouth/Throat: No oropharyngeal exudate  Eyes: Right eye exhibits no discharge  Left eye exhibits no discharge  No scleral icterus  Neck: No JVD present  No tracheal deviation present  No thyromegaly present  Cardiovascular: Normal rate  Exam reveals no gallop and no friction rub  No murmur heard  Pulmonary/Chest: Effort normal  No respiratory distress  She has no wheezes  She has no rales  She exhibits no tenderness  Abdominal: She exhibits no distension and no mass  There is no tenderness  There is no rebound and no guarding  Musculoskeletal: She exhibits no edema, tenderness or deformity  Lymphadenopathy:     She has no cervical adenopathy  Neurological: She displays normal reflexes  No cranial nerve deficit  She exhibits normal muscle tone  Coordination normal    Skin: Skin is warm  No rash noted  She is not diaphoretic  No erythema  No pallor  Psychiatric: She has a normal mood and affect  Discussion with Family: Discussed with patient - and with family at bedside  Discharge instructions/Information to patient and family:   See after visit summary for information provided to patient and family        Provisions for Follow-Up Care:  See after visit summary for information related to follow-up care and any pertinent home health orders  Disposition:     Home    For Discharges to [facility] SNF:   · NA - Not Applicable to this Patient    Planned Readmission: none  Discharge Statement:  I spent 45 minutes discharging the patient  This time was spent on the day of discharge  I had direct contact with the patient on the day of discharge  Greater than 50% of the total time was spent examining patient, answering all patient questions, arranging and discussing plan of care with patient as well as directly providing post-discharge instructions  Additional time then spent on discharge activities  Discharge Medications:  See after visit summary for reconciled discharge medications provided to patient and family        ** Please Note: This note has been constructed using a voice recognition system **

## 2019-01-15 NOTE — SOCIAL WORK
CM received call from [place] at [address] [facility], states that this first month of Eliquis is $0, subsequent cost will be $47 per month  CM discussed cost with patient at bedside  Patient agreeable to cost, requests medication to be filled  Meds to [facility]  Patient states that she [place] with her  and son, is independent with her ADLs and drives, has transportation home with , fills her prescriptions at the [place] [address] [address]; no other needs identified  IMM reviewed with patient  CM will continue to follow and assist as needed through discharge

## 2019-01-15 NOTE — PROGRESS NOTES
Progress Note - Juan A Elders 1950, 68 y o  female MRN: [id number]    Unit/Bed#: -01 Encounter: [id number]    Primary Care Provider: Jeana Hodge MD   Date and time admitted to hospital: 1/11/2019 11:02 PM        Acute Bibasilar Pulmonary Embolism and Acute Left Popliteal and Gastrocnemius DVTs -   Anticoagulation - Eliquis  - discussed with CM - scripts already sorted out  Oxygenation / ventilation - does ok on room air  Chest Pain and Dyspnea -   PE present on CT chest   See above for management  This is the likely cause for symptoms  Rule out ACS - troponins overall negative  No further workup needed as we already have another clear cause for symptoms  Evaluation for CHF -   An echocardiogram showing moderate to severe aortic stenosis increases the risk of this being a heart failure presentation  Seen by cardiology today - ? 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  Outpatient dermatologist evaluation  Hydrocortisone cream locally  Essential Hypertension -   Bystolic  BP is 99/52 - will discuss with cardiology regarding home DC dose of lasix  PSVT - Bystolic  Hypothyroidism - Synthroid  Hyperlipidemia - statin therapy  Renal Mass - renal CT done this admission as outpatient test  was missed due to the fact that patient was here  Reviewed  Hodgkin Disease, Basal Cell Carcinoma, and history of Malignant Melanoma -   Outpatient follow ups  For Hodgkin disease follows with Dr Marylene Bottom    Has had laparotomy with splenectomy and extended field radiation    VTE Pharmacologic Prophylaxis:   Pharmacologic: Apixaban (Eliquis)  Mechanical VTE Prophylaxis in Place: Yes    Patient Centered Rounds: I have performed bedside rounds with nursing staff today  Discussions with Specialists or Other Care Team Provider: Discussed with cardiology - they will confirm regarding ? DC today and dose of lasix  Education and Discussions with Family / Patient: Discussed with patient and her   Time Spent for Care: 30 minutes  More than 50% of total time spent on counseling and coordination of care as described above  Current Length of Stay: 3 day(s)    Current Patient Status: Inpatient   Certification Statement: The patient will continue to require additional inpatient hospital stay due to stable for DC likely later today  Discharge Plan: likely later today versus tomorrow  Code Status: Level 1 - Full Code      Subjective:   Patient seen and examined      " I feel fine"    Objective:     Vitals:   Temp (24hrs), Av 6 °F (37 °C), Min:98 2 °F (36 8 °C), Max:99 3 °F (37 4 °C)    Temp:  [98 2 °F (36 8 °C)-99 3 °F (37 4 °C)] 98 2 °F (36 8 °C)  HR:  [93-98] 93  Resp:  [18] 18  BP: ()/(52-58) 99/52  SpO2:  [93 %-98 %] 94 %  Body mass index is 30 46 kg/m²  Input and Output Summary (last 24 hours): Intake/Output Summary (Last 24 hours) at 01/15/19 1050  Last data filed at 01/15/19 1950   Gross per 24 hour   Intake              240 ml   Output             2000 ml   Net            -1760 ml       Physical Exam:     Physical Exam   Constitutional: She is oriented to person, place, and time  She appears well-developed  No distress  HENT:   Head: Normocephalic  Mouth/Throat: No oropharyngeal exudate  Eyes: Right eye exhibits no discharge  Left eye exhibits no discharge  No scleral icterus  Neck: No JVD present  No tracheal deviation present  No thyromegaly present     Cardiovascular: Normal rate and regular rhythm  Exam reveals no gallop and no friction rub  No murmur heard  Pulmonary/Chest: Effort normal  No respiratory distress  She has no wheezes  She has no rales  She exhibits no tenderness  Abdominal: Soft  She exhibits no distension and no mass  There is no tenderness  There is no rebound and no guarding  Musculoskeletal: She exhibits no edema, tenderness or deformity  Lymphadenopathy:     She has no cervical adenopathy  Neurological: She is alert and oriented to person, place, and time  She displays normal reflexes  No cranial nerve deficit  She exhibits normal muscle tone  Coordination normal    Skin: Skin is warm  No rash noted  She is not diaphoretic  No erythema  No pallor  Psychiatric: She has a normal mood and affect  Additional Data:     Labs:      Results from last 7 days  Lab Units 01/14/19  0559  01/11/19  2319   WBC Thousand/uL 7 74  < > 9 45   HEMOGLOBIN g/dL 8 8*  < > 10 3*   HEMATOCRIT % 28 4*  < > 33 4*   PLATELETS Thousands/uL 627*  < > 765*   NEUTROS PCT %  --   --  67   LYMPHS PCT %  --   --  19   MONOS PCT %  --   --  10   EOS PCT %  --   --  3   < > = values in this interval not displayed  Results from last 7 days  Lab Units 01/15/19  0531  01/11/19  2319   SODIUM mmol/L 138  < > 134*   POTASSIUM mmol/L 3 8  < > 4 0   CHLORIDE mmol/L 104  < > 98*   CO2 mmol/L 26  < > 26   BUN mg/dL 15  < > 20   CREATININE mg/dL 1 11  < > 1 46*   ANION GAP mmol/L 8  < > 10   CALCIUM mg/dL 8 6  < > 9 3   ALBUMIN g/dL  --   --  2 3*   TOTAL BILIRUBIN mg/dL  --   --  0 30   ALK PHOS U/L  --   --  218*   ALT U/L  --   --  45   AST U/L  --   --  50*   GLUCOSE RANDOM mg/dL 88  < > 106   < > = values in this interval not displayed  Results from last 7 days  Lab Units 01/11/19  2319   LACTIC ACID mmol/L 1 2           * I Have Reviewed All Lab Data Listed Above  * Additional Pertinent Lab Tests Reviewed:  [address] [facility] Admission Reviewed    Imaging:    Imaging Reports Reviewed Today Include:   CT PE study -   "  Acute bibasilar pulmonary emboli  Mild diffuse patchy groundglass alveolar opacity, trace pleural effusions, and smooth septal thickening suggestive of pulmonary vascular congestion /fluid overload  Trace bibasilar and lingular subsegmental atelectasis  I personally discussed this study with Malgorzata Last on 1/12/2019 at 4:01 PM "  Imaging Personally Reviewed by Myself Includes:  As above  Recent Cultures (last 7 days):           Last 24 Hours Medication List:     Current Facility-Administered Medications:  acetaminophen 650 mg Oral Q6H PRN Yosi Lin PA-C   apixaban 10 mg Oral BID Caresse Patricia, DO   [START ON 1/21/2019] apixaban 5 mg Oral BID Leonarde Patricia, DO   aspirin 81 mg Oral Daily Yosi Lin PA-C   cholecalciferol 1,000 Units Oral Daily Yosi Lin PA-C   furosemide 40 mg Oral Daily Franchester Pond, CRNP   hydrocortisone  Topical 4x Daily PRN Sid Gomez, DO   levothyroxine 75 mcg Oral Early Morning Yosi Lin PA-C   loratadine 10 mg Oral Daily Yosi Lin PA-C   nebivolol 5 mg Oral Daily Yosi Lin PA-C   nitroglycerin 0 4 mg Sublingual Q5 Min PRN Yosi Lin PA-C   ondansetron 4 mg Intravenous Q6H PRN Yosi Lin PA-C   potassium chloride 10 mEq Oral Daily Franchester Pond, CRNP   pravastatin 80 mg Oral Daily With [facility] Marga Koch PA-C        Today, Patient Was Seen By: Dereje Fan MD    ** Please Note: Dictation voice to text software may have been used in the creation of this document   **

## 2019-01-15 NOTE — PLAN OF CARE
Problem: DISCHARGE PLANNING - CARE MANAGEMENT  Goal: Discharge to post-acute care or home with appropriate resources  INTERVENTIONS:  - Conduct assessment to determine patient/family and health care team treatment goals, and need for post-acute services based on payer coverage, community resources, and patient preferences, and barriers to discharge  - Address psychosocial, clinical, and financial barriers to discharge as identified in assessment in conjunction with the patient/family and health care team  - Arrange appropriate level of post-acute services according to patient's   needs and preference and payer coverage in collaboration with the physician and health care team  - Communicate with and update the patient/family, physician, and health care team regarding progress on the discharge plan  - Arrange appropriate transportation to post-acute venues    Outcome: Progressing  CM received call from Jeffry Coelho at [facility], states that this first month of Eliquis is $0, subsequent cost will be $47 per month  CM discussed cost with patient at bedside  Patient agreeable to cost, requests medication to be filled  Meds to [facility] Delivered [facility]  Patient states that she [place] with her  and son, is independent with her ADLs and drives, has transportation home with , fills her prescriptions at the [facility] on [address]; no other needs identified  IMM reviewed with patient  CM will continue to follow and assist as needed through discharge

## 2019-01-15 NOTE — PROGRESS NOTES
Progress Note - Cardiology   Lillian Corbett 68 y o  female MRN: [id number]  Unit/Bed#: -01 Encounter: [id number]        Principal Problem:    Pulmonary embolism ([address] )  Active Problems:    Hypertension    Hyperlipidemia    Hypothyroidism    Retroperitoneal mass    Basal cell carcinoma (BCC) of left side of nose    History of Hodgkin's disease    Paroxysmal supraventricular tachycardia (HCC)    Chest pain    Vasculitis (HCC)    CKD (chronic kidney disease), stage III (HCC)    DVT (deep venous thrombosis) (HCC)        Assessment/Plan     1  Shortness of breath/chest tightness  Presenting symptom  Improved since arrival and really had not worsened with IV hydration other than during CT scan while lying flat, did have PND  No hypoxia documented other than one isolated check  She said she was temporarily put on oxygen  Troponin 0  0 6 in the setting of pulmonary embolism  EKG with mild inferior lateral ST depressions  Consider outpatient stress testing once recovered from PEs     2  Acute bibasilar pulmonary embolism  Acute left popliteal had a gastrocnemius DVTs  Heparin drip transitioned to Eliquis      3  Moderate to severe aortic regurgitation/normal LV function  Prior echocardiogram noted in the system 2007  There is no mention of any valvular heart disease at that time  Given a dose of IV lasix yesterday with decent diuresis  Cough improved  SBP a bit lower  Will start 40mg PO lasix  She will need outpatient follow-up in regards to her valvular heart disease, JUDY  Will schedule close follow up    4  CKD/AK I-at baseline     4  HTN-controlled on bystolic  BP a bit low this am and did not receive     5  History of PSVT-takes Bystolic  With no recent palpitations  She is no longer on telemetry but it is documented she has not had any arrhythmias during her hospitalization    She previously had palpitations with her SVT and is not bothered by palpitations for quite some time      6  History of Hodgkin's disease/basal cell carcinoma and history of malignant melanoma/breast cancer  Patient follows with Dr Kale Cain     7  Renal mass-patient has follow-up scheduled with Urology     8  Moderate TR with severe pulmonary hypertension    Subjective/Objective   Chief Complaint/Subjective  Cough less  Not SOB   at bedside           Vitals: BP 99/52 (BP Location: Left arm)   Pulse 93   Temp 98 2 °F (36 8 °C) (Oral)   Resp 18   Ht 5' 1" (1 549 m)   Wt 73 1 kg (161 lb 3 2 oz)   SpO2 94%   BMI 30 46 kg/m²     Vitals:    01/12/19 0720 01/12/19 1348   Weight: 75 9 kg (167 lb 5 3 oz) 73 1 kg (161 lb 3 2 oz)     Orthostatic Blood Pressures      Most Recent Value   Blood Pressure  99/52 filed at 01/15/2019 0736   Patient Position - Orthostatic VS  Lying filed at 01/15/2019 0736            Intake/Output Summary (Last 24 hours) at 01/15/19 0931  Last data filed at 01/15/19 0903   Gross per 24 hour   Intake              240 ml   Output             2000 ml   Net            -1760 ml       Invasive Devices     Peripheral Intravenous Line            Peripheral IV 01/12/19 Right Wrist 3 days                Current Facility-Administered Medications   Medication Dose Route Frequency    acetaminophen (TYLENOL) tablet 650 mg  650 mg Oral Q6H PRN    apixaban (ELIQUIS) tablet 10 mg  10 mg Oral BID    [START ON 1/21/2019] apixaban (ELIQUIS) tablet 5 mg  5 mg Oral BID    aspirin chewable tablet 81 mg  81 mg Oral Daily    cholecalciferol (VITAMIN D3) tablet 1,000 Units  1,000 Units Oral Daily    hydrocortisone 2 5 % cream   Topical 4x Daily PRN    levothyroxine tablet 75 mcg  75 mcg Oral Early Morning    loratadine (CLARITIN) tablet 10 mg  10 mg Oral Daily    nebivolol (BYSTOLIC) tablet 5 mg  5 mg Oral Daily    nitroglycerin (NITROSTAT) SL tablet 0 4 mg  0 4 mg Sublingual Q5 Min PRN    ondansetron (ZOFRAN) injection 4 mg  4 mg Intravenous Q6H PRN    pravastatin (PRAVACHOL) tablet 80 mg  80 mg Oral Daily With [employer] Physical Exam: BP 99/52 (BP Location: Left arm)   Pulse 93   Temp 98 2 °F (36 8 °C) (Oral)   Resp 18   Ht 5' 1" (1 549 m)   Wt 73 1 kg (161 lb 3 2 oz)   SpO2 94%   BMI 30 46 kg/m²     General Appearance:    Alert, cooperative, no distress, appears stated age   Head:    Normocephalic, no scleral icterus   Eyes:    PERRL   Nose:   Nares normal, septum midline, no drainage    Throat:   Lips, mucosa, and tongue normal   Neck:   Supple, symmetrical, trachea midline,             Lungs:     Clear to auscultation bilaterally, respirations unlabored   Chest Wall:    No tenderness or deformity    Heart:    Regular rate and rhythm, S1 and S2 normal, no murmur, rub   or gallop   Abdomen:     Soft, non-tender, bowel sounds active all four quadrants,     no masses, no organomegaly   Extremities:   Extremities normal, atraumatic, no cyanosis ,1= edema   Pulses:   2+ and symmetric all extremities   Skin:   Skin warm   Neurologic:   Alert and oriented to person place and time                 Lab Results:   Recent Results (from the past 72 hour(s))   APTT    Collection Time: 01/12/19  3:45 PM   Result Value Ref Range    PTT 50 (H) 26 - 38 seconds   APTT    Collection Time: 01/12/19 11:26 PM   Result Value Ref Range    PTT 68 (H) 26 - 38 seconds   Basic metabolic panel    Collection Time: 01/13/19  6:18 AM   Result Value Ref Range    Sodium 137 136 - 145 mmol/L    Potassium 4 3 3 5 - 5 3 mmol/L    Chloride 105 100 - 108 mmol/L    CO2 22 21 - 32 mmol/L    ANION GAP 10 4 - 13 mmol/L    BUN 12 5 - 25 mg/dL    Creatinine 1 06 0 60 - 1 30 mg/dL    Glucose 93 65 - 140 mg/dL    Calcium 8 4 8 3 - 10 1 mg/dL    eGFR 53 ml/min/1 73sq m   CBC    Collection Time: 01/13/19  6:18 AM   Result Value Ref Range    WBC 10 39 (H) 4 31 - 10 16 Thousand/uL    RBC 3 18 (L) 3 81 - 5 12 Million/uL    Hemoglobin 8 5 (L) 11 5 - 15 4 g/dL    Hematocrit 27 2 (L) 34 8 - 46 1 %    MCV 86 82 - 98 fL    MCH 26 7 (L) 26 8 - 34 3 pg    MCHC 31 3 (L) 31 4 - 37 4 g/dL    RDW 15 1 11 6 - 15 1 %    Platelets 219 (H) 817 - 390 Thousands/uL    MPV 9 1 8 9 - 12 7 fL   APTT    Collection Time: 01/13/19  6:18 AM   Result Value Ref Range    PTT 67 (H) 26 - 38 seconds   Basic metabolic panel    Collection Time: 01/14/19  5:59 AM   Result Value Ref Range    Sodium 136 136 - 145 mmol/L    Potassium 4 0 3 5 - 5 3 mmol/L    Chloride 104 100 - 108 mmol/L    CO2 23 21 - 32 mmol/L    ANION GAP 9 4 - 13 mmol/L    BUN 9 5 - 25 mg/dL    Creatinine 1 06 0 60 - 1 30 mg/dL    Glucose 87 65 - 140 mg/dL    Calcium 8 9 8 3 - 10 1 mg/dL    eGFR 53 ml/min/1 73sq m   CBC    Collection Time: 01/14/19  5:59 AM   Result Value Ref Range    WBC 7 74 4 31 - 10 16 Thousand/uL    RBC 3 31 (L) 3 81 - 5 12 Million/uL    Hemoglobin 8 8 (L) 11 5 - 15 4 g/dL    Hematocrit 28 4 (L) 34 8 - 46 1 %    MCV 86 82 - 98 fL    MCH 26 6 (L) 26 8 - 34 3 pg    MCHC 31 0 (L) 31 4 - 37 4 g/dL    RDW 15 4 (H) 11 6 - 15 1 %    Platelets 087 (H) 892 - 390 Thousands/uL    MPV 9 0 8 9 - 12 7 fL   Basic metabolic panel    Collection Time: 01/15/19  5:31 AM   Result Value Ref Range    Sodium 138 136 - 145 mmol/L    Potassium 3 8 3 5 - 5 3 mmol/L    Chloride 104 100 - 108 mmol/L    CO2 26 21 - 32 mmol/L    ANION GAP 8 4 - 13 mmol/L    BUN 15 5 - 25 mg/dL    Creatinine 1 11 0 60 - 1 30 mg/dL    Glucose 88 65 - 140 mg/dL    Calcium 8 6 8 3 - 10 1 mg/dL    eGFR 50 ml/min/1 73sq m   Magnesium    Collection Time: 01/15/19  5:31 AM   Result Value Ref Range    Magnesium 1 8 1 6 - 2 6 mg/dL     Imaging: I have personally reviewed pertinent reports  VTE Pharmacologic Prophylaxis: eliquis  VTE Mechanical Prophylaxis: sequential compression device    Counseling / Coordination of Care  Total time spent today 25 minutes  Greater than 50% of total time was spent with the patient and / or family counseling and / or coordination of care

## 2019-01-15 NOTE — PROGRESS NOTES
Pt asleep in bed  No visible signs of distress noted at this time  Bed low and locked; side rails up; call bell within reach  Will continue to monitor  Swetha Morris is a 75year old male. Patient presents with: Follow - Up: pt presents to clinic for follow up on DVT. needs order for CLN      HPI:       Patient feels well, with less swelling of his left lower extremity.   He does complain of cramping History:  No date: HAND/FINGER SURGERY UNLISTED Right      Comment: hand surgery  No date: UMBILICAL HERNIA REPAIR   Family History   Problem Relation Age of Onset   • Diabetes Father    • Cancer Mother      colon cancer,  80   • Diabetes Other      au Sodium 138 136 - 144 mmol/L   Potassium 4.3 3.3 - 5.1 mmol/L   Chloride 102 95 - 110 mmol/L   CO2 26 22 - 32 mmol/L   BUN 12 8 - 20 mg/dL   Creatinine 0.77 0.50 - 1.50 mg/dL   Calcium, Total 8.9 8.5 - 10.5 mg/dL   ALT 42 17 - 63 U/L   AST 40 15 - 41 U/L COMPARISON: [facility] LEFT-DIAG IMG (CNU=[id number]), 4/17/2017, 15:55.   INDICATIONS: I82.402 Acute embolism and thrombosis of unspecified deep veins of left lower extremity  TECHNIQUE: Color duplex Doppler venous ultraso (8), MAGNESIUM, CK CREATINE KINASE (NOT         CREATININE), BASIC METABOLIC PANEL (8),         MAGNESIUM        Patient advised on adequate hydration, and also that he may give trial of approximately 1/4 cup of tonic water as needed trial to prevent cramp

## 2019-01-16 ENCOUNTER — TRANSITIONAL CARE MANAGEMENT (OUTPATIENT)
Dept: FAMILY MEDICINE CLINIC | Facility: CLINIC | Age: 69
End: 2019-01-16

## 2019-01-18 ENCOUNTER — TELEPHONE (OUTPATIENT)
Dept: SURGICAL ONCOLOGY | Facility: CLINIC | Age: 69
End: 2019-01-18

## 2019-01-22 ENCOUNTER — TELEPHONE (OUTPATIENT)
Dept: FAMILY MEDICINE CLINIC | Facility: CLINIC | Age: 69
End: 2019-01-22

## 2019-01-22 NOTE — TELEPHONE ENCOUNTER
----- Message from Louann Rizo, Bigg Inga Jordan sent at 12/27/2018  4:51 PM EST -----  Patient notified of lab results significant for:  --Elevated WBC, sed rate, LFT's-->acute hepatitis panel added  --New onset anemia-->GI referral    --Hyponatremia  --Proteinuria, hematuria, leuks in urine-->urine culture added  Abx if positive    See results note for ultrasound for add's interventions  Advised to schedule f/u in office [facility]) early next week  Slip for repeat labs can be given at that time  ER for worsening symptoms in the interim

## 2019-01-29 ENCOUNTER — OFFICE VISIT (OUTPATIENT)
Dept: FAMILY MEDICINE CLINIC | Facility: CLINIC | Age: 69
End: 2019-01-29
Payer: MEDICARE

## 2019-01-29 VITALS
SYSTOLIC BLOOD PRESSURE: 120 MMHG | BODY MASS INDEX: 29.27 KG/M2 | TEMPERATURE: 95.6 F | HEIGHT: 61 IN | WEIGHT: 155 LBS | HEART RATE: 78 BPM | RESPIRATION RATE: 16 BRPM | DIASTOLIC BLOOD PRESSURE: 64 MMHG

## 2019-01-29 DIAGNOSIS — N18.30 CKD (CHRONIC KIDNEY DISEASE), STAGE III (HCC): ICD-10-CM

## 2019-01-29 DIAGNOSIS — E46 PROTEIN-CALORIE MALNUTRITION, UNSPECIFIED SEVERITY (HCC): ICD-10-CM

## 2019-01-29 DIAGNOSIS — I10 ESSENTIAL HYPERTENSION: ICD-10-CM

## 2019-01-29 DIAGNOSIS — I36.1 NON-RHEUMATIC TRICUSPID VALVE INSUFFICIENCY: ICD-10-CM

## 2019-01-29 DIAGNOSIS — D75.839 THROMBOCYTOSIS: ICD-10-CM

## 2019-01-29 DIAGNOSIS — I26.99 OTHER ACUTE PULMONARY EMBOLISM WITHOUT ACUTE COR PULMONALE (HCC): Primary | ICD-10-CM

## 2019-01-29 DIAGNOSIS — I82.432 ACUTE DEEP VEIN THROMBOSIS (DVT) OF POPLITEAL VEIN OF LEFT LOWER EXTREMITY (HCC): ICD-10-CM

## 2019-01-29 DIAGNOSIS — I77.6 VASCULITIS (HCC): ICD-10-CM

## 2019-01-29 DIAGNOSIS — R74.8 ELEVATED LIVER ENZYMES: ICD-10-CM

## 2019-01-29 DIAGNOSIS — E03.9 ACQUIRED HYPOTHYROIDISM: ICD-10-CM

## 2019-01-29 DIAGNOSIS — I27.20 PULMONARY HYPERTENSION (HCC): ICD-10-CM

## 2019-01-29 DIAGNOSIS — I34.0 NON-RHEUMATIC MITRAL REGURGITATION: ICD-10-CM

## 2019-01-29 DIAGNOSIS — I35.1 NONRHEUMATIC AORTIC VALVE INSUFFICIENCY: ICD-10-CM

## 2019-01-29 DIAGNOSIS — D64.9 NORMOCYTIC ANEMIA: ICD-10-CM

## 2019-01-29 PROBLEM — R10.11 RIGHT UPPER QUADRANT ABDOMINAL PAIN: Status: RESOLVED | Noted: 2018-12-27 | Resolved: 2019-01-29

## 2019-01-29 PROBLEM — R07.9 CHEST PAIN: Status: RESOLVED | Noted: 2019-01-12 | Resolved: 2019-01-29

## 2019-01-29 PROCEDURE — 99495 TRANSJ CARE MGMT MOD F2F 14D: CPT | Performed by: FAMILY MEDICINE

## 2019-01-29 RX ORDER — NEBIVOLOL 5 MG/1
5 TABLET ORAL DAILY
Qty: 90 TABLET | Refills: 3 | Status: SHIPPED | OUTPATIENT
Start: 2019-01-29 | End: 2019-07-31 | Stop reason: SDUPTHER

## 2019-01-29 NOTE — PROGRESS NOTES
TCM Call (since 12/29/2018)     Date and time call was made  1/16/2019  9:59 AM    Hospital care reviewed  Records reviewed    Patient was hospitialized at  [facility]    Date of Admission  01/11/19    Date of discharge  01/15/19    Diagnosis  Pulmonary Embolism    Disposition  Home    Were the patients medications reviewed and updated  Yes    Current Symptoms  None      TCM Call (since 12/29/2018)     Post hospital issues  Reduced activity    Scheduled for follow up? Yes    Did you obtain your prescribed medications  Yes    Do you need help managing your prescriptions or medications  No    Is transportation to your appointment needed  Yes    Specify why  PAtient not currently driving    I have advised the patient to call PCP with any new or worsening symptoms  [address] or [address] other    Support System  Spouse    The type of support provided  Emotional; Physical    Do you have social support  Yes, as much as I need    Are you recieving any outpatient services  No    Are you recieving home care services  No    Are you using any community resources  No    Current waiver services  No    Have you fallen in the last 12 months  No    Interperter language line needed  No    Counseling  Patient              Assessment/Plan:         Diagnoses and all orders for this visit:    Other acute pulmonary embolism without acute cor pulmonale (HCC)    Acute deep vein thrombosis (DVT) of popliteal vein of left lower extremity (HCC)    Pulmonary hypertension (HCC)    Non-rheumatic tricuspid valve insufficiency    Normocytic anemia  -     CBC and differential  -     Folate  -     Iron Panel  -     Occult Blood, Fecal Immunochemical  -     Protein electrophoresis, serum  -     Reticulocytes  -     Vitamin B12    Thrombocytosis (HCC)  -     Folate  -     Vitamin B12    Essential hypertension  -     nebivolol (BYSTOLIC) 5 mg tablet;  Take 1 tablet (5 mg total) by mouth daily for 90 days    Vasculitis ([facility] [address] )    Non-rheumatic mitral regurgitation    Nonrheumatic aortic valve insufficiency    Acquired hypothyroidism    CKD (chronic kidney disease), stage III (HCC)  -     CBC and differential  -     Protein electrophoresis, serum    Protein-calorie malnutrition, unspecified severity (HCC)   -     Folate    Elevated liver enzymes        patient is scheduled to see cardiology today  Repeat CBC in several weeks with additional anemia studies and hemoccult  Re PE 6 month course of Eliquis  No prior history of PE or DVT  No FH venous thrombosis  No recent surgery or travel  She was ill last month and was sedentary with limited activity  She has a history of a # of different cancers not currently active  She is followed by oncology      Patient ID: Jarocho Villanueva is a 68 y o  female  Follow-up visit  TCM  S/p admission for PE/DVT  Patient presented with chest tightness, shortness of breath and palpitations  CXR no active disease  EKG sinus tachycardia with non specific T wave changes inferolaterally  Troponin 0 06  Elevated D dimer at 10,000  Acute bibasilar pulmonary emboli  Mild diffuse patchy ground-glass alveolar opacity, trace pleural effusions and smooth septal thickening suggestive of pulmonary vascular congestion/fluid overload  Trace bibasilar and lingular subsegmental atelectasis  No cardiomegaly  No pericardial effusion  Aortic and coronary artery calcification  Calcified prevascular lymph nodes  Shotty bilateral hilar, left paratracheal and subcarinal lymph nodes  Small sliding hiatal hernia  Venous Dopplers normal except for acute nonocclusive deep vein thrombosis in the popliteal and 1 of the paired gastrocnemius veins  Admission labs CBC WBC 9,450  Hemoglobin 10 3  MCV 85  Platelet count 448,405 Chemistry profile  blood glucose 106  Electrolytes normal except for sodium 134 and chloride 98  creatinine 1 46  LFTs normal except for AST 50 and alkaline phosphatase 218  Total protein elevated 9 6  Albumin 2 3  NT pro BNP 3,376  Repeat CBC WBC 7,740  Hgb 8 8  MCV 86  Platelets 329,492  Creatinine 1  11  Electrolytes normal   Medications reviewed  Patient was discharged on Eliquis  Hyperlipidemia on Simvastatin 40 mg daily  Lipid profile 01/2019 Cholesterol 115  Triglycerides 99  HDL 34  LDL 61  Hypertension blood pressures have stable on Bystolic 5 mg daily  08/2018 creatinine 1 32  Electrolytes normal except for Na+ 132 and chloride 96  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  No monoclonal bands  UPEP no monoclonal bands  The following portions of the patient's history were reviewed and updated as appropriate: allergies, current medications, past family history, past medical history, past social history, past surgical history and problem list     Review of Systems   Constitutional: Positive for fatigue and unexpected weight change (10 lb weight loss this month )  Negative for appetite change, chills and fever  HENT: Negative for congestion, ear pain, rhinorrhea, sore throat, trouble swallowing and voice change  Eyes: Negative for visual disturbance  Respiratory: Positive for cough  Negative for shortness of breath and wheezing  Mild non productive cough  Cardiovascular: Positive for leg swelling  See HPI  Lower extremity edema improved with Furosemide  01/2019 echocardiogram normal left ventricular systolic function  EF 55%  Right ventricle normal   Mild MR  Moderate to severe AR  Moderate TR with finding suggesting severe pulmonary hypertension  No pericardial effusion  Normal aortic root  Gastrointestinal: Negative for abdominal pain, blood in stool, constipation, diarrhea, nausea and vomiting  no prior colonoscopy  repeat CT scan abdomen 12/2016 + cholelithiasis asymptomatic  Hernia left flank containing fat and nonobstructive bowel  Small subscapular area of enhancement right hepatic lobe   Stable retroperitoneal nodule  hiatal hernia  Stable nephrolithiasis and renal scarring/complex renal cystic mass on the right  Endocrine:        Hypothyroidism on Levothyroxine 75 mcg daily  06/2018 TSH 2 251 Osteoporosis on vitamin D 1,000 IU daily  Genitourinary: Negative for difficulty urinating, dysuria and hematuria  12/2018 abdominal ultrasound markedly enlarged incompletely evaluated right kidney with complex masses  Staghorn calculus  Cholelithiasis  01/2019 CT scan renal protocol no solid enhancing renal or urothelial mass  Multifocal right renal staghorn calculi including a 2 cm pelvic calculus  Interval progressive cystic hydronephrosis of the upper pole  Stable cystic caliectasis  posterior lower pole  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  Left nephrolithiasis 1 cm and less no obstructive uropathy  No acute intra-abdominal or intrapelvic process  Cholelithiasis  history of nephrolithiasis including staghorn calculi followed by urology  03/2018 renal ultrasound large right kidney with upper pole complex cystic mass and lower pole complex solid mass  Large right-sided staghorn calculus  No obvious hydronephrosis  Nonobstructing left renal collecting system calculus  Lower pole cortical scarring  08/2017 nuclear renal flow scan abnormal delayed perfusion and diminished functioning within the right kidney with a split function measuring only 17% on the right  83% on the left  Musculoskeletal: Negative for arthralgias and myalgias  Skin: Positive for rash  Non pruritic rash on lower extremities at that time of admission  Diagnosis leukocytoclastic vasculitis  history of melanoma right flank  biopsy left arm 11/2016 SCC in situ  06/2018 biopsy lesion left side of nose BCC  Allergic/Immunologic: Positive for environmental allergies  On Loratadine  Neurological: Negative for dizziness, weakness and headaches     Hematological: Negative for adenopathy  Does not bruise/bleed easily  See HPI  08/2018 CBC WBC 8,510  Hgb 12 8  MCV 89  Platelets 951,604  42/2765 CBC WBC 12,950  Hgb 10 2  MCV 85  Platelets 172,089  remote history of Hodgkin's disease  breast CA s/p left mastectomy with chemotherapy  she completed a course of Arimidex in 2011  followed by oncology  Psychiatric/Behavioral: Negative for dysphoric mood and sleep disturbance  Objective:      /64   Pulse 78   Temp (!) 95 6 °F (35 3 °C)   Resp 16   Ht 5' 1" (1 549 m)   Wt 70 3 kg (155 lb)   BMI 29 29 kg/m²          Physical Exam   Constitutional: She is oriented to person, place, and time  She appears well-developed and well-nourished  No distress  HENT:   Mouth/Throat: Oropharynx is clear and moist and mucous membranes are normal  No oral lesions  Normal dentition  Eyes: Pupils are equal, round, and reactive to light  Conjunctivae and EOM are normal  No scleral icterus  Neck: No JVD present  Carotid bruit is not present  No tracheal deviation present  No thyroid mass and no thyromegaly present  Cardiovascular: Normal rate, regular rhythm and normal heart sounds  Exam reveals no gallop  No murmur heard  Pulmonary/Chest: Effort normal and breath sounds normal  No respiratory distress  She has no wheezes  She has no rales  Abdominal: Soft  Bowel sounds are normal  She exhibits no distension and no mass  There is no tenderness  There is no rebound and no guarding  Musculoskeletal: Normal range of motion  She exhibits no edema or deformity  Lymphadenopathy:     She has no cervical adenopathy  Neurological: She is alert and oriented to person, place, and time  She displays normal reflexes  No cranial nerve deficit  Skin: No rash noted  Psychiatric: She has a normal mood and affect  Nursing note and vitals reviewed          Recent Results (from the past 504 hour(s))   CBC and differential    Collection Time: 01/11/19 11:19 PM   Result Value Ref Range    WBC 9 45 4 31 - 10 16 Thousand/uL    RBC 3 92 3 81 - 5 12 Million/uL    Hemoglobin 10 3 (L) 11 5 - 15 4 g/dL    Hematocrit 33 4 (L) 34 8 - 46 1 %    MCV 85 82 - 98 fL    MCH 26 3 (L) 26 8 - 34 3 pg    MCHC 30 8 (L) 31 4 - 37 4 g/dL    RDW 15 0 11 6 - 15 1 %    MPV 9 0 8 9 - 12 7 fL    Platelets 330 (H) 699 - 390 Thousands/uL    nRBC 0 /100 WBCs    Neutrophils Relative 67 43 - 75 %    Immat GRANS % 0 0 - 2 %    Lymphocytes Relative 19 14 - 44 %    Monocytes Relative 10 4 - 12 %    Eosinophils Relative 3 0 - 6 %    Basophils Relative 1 0 - 1 %    Neutrophils Absolute 6 37 1 85 - 7 62 Thousands/µL    Immature Grans Absolute 0 04 0 00 - 0 20 Thousand/uL    Lymphocytes Absolute 1 76 0 60 - 4 47 Thousands/µL    Monocytes Absolute 0 93 0 17 - 1 22 Thousand/µL    Eosinophils Absolute 0 29 0 00 - 0 61 Thousand/µL    Basophils Absolute 0 06 0 00 - 0 10 Thousands/µL   Comprehensive metabolic panel    Collection Time: 01/11/19 11:19 PM   Result Value Ref Range    Sodium 134 (L) 136 - 145 mmol/L    Potassium 4 0 3 5 - 5 3 mmol/L    Chloride 98 (L) 100 - 108 mmol/L    CO2 26 21 - 32 mmol/L    ANION GAP 10 4 - 13 mmol/L    BUN 20 5 - 25 mg/dL    Creatinine 1 46 (H) 0 60 - 1 30 mg/dL    Glucose 106 65 - 140 mg/dL    Calcium 9 3 8 3 - 10 1 mg/dL    AST 50 (H) 5 - 45 U/L    ALT 45 12 - 78 U/L    Alkaline Phosphatase 218 (H) 46 - 116 U/L    Total Protein 9 6 (H) 6 4 - 8 2 g/dL    Albumin 2 3 (L) 3 5 - 5 0 g/dL    Total Bilirubin 0 30 0 20 - 1 00 mg/dL    eGFR 36 ml/min/1 73sq m   D-Dimer    Collection Time: 01/11/19 11:19 PM   Result Value Ref Range    D-Dimer, Quant >10,000 (H) <500 ng/ml (FEU)   Troponin I    Collection Time: 01/11/19 11:19 PM   Result Value Ref Range    Troponin I 0 06 (H) <=0 04 ng/mL   Lactic acid, plasma    Collection Time: 01/11/19 11:19 PM   Result Value Ref Range    LACTIC ACID 1 2 0 5 - 2 0 mmol/L   ECG 12 lead    Collection Time: 01/11/19 11:54 PM   Result Value Ref Range    Ventricular Rate 103 BPM    Atrial Rate 103 BPM    IN Interval 134 ms    QRSD Interval 80 ms    QT Interval 340 ms    QTC Interval 445 ms    P Axis 81 degrees    QRS Axis 29 degrees    T Wave Axis -27 degrees   APTT six (6) hours after Heparin bolus/drip initiation or dosing change    Collection Time: 01/12/19  1:46 AM   Result Value Ref Range    PTT 30 26 - 38 seconds   Troponin I    Collection Time: 01/12/19  6:14 AM   Result Value Ref Range    Troponin I 0 02 <=0 04 ng/mL   Basic metabolic panel    Collection Time: 01/12/19  6:14 AM   Result Value Ref Range    Sodium 136 136 - 145 mmol/L    Potassium 4 0 3 5 - 5 3 mmol/L    Chloride 101 100 - 108 mmol/L    CO2 24 21 - 32 mmol/L    ANION GAP 11 4 - 13 mmol/L    BUN 16 5 - 25 mg/dL    Creatinine 1 22 0 60 - 1 30 mg/dL    Glucose 94 65 - 140 mg/dL    Calcium 9 1 8 3 - 10 1 mg/dL    eGFR 45 ml/min/1 73sq m   CBC (With Platelets)    Collection Time: 01/12/19  6:14 AM   Result Value Ref Range    WBC 9 69 4 31 - 10 16 Thousand/uL    RBC 3 74 (L) 3 81 - 5 12 Million/uL    Hemoglobin 9 9 (L) 11 5 - 15 4 g/dL    Hematocrit 31 6 (L) 34 8 - 46 1 %    MCV 85 82 - 98 fL    MCH 26 5 (L) 26 8 - 34 3 pg    MCHC 31 3 (L) 31 4 - 37 4 g/dL    RDW 14 8 11 6 - 15 1 %    Platelets 902 (H) 065 - 390 Thousands/uL    MPV 8 8 (L) 8 9 - 12 7 fL   Lipid panel    Collection Time: 01/12/19  6:14 AM   Result Value Ref Range    Cholesterol 115 50 - 200 mg/dL    Triglycerides 99 <=150 mg/dL    HDL, Direct 34 (L) 40 - 60 mg/dL    LDL Calculated 61 0 - 100 mg/dL    Non-HDL-Chol (CHOL-HDL) 81 mg/dl   NT-BNP PRO    Collection Time: 01/12/19  8:16 AM   Result Value Ref Range    NT-proBNP 3,376 (H) <125 pg/mL   APTT    Collection Time: 01/12/19  8:16 AM   Result Value Ref Range    PTT 33 26 - 38 seconds   Troponin I    Collection Time: 01/12/19  9:23 AM   Result Value Ref Range    Troponin I 0 02 <=0 04 ng/mL   APTT    Collection Time: 01/12/19  3:45 PM   Result Value Ref Range    PTT 50 (H) 26 - 38 seconds   APTT Collection Time: 01/12/19 11:26 PM   Result Value Ref Range    PTT 68 (H) 26 - 38 seconds   Basic metabolic panel    Collection Time: 01/13/19  6:18 AM   Result Value Ref Range    Sodium 137 136 - 145 mmol/L    Potassium 4 3 3 5 - 5 3 mmol/L    Chloride 105 100 - 108 mmol/L    CO2 22 21 - 32 mmol/L    ANION GAP 10 4 - 13 mmol/L    BUN 12 5 - 25 mg/dL    Creatinine 1 06 0 60 - 1 30 mg/dL    Glucose 93 65 - 140 mg/dL    Calcium 8 4 8 3 - 10 1 mg/dL    eGFR 53 ml/min/1 73sq m   CBC    Collection Time: 01/13/19  6:18 AM   Result Value Ref Range    WBC 10 39 (H) 4 31 - 10 16 Thousand/uL    RBC 3 18 (L) 3 81 - 5 12 Million/uL    Hemoglobin 8 5 (L) 11 5 - 15 4 g/dL    Hematocrit 27 2 (L) 34 8 - 46 1 %    MCV 86 82 - 98 fL    MCH 26 7 (L) 26 8 - 34 3 pg    MCHC 31 3 (L) 31 4 - 37 4 g/dL    RDW 15 1 11 6 - 15 1 %    Platelets 683 (H) 862 - 390 Thousands/uL    MPV 9 1 8 9 - 12 7 fL   APTT    Collection Time: 01/13/19  6:18 AM   Result Value Ref Range    PTT 67 (H) 26 - 38 seconds   Basic metabolic panel    Collection Time: 01/14/19  5:59 AM   Result Value Ref Range    Sodium 136 136 - 145 mmol/L    Potassium 4 0 3 5 - 5 3 mmol/L    Chloride 104 100 - 108 mmol/L    CO2 23 21 - 32 mmol/L    ANION GAP 9 4 - 13 mmol/L    BUN 9 5 - 25 mg/dL    Creatinine 1 06 0 60 - 1 30 mg/dL    Glucose 87 65 - 140 mg/dL    Calcium 8 9 8 3 - 10 1 mg/dL    eGFR 53 ml/min/1 73sq m   CBC    Collection Time: 01/14/19  5:59 AM   Result Value Ref Range    WBC 7 74 4 31 - 10 16 Thousand/uL    RBC 3 31 (L) 3 81 - 5 12 Million/uL    Hemoglobin 8 8 (L) 11 5 - 15 4 g/dL    Hematocrit 28 4 (L) 34 8 - 46 1 %    MCV 86 82 - 98 fL    MCH 26 6 (L) 26 8 - 34 3 pg    MCHC 31 0 (L) 31 4 - 37 4 g/dL    RDW 15 4 (H) 11 6 - 15 1 %    Platelets 039 (H) 025 - 390 Thousands/uL    MPV 9 0 8 9 - 12 7 fL   Basic metabolic panel    Collection Time: 01/15/19  5:31 AM   Result Value Ref Range    Sodium 138 136 - 145 mmol/L    Potassium 3 8 3 5 - 5 3 mmol/L    Chloride 104 100 - 108 mmol/L    CO2 26 21 - 32 mmol/L    ANION GAP 8 4 - 13 mmol/L    BUN 15 5 - 25 mg/dL    Creatinine 1 11 0 60 - 1 30 mg/dL    Glucose 88 65 - 140 mg/dL    Calcium 8 6 8 3 - 10 1 mg/dL    eGFR 50 ml/min/1 73sq m   Magnesium    Collection Time: 01/15/19  5:31 AM   Result Value Ref Range    Magnesium 1 8 1 6 - 2 6 mg/dL

## 2019-01-30 ENCOUNTER — TELEPHONE (OUTPATIENT)
Dept: FAMILY MEDICINE CLINIC | Facility: CLINIC | Age: 69
End: 2019-01-30

## 2019-01-30 NOTE — TELEPHONE ENCOUNTER
Patient informed, will have blood work done, declines to have FIT test done ----- Message from Anson Severance, MD sent at 1/29/2019  5:44 PM EST -----  Call I reviewed her recent admission after her visit today  CBC shows a drop in hemoglobin or red cell count  I would like her to have this repeated next week along with some additional labs and hemoccult

## 2019-02-13 DIAGNOSIS — R07.9 CHEST PAIN: ICD-10-CM

## 2019-02-13 DIAGNOSIS — I47.1 PAROXYSMAL SUPRAVENTRICULAR TACHYCARDIA (HCC): ICD-10-CM

## 2019-02-15 ENCOUNTER — TRANSCRIBE ORDERS (OUTPATIENT)
Dept: LAB | Facility: CLINIC | Age: 69
End: 2019-02-15

## 2019-02-15 ENCOUNTER — APPOINTMENT (OUTPATIENT)
Dept: LAB | Facility: CLINIC | Age: 69
End: 2019-02-15
Payer: MEDICARE

## 2019-02-15 LAB
BASOPHILS # BLD AUTO: 0.08 THOUSANDS/ΜL (ref 0–0.1)
BASOPHILS NFR BLD AUTO: 1 % (ref 0–1)
EOSINOPHIL # BLD AUTO: 0.13 THOUSAND/ΜL (ref 0–0.61)
EOSINOPHIL NFR BLD AUTO: 1 % (ref 0–6)
ERYTHROCYTE [DISTWIDTH] IN BLOOD BY AUTOMATED COUNT: 16.3 % (ref 11.6–15.1)
FOLATE SERPL-MCNC: 9.7 NG/ML (ref 3.1–17.5)
HCT VFR BLD AUTO: 28.5 % (ref 34.8–46.1)
HGB BLD-MCNC: 8.7 G/DL (ref 11.5–15.4)
IMM GRANULOCYTES # BLD AUTO: 0.08 THOUSAND/UL (ref 0–0.2)
IMM GRANULOCYTES NFR BLD AUTO: 1 % (ref 0–2)
LYMPHOCYTES # BLD AUTO: 0.88 THOUSANDS/ΜL (ref 0.6–4.47)
LYMPHOCYTES NFR BLD AUTO: 9 % (ref 14–44)
MCH RBC QN AUTO: 25.7 PG (ref 26.8–34.3)
MCHC RBC AUTO-ENTMCNC: 30.5 G/DL (ref 31.4–37.4)
MCV RBC AUTO: 84 FL (ref 82–98)
MONOCYTES # BLD AUTO: 0.78 THOUSAND/ΜL (ref 0.17–1.22)
MONOCYTES NFR BLD AUTO: 8 % (ref 4–12)
NEUTROPHILS # BLD AUTO: 7.68 THOUSANDS/ΜL (ref 1.85–7.62)
NEUTS SEG NFR BLD AUTO: 80 % (ref 43–75)
PLATELET # BLD AUTO: 767 THOUSANDS/UL (ref 149–390)
PMV BLD AUTO: 8.9 FL (ref 8.9–12.7)
RBC # BLD AUTO: 3.39 MILLION/UL (ref 3.81–5.12)
RETICS # AUTO: NORMAL 10*3/UL (ref 14097–95744)
RETICS # CALC: 1.45 % (ref 0.37–1.87)
VIT B12 SERPL-MCNC: 656 PG/ML (ref 100–900)
WBC # BLD AUTO: 9.63 THOUSAND/UL (ref 4.31–10.16)

## 2019-02-15 PROCEDURE — 84165 PROTEIN E-PHORESIS SERUM: CPT | Performed by: PATHOLOGY

## 2019-02-15 PROCEDURE — 85025 COMPLETE CBC W/AUTO DIFF WBC: CPT | Performed by: FAMILY MEDICINE

## 2019-02-15 PROCEDURE — 85045 AUTOMATED RETICULOCYTE COUNT: CPT | Performed by: FAMILY MEDICINE

## 2019-02-15 PROCEDURE — 36415 COLL VENOUS BLD VENIPUNCTURE: CPT | Performed by: FAMILY MEDICINE

## 2019-02-15 PROCEDURE — 82607 VITAMIN B-12: CPT | Performed by: FAMILY MEDICINE

## 2019-02-15 PROCEDURE — 86334 IMMUNOFIX E-PHORESIS SERUM: CPT | Performed by: FAMILY MEDICINE

## 2019-02-15 PROCEDURE — 86334 IMMUNOFIX E-PHORESIS SERUM: CPT | Performed by: PATHOLOGY

## 2019-02-15 PROCEDURE — 84165 PROTEIN E-PHORESIS SERUM: CPT | Performed by: FAMILY MEDICINE

## 2019-02-15 PROCEDURE — 82746 ASSAY OF FOLIC ACID SERUM: CPT | Performed by: FAMILY MEDICINE

## 2019-02-19 LAB
ALBUMIN SERPL ELPH-MCNC: 2.31 G/DL (ref 3.5–5)
ALBUMIN SERPL ELPH-MCNC: 26.9 % (ref 52–65)
ALPHA1 GLOB SERPL ELPH-MCNC: 0.76 G/DL (ref 0.1–0.4)
ALPHA1 GLOB SERPL ELPH-MCNC: 8.8 % (ref 2.5–5)
ALPHA2 GLOB SERPL ELPH-MCNC: 1.2 G/DL (ref 0.4–1.2)
ALPHA2 GLOB SERPL ELPH-MCNC: 13.9 % (ref 7–13)
BETA GLOB ABNORMAL SERPL ELPH-MCNC: 0.62 G/DL (ref 0.4–0.8)
BETA1 GLOB SERPL ELPH-MCNC: 7.2 % (ref 5–13)
BETA2 GLOB SERPL ELPH-MCNC: 8.2 % (ref 2–8)
BETA2+GAMMA GLOB SERPL ELPH-MCNC: 0.71 G/DL (ref 0.2–0.5)
GAMMA GLOB ABNORMAL SERPL ELPH-MCNC: 3.01 G/DL (ref 0.5–1.6)
GAMMA GLOB SERPL ELPH-MCNC: 35 % (ref 12–22)
IGG/ALB SER: 0.37 {RATIO} (ref 1.1–1.8)
INTERPRETATION UR IFE-IMP: NORMAL
PROT PATTERN SERPL ELPH-IMP: ABNORMAL
PROT SERPL-MCNC: 8.6 G/DL (ref 6.4–8.2)

## 2019-02-20 ENCOUNTER — TELEPHONE (OUTPATIENT)
Dept: FAMILY MEDICINE CLINIC | Facility: CLINIC | Age: 69
End: 2019-02-20

## 2019-02-20 DIAGNOSIS — D64.9 NORMOCYTIC ANEMIA: Primary | ICD-10-CM

## 2019-02-21 NOTE — TELEPHONE ENCOUNTER
Patient had additional labs for anemia  Normal vitamin S07 and folic acid  Hgb remains low at 8 7  I ordered an iron panel but it looks like it was not done  Call patient and let her know about results  I printed out a script for an iron panel       Recent Results (from the past 336 hour(s))   CBC and differential    Collection Time: 02/15/19  1:58 PM   Result Value Ref Range    WBC 9 63 4 31 - 10 16 Thousand/uL    RBC 3 39 (L) 3 81 - 5 12 Million/uL    Hemoglobin 8 7 (L) 11 5 - 15 4 g/dL    Hematocrit 28 5 (L) 34 8 - 46 1 %    MCV 84 82 - 98 fL    MCH 25 7 (L) 26 8 - 34 3 pg    MCHC 30 5 (L) 31 4 - 37 4 g/dL    RDW 16 3 (H) 11 6 - 15 1 %    MPV 8 9 8 9 - 12 7 fL    Platelets 371 (H) 514 - 390 Thousands/uL    Neutrophils Relative 80 (H) 43 - 75 %    Immat GRANS % 1 0 - 2 %    Lymphocytes Relative 9 (L) 14 - 44 %    Monocytes Relative 8 4 - 12 %    Eosinophils Relative 1 0 - 6 %    Basophils Relative 1 0 - 1 %    Neutrophils Absolute 7 68 (H) 1 85 - 7 62 Thousands/µL    Immature Grans Absolute 0 08 0 00 - 0 20 Thousand/uL    Lymphocytes Absolute 0 88 0 60 - 4 47 Thousands/µL    Monocytes Absolute 0 78 0 17 - 1 22 Thousand/µL    Eosinophils Absolute 0 13 0 00 - 0 61 Thousand/µL    Basophils Absolute 0 08 0 00 - 0 10 Thousands/µL   Folate    Collection Time: 02/15/19  1:58 PM   Result Value Ref Range    Folate 9 7 3 1 - 17 5 ng/mL   Protein electrophoresis, serum    Collection Time: 02/15/19  1:58 PM   Result Value Ref Range    A/G Ratio 0 37 (L) 1 10 - 1 80    Albumin Electrophoresis 26 9 (L) 52 0 - 65 0 %    Albumin CONC 2 31 (L) 3 50 - 5 00 g/dl    Alpha 1 8 8 (H) 2 5 - 5 0 %    ALPHA 1 CONC 0 76 (H) 0 10 - 0 40 g/dL    Alpha 2 13 9 (H) 7 0 - 13 0 %    ALPHA 2 CONC 1 20 0 40 - 1 20 g/dL    Beta-1 7 2 5 0 - 13 0 %    BETA 1 CONC 0 62 0 40 - 0 80 g/dL    Beta-2 8 2 (H) 2 0 - 8 0 %    BETA 2 CONC 0 71 (H) 0 20 - 0 50 g/dL    Gamma Globulin 35 0 (H) 12 0 - 22 0 %    GAMMA CONC 3 01 (H) 0 50 - 1 60 g/dL    SPEP Interpretation       The SPEP shows a faint possible band in the gamma region  Immunofixation to be performed  Reviewed by: Yg Razo MD ([id number]) **Electronic Signature**    Total Protein 8 6 (H) 6 4 - 8 2 g/dL   Reticulocytes    Collection Time: 02/15/19  1:58 PM   Result Value Ref Range    Retic Ct Abs 49,200 23,303-16,534    Retic Ct Pct 1 45 0 37 - 1 87 %   Vitamin B12    Collection Time: 02/15/19  1:58 PM   Result Value Ref Range    Vitamin B-12 656 100 - 900 pg/mL   Immunofixation, Serum(Reflex Only-Do Not Order)    Collection Time: 02/15/19  1:58 PM   Result Value Ref Range    Immunofixation Interpretation       Serum immunofixation shows no monoclonal gammopathy  Reviewed by: Yg Razo MD ([id number]) **Electronic Signature**

## 2019-02-22 ENCOUNTER — APPOINTMENT (OUTPATIENT)
Dept: LAB | Facility: CLINIC | Age: 69
End: 2019-02-22
Payer: MEDICARE

## 2019-02-22 LAB
BASOPHILS # BLD AUTO: 0.07 THOUSANDS/ΜL (ref 0–0.1)
BASOPHILS NFR BLD AUTO: 1 % (ref 0–1)
EOSINOPHIL # BLD AUTO: 0.22 THOUSAND/ΜL (ref 0–0.61)
EOSINOPHIL NFR BLD AUTO: 2 % (ref 0–6)
ERYTHROCYTE [DISTWIDTH] IN BLOOD BY AUTOMATED COUNT: 16.6 % (ref 11.6–15.1)
FERRITIN SERPL-MCNC: 260 NG/ML (ref 8–388)
HCT VFR BLD AUTO: 27.9 % (ref 34.8–46.1)
HGB BLD-MCNC: 8.5 G/DL (ref 11.5–15.4)
IMM GRANULOCYTES # BLD AUTO: 0.07 THOUSAND/UL (ref 0–0.2)
IMM GRANULOCYTES NFR BLD AUTO: 1 % (ref 0–2)
IRON SATN MFR SERPL: 13 %
IRON SERPL-MCNC: 20 UG/DL (ref 50–170)
LYMPHOCYTES # BLD AUTO: 1.06 THOUSANDS/ΜL (ref 0.6–4.47)
LYMPHOCYTES NFR BLD AUTO: 10 % (ref 14–44)
MCH RBC QN AUTO: 25.4 PG (ref 26.8–34.3)
MCHC RBC AUTO-ENTMCNC: 30.5 G/DL (ref 31.4–37.4)
MCV RBC AUTO: 84 FL (ref 82–98)
MONOCYTES # BLD AUTO: 0.96 THOUSAND/ΜL (ref 0.17–1.22)
MONOCYTES NFR BLD AUTO: 9 % (ref 4–12)
NEUTROPHILS # BLD AUTO: 8.45 THOUSANDS/ΜL (ref 1.85–7.62)
NEUTS SEG NFR BLD AUTO: 77 % (ref 43–75)
NRBC BLD AUTO-RTO: 0 /100 WBCS
PLATELET # BLD AUTO: 781 THOUSANDS/UL (ref 149–390)
PMV BLD AUTO: 8.9 FL (ref 8.9–12.7)
RBC # BLD AUTO: 3.34 MILLION/UL (ref 3.81–5.12)
TIBC SERPL-MCNC: 151 UG/DL (ref 250–450)
WBC # BLD AUTO: 10.83 THOUSAND/UL (ref 4.31–10.16)

## 2019-02-22 PROCEDURE — 82728 ASSAY OF FERRITIN: CPT | Performed by: FAMILY MEDICINE

## 2019-02-22 PROCEDURE — 36415 COLL VENOUS BLD VENIPUNCTURE: CPT | Performed by: FAMILY MEDICINE

## 2019-02-22 PROCEDURE — 83540 ASSAY OF IRON: CPT | Performed by: FAMILY MEDICINE

## 2019-02-22 PROCEDURE — 83550 IRON BINDING TEST: CPT | Performed by: FAMILY MEDICINE

## 2019-02-22 PROCEDURE — 85025 COMPLETE CBC W/AUTO DIFF WBC: CPT | Performed by: FAMILY MEDICINE

## 2019-02-24 ENCOUNTER — TELEPHONE (OUTPATIENT)
Dept: FAMILY MEDICINE CLINIC | Facility: CLINIC | Age: 69
End: 2019-02-24

## 2019-02-24 DIAGNOSIS — D50.9 IRON DEFICIENCY ANEMIA, UNSPECIFIED IRON DEFICIENCY ANEMIA TYPE: Primary | ICD-10-CM

## 2019-02-24 NOTE — TELEPHONE ENCOUNTER
Call re labs  Hgb remains low at 8 5 normal 11 5 to 15 4  Iron level low at 20 normal 50 to 170  Plan start oral iron Ferrous sulfate 325 mg daily  Repeat CBC and iron level in 3 to 4 weeks  Lab slip printed  Additional work up for patients with iron deficiency includes GI referral for upper endoscopy and colonoscopy  Not sure if she is agreeable to this

## 2019-02-25 NOTE — TELEPHONE ENCOUNTER
Spoke with patient , gave message  He is going to have her return call when she wakes up to go over message directly with her

## 2019-03-19 ENCOUNTER — OFFICE VISIT (OUTPATIENT)
Dept: CARDIOLOGY CLINIC | Facility: CLINIC | Age: 69
End: 2019-03-19
Payer: MEDICARE

## 2019-03-19 VITALS
SYSTOLIC BLOOD PRESSURE: 118 MMHG | DIASTOLIC BLOOD PRESSURE: 60 MMHG | HEART RATE: 88 BPM | WEIGHT: 151.2 LBS | OXYGEN SATURATION: 98 % | BODY MASS INDEX: 28.55 KG/M2 | HEIGHT: 61 IN

## 2019-03-19 DIAGNOSIS — I35.1 NONRHEUMATIC AORTIC VALVE INSUFFICIENCY: Primary | ICD-10-CM

## 2019-03-19 PROCEDURE — 99214 OFFICE O/P EST MOD 30 MIN: CPT | Performed by: INTERNAL MEDICINE

## 2019-03-19 NOTE — PROGRESS NOTES
Cardiology Follow Up    Christian Kidd  1950  [id number]  [address] [facility] [address] [id number] [phone]    No diagnosis found  Interval History: Followup for aortic regurgitation  She seems to be doing well  Her dyspnea is improved  No chest pain or palpitations  She is on Eliquis for pulmonary embolism           Problem List     Hypertension    Hyperlipidemia    Hypothyroidism    Basal cell carcinoma (BCC) of left side of nose    History of Hodgkin's disease    Paroxysmal supraventricular tachycardia (HCC)    Vasculitis (HCC)    CKD (chronic kidney disease), stage III (HCC)    Pulmonary embolism (HCC)    DVT (deep venous thrombosis) (HCC)    Bilateral kidney stones    Melanoma of skin of trunk (HCC)    Mitral regurgitation    Pulmonary hypertension ([address] )    Acquired complex renal cyst    Elevated serum protein level    Osteoporosis    Vitamin D deficiency    BCC (basal cell carcinoma), face    Overview Signed 8/13/2018  3:38 PM by Ledy Berger MA     Added automatically from request for surgery [id number]         History of breast cancer    History of basal cell cancer    Normocytic anemia    Hyponatremia    Elevated liver enzymes    Cystitis    Non-rheumatic tricuspid valve insufficiency    Thrombocytosis (HCC)    Nonrheumatic aortic valve insufficiency        Past Medical History:   Diagnosis Date    ENEDELIA (acute kidney injury) ([address] ) 1/12/2019    BCC (basal cell carcinoma of skin)     facial    Calculus, kidney 2013    Cervical cancer ([address] ) 1983    Disease of thyroid gland     Ganglion     Last Assessed:7/9/13    Heart murmur     mitral valve regurgitation    Hodgkin disease ([address] )     Hydronephrosis 2013    Hyperlipidemia     Hypertension     NSTEMI (non-ST elevated myocardial infarction) ([address] ) 1/12/2019    Osteopenia     Last Assessed:7/9/13    Paroxysmal supraventricular tachycardia [facility])     Renal calculi     Last Assessed:3/19/14    Renal cyst     Shoulder impingement     Last Assessed:13    UTI (urinary tract infection) 2013    Vasculitis ([address] ) 2019     Social History     Socioeconomic History    Marital status: /Civil Union     Spouse name: Not on file    Number of children: Not on file    Years of education: Not on file    Highest education level: Not on file   Occupational History    Not on file   Social Needs    Financial resource strain: Not on file    Food insecurity:     Worry: Not on file     Inability: Not on file    Transportation needs:     Medical: Not on file     Non-medical: Not on file   Tobacco Use    Smoking status: Former Smoker     Last attempt to quit: 2011     Years since quittin 2    Smokeless tobacco: Never Used   Substance and Sexual Activity    Alcohol use: Yes     Comment: caffeine use; social drinker-1 beer every 2/3 months    Drug use: No    Sexual activity: Not on file   Lifestyle    Physical activity:     Days per week: Not on file     Minutes per session: Not on file    Stress: Not on file   Relationships    Social connections:     Talks on phone: Not on file     Gets together: Not on file     Attends [employer] service: Not on file     Active member of club or organization: Not on file     Attends meetings of clubs or organizations: Not on file     Relationship status: Not on file    Intimate partner violence:     Fear of current or ex partner: Not on file     Emotionally abused: Not on file     Physically abused: Not on file     Forced sexual activity: Not on file   Other Topics Concern    Not on file   Social History Narrative    Advance directive declined by patient    Drinks coffee    Exercise habits      Family History   Problem Relation Age of Onset    Leukemia Mother     Colon cancer Paternal Grandmother     Heart disease Family     Heart attack Father     Coronary artery disease Father         stent- five  Hypertension Father     Stroke Neg Hx     Anuerysm Neg Hx     Clotting disorder Neg Hx     Arrhythmia Neg Hx     Heart failure Neg Hx     Hyperlipidemia Neg Hx      Past Surgical History:   Procedure Laterality Date    APPENDECTOMY      BREAST RECONSTRUCTION      COMPLEX WOUND CLOSURE TO EXTREMITY Left 10/9/2018    Procedure: COMPLEX CLOSURE RECONSTRUCTION;  Surgeon: Greta Calzada MD;  [facility];  Service: Plastics    CYSTOSCOPY W/ RETROGRADES  2009    CYSTOSCOPY W/ URETEROSCOPY  2002    EXTRACORPOREAL SHOCK WAVE LITHOTRIPSY Right 2005    FLAP LOCAL HEAD / NECK Left 10/9/2018    Procedure: FLAP RECONSTRUCTION;  Surgeon: Greta Calzada MD;  [facility];  Service: Plastics    FULL THICKNESS SKIN GRAFT Left 10/9/2018    Procedure: FULL THICKNESS SKIN GRAFT RECONSTRUCTION;  Surgeon: Greta Calzada MD;  [facility];  Service: Plastics    HYSTERECTOMY      1983-cervical cancer    INTRAOPERATIVE RADIATION THERAPY ([facility])      Radiation Therapy    KIDNEY SURGERY      MASTECTOMY Left 2006    OTHER SURGICAL HISTORY      Chemotherapeutics    SENTINEL LYMPH NODE BIOPSY      SPLENECTOMY      1985-Hodgkin's Disease    SQUAMOUS CELL CARCINOMA EXCISION Left 10/9/2018    Procedure: NOSE/CHEEK [address] EXCISION; FROZEN SECTION;  Surgeon: Greta Calzada MD;  [facility];  Service: Plastics       Current Outpatient Medications:     apixaban (ELIQUIS) 5 mg, Take 1 tablet (5 mg total) by mouth 2 (two) times a day, Disp: 180 tablet, Rfl: 3    cholecalciferol (VITAMIN D3) 1,000 units tablet, Take 1,000 Units by mouth daily  , Disp: , Rfl:     levothyroxine 75 mcg tablet, TAKE 1 TABLET BY MOUTH DAILY, Disp: 90 tablet, Rfl: 3    nebivolol (BYSTOLIC) 5 mg tablet, Take 1 tablet (5 mg total) by mouth daily for 90 days, Disp: 90 tablet, Rfl: 3    simvastatin (ZOCOR) 40 mg tablet, TAKE 1 TABLET BY MOUTH EVERY DAY, Disp: 90 tablet, Rfl: 3    Loratadine (CLARITIN) 10 MG CAPS, Take by mouth daily , Disp: , Rfl:   Allergies   Allergen Reactions    Ciprofloxacin Other (See Comments)     Reaction Date: 24Jun2011; Hives/Uticaria    Sulfamethoxazole-Trimethoprim      Patient does not remember rx       Labs:     Chemistry        Component Value Date/Time    K 3 8 01/15/2019 0531     01/15/2019 0531    CO2 26 01/15/2019 0531    BUN 15 01/15/2019 0531    CREATININE 1 11 01/15/2019 0531        Component Value Date/Time    CALCIUM 8 6 01/15/2019 0531    ALKPHOS 218 (H) 01/11/2019 2319    AST 50 (H) 01/11/2019 2319    ALT 45 01/11/2019 2319            No results found for: CHOL  Lab Results   Component Value Date    HDL 34 (L) 01/12/2019    HDL 51 06/15/2018    HDL 55 08/15/2017     Lab Results   Component Value Date    LDLCALC 61 01/12/2019    LDLCALC 92 06/15/2018    LDLCALC 98 08/15/2017     Lab Results   Component Value Date    TRIG 99 01/12/2019    TRIG 107 06/15/2018    TRIG 92 08/15/2017     No results found for: CHOLHDL    Imaging: No results found  Review of Systems   Constitution: Negative  HENT: Negative  Eyes: Negative  Cardiovascular: Negative  Respiratory: Negative  Endocrine: Negative  Hematologic/Lymphatic: Negative  Skin: Negative  Musculoskeletal: Negative  Gastrointestinal: Negative  Genitourinary: Negative  Neurological: Negative  Psychiatric/Behavioral: Negative  Allergic/Immunologic: Negative  Vitals:    03/19/19 0816   BP: 118/60   Pulse: 88   SpO2: 98%           Physical Exam   Constitutional: She is oriented to person, place, and time  No distress  HENT:   Mouth/Throat: No oropharyngeal exudate  Eyes: No scleral icterus  Neck: No JVD present  Cardiovascular: Normal rate and regular rhythm  Murmur heard  Pulmonary/Chest: Effort normal and breath sounds normal  No stridor  No respiratory distress  She has no wheezes  Abdominal: Soft   Bowel sounds are normal  She exhibits no distension  There is no tenderness  There is no guarding  Musculoskeletal: She exhibits no edema  Neurological: She is alert and oriented to person, place, and time  Skin: Skin is warm and dry  She is not diaphoretic  Psychiatric: She has a normal mood and affect  Discussion/Summary: Moderate to Severe Aortic Regurgitation: She is clinically stable and her dyspnea has improved  At this point will schedule a JUDY with me to evaluate her aortic valve more carefully  The patient was counseled regarding diagnostic results, instructions for management, risk factor reductions, impressions  total time of encounter was 25 minutes and 15 minutes was spent counseling

## 2019-03-26 ENCOUNTER — APPOINTMENT (OUTPATIENT)
Dept: LAB | Facility: CLINIC | Age: 69
End: 2019-03-26
Payer: MEDICARE

## 2019-03-26 ENCOUNTER — TELEPHONE (OUTPATIENT)
Dept: FAMILY MEDICINE CLINIC | Facility: CLINIC | Age: 69
End: 2019-03-26

## 2019-03-26 DIAGNOSIS — D50.9 IRON DEFICIENCY ANEMIA, UNSPECIFIED IRON DEFICIENCY ANEMIA TYPE: ICD-10-CM

## 2019-03-26 LAB
BASOPHILS # BLD AUTO: 0.06 THOUSANDS/ΜL (ref 0–0.1)
BASOPHILS NFR BLD AUTO: 1 % (ref 0–1)
EOSINOPHIL # BLD AUTO: 0.35 THOUSAND/ΜL (ref 0–0.61)
EOSINOPHIL NFR BLD AUTO: 7 % (ref 0–6)
ERYTHROCYTE [DISTWIDTH] IN BLOOD BY AUTOMATED COUNT: 18.2 % (ref 11.6–15.1)
FERRITIN SERPL-MCNC: 141 NG/ML (ref 8–388)
HCT VFR BLD AUTO: 28.6 % (ref 34.8–46.1)
HGB BLD-MCNC: 8.6 G/DL (ref 11.5–15.4)
IMM GRANULOCYTES # BLD AUTO: 0.02 THOUSAND/UL (ref 0–0.2)
IMM GRANULOCYTES NFR BLD AUTO: 0 % (ref 0–2)
IRON SATN MFR SERPL: 16 %
IRON SERPL-MCNC: 30 UG/DL (ref 50–170)
LYMPHOCYTES # BLD AUTO: 0.79 THOUSANDS/ΜL (ref 0.6–4.47)
LYMPHOCYTES NFR BLD AUTO: 17 % (ref 14–44)
MCH RBC QN AUTO: 25.3 PG (ref 26.8–34.3)
MCHC RBC AUTO-ENTMCNC: 30.1 G/DL (ref 31.4–37.4)
MCV RBC AUTO: 84 FL (ref 82–98)
MONOCYTES # BLD AUTO: 0.88 THOUSAND/ΜL (ref 0.17–1.22)
MONOCYTES NFR BLD AUTO: 18 % (ref 4–12)
NEUTROPHILS # BLD AUTO: 2.67 THOUSANDS/ΜL (ref 1.85–7.62)
NEUTS SEG NFR BLD AUTO: 57 % (ref 43–75)
NRBC BLD AUTO-RTO: 0 /100 WBCS
PLATELET # BLD AUTO: 640 THOUSANDS/UL (ref 149–390)
PMV BLD AUTO: 8.2 FL (ref 8.9–12.7)
RBC # BLD AUTO: 3.4 MILLION/UL (ref 3.81–5.12)
TIBC SERPL-MCNC: 186 UG/DL (ref 250–450)
WBC # BLD AUTO: 4.77 THOUSAND/UL (ref 4.31–10.16)

## 2019-03-26 PROCEDURE — 85025 COMPLETE CBC W/AUTO DIFF WBC: CPT | Performed by: FAMILY MEDICINE

## 2019-03-26 PROCEDURE — 36415 COLL VENOUS BLD VENIPUNCTURE: CPT | Performed by: FAMILY MEDICINE

## 2019-03-26 PROCEDURE — 82728 ASSAY OF FERRITIN: CPT | Performed by: FAMILY MEDICINE

## 2019-03-26 PROCEDURE — 83540 ASSAY OF IRON: CPT | Performed by: FAMILY MEDICINE

## 2019-03-26 PROCEDURE — 83550 IRON BINDING TEST: CPT | Performed by: FAMILY MEDICINE

## 2019-03-26 NOTE — TELEPHONE ENCOUNTER
Call re labs Hgb remains low at 8 6 normal 11 5 to 15 4 iron level low at 30 improved from 20  Normal 50 to 170  I had previously recommended a GI evaluation which she declined  Could consider a series of IV iron infusions   Either way I would recheck a CBC and iron level one week       Recent Results (from the past 336 hour(s))   CBC and differential    Collection Time: 03/26/19  9:14 AM   Result Value Ref Range    WBC 4 77 4 31 - 10 16 Thousand/uL    RBC 3 40 (L) 3 81 - 5 12 Million/uL    Hemoglobin 8 6 (L) 11 5 - 15 4 g/dL    Hematocrit 28 6 (L) 34 8 - 46 1 %    MCV 84 82 - 98 fL    MCH 25 3 (L) 26 8 - 34 3 pg    MCHC 30 1 (L) 31 4 - 37 4 g/dL    RDW 18 2 (H) 11 6 - 15 1 %    MPV 8 2 (L) 8 9 - 12 7 fL    Platelets 858 (H) 851 - 390 Thousands/uL    nRBC 0 /100 WBCs    Neutrophils Relative 57 43 - 75 %    Immat GRANS % 0 0 - 2 %    Lymphocytes Relative 17 14 - 44 %    Monocytes Relative 18 (H) 4 - 12 %    Eosinophils Relative 7 (H) 0 - 6 %    Basophils Relative 1 0 - 1 %    Neutrophils Absolute 2 67 1 85 - 7 62 Thousands/µL    Immature Grans Absolute 0 02 0 00 - 0 20 Thousand/uL    Lymphocytes Absolute 0 79 0 60 - 4 47 Thousands/µL    Monocytes Absolute 0 88 0 17 - 1 22 Thousand/µL    Eosinophils Absolute 0 35 0 00 - 0 61 Thousand/µL    Basophils Absolute 0 06 0 00 - 0 10 Thousands/µL   Iron Saturation %    Collection Time: 03/26/19  9:14 AM   Result Value Ref Range    Iron Saturation 16 %    TIBC 186 (L) 250 - 450 ug/dL    Iron 30 (L) 50 - 170 ug/dL   Ferritin    Collection Time: 03/26/19  9:14 AM   Result Value Ref Range    Ferritin 141 8 - 388 ng/mL

## 2019-03-27 ENCOUNTER — TELEPHONE (OUTPATIENT)
Dept: FAMILY MEDICINE CLINIC | Facility: CLINIC | Age: 69
End: 2019-03-27

## 2019-04-01 NOTE — TELEPHONE ENCOUNTER
Left message for patient to return call  Appointment made for IV infusion 04/15/2019 @ [facility] [address]  Order faxed

## 2019-04-08 ENCOUNTER — ANESTHESIA EVENT (OUTPATIENT)
Dept: NON INVASIVE DIAGNOSTICS | Facility: HOSPITAL | Age: 69
End: 2019-04-08

## 2019-04-08 ENCOUNTER — ANESTHESIA (OUTPATIENT)
Dept: NON INVASIVE DIAGNOSTICS | Facility: HOSPITAL | Age: 69
End: 2019-04-08

## 2019-04-08 ENCOUNTER — HOSPITAL ENCOUNTER (OUTPATIENT)
Dept: NON INVASIVE DIAGNOSTICS | Facility: HOSPITAL | Age: 69
Discharge: HOME | End: 2019-04-08
Attending: INTERNAL MEDICINE
Payer: MEDICARE

## 2019-04-08 VITALS
RESPIRATION RATE: 18 BRPM | OXYGEN SATURATION: 100 % | DIASTOLIC BLOOD PRESSURE: 55 MMHG | SYSTOLIC BLOOD PRESSURE: 109 MMHG | HEART RATE: 99 BPM

## 2019-04-08 DIAGNOSIS — I35.1 NONRHEUMATIC AORTIC VALVE INSUFFICIENCY: ICD-10-CM

## 2019-04-08 PROCEDURE — 93312 ECHO TRANSESOPHAGEAL: CPT

## 2019-04-08 PROCEDURE — 76376 3D RENDER W/INTRP POSTPROCES: CPT

## 2019-04-08 RX ORDER — PROPOFOL 10 MG/ML
INJECTION, EMULSION INTRAVENOUS CONTINUOUS PRN
Status: DISCONTINUED | OUTPATIENT
Start: 2019-04-08 | End: 2019-04-08 | Stop reason: SURG

## 2019-04-08 RX ORDER — LIDOCAINE HYDROCHLORIDE 10 MG/ML
INJECTION, SOLUTION INFILTRATION; PERINEURAL AS NEEDED
Status: DISCONTINUED | OUTPATIENT
Start: 2019-04-08 | End: 2019-04-08 | Stop reason: SURG

## 2019-04-08 RX ORDER — PROPOFOL 10 MG/ML
INJECTION, EMULSION INTRAVENOUS AS NEEDED
Status: DISCONTINUED | OUTPATIENT
Start: 2019-04-08 | End: 2019-04-08 | Stop reason: SURG

## 2019-04-08 RX ORDER — SODIUM CHLORIDE 9 MG/ML
INJECTION, SOLUTION INTRAVENOUS CONTINUOUS PRN
Status: DISCONTINUED | OUTPATIENT
Start: 2019-04-08 | End: 2019-04-08 | Stop reason: SURG

## 2019-04-08 RX ORDER — FERROUS SULFATE 325(65) MG
325 TABLET ORAL
COMMUNITY
End: 2020-02-12 | Stop reason: ALTCHOICE

## 2019-04-08 RX ADMIN — LIDOCAINE HYDROCHLORIDE 100 MG: 10 INJECTION, SOLUTION INFILTRATION; PERINEURAL at 09:51

## 2019-04-08 RX ADMIN — SODIUM CHLORIDE: 9 INJECTION, SOLUTION INTRAVENOUS at 09:43

## 2019-04-08 RX ADMIN — PROPOFOL 10 MG: 10 INJECTION, EMULSION INTRAVENOUS at 10:06

## 2019-04-08 RX ADMIN — PROPOFOL 70 MG: 10 INJECTION, EMULSION INTRAVENOUS at 10:02

## 2019-04-08 RX ADMIN — PROPOFOL 20 MG: 10 INJECTION, EMULSION INTRAVENOUS at 10:04

## 2019-04-08 RX ADMIN — PROPOFOL 100 MCG/KG/MIN: 10 INJECTION, EMULSION INTRAVENOUS at 10:06

## 2019-04-09 ENCOUNTER — TELEPHONE (OUTPATIENT)
Dept: NON INVASIVE DIAGNOSTICS | Facility: HOSPITAL | Age: 69
End: 2019-04-09

## 2019-04-10 PROCEDURE — 93325 DOPPLER ECHO COLOR FLOW MAPG: CPT | Performed by: INTERNAL MEDICINE

## 2019-04-10 PROCEDURE — 93320 DOPPLER ECHO COMPLETE: CPT | Performed by: INTERNAL MEDICINE

## 2019-04-10 PROCEDURE — 93312 ECHO TRANSESOPHAGEAL: CPT | Performed by: INTERNAL MEDICINE

## 2019-04-11 ENCOUNTER — TELEPHONE (OUTPATIENT)
Dept: FAMILY MEDICINE CLINIC | Facility: CLINIC | Age: 69
End: 2019-04-11

## 2019-04-11 RX ORDER — SODIUM CHLORIDE 9 MG/ML
20 INJECTION, SOLUTION INTRAVENOUS CONTINUOUS
Status: DISCONTINUED | OUTPATIENT
Start: 2019-04-15 | End: 2019-04-18 | Stop reason: HOSPADM

## 2019-04-15 ENCOUNTER — HOSPITAL ENCOUNTER (OUTPATIENT)
Dept: INFUSION CENTER | Facility: CLINIC | Age: 69
Discharge: HOME | End: 2019-04-15
Payer: MEDICARE

## 2019-04-15 VITALS
TEMPERATURE: 98.3 F | OXYGEN SATURATION: 96 % | HEART RATE: 91 BPM | SYSTOLIC BLOOD PRESSURE: 115 MMHG | DIASTOLIC BLOOD PRESSURE: 58 MMHG | RESPIRATION RATE: 18 BRPM

## 2019-04-15 PROCEDURE — 96365 THER/PROPH/DIAG IV INF INIT: CPT

## 2019-04-15 RX ADMIN — SODIUM CHLORIDE 20 ML/HR: 0.9 INJECTION, SOLUTION INTRAVENOUS at 14:25

## 2019-04-15 RX ADMIN — IRON SUCROSE 200 MG: 20 INJECTION, SOLUTION INTRAVENOUS at 14:40

## 2019-04-15 NOTE — PROGRESS NOTES
Patient to William for Venofer: Offers no complaints at present time: Lab work ( 03/26/19 ) reviewed: Right AC PIV initiated without incident

## 2019-04-19 RX ORDER — SODIUM CHLORIDE 9 MG/ML
20 INJECTION, SOLUTION INTRAVENOUS CONTINUOUS
Status: DISCONTINUED | OUTPATIENT
Start: 2019-04-22 | End: 2019-04-25 | Stop reason: HOSPADM

## 2019-04-22 ENCOUNTER — HOSPITAL ENCOUNTER (OUTPATIENT)
Dept: INFUSION CENTER | Facility: CLINIC | Age: 69
Discharge: HOME | End: 2019-04-22
Payer: MEDICARE

## 2019-04-22 VITALS
RESPIRATION RATE: 18 BRPM | HEART RATE: 96 BPM | SYSTOLIC BLOOD PRESSURE: 110 MMHG | DIASTOLIC BLOOD PRESSURE: 57 MMHG | TEMPERATURE: 98.1 F

## 2019-04-22 PROCEDURE — 96365 THER/PROPH/DIAG IV INF INIT: CPT

## 2019-04-22 RX ADMIN — SODIUM CHLORIDE 20 ML/HR: 0.9 INJECTION, SOLUTION INTRAVENOUS at 11:33

## 2019-04-22 RX ADMIN — IRON SUCROSE 200 MG: 20 INJECTION, SOLUTION INTRAVENOUS at 11:35

## 2019-04-26 RX ORDER — SODIUM CHLORIDE 9 MG/ML
20 INJECTION, SOLUTION INTRAVENOUS CONTINUOUS
Status: DISCONTINUED | OUTPATIENT
Start: 2019-04-29 | End: 2019-05-02 | Stop reason: HOSPADM

## 2019-04-29 ENCOUNTER — HOSPITAL ENCOUNTER (OUTPATIENT)
Dept: INFUSION CENTER | Facility: CLINIC | Age: 69
Discharge: HOME | End: 2019-04-29
Payer: MEDICARE

## 2019-04-29 VITALS
OXYGEN SATURATION: 99 % | RESPIRATION RATE: 18 BRPM | SYSTOLIC BLOOD PRESSURE: 122 MMHG | DIASTOLIC BLOOD PRESSURE: 58 MMHG | HEART RATE: 92 BPM | TEMPERATURE: 98 F

## 2019-04-29 PROCEDURE — 96365 THER/PROPH/DIAG IV INF INIT: CPT

## 2019-04-29 RX ADMIN — SODIUM CHLORIDE 20 ML/HR: 0.9 INJECTION, SOLUTION INTRAVENOUS at 14:02

## 2019-04-29 RX ADMIN — IRON SUCROSE 200 MG: 20 INJECTION, SOLUTION INTRAVENOUS at 14:03

## 2019-04-29 NOTE — PROGRESS NOTES
Pt tolerated infusion without complications, pt has no further appointments with us, aware of follow up at dr office, declines avs

## 2019-06-17 ENCOUNTER — LAB REQUISITION (OUTPATIENT)
Dept: LAB | Facility: HOSPITAL | Age: 69
End: 2019-06-17
Payer: MEDICARE

## 2019-06-17 DIAGNOSIS — D48.5 NEOPLASM OF UNCERTAIN BEHAVIOR OF SKIN: ICD-10-CM

## 2019-06-17 PROCEDURE — 88341 IMHCHEM/IMCYTCHM EA ADD ANTB: CPT | Performed by: PATHOLOGY

## 2019-06-17 PROCEDURE — 88305 TISSUE EXAM BY PATHOLOGIST: CPT | Performed by: PATHOLOGY

## 2019-06-17 PROCEDURE — 88342 IMHCHEM/IMCYTCHM 1ST ANTB: CPT | Performed by: PATHOLOGY

## 2019-06-21 ENCOUNTER — TELEPHONE (OUTPATIENT)
Dept: HEMATOLOGY ONCOLOGY | Facility: CLINIC | Age: 69
End: 2019-06-21

## 2019-06-24 ENCOUNTER — OFFICE VISIT (OUTPATIENT)
Dept: CARDIOLOGY CLINIC | Facility: CLINIC | Age: 69
End: 2019-06-24
Payer: MEDICARE

## 2019-06-24 VITALS
OXYGEN SATURATION: 96 % | WEIGHT: 145.1 LBS | DIASTOLIC BLOOD PRESSURE: 62 MMHG | SYSTOLIC BLOOD PRESSURE: 118 MMHG | HEART RATE: 96 BPM | BODY MASS INDEX: 26.7 KG/M2 | HEIGHT: 62 IN

## 2019-06-24 DIAGNOSIS — I35.1 NONRHEUMATIC AORTIC VALVE INSUFFICIENCY: Primary | ICD-10-CM

## 2019-06-24 PROCEDURE — 99214 OFFICE O/P EST MOD 30 MIN: CPT | Performed by: INTERNAL MEDICINE

## 2019-06-26 DIAGNOSIS — Z12.39 BREAST CANCER SCREENING: Primary | ICD-10-CM

## 2019-07-24 ENCOUNTER — TRANSCRIBE ORDERS (OUTPATIENT)
Dept: LAB | Facility: CLINIC | Age: 69
End: 2019-07-24

## 2019-07-24 ENCOUNTER — APPOINTMENT (OUTPATIENT)
Dept: LAB | Facility: CLINIC | Age: 69
End: 2019-07-24
Payer: MEDICARE

## 2019-07-24 DIAGNOSIS — I10 ESSENTIAL HYPERTENSION: Primary | ICD-10-CM

## 2019-07-24 DIAGNOSIS — E55.9 AVITAMINOSIS D: Primary | ICD-10-CM

## 2019-07-24 DIAGNOSIS — E78.00 HYPERCHOLESTEREMIA: ICD-10-CM

## 2019-07-24 DIAGNOSIS — E55.9 AVITAMINOSIS D: ICD-10-CM

## 2019-07-24 DIAGNOSIS — E03.9 ACQUIRED HYPOTHYROIDISM: ICD-10-CM

## 2019-07-24 LAB
25(OH)D3 SERPL-MCNC: 28.1 NG/ML (ref 30–100)
CHOLEST SERPL-MCNC: 112 MG/DL (ref 50–200)
HDLC SERPL-MCNC: 37 MG/DL (ref 40–60)
LDLC SERPL CALC-MCNC: 61 MG/DL (ref 0–100)
NONHDLC SERPL-MCNC: 75 MG/DL
TRIGL SERPL-MCNC: 72 MG/DL
TSH SERPL DL<=0.05 MIU/L-ACNC: 1.41 UIU/ML (ref 0.36–3.74)

## 2019-07-24 PROCEDURE — 80061 LIPID PANEL: CPT | Performed by: FAMILY MEDICINE

## 2019-07-24 PROCEDURE — 36415 COLL VENOUS BLD VENIPUNCTURE: CPT | Performed by: FAMILY MEDICINE

## 2019-07-24 PROCEDURE — 82306 VITAMIN D 25 HYDROXY: CPT

## 2019-07-24 PROCEDURE — 84443 ASSAY THYROID STIM HORMONE: CPT | Performed by: FAMILY MEDICINE

## 2019-07-29 ENCOUNTER — TELEPHONE (OUTPATIENT)
Dept: CARDIOLOGY CLINIC | Facility: CLINIC | Age: 69
End: 2019-07-29

## 2019-07-29 ENCOUNTER — TELEPHONE (OUTPATIENT)
Dept: FAMILY MEDICINE CLINIC | Facility: CLINIC | Age: 69
End: 2019-07-29

## 2019-07-29 NOTE — TELEPHONE ENCOUNTER
Steve Lees called back from [facility], he is a 4th year medical student  He was calling to see if patient ever had a work up of the rash on her legs she is complaining about as an inpatient at this time  Also, he was inquiring if she ever followed up the work up on the peritineal mass from January 2019 visit which I discussed with you   Melinda changed her Wednesday 07/31/19 appointment into a TCM

## 2019-07-29 NOTE — TELEPHONE ENCOUNTER
Gorge ( nurse)  called from [facility] in [place]  He would like to speak to a nurse regarding patient  Please advise   [phone]

## 2019-07-30 ENCOUNTER — TRANSITIONAL CARE MANAGEMENT (OUTPATIENT)
Dept: FAMILY MEDICINE CLINIC | Facility: CLINIC | Age: 69
End: 2019-07-30

## 2019-07-30 RX ORDER — ASPIRIN 81 MG/1
81 TABLET, CHEWABLE ORAL DAILY
COMMUNITY
Start: 2019-07-30 | End: 2019-08-12

## 2019-07-31 ENCOUNTER — OFFICE VISIT (OUTPATIENT)
Dept: FAMILY MEDICINE CLINIC | Facility: CLINIC | Age: 69
End: 2019-07-31
Payer: MEDICARE

## 2019-07-31 VITALS
RESPIRATION RATE: 16 BRPM | DIASTOLIC BLOOD PRESSURE: 64 MMHG | BODY MASS INDEX: 26.68 KG/M2 | TEMPERATURE: 98.1 F | HEART RATE: 88 BPM | HEIGHT: 62 IN | WEIGHT: 145 LBS | SYSTOLIC BLOOD PRESSURE: 118 MMHG

## 2019-07-31 DIAGNOSIS — I35.1 NONRHEUMATIC AORTIC VALVE INSUFFICIENCY: ICD-10-CM

## 2019-07-31 DIAGNOSIS — R55 SYNCOPE, UNSPECIFIED SYNCOPE TYPE: Primary | ICD-10-CM

## 2019-07-31 DIAGNOSIS — D75.839 THROMBOCYTOSIS: ICD-10-CM

## 2019-07-31 DIAGNOSIS — I27.20 PULMONARY HYPERTENSION (HCC): ICD-10-CM

## 2019-07-31 DIAGNOSIS — Z86.711 HISTORY OF PULMONARY EMBOLUS (PE): ICD-10-CM

## 2019-07-31 DIAGNOSIS — Z86.718 HISTORY OF DVT OF LOWER EXTREMITY: ICD-10-CM

## 2019-07-31 DIAGNOSIS — Z85.71 HISTORY OF HODGKIN'S DISEASE: ICD-10-CM

## 2019-07-31 DIAGNOSIS — D64.9 NORMOCYTIC ANEMIA: ICD-10-CM

## 2019-07-31 DIAGNOSIS — Z85.3 HISTORY OF BREAST CANCER: ICD-10-CM

## 2019-07-31 DIAGNOSIS — I10 ESSENTIAL HYPERTENSION: ICD-10-CM

## 2019-07-31 PROCEDURE — 99496 TRANSJ CARE MGMT HIGH F2F 7D: CPT | Performed by: FAMILY MEDICINE

## 2019-07-31 RX ORDER — NEBIVOLOL 5 MG/1
5 TABLET ORAL DAILY
Qty: 90 TABLET | Refills: 3 | Status: SHIPPED | OUTPATIENT
Start: 2019-07-31 | End: 2020-01-01 | Stop reason: SDUPTHER

## 2019-07-31 NOTE — PROGRESS NOTES
TCM Call (since 6/30/2019)     Date and time call was made  7/30/2019 11:58 AM    Hospital care reviewed  Records reviewed    Patient was hospitialized at  Other (comment)    [facility]    Date of Admission  07/27/19    Date of discharge  07/29/19    Diagnosis  Syncope    Disposition  Home    Were the patients medications reviewed and updated  Yes    Current Symptoms  Fatigue    Fatigue severity  Moderate      TCM Call (since 6/30/2019)     Post hospital issues  Reduced activity    Should patient be enrolled in anticoag monitoring? No    Scheduled for follow up? Yes    Did you obtain your prescribed medications  Yes    Do you need help managing your prescriptions or medications  No    Is transportation to your appointment needed  Yes    Specify why  Not drivivg at this time    I have advised the patient to call PCP with any new or worsening symptoms  Kishan Meneses Medical Assistant    Living Arrangements  Spouse or Significiant other    Support System  Spouse; Family    The type of support provided  Emotional; Physical    Do you have social support  Yes, as much as I need    Are you recieving any outpatient services  Yes    What type of services  Counts include [facility] beds at the [facility]    Are you recieving home care services  No    Are you using any community resources  No    Current waiver services  No    Have you fallen in the last 12 months  Yes    How many times  once    Interperter language line needed  No    Counseling  Patient            Assessment/Plan:         Diagnoses and all orders for this visit:    Syncope, unspecified syncope type    Normocytic anemia  -     CBC and differential  -     Iron Panel (Includes Iron Saturation, Iron, and TIBC)  -     sodium chloride 0 9 % infusion  -     iron sucrose (VENOFER) injection 200 mg    Essential hypertension  -     nebivolol (BYSTOLIC) 5 mg tablet;  Take 1 tablet (5 mg total) by mouth daily for 183 days    Thrombocytosis (HCC)    Nonrheumatic aortic valve insufficiency    Pulmonary hypertension (HCC)    History of pulmonary embolus (PE)    History of DVT of lower extremity    History of Hodgkin's disease    History of breast cancer    Other orders  -     Cancel: TD VACCINE GREATER THAN OR EQUAL TO 8YO PRESERVATIVE FREE IM  -     Cancel: Mammo screening bilateral w cad; Future        I again have strongly recommended a GI evaluation  I stopped her ASA  Continue with Eliquis for now given her repeat D dimer this month was 10,001  Schedule IV infusions with Venofer x 3  CBC and iron studies after her infusions  She is scheduled to see hematology 09/2019  She has follow up visits with cardiology and urology  Patient ID: Lucas Weinstein is a 68 y o  female  Follow-up visit  TCM s/p admission for syncope [place] in [place] at a baseball game  Patient was sitting down  She had an episode of vomiting  Subsequent syncopal episode with transient LOC  Admission labs random blood glucose 101  Creatinine 1 5  Electrolytes normal   CBC WBC 19047  Hemoglobin 9 7  MCV 88  Platelet count 930,834  Troponin negative  C-reactive protein 7 0  NT proBNP 4916  D-dimer elevated at 1001  EKG sinus tachycardia  Probable left atrial abnormality  Repolarization abnormality suggest ischemia, diffuse leads  Chest x-ray no consolidation or edema  Small right pleural effusion  No pneumothorax  Multiple calcified lymph nodes projecting over the mediastinum  CT scan chest with contrast no evidence of pulmonary embolus  Staghorn calculi throughout the right kidney resulting in marked obstruction of the upper pole calices  Additional nonobstructing 1 5 cm left upper pole calculus  Incompletely characterized 2 point cm solid renal right mass  Bilateral pleural effusions with associated atelectasis  Mosaic attenuation of lung parenchyma suggesting air trapping  Cholelithiasis    Surgical changes of splenectomy with left lateral abdominal wall herniation containing nonobstructed small large bowel  Echocardiogram normal left ventricular chamber size  Mildly decreased left ventricular systolic function with segmental wall abnormalities  Basal to mid inferolateral akinesis with thinning  Basal inferior thinning and akinesis  Low normal contraction of the remaining wall  Ejection fraction 45%  Left atrium mildly dilated  Mild mitral regurgitation  Mild aortic regurgitation  Mild to moderate tricuspid regurgitation  Severe pulmonary hypertension present  Hyperlipidemia on Simvastatin 40 mg daily  Lipid profile 01/2019 Cholesterol 115  Triglycerides 99  HDL 34  LDL 61  Hypertension blood pressures have stable on Bystolic 5 mg daily  08/2018 creatinine 1 32  Electrolytes normal except for Na+ 132 and chloride 96  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  No monoclonal bands  UPEP no monoclonal bands  The following portions of the patient's history were reviewed and updated as appropriate: allergies, current medications, past family history, past medical history, past social history, past surgical history and problem list     Review of Systems   Constitutional: Negative for appetite change, chills, fatigue, fever and unexpected weight change  HENT: Negative for congestion, ear pain, rhinorrhea, sore throat, trouble swallowing and voice change  Eyes: Negative for visual disturbance  Respiratory: Negative for cough, shortness of breath and wheezing  S/p admission 01/2019 for PE/DVT  Patient presented with chest tightness, shortness of breath and palpitations  CXR no active disease  EKG sinus tachycardia with non specific T wave changes inferolaterally  Troponin 0 06  Elevated D dimer at 10,000  Acute bibasilar pulmonary emboli  Mild diffuse patchy ground-glass alveolar opacity, trace pleural effusions and smooth septal thickening suggestive of pulmonary vascular congestion/fluid overload  Trace bibasilar and lingular subsegmental atelectasis    No cardiomegaly  No pericardial effusion  Aortic and coronary artery calcification  Calcified prevascular lymph nodes  Shotty bilateral hilar, left paratracheal and subcarinal lymph nodes  Small sliding hiatal hernia  Venous Dopplers normal except for left leg acute nonocclusive deep vein thrombosis in the popliteal and 1 of the paired gastrocnemius veins  Admission labs CBC WBC 9,450  Hemoglobin 10 3  MCV 85  Platelet count 534,681 Chemistry profile  blood glucose 106  Electrolytes normal except for sodium 134 and chloride 98  creatinine 1 46  LFTs normal except for AST 50 and alkaline phosphatase 218  Total protein elevated 9 6  Albumin 2 3  NT pro BNP 3,376  Repeat CBC WBC 7,740  Hgb 8 8  MCV 86  Platelets 213,930  Creatinine 1  11  Electrolytes normal   Currently on Eliquis  Cardiovascular: Positive for leg swelling  Negative for chest pain and palpitations  Lower extremity edema improved with Furosemide  01/2019 echocardiogram normal left ventricular systolic function  EF 55%  Right ventricle normal   Mild MR  Moderate to severe AR  Moderate TR with finding suggesting severe pulmonary hypertension  No pericardial effusion  Normal aortic root  Repeat echocardiogram 04/2019 normal left ventricular systolic function  EF 55%  No regional wall motion abnormalities  Left atrium mildly dilated  No atrial septal defect or PFO  Mildly dilated right atrium  Mild to moderate MR  Moderate AR  Mild to moderate tricuspid regurgitation  No pericardial effusion  Aortic root normal size  Gastrointestinal: Negative for abdominal pain, blood in stool, constipation, diarrhea, nausea and vomiting  See HPI   no prior colonoscopy  CT scan abdomen 12/2016 + cholelithiasis asymptomatic  Hernia left flank containing fat and nonobstructive bowel  Small subscapular area of enhancement right hepatic lobe  Stable retroperitoneal nodule  hiatal hernia   Stable nephrolithiasis and renal scarring/complex renal cystic mass on the right  Endocrine:        Hypothyroidism on Levothyroxine 75 mcg daily  06/2018 TSH 2 251 Osteoporosis on vitamin D 1,000 IU daily  Genitourinary: Negative for difficulty urinating, dysuria and hematuria  12/2018 abdominal ultrasound markedly enlarged incompletely evaluated right kidney with complex masses  Staghorn calculus  Cholelithiasis  01/2019 CT scan renal protocol no solid enhancing renal or urothelial mass  Multifocal right renal staghorn calculi including a 2 cm pelvic calculus  Interval progressive cystic hydronephrosis of the upper pole  Stable cystic caliectasis  posterior lower pole  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  Left nephrolithiasis 1 cm and less no obstructive uropathy  No acute intra-abdominal or intrapelvic process  Cholelithiasis  history of nephrolithiasis including staghorn calculi followed by urology  03/2018 renal ultrasound large right kidney with upper pole complex cystic mass and lower pole complex solid mass  Large right-sided staghorn calculus  No obvious hydronephrosis  Nonobstructing left renal collecting system calculus  Lower pole cortical scarring  08/2017 nuclear renal flow scan abnormal delayed perfusion and diminished functioning within the right kidney with a split function measuring only 17% on the right  83% on the left  Musculoskeletal: Negative for arthralgias and myalgias  Skin: Positive for rash  Non pruritic rash on lower extremities at that time of admission 01/2019  Diagnosis leukocytoclastic vasculitis  history of melanoma right flank  biopsy left arm 11/2016 SCC in situ  06/2018 biopsy lesion left side of nose BCC  Allergic/Immunologic: Positive for environmental allergies  On Loratadine  Neurological: Negative for dizziness, weakness and headaches  Hematological: Negative for adenopathy  Does not bruise/bleed easily  See HPI  Normocytic anemia dating back to 12/2018  Hgb 10 2   02/2019 anemia studies vitamin B12 656  Folic acid 9 7  Iron low at 20  TIBC 151  % saturation 13  Ferritin 260  Reticulocyte count 1 45  SPEP/immunofixation no monoclonal gammopathy  No rectal bleeding or melena  No prior colonoscopy  Patient refused GI work up  Intolerance to oral iron  She received 3 infusions of IV iron 06/2019 08/2018 CBC WBC 8,510  Hgb 12 8  MCV 89  Platelets 683,975  06/7878 CBC WBC 12,950  Hgb 10 2  MCV 85  Platelets 281,530  remote history of Hodgkin's disease  breast CA s/p left mastectomy with chemotherapy  she completed a course of Arimidex in 2011  followed by oncology  Psychiatric/Behavioral: Negative for dysphoric mood and sleep disturbance  Objective:      /64   Pulse 88   Temp 98 1 °F (36 7 °C)   Resp 16   Ht 5' 2" (1 575 m)   Wt 65 8 kg (145 lb)   LMP  (LMP Unknown)   BMI 26 52 kg/m²          Physical Exam   Constitutional: She is oriented to person, place, and time  She appears well-developed and well-nourished  No distress  HENT:   Mouth/Throat: Oropharynx is clear and moist and mucous membranes are normal  No oral lesions  Normal dentition  Eyes: Pupils are equal, round, and reactive to light  Conjunctivae and EOM are normal  No scleral icterus  Neck: No JVD present  Carotid bruit is not present  No tracheal deviation present  No thyroid mass and no thyromegaly present  Cardiovascular: Normal rate, regular rhythm and normal heart sounds  Exam reveals no gallop  No murmur heard  Pulmonary/Chest: Effort normal and breath sounds normal  No respiratory distress  She has no wheezes  She has no rales  Abdominal: Soft  Bowel sounds are normal  She exhibits no distension, no abdominal bruit and no mass  There is no hepatosplenomegaly  There is no tenderness  There is no rebound and no guarding  Musculoskeletal: Normal range of motion  She exhibits no edema  Lymphadenopathy:     She has no cervical adenopathy  Neurological: She is alert and oriented to person, place, and time  No cranial nerve deficit  Skin: No rash noted  No cyanosis  Nails show no clubbing  Psychiatric: She has a normal mood and affect  Nursing note and vitals reviewed            Recent Results (from the past 672 hour(s))   TSH, 3rd generation with Free T4 reflex    Collection Time: 07/24/19 10:20 AM   Result Value Ref Range    TSH 3RD GENERATON 1 410 0 358 - 3 740 uIU/mL   Lipid panel    Collection Time: 07/24/19 10:20 AM   Result Value Ref Range    Cholesterol 112 50 - 200 mg/dL    Triglycerides 72 <=150 mg/dL    HDL, Direct 37 (L) 40 - 60 mg/dL    LDL Calculated 61 0 - 100 mg/dL    Non-HDL-Chol (CHOL-HDL) 75 mg/dl   Vitamin D 25 hydroxy    Collection Time: 07/24/19 10:28 AM   Result Value Ref Range    Vit D, 25-Hydroxy 28 1 (L) 30 0 - 100 0 ng/mL

## 2019-08-03 RX ORDER — SODIUM CHLORIDE 9 MG/ML
20 INJECTION, SOLUTION INTRAVENOUS ONCE
Status: CANCELLED | OUTPATIENT
Start: 2019-08-09

## 2019-08-07 ENCOUNTER — TELEPHONE (OUTPATIENT)
Dept: HEMATOLOGY ONCOLOGY | Facility: CLINIC | Age: 69
End: 2019-08-07

## 2019-08-09 ENCOUNTER — HOSPITAL ENCOUNTER (OUTPATIENT)
Dept: INFUSION CENTER | Facility: CLINIC | Age: 69
Discharge: HOME | End: 2019-08-09
Payer: MEDICARE

## 2019-08-09 VITALS — RESPIRATION RATE: 16 BRPM | DIASTOLIC BLOOD PRESSURE: 55 MMHG | HEART RATE: 92 BPM | SYSTOLIC BLOOD PRESSURE: 107 MMHG

## 2019-08-09 DIAGNOSIS — E87.1 HYPONATREMIA: Primary | ICD-10-CM

## 2019-08-09 DIAGNOSIS — D64.9 NORMOCYTIC ANEMIA: ICD-10-CM

## 2019-08-09 PROCEDURE — 96374 THER/PROPH/DIAG INJ IV PUSH: CPT

## 2019-08-09 RX ORDER — SODIUM CHLORIDE 9 MG/ML
20 INJECTION, SOLUTION INTRAVENOUS ONCE
Status: COMPLETED | OUTPATIENT
Start: 2019-08-09 | End: 2019-08-09

## 2019-08-09 RX ORDER — SODIUM CHLORIDE 9 MG/ML
20 INJECTION, SOLUTION INTRAVENOUS ONCE
Status: CANCELLED | OUTPATIENT
Start: 2019-08-16

## 2019-08-09 RX ADMIN — IRON SUCROSE 200 MG: 20 INJECTION, SOLUTION INTRAVENOUS at 13:10

## 2019-08-09 RX ADMIN — SODIUM CHLORIDE 20 ML/HR: 0.9 INJECTION, SOLUTION INTRAVENOUS at 13:07

## 2019-08-09 NOTE — PROGRESS NOTES
venofer IVP tolerated without adverse event, pt scheduled for the next two infusion weekly  Will clarify with Dr Gris Schrader office, discrepancy in order    3 appointment requests but 10 venofer doses ordered

## 2019-08-12 ENCOUNTER — OFFICE VISIT (OUTPATIENT)
Dept: CARDIOLOGY CLINIC | Facility: CLINIC | Age: 69
End: 2019-08-12
Payer: MEDICARE

## 2019-08-12 VITALS
HEART RATE: 100 BPM | OXYGEN SATURATION: 97 % | DIASTOLIC BLOOD PRESSURE: 50 MMHG | BODY MASS INDEX: 25.95 KG/M2 | SYSTOLIC BLOOD PRESSURE: 100 MMHG | WEIGHT: 141 LBS | HEIGHT: 62 IN

## 2019-08-12 DIAGNOSIS — I34.0 MITRAL VALVE INSUFFICIENCY, UNSPECIFIED ETIOLOGY: ICD-10-CM

## 2019-08-12 DIAGNOSIS — I42.9 CARDIOMYOPATHY, UNSPECIFIED TYPE (HCC): Primary | ICD-10-CM

## 2019-08-12 DIAGNOSIS — R55 SYNCOPE, UNSPECIFIED SYNCOPE TYPE: ICD-10-CM

## 2019-08-12 DIAGNOSIS — I47.1 PAROXYSMAL SUPRAVENTRICULAR TACHYCARDIA (HCC): ICD-10-CM

## 2019-08-12 DIAGNOSIS — I27.20 PULMONARY HYPERTENSION (HCC): ICD-10-CM

## 2019-08-12 DIAGNOSIS — Z86.711 HISTORY OF PULMONARY EMBOLUS (PE): ICD-10-CM

## 2019-08-12 DIAGNOSIS — I10 ESSENTIAL HYPERTENSION: ICD-10-CM

## 2019-08-12 PROCEDURE — 99215 OFFICE O/P EST HI 40 MIN: CPT | Performed by: NURSE PRACTITIONER

## 2019-08-12 NOTE — PROGRESS NOTES
Cardiology Follow Up    Humble Appiah  1950  [id number]  [address] [facility]ess[facility]  [address]  [phone]    Hospital Follow up       Interval History:    Ms Hoa Jones was at [facility] on 7/27/19 - 7/2919  with syncope  Ms Hola Goldstein this is a had a fairly scale when she began to feel dizzy and nauseated and syncopized  She hit her head  She woke up shortly after and could recall episode  She felt dehydrated  She was taken to [facility] evaluated  CT of the head without intracranial pathology  CT of the chest negative for PE   EKG shows sinus tachycardia troponins negative x2  She was found to have elevated ESR and CRP levels  She was admitted under general medicine for closer observation  Serial EKGs and troponins were stable  No events on telemetry noted  TSH normal   Episode with suspected to be secondary to dehydration  She improved with IV hydration  TTE showed LVEF 45%, mild MR, mild to mod TR, mild aortic valve regurgitation,  PAP 68mmHg  it was suggested she under go outpatient stress testing and cardiac monitoring for arrhythmias  She was started on aspirin for possible CAD continue her beta-blocker and statin  Discharge Hgb 7 9/Hct 25 8  Ms Hoa Jones presents to our office for a follow up visit  She is accompanied by her   Merlene Leon admits to fatigue  She had experienced anju LE edema and rash  She related this to the Eliquis and decreased the dose of Eliquis 5mg BID to daily  She is undergoing iron infusions  Merlene Leon admits to an episode of mid sternal chest tightness with shortness of breath in am after getting out of bed  She is not taking her Bystolic daily  She is holding it on days her BP is low or will take it later in the day when her BP improves  She has lst intentional 26 pounds over the past couple of months  1/2019 PE on Eliquis  DVT  Breast CA sp Mastectomy in 2006      Hodgkins Lymphoma in 1995  Cervical Cancer 1983  Hypothyroidism  Hypertension  Hyperlipidemia 7/24/19 Cholesterol 112, Trglycerides 72, HDL 37, LDL 61   Renal status bilateral staghorn calculi status post prior lithotrisie  Patient Active Problem List   Diagnosis    Bilateral kidney stones    Hypertension    Hyperlipidemia    Melanoma of skin of trunk ([facility] [facility] )    Mitral regurgitation    Pulmonary hypertension (HCC)    Acquired complex renal cyst    Elevated serum protein level    Hypothyroidism    Osteoporosis    Vitamin D deficiency    Basal cell carcinoma (BCC) of left side of nose    BCC (basal cell carcinoma), face    History of breast cancer    History of Hodgkin's disease    History of basal cell cancer    Normocytic anemia    Hyponatremia    Elevated liver enzymes    Cystitis    Paroxysmal supraventricular tachycardia (HCC)    Vasculitis (HCC)    CKD (chronic kidney disease), stage III (HCC)    History of pulmonary embolus (PE)    History of DVT of lower extremity    Non-rheumatic tricuspid valve insufficiency    Thrombocytosis ([facility] )    Nonrheumatic aortic valve insufficiency    Cervical cancer ([facility] [facility] )    Syncope     Past Medical History:   Diagnosis Date    ENEDELIA (acute kidney injury) ([facility] [facility] ) 1/12/2019    Anemia     BCC (basal cell carcinoma of skin)     facial    Breast cancer ([facility] [facility] ) 2006    s/p mastectomy     Calculus, kidney 2013    Cervical cancer ([facility] ) 1983    Disease of thyroid gland     Ganglion     Last Assessed:7/9/13    Heart murmur     mitral valve regurgitation    Hodgkin disease ([facility] )     Hydronephrosis 2013    Hyperlipidemia     Hypertension     NSTEMI (non-ST elevated myocardial infarction) ([facility] [facility] ) 1/12/2019    Osteopenia     Last Assessed:7/9/13    Osteoporosis     Paroxysmal supraventricular tachycardia ([address] )     Renal calculi     Last Assessed:3/19/14    Renal cyst     Shoulder impingement     Last Assessed:13    UTI (urinary tract infection) 2013    Vasculitis ([facility] [address] ) 2019     Social History     Socioeconomic History    Marital status: /Civil Union     Spouse name: Not on file    Number of children: Not on file    Years of education: Not on file    Highest education level: Not on file   Occupational History    Not on file   Social Needs    Financial resource strain: Not on file    Food insecurity:     Worry: Not on file     Inability: Not on file    Transportation needs:     Medical: Not on file     Non-medical: Not on file   Tobacco Use    Smoking status: Former Smoker     Last attempt to quit:      Years since quittin 6    Smokeless tobacco: Never Used   Substance and Sexual Activity    Alcohol use: Yes     Comment: caffeine use; social drinker-1 beer every 2/3 months    Drug use: No    Sexual activity: Not on file   Lifestyle    Physical activity:     Days per week: Not on file     Minutes per session: Not on file    Stress: Not on file   Relationships    Social connections:     Talks on phone: Not on file     Gets together: Not on file     Attends Alevism service: Not on file     Active member of club or organization: Not on file     Attends meetings of clubs or organizations: Not on file     Relationship status: Not on file    Intimate partner violence:     Fear of current or ex partner: Not on file     Emotionally abused: Not on file     Physically abused: Not on file     Forced sexual activity: Not on file   Other Topics Concern    Not on file   Social History Narrative    Advance directive declined by patient    Drinks coffee    Exercise habits      Family History   Problem Relation Age of Onset    Leukemia Mother     Colon cancer Paternal Grandmother     Heart disease Family     Heart attack Father     Coronary artery disease Father         stent- five    Hypertension Father     Stroke Neg Hx     Anuerysm Neg Hx     Clotting disorder Neg Hx     Arrhythmia Neg Hx     Heart failure Neg Hx     Hyperlipidemia Neg Hx      Past Surgical History:   Procedure Laterality Date    APPENDECTOMY      BREAST RECONSTRUCTION      COMPLEX WOUND CLOSURE TO EXTREMITY Left 10/9/2018    Procedure: COMPLEX CLOSURE RECONSTRUCTION;  Surgeon: Fawad Fuentes MD;  [facility];  Service: Plastics    CYSTOSCOPY W/ RETROGRADES  2009    CYSTOSCOPY W/ URETEROSCOPY  2002    EXTRACORPOREAL SHOCK WAVE LITHOTRIPSY Right 2005    FLAP LOCAL HEAD / NECK Left 10/9/2018    Procedure: FLAP RECONSTRUCTION;  Surgeon: aFwad Fuentes MD;  [facility];  Service: Plastics    FULL THICKNESS SKIN GRAFT Left 10/9/2018    Procedure: FULL THICKNESS SKIN GRAFT RECONSTRUCTION;  Surgeon: Fawad Fuentes MD;  [facility];  Service: Plastics    HYSTERECTOMY      1983-cervical cancer    INTRAOPERATIVE RADIATION THERAPY ([facility])      Radiation Therapy    KIDNEY SURGERY      MASTECTOMY Left 2006    OTHER SURGICAL HISTORY      Chemotherapeutics    SENTINEL LYMPH NODE BIOPSY      SPLENECTOMY      1985-Hodgkin's Disease    SQUAMOUS CELL CARCINOMA EXCISION Left 10/9/2018    Procedure: NOSE/CHEEK [address] EXCISION; FROZEN SECTION;  Surgeon: Fawad Fuentes MD;  [facility];  Service: Plastics       Current Outpatient Medications:     apixaban (ELIQUIS) 5 mg, Take 1 tablet (5 mg total) by mouth 2 (two) times a day, Disp: 180 tablet, Rfl: 3    cholecalciferol (VITAMIN D3) 1,000 units tablet, Take 1,000 Units by mouth daily  , Disp: , Rfl:     ferrous sulfate 325 (65 Fe) mg tablet, Take 325 mg by mouth daily with breakfast, Disp: , Rfl:     levothyroxine 75 mcg tablet, TAKE 1 TABLET BY MOUTH DAILY, Disp: 90 tablet, Rfl: 3    Loratadine (CLARITIN) 10 MG CAPS, Take by mouth daily , Disp: , Rfl:     nebivolol (BYSTOLIC) 5 mg tablet, Take 1 tablet (5 mg total) by mouth daily for 183 days, Disp: 90 tablet, Rfl: 3    simvastatin (ZOCOR) 40 mg tablet, TAKE 1 TABLET BY MOUTH EVERY DAY, Disp: 90 tablet, Rfl: 3  Allergies   Allergen Reactions    Ciprofloxacin Other (See Comments)     Reaction Date: [id number]; Hives/Uticaria    Sulfamethoxazole-Trimethoprim Hives     Patient does not remember rx       Labs:  Orders Only on 07/24/2019   Component Date Value    TSH 3RD GENERATON 07/24/2019 1 410     Cholesterol 07/24/2019 112     Triglycerides 07/24/2019 72     HDL, Direct 07/24/2019 37*    LDL Calculated 07/24/2019 61     Non-HDL-Chol (CHOL-HDL) 07/24/2019 75    Appointment on 07/24/2019   Component Date Value    Vit D, 25-Hydroxy 07/24/2019 28 1*   Lab Requisition on 06/17/2019   Component Date Value    Case Report 06/17/2019                      Value:Surgical Pathology Report                         Case: [id number]                                   Authorizing Provider:  Darrin Xiao  Collected:           06/17/2019 0000              Ordering Location:     [address] [place] [address]      Received:            06/17/2019 [facility]                                                                                  Laboratory                                                                   Pathologist:           Azalea Martinez MD                                                                  Specimen:    Back, skin, right upper back                                                               Final Diagnosis 06/17/2019                      Value: This result contains rich text formatting which cannot be displayed here   Additional Information 06/17/2019                      Value: This result contains rich text formatting which cannot be displayed here  Natalya Cifuentes Gross Description 06/17/2019                      Value: This result contains rich text formatting which cannot be displayed here  Imaging: No results found      Review of Systems:  Review of Systems   Constitutional: Positive for fatigue  Physical Exam:  Physical Exam   Constitutional: She is oriented to person, place, and time  She appears well-developed  HENT:   Head: Normocephalic  Eyes: Pupils are equal, round, and reactive to light  Neck: Normal range of motion  Neck supple  Cardiovascular: Normal rate and regular rhythm  Pulmonary/Chest: Effort normal and breath sounds normal    Abdominal: Soft  Bowel sounds are normal    Musculoskeletal: Normal range of motion  She exhibits edema  1+ pedal edema    Neurological: She is alert and oriented to person, place, and time  Skin: Skin is warm and dry  Capillary refill takes less than 2 seconds  Psychiatric: She has a normal mood and affect  Vitals reviewed  Discussion/Summary:  1  Syncope- felt to be secondary to dehydration, no further episodes    2  1/2019 PE on Eliquis 5mg po BID, Philomena Conte decreased dose to 5mg daily due to adverse reaction, change Eliquis to 2 5mg BID  3  CM LVEF 45%, admitted to an episode of chest pain, last TTE nuclear RX stress test R/O ischemia   3  Hypothyroidism- continue on Levothyroxine 75 mcg daily   4  Iron deficiency anemia receiving iron infusion, CBC in near future   5  Hyperlipidemia 7/24/19 Cholesterol 112, Trglycerides 72, HDL 37, LDL 61 continue on Zocor  6   Hx of left breast cancer follows with Dr Naida Joy

## 2019-08-16 ENCOUNTER — HOSPITAL ENCOUNTER (OUTPATIENT)
Dept: INFUSION CENTER | Facility: CLINIC | Age: 69
Discharge: HOME | End: 2019-08-16
Payer: MEDICARE

## 2019-08-16 VITALS
OXYGEN SATURATION: 97 % | SYSTOLIC BLOOD PRESSURE: 102 MMHG | TEMPERATURE: 98.3 F | HEART RATE: 97 BPM | RESPIRATION RATE: 16 BRPM | DIASTOLIC BLOOD PRESSURE: 60 MMHG

## 2019-08-16 DIAGNOSIS — E87.1 HYPONATREMIA: Primary | ICD-10-CM

## 2019-08-16 DIAGNOSIS — D64.9 NORMOCYTIC ANEMIA: ICD-10-CM

## 2019-08-16 PROCEDURE — 96374 THER/PROPH/DIAG INJ IV PUSH: CPT

## 2019-08-16 RX ORDER — SODIUM CHLORIDE 9 MG/ML
20 INJECTION, SOLUTION INTRAVENOUS ONCE
Status: CANCELLED | OUTPATIENT
Start: 2019-08-23

## 2019-08-16 RX ORDER — SODIUM CHLORIDE 9 MG/ML
20 INJECTION, SOLUTION INTRAVENOUS ONCE
Status: COMPLETED | OUTPATIENT
Start: 2019-08-16 | End: 2019-08-16

## 2019-08-16 RX ADMIN — SODIUM CHLORIDE 20 ML/HR: 0.9 INJECTION, SOLUTION INTRAVENOUS at 15:02

## 2019-08-16 RX ADMIN — IRON SUCROSE 200 MG: 20 INJECTION, SOLUTION INTRAVENOUS at 15:02

## 2019-08-23 ENCOUNTER — HOSPITAL ENCOUNTER (OUTPATIENT)
Dept: INFUSION CENTER | Facility: CLINIC | Age: 69
Discharge: HOME | End: 2019-08-23
Payer: MEDICARE

## 2019-08-23 VITALS
HEART RATE: 95 BPM | OXYGEN SATURATION: 97 % | RESPIRATION RATE: 16 BRPM | TEMPERATURE: 98 F | DIASTOLIC BLOOD PRESSURE: 53 MMHG | SYSTOLIC BLOOD PRESSURE: 111 MMHG

## 2019-08-23 DIAGNOSIS — D64.9 NORMOCYTIC ANEMIA: ICD-10-CM

## 2019-08-23 DIAGNOSIS — E87.1 HYPONATREMIA: Primary | ICD-10-CM

## 2019-08-23 PROCEDURE — 96374 THER/PROPH/DIAG INJ IV PUSH: CPT

## 2019-08-23 RX ORDER — SODIUM CHLORIDE 9 MG/ML
20 INJECTION, SOLUTION INTRAVENOUS ONCE
Status: COMPLETED | OUTPATIENT
Start: 2019-08-23 | End: 2019-08-23

## 2019-08-23 RX ORDER — SODIUM CHLORIDE 9 MG/ML
20 INJECTION, SOLUTION INTRAVENOUS ONCE
Status: CANCELLED | OUTPATIENT
Start: 2019-08-30

## 2019-08-23 RX ADMIN — SODIUM CHLORIDE 20 ML/HR: 0.9 INJECTION, SOLUTION INTRAVENOUS at 14:08

## 2019-08-23 RX ADMIN — IRON SUCROSE 200 MG: 20 INJECTION, SOLUTION INTRAVENOUS at 14:19

## 2019-08-23 NOTE — PROGRESS NOTES
Pt offers no complaints, venofer tolerated without adverse event  Pt to follow up with Dr Esme Rhodes as scheduled  Previously requested order to be corrected as patient and office confirmed patient only to receive 3 doses of weekly venofer, however, plan is ordered for 10 doses    Appointment request was canceled but order remains active, plan sent to Dr Esme Rhodes in basket to d/c plan

## 2019-08-29 ENCOUNTER — TELEPHONE (OUTPATIENT)
Dept: FAMILY MEDICINE CLINIC | Facility: CLINIC | Age: 69
End: 2019-08-29

## 2019-08-29 NOTE — TELEPHONE ENCOUNTER
Patient called stating She started Eliquis in January 10 mg but Cardiologist decreased it to 5 mg  She stated just from sitting (painting a picture)she noticed large red spots about 4 inches big in the  back of her legs and wrapped around the  inner part of her  Legs/ thigh  She stated it was  Also  really hot to touch  Patient would like a return call  She would like to know if she should continue with medication or be seen for apt  Please advise

## 2019-08-29 NOTE — TELEPHONE ENCOUNTER
Spoke with patient, gave message    She is going to have blood work done on Tuesday already ordered by cardiology, which includes CBC with diff

## 2019-08-29 NOTE — TELEPHONE ENCOUNTER
Suspect skin lesions secondary to Eliquis which can cause subcutaneous bleeding  She should have a repeat CBC with platelet count

## 2019-08-30 ENCOUNTER — TRANSCRIBE ORDERS (OUTPATIENT)
Dept: LAB | Facility: CLINIC | Age: 69
End: 2019-08-30

## 2019-08-30 ENCOUNTER — APPOINTMENT (OUTPATIENT)
Dept: LAB | Facility: CLINIC | Age: 69
End: 2019-08-30
Payer: MEDICARE

## 2019-08-30 LAB
ANION GAP SERPL CALCULATED.3IONS-SCNC: 6 MMOL/L (ref 4–13)
BASOPHILS # BLD AUTO: 0.05 THOUSANDS/ΜL (ref 0–0.1)
BASOPHILS NFR BLD AUTO: 1 % (ref 0–1)
BUN SERPL-MCNC: 19 MG/DL (ref 5–25)
CALCIUM SERPL-MCNC: 8.7 MG/DL (ref 8.3–10.1)
CHLORIDE SERPL-SCNC: 102 MMOL/L (ref 100–108)
CO2 SERPL-SCNC: 28 MMOL/L (ref 21–32)
CREAT SERPL-MCNC: 1.3 MG/DL (ref 0.6–1.3)
EOSINOPHIL # BLD AUTO: 0.36 THOUSAND/ΜL (ref 0–0.61)
EOSINOPHIL NFR BLD AUTO: 4 % (ref 0–6)
ERYTHROCYTE [DISTWIDTH] IN BLOOD BY AUTOMATED COUNT: 16 % (ref 11.6–15.1)
FERRITIN SERPL-MCNC: 403 NG/ML (ref 8–388)
GFR SERPL CREATININE-BSD FRML MDRD: 41 ML/MIN/1.73SQ M
GLUCOSE P FAST SERPL-MCNC: 88 MG/DL (ref 65–99)
HCT VFR BLD AUTO: 30.8 % (ref 34.8–46.1)
HGB BLD-MCNC: 9.4 G/DL (ref 11.5–15.4)
IMM GRANULOCYTES # BLD AUTO: 0.04 THOUSAND/UL (ref 0–0.2)
IMM GRANULOCYTES NFR BLD AUTO: 1 % (ref 0–2)
IRON SATN MFR SERPL: 21 %
IRON SERPL-MCNC: 35 UG/DL (ref 50–170)
LYMPHOCYTES # BLD AUTO: 1.04 THOUSANDS/ΜL (ref 0.6–4.47)
LYMPHOCYTES NFR BLD AUTO: 13 % (ref 14–44)
MCH RBC QN AUTO: 27.6 PG (ref 26.8–34.3)
MCHC RBC AUTO-ENTMCNC: 30.5 G/DL (ref 31.4–37.4)
MCV RBC AUTO: 90 FL (ref 82–98)
MONOCYTES # BLD AUTO: 0.67 THOUSAND/ΜL (ref 0.17–1.22)
MONOCYTES NFR BLD AUTO: 8 % (ref 4–12)
NEUTROPHILS # BLD AUTO: 6.04 THOUSANDS/ΜL (ref 1.85–7.62)
NEUTS SEG NFR BLD AUTO: 73 % (ref 43–75)
NRBC BLD AUTO-RTO: 0 /100 WBCS
PLATELET # BLD AUTO: 546 THOUSANDS/UL (ref 149–390)
PMV BLD AUTO: 8.9 FL (ref 8.9–12.7)
POTASSIUM SERPL-SCNC: 4.4 MMOL/L (ref 3.5–5.3)
RBC # BLD AUTO: 3.41 MILLION/UL (ref 3.81–5.12)
SODIUM SERPL-SCNC: 136 MMOL/L (ref 136–145)
TIBC SERPL-MCNC: 169 UG/DL (ref 250–450)
WBC # BLD AUTO: 8.2 THOUSAND/UL (ref 4.31–10.16)

## 2019-08-30 PROCEDURE — 83540 ASSAY OF IRON: CPT | Performed by: FAMILY MEDICINE

## 2019-08-30 PROCEDURE — 85025 COMPLETE CBC W/AUTO DIFF WBC: CPT | Performed by: FAMILY MEDICINE

## 2019-08-30 PROCEDURE — 82728 ASSAY OF FERRITIN: CPT | Performed by: FAMILY MEDICINE

## 2019-08-30 PROCEDURE — 83550 IRON BINDING TEST: CPT | Performed by: FAMILY MEDICINE

## 2019-08-30 PROCEDURE — 80048 BASIC METABOLIC PNL TOTAL CA: CPT | Performed by: NURSE PRACTITIONER

## 2019-08-30 PROCEDURE — 36415 COLL VENOUS BLD VENIPUNCTURE: CPT | Performed by: FAMILY MEDICINE

## 2019-08-31 ENCOUNTER — TELEPHONE (OUTPATIENT)
Dept: FAMILY MEDICINE CLINIC | Facility: CLINIC | Age: 69
End: 2019-08-31

## 2019-08-31 NOTE — TELEPHONE ENCOUNTER
Call re labs  Hgb or red cell count 9 4  This improved from 8 5  Normal range 11 5 to 15 4  Iron level 35 improved from 20  Normal range 50 to 170  She just completed 3 iron infusions  Plan she is scheduled to see hematology at the end of September  She can wait until she sees hematology and repeat CBC prior to that visit  She could get additional iron infusions unless she wants to wait until her hematology appointment  Let me know what she decides  Recent Results (from the past 336 hour(s))   CBC and differential    Collection Time: 08/30/19  8:34 AM   Result Value Ref Range    WBC 8 20 4  31 - 10 16 Thousand/uL    RBC 3 41 (L) 3 81 - 5 12 Million/uL    Hemoglobin 9 4 (L) 11 5 - 15 4 g/dL    Hematocrit 30 8 (L) 34 8 - 46 1 %    MCV 90 82 - 98 fL    MCH 27 6 26 8 - 34 3 pg    MCHC 30 5 (L) 31 4 - 37 4 g/dL    RDW 16 0 (H) 11 6 - 15 1 %    MPV 8 9 8 9 - 12 7 fL    Platelets 035 (H) 945 - 390 Thousands/uL    nRBC 0 /100 WBCs    Neutrophils Relative 73 43 - 75 %    Immat GRANS % 1 0 - 2 %    Lymphocytes Relative 13 (L) 14 - 44 %    Monocytes Relative 8 4 - 12 %    Eosinophils Relative 4 0 - 6 %    Basophils Relative 1 0 - 1 %    Neutrophils Absolute 6 04 1 85 - 7 62 Thousands/µL    Immature Grans Absolute 0 04 0 00 - 0 20 Thousand/uL    Lymphocytes Absolute 1 04 0 60 - 4 47 Thousands/µL    Monocytes Absolute 0 67 0 17 - 1 22 Thousand/µL    Eosinophils Absolute 0 36 0 00 - 0 61 Thousand/µL    Basophils Absolute 0 05 0 00 - 0 10 Thousands/µL   Basic metabolic panel    Collection Time: 08/30/19  8:34 AM   Result Value Ref Range    Sodium 136 136 - 145 mmol/L    Potassium 4 4 3 5 - 5 3 mmol/L    Chloride 102 100 - 108 mmol/L    CO2 28 21 - 32 mmol/L    ANION GAP 6 4 - 13 mmol/L    BUN 19 5 - 25 mg/dL    Creatinine 1 30 0 60 - 1 30 mg/dL    Glucose, Fasting 88 65 - 99 mg/dL    Calcium 8 7 8 3 - 10 1 mg/dL    eGFR 41 ml/min/1 73sq m   Iron Saturation %    Collection Time: 08/30/19  8:34 AM   Result Value Ref Range Iron Saturation 21 %    TIBC 169 (L) 250 - 450 ug/dL    Iron 35 (L) 50 - 170 ug/dL   Ferritin    Collection Time: 08/30/19  8:34 AM   Result Value Ref Range    Ferritin 403 (H) 8 - 388 ng/mL

## 2019-09-09 PROBLEM — D50.9 IRON DEFICIENCY ANEMIA: Status: ACTIVE | Noted: 2019-09-09

## 2019-09-10 NOTE — TELEPHONE ENCOUNTER
Called to schedule infusions, per infusion center, order needs to state location of infusion  Will inform Dr Migue Jacobo and once corrected, will schedule

## 2019-09-11 ENCOUNTER — HOSPITAL ENCOUNTER (OUTPATIENT)
Dept: NON INVASIVE DIAGNOSTICS | Facility: CLINIC | Age: 69
Discharge: HOME | End: 2019-09-11
Payer: MEDICARE

## 2019-09-11 DIAGNOSIS — I42.9 CARDIOMYOPATHY, UNSPECIFIED TYPE (HCC): ICD-10-CM

## 2019-09-11 DIAGNOSIS — I10 ESSENTIAL HYPERTENSION: ICD-10-CM

## 2019-09-11 PROCEDURE — 78452 HT MUSCLE IMAGE SPECT MULT: CPT

## 2019-09-11 PROCEDURE — 93017 CV STRESS TEST TRACING ONLY: CPT

## 2019-09-11 PROCEDURE — 93018 CV STRESS TEST I&R ONLY: CPT | Performed by: INTERNAL MEDICINE

## 2019-09-11 PROCEDURE — 78452 HT MUSCLE IMAGE SPECT MULT: CPT | Performed by: INTERNAL MEDICINE

## 2019-09-11 PROCEDURE — 93016 CV STRESS TEST SUPVJ ONLY: CPT | Performed by: INTERNAL MEDICINE

## 2019-09-11 PROCEDURE — A9502 TC99M TETROFOSMIN: HCPCS

## 2019-09-11 RX ADMIN — REGADENOSON 0.4 MG: 0.08 INJECTION, SOLUTION INTRAVENOUS at 13:32

## 2019-09-12 LAB
CHEST PAIN STATEMENT: NORMAL
MAX DIASTOLIC BP: 62 MMHG
MAX HEART RATE: 121 BPM
MAX PREDICTED HEART RATE: 149 BPM
MAX. SYSTOLIC BP: 126 MMHG
PROTOCOL NAME: NORMAL
REASON FOR TERMINATION: NORMAL
TARGET HR FORMULA: NORMAL
TEST INDICATION: NORMAL
TIME IN EXERCISE PHASE: NORMAL

## 2019-09-12 NOTE — TELEPHONE ENCOUNTER
Dr Delmar Roth place [facility] in [place]  Still some issues with order   Dr Delmar Roth will call [facility] for assistance

## 2019-09-16 ENCOUNTER — OFFICE VISIT (OUTPATIENT)
Dept: CARDIOLOGY CLINIC | Facility: CLINIC | Age: 69
End: 2019-09-16
Payer: MEDICARE

## 2019-09-16 ENCOUNTER — TELEPHONE (OUTPATIENT)
Dept: CARDIOLOGY CLINIC | Facility: CLINIC | Age: 69
End: 2019-09-16

## 2019-09-16 VITALS
WEIGHT: 141 LBS | SYSTOLIC BLOOD PRESSURE: 120 MMHG | HEIGHT: 62 IN | DIASTOLIC BLOOD PRESSURE: 64 MMHG | BODY MASS INDEX: 25.95 KG/M2 | HEART RATE: 100 BPM

## 2019-09-16 DIAGNOSIS — I35.1 NONRHEUMATIC AORTIC VALVE INSUFFICIENCY: Primary | ICD-10-CM

## 2019-09-16 PROCEDURE — 1124F ACP DISCUSS-NO DSCNMKR DOCD: CPT | Performed by: INTERNAL MEDICINE

## 2019-09-16 PROCEDURE — 99214 OFFICE O/P EST MOD 30 MIN: CPT | Performed by: INTERNAL MEDICINE

## 2019-09-16 NOTE — PROGRESS NOTES
Cardiology Follow Up    Kasey Tatuml  1950  [id number]  [facility] [place]  [address]  [address]  [phone]  [phone]    No diagnosis found  Interval History: Followup for aortic regurgitation  She lost consciousness after a [employer] game over the summer  SHe was admitted to [facility]  Her symptoms were c/w vasovagal syncope  She has had severe bruising on eliquis  Currently only taking it once daily  Problem List     Hypertension    Hyperlipidemia    Hypothyroidism    Basal cell carcinoma (BCC) of left side of nose    History of Hodgkin's disease    Paroxysmal supraventricular tachycardia (HCC)    Vasculitis (HCC)    CKD (chronic kidney disease), stage III (HCC)    History of pulmonary embolus (PE)    History of DVT of lower extremity    Nonrheumatic aortic valve insufficiency    Bilateral kidney stones    Melanoma of skin of trunk (HCC)    Mitral regurgitation    Pulmonary hypertension ([address] )    Acquired complex renal cyst    Elevated serum protein level    Osteoporosis    Vitamin D deficiency    BCC (basal cell carcinoma), face    Overview Signed 8/13/2018  3:38 PM by Javi Campos MA     Added automatically from request for surgery [id number]         History of breast cancer    History of basal cell cancer    Normocytic anemia    Hyponatremia    Elevated liver enzymes    Cystitis    Non-rheumatic tricuspid valve insufficiency    Thrombocytosis ([address] )    Cervical cancer ([address] )    Syncope    Overview Signed 7/31/2019  1:52 PM by Chuy Winkler MD     Last Assessment & Plan:   Pre-syncopal symptoms leading to short syncopal episode without post-ictal period  Suspect dehydration/hypovolemia  Infectious, cardiopulm or neurologic cause less likely  No arrhythmias or AV block on ECG or telemetry thus far  No PE on CTA  No clinical signs of seizure   No acute pathology or mass on CTH   - s/p IVF; encourage PO intake  - continue on telemetry  - TTE c/f new ischemic cardiomyopathy, but no acute ACS event - unlikely cause of syncope on presentation         Iron deficiency anemia        Past Medical History:   Diagnosis Date    ENEDELIA (acute kidney injury) ([place] [address] ) 2019    Anemia     BCC (basal cell carcinoma of skin)     facial    Breast cancer ([place] [address] )     s/p mastectomy     Calculus, kidney 2013    Cervical cancer ([place] [address] ) 1983    Disease of thyroid gland     Ganglion     Last Assessed:13    Heart murmur     mitral valve regurgitation    Hodgkin disease ([place] [address] )     Hydronephrosis     Hyperlipidemia     Hypertension     NSTEMI (non-ST elevated myocardial infarction) ([place] [address] ) 2019    Osteopenia     Last Assessed:13    Osteoporosis     Paroxysmal supraventricular tachycardia (HCC)     Renal calculi     Last Assessed:3/19/14    Renal cyst     Shoulder impingement     Last Assessed:13    UTI (urinary tract infection) 2013    Vasculitis ([place] [address] ) 2019     Social History     Socioeconomic History    Marital status: /Civil Union     Spouse name: Not on file    Number of children: Not on file    Years of education: Not on file    Highest education level: Not on file   Occupational History    Not on file   Social Needs    Financial resource strain: Not on file    Food insecurity:     Worry: Not on file     Inability: Not on file    Transportation needs:     Medical: Not on file     Non-medical: Not on file   Tobacco Use    Smoking status: Former Smoker     Last attempt to quit: 2011     Years since quittin 7    Smokeless tobacco: Never Used   Substance and Sexual Activity    Alcohol use: Yes     Comment: caffeine use; social drinker-1 beer every 2/3 months    Drug use: No    Sexual activity: Not on file   Lifestyle    Physical activity:     Days per week: Not on file     Minutes per session: Not on file    Stress: Not on file   Relationships    Social connections:     Talks on phone: Not on file     Gets together: Not on file     Attends [employer] service: Not on file     Active member of club or organization: Not on file     Attends meetings of clubs or organizations: Not on file     Relationship status: Not on file    Intimate partner violence:     Fear of current or ex partner: Not on file     Emotionally abused: Not on file     Physically abused: Not on file     Forced sexual activity: Not on file   Other Topics Concern    Not on file   Social History Narrative    Advance directive declined by patient    Drinks coffee    Exercise habits      Family History   Problem Relation Age of Onset    Leukemia Mother     Colon cancer Paternal Grandmother     Heart disease Family     Heart attack Father     Coronary artery disease Father         stent- five    Hypertension Father     Stroke Neg Hx     Anuerysm Neg Hx     Clotting disorder Neg Hx     Arrhythmia Neg Hx     Heart failure Neg Hx     Hyperlipidemia Neg Hx      Past Surgical History:   Procedure Laterality Date    APPENDECTOMY      BREAST RECONSTRUCTION      COMPLEX WOUND CLOSURE TO EXTREMITY Left 10/9/2018    Procedure: COMPLEX CLOSURE RECONSTRUCTION;  Surgeon: Jake Diaz MD;  [facility];  Service: Plastics    CYSTOSCOPY W/ RETROGRADES  2009    CYSTOSCOPY W/ URETEROSCOPY  2002    EXTRACORPOREAL SHOCK WAVE LITHOTRIPSY Right 2005    FLAP LOCAL HEAD / NECK Left 10/9/2018    Procedure: FLAP RECONSTRUCTION;  Surgeon: Jake Diaz MD;  [facility];  Service: Plastics    FULL THICKNESS SKIN GRAFT Left 10/9/2018    Procedure: FULL THICKNESS SKIN GRAFT RECONSTRUCTION;  Surgeon: Jake Diaz MD;  [facility];  Service: Plastics    HYSTERECTOMY      1983-cervical cancer    INTRAOPERATIVE RADIATION THERAPY ([facility])      Radiation Therapy    KIDNEY SURGERY      MASTECTOMY Left 2006    OTHER SURGICAL HISTORY      Chemotherapeutics    SENTINEL LYMPH NODE BIOPSY      SPLENECTOMY      1985-Hodgkin's Disease    SQUAMOUS CELL CARCINOMA EXCISION Left 10/9/2018    Procedure: NOSE/CHEEK BCC EXCISION; FROZEN SECTION;  Surgeon: Eugenia Ortez MD;  [facility];  Service: Plastics       Current Outpatient Medications:     apixaban (ELIQUIS) 2 5 mg, Take 1 tablet (2 5 mg total) by mouth 2 (two) times a day (Patient taking differently: Take 2 5 mg by mouth daily ), Disp: 60 tablet, Rfl: 3    cholecalciferol (VITAMIN D3) 1,000 units tablet, Take 1,000 Units by mouth daily  , Disp: , Rfl:     levothyroxine 75 mcg tablet, TAKE 1 TABLET BY MOUTH DAILY, Disp: 90 tablet, Rfl: 3    Loratadine (CLARITIN) 10 MG CAPS, Take by mouth as needed , Disp: , Rfl:     nebivolol (BYSTOLIC) 5 mg tablet, Take 1 tablet (5 mg total) by mouth daily for 183 days, Disp: 90 tablet, Rfl: 3    simvastatin (ZOCOR) 40 mg tablet, TAKE 1 TABLET BY MOUTH EVERY DAY, Disp: 90 tablet, Rfl: 3    ferrous sulfate 325 (65 Fe) mg tablet, Take 325 mg by mouth daily with breakfast, Disp: , Rfl:   Allergies   Allergen Reactions    Ciprofloxacin Other (See Comments)     Reaction Date: [id number];    Hives/Uticaria    Sulfamethoxazole-Trimethoprim Hives     Patient does not remember rx       Labs:     Chemistry        Component Value Date/Time    K 4 4 08/30/2019 0834     08/30/2019 0834    CO2 28 08/30/2019 0834    BUN 19 08/30/2019 0834    CREATININE 1 30 08/30/2019 0834        Component Value Date/Time    CALCIUM 8 7 08/30/2019 0834    ALKPHOS 218 (H) 01/11/2019 2319    AST 50 (H) 01/11/2019 2319    ALT 45 01/11/2019 2319            No results found for: CHOL  Lab Results   Component Value Date    HDL 37 (L) 07/24/2019    HDL 34 (L) 01/12/2019    HDL 51 06/15/2018     Lab Results   Component Value Date    LDLCALC 61 07/24/2019    LDLCALC 61 01/12/2019    LDLCALC 92 06/15/2018     Lab Results   Component Value Date    TRIG 72 07/24/2019    TRIG 99 01/12/2019    TRIG 107 06/15/2018     No results found for: CHOLHDL    Imaging: No results found       Review of Systems   Constitution: Negative  HENT: Negative  Eyes: Negative  Cardiovascular: Negative  Respiratory: Negative  Endocrine: Negative  Hematologic/Lymphatic: Negative  Skin: Negative  Musculoskeletal: Negative  Gastrointestinal: Negative  Genitourinary: Negative  Neurological: Negative  Psychiatric/Behavioral: Negative  Allergic/Immunologic: Negative  Vitals:    09/16/19 1314   BP: 120/64   Pulse: 100           Physical Exam   Constitutional: She is oriented to person, place, and time  No distress  HENT:   Mouth/Throat: No oropharyngeal exudate  Eyes: No scleral icterus  Neck: No JVD present  Cardiovascular: Normal rate and regular rhythm  Exam reveals no friction rub  No murmur heard  Pulmonary/Chest: Effort normal and breath sounds normal  No stridor  No respiratory distress  She has no wheezes  Abdominal: Soft  Bowel sounds are normal  She exhibits no distension  There is no tenderness  There is no guarding  Musculoskeletal: She exhibits no edema  Neurological: She is alert and oriented to person, place, and time  Skin: Skin is warm and dry  She is not diaphoretic  Psychiatric: She has a normal mood and affect  Her behavior is normal        Discussion/Summary: Moderate aortic regurgitation: This has been stable will continue to follow  Lexiscan stress test was normal   Anticoagulation is for pulmonary emboli earlier in the year  Will get images of last echo  Report was sig  For severe PH  The patient was counseled regarding diagnostic results, instructions for management, risk factor reductions, impressions  total time of encounter was 25 minutes and 15 minutes was spent counseling

## 2019-09-16 NOTE — TELEPHONE ENCOUNTER
----- Message from Suri Soni, [address] sent at 9/12/2019  9:53 AM EDT -----  Please call Kae Mcallister and inform her the stress test was normal      Called pt and lft msg re: normal ST rslts

## 2019-09-16 NOTE — TELEPHONE ENCOUNTER
Patient called to check the status of her iron infusions at [facility]  She has not heard from our office or the infusion center

## 2019-09-17 ENCOUNTER — OFFICE VISIT (OUTPATIENT)
Dept: UROLOGY | Facility: MEDICAL CENTER | Age: 69
End: 2019-09-17
Payer: MEDICARE

## 2019-09-17 VITALS
BODY MASS INDEX: 26.4 KG/M2 | SYSTOLIC BLOOD PRESSURE: 118 MMHG | WEIGHT: 142 LBS | DIASTOLIC BLOOD PRESSURE: 64 MMHG | HEART RATE: 52 BPM

## 2019-09-17 DIAGNOSIS — N28.1 ACQUIRED COMPLEX RENAL CYST: ICD-10-CM

## 2019-09-17 DIAGNOSIS — N20.0 BILATERAL KIDNEY STONES: Primary | ICD-10-CM

## 2019-09-17 LAB
SL AMB  POCT GLUCOSE, UA: ABNORMAL
SL AMB LEUKOCYTE ESTERASE,UA: ABNORMAL
SL AMB POCT BILIRUBIN,UA: ABNORMAL
SL AMB POCT BLOOD,UA: ABNORMAL
SL AMB POCT CLARITY,UA: ABNORMAL
SL AMB POCT COLOR,UA: YELLOW
SL AMB POCT KETONES,UA: ABNORMAL
SL AMB POCT NITRITE,UA: ABNORMAL
SL AMB POCT PH,UA: 6
SL AMB POCT SPECIFIC GRAVITY,UA: 1.02
SL AMB POCT URINE PROTEIN: ABNORMAL
SL AMB POCT UROBILINOGEN: 0.2

## 2019-09-17 PROCEDURE — 99214 OFFICE O/P EST MOD 30 MIN: CPT | Performed by: UROLOGY

## 2019-09-17 PROCEDURE — 87086 URINE CULTURE/COLONY COUNT: CPT | Performed by: UROLOGY

## 2019-09-17 PROCEDURE — 81003 URINALYSIS AUTO W/O SCOPE: CPT | Performed by: UROLOGY

## 2019-09-17 RX ORDER — SODIUM CHLORIDE 9 MG/ML
20 INJECTION, SOLUTION INTRAVENOUS ONCE
Status: CANCELLED | OUTPATIENT
Start: 2019-09-17

## 2019-09-17 NOTE — LETTER
September 17, 2019     Robinson Ash MD  [address] [place] [address]    Patient: Carmen Vallejo   YOB: 1950   Date of Visit: 9/17/2019       Dear Dr Nicky Capellan:    Thank you for referring John Carmen to me for evaluation  Below are my notes for this consultation  If you have questions, please do not hesitate to call me  I look forward to following your patient along with you  Sincerely,        Shruthi Jalloh MD        CC: No Recipients  Shruthi Jalloh MD  9/17/2019 11:01 AM  Sign at close encounter  Assessment/Plan:    Bilateral kidney stones  We reviewed her most recent CT scan  She has a right staghorn calculus and there is minimal cortex remaining in the right kidney  She has a 1 cm left renal stone in the upper pole  I again reviewed the natural history of staghorn calculi with the patient  We discussed how a can cause chronic infection, chronic disease etc   I recommended that we obtain a nuclear study to assess the function of the right kidney  I suspected is low and that she would benefit from right nephrectomy  She notes she has multiple medical tests pending and she will have the nuclear scan toward the beginning of next year  We will obtain a urine culture at this time  She will then return for follow-up  She will call should new symptoms arise  Diagnoses and all orders for this visit:    Bilateral kidney stones  -     POCT urine dip auto non-scope  -     NM kidney w flow and function; Future  -     Urine culture    Acquired complex renal cyst  -     POCT urine dip auto non-scope          Subjective:      Patient ID: Carmen Vallejo is a 68 y o  female  Chief complaint:  Kidney stones    HPI:  15-year-old female followed for kidney stones  She has a large right staghorn calculus and left renal stones  She has been completely asymptomatic  Since her last visit she has had 1 urinary tract infection  She reports no flank pain    She has not passed any stones  She is voiding adequately  She has had a medically complex year  She was treated for pulmonary emboli with anticoagulation  She has had complications from the anticoagulation including hematuria  She notes she is anemic and is awaiting iron infusions  The following portions of the patient's history were reviewed and updated as appropriate: allergies, current medications, past family history, past medical history, past social history, past surgical history and problem list     Review of Systems   Constitutional: Negative for activity change, appetite change, fatigue and fever  HENT: Negative  Eyes: Negative  Respiratory: Negative  Cardiovascular: Negative  Gastrointestinal: Negative for blood in stool, constipation, diarrhea and vomiting  Endocrine: Negative  Genitourinary:        See HPI   Musculoskeletal: Negative  Skin: Negative  Allergic/Immunologic: Negative  Neurological: Negative  Hematological: Negative  Psychiatric/Behavioral: Negative  Objective:      /64 (BP Location: Left arm, Patient Position: Sitting, Cuff Size: Adult)   Pulse (!) 52   Wt 64 4 kg (142 lb)   LMP  (LMP Unknown)   BMI 26 40 kg/m²           Physical Exam   Constitutional: She is oriented to person, place, and time  She appears well-developed and well-nourished  HENT:   Head: Normocephalic and atraumatic  Eyes: Conjunctivae are normal    Neck: Neck supple  Cardiovascular: Normal rate  Pulmonary/Chest: Effort normal    Abdominal: Soft  She exhibits no distension and no mass  There is no tenderness  There is no rebound, no guarding and no CVA tenderness  A hernia is present  Midline incisional hernia   Musculoskeletal: She exhibits no edema  No CVAT   Neurological: She is alert and oriented to person, place, and time  Skin: Skin is warm and dry  Psychiatric: She has a normal mood and affect   Her behavior is normal  Judgment and thought content normal

## 2019-09-17 NOTE — ASSESSMENT & PLAN NOTE
We reviewed her most recent CT scan  She has a right staghorn calculus and there is minimal cortex remaining in the right kidney  She has a 1 cm left renal stone in the upper pole  I again reviewed the natural history of staghorn calculi with the patient  We discussed how a can cause chronic infection, chronic disease etc   I recommended that we obtain a nuclear study to assess the function of the right kidney  I suspected is low and that she would benefit from right nephrectomy  She notes she has multiple medical tests pending and she will have the nuclear scan toward the beginning of next year  We will obtain a urine culture at this time  She will then return for follow-up  She will call should new symptoms arise

## 2019-09-17 NOTE — PROGRESS NOTES
Assessment/Plan:    Bilateral kidney stones  We reviewed her most recent CT scan  She has a right staghorn calculus and there is minimal cortex remaining in the right kidney  She has a 1 cm left renal stone in the upper pole  I again reviewed the natural history of staghorn calculi with the patient  We discussed how a can cause chronic infection, chronic disease etc   I recommended that we obtain a nuclear study to assess the function of the right kidney  I suspected is low and that she would benefit from right nephrectomy  She notes she has multiple medical tests pending and she will have the nuclear scan toward the beginning of next year  We will obtain a urine culture at this time  She will then return for follow-up  She will call should new symptoms arise  Diagnoses and all orders for this visit:    Bilateral kidney stones  -     POCT urine dip auto non-scope  -     NM kidney w flow and function; Future  -     Urine culture    Acquired complex renal cyst  -     POCT urine dip auto non-scope          Subjective:      Patient ID: Lucas Weinstein is a 68 y o  female  Chief complaint:  Kidney stones    HPI:  64-year-old female followed for kidney stones  She has a large right staghorn calculus and left renal stones  She has been completely asymptomatic  Since her last visit she has had 1 urinary tract infection  She reports no flank pain  She has not passed any stones  She is voiding adequately  She has had a medically complex year  She was treated for pulmonary emboli with anticoagulation  She has had complications from the anticoagulation including hematuria  She notes she is anemic and is awaiting iron infusions        The following portions of the patient's history were reviewed and updated as appropriate: allergies, current medications, past family history, past medical history, past social history, past surgical history and problem list     Review of Systems   Constitutional: Negative for activity change, appetite change, fatigue and fever  HENT: Negative  Eyes: Negative  Respiratory: Negative  Cardiovascular: Negative  Gastrointestinal: Negative for blood in stool, constipation, diarrhea and vomiting  Endocrine: Negative  Genitourinary:        See HPI   Musculoskeletal: Negative  Skin: Negative  Allergic/Immunologic: Negative  Neurological: Negative  Hematological: Negative  Psychiatric/Behavioral: Negative  Objective:      /64 (BP Location: Left arm, Patient Position: Sitting, Cuff Size: Adult)   Pulse (!) 52   Wt 64 4 kg (142 lb)   LMP  (LMP Unknown)   BMI 26 40 kg/m²          Physical Exam   Constitutional: She is oriented to person, place, and time  She appears well-developed and well-nourished  HENT:   Head: Normocephalic and atraumatic  Eyes: Conjunctivae are normal    Neck: Neck supple  Cardiovascular: Normal rate  Pulmonary/Chest: Effort normal    Abdominal: Soft  She exhibits no distension and no mass  There is no tenderness  There is no rebound, no guarding and no CVA tenderness  A hernia is present  Midline incisional hernia   Musculoskeletal: She exhibits no edema  No CVAT   Neurological: She is alert and oriented to person, place, and time  Skin: Skin is warm and dry  Psychiatric: She has a normal mood and affect   Her behavior is normal  Judgment and thought content normal

## 2019-09-17 NOTE — PATIENT INSTRUCTIONS
Kidney Stones   WHAT YOU NEED TO KNOW:   What is a kidney stone? Kidney stones form in the urinary system when the water and waste in your urine are out of balance  When this happens, certain types of waste crystals separate from the urine  The crystals build up and form kidney stones  Kidney stones can be made of uric acid, calcium, phosphate, or oxalate crystals  You may have 1 or more kidney stones  What increases my risk for kidney stones? · You do not drink enough liquids (especially water) each day  · You have urinary tract infections often  · You follow a certain type of diet  For example, people who eat a diet high in meat or salt may be at higher risk for kidney stones  People who eat foods high in oxalate may also be at higher risk  Foods that are high in oxalate include nuts, chocolate, coffee, and green leafy vegetables  · You take certain medicines such as diuretics, steroids, and antacids  · A family member has had kidney stones  · You were born with a kidney or bowel disorder, or you have other medical problems such as gout  What are the signs and symptoms of kidney stones? · Pain in the middle of your back that moves across to your side or that may spread to your groin    · Nausea and vomiting    · Urge to urinate often, burning feeling when you urinate, or pink or red urine    · Tenderness in your lower back, side, or stomach  How are kidney stones diagnosed? Your healthcare provider will ask about your health, diet, and lifestyle  He may refer you to a urologist  Nas [place]rier may need tests to find out what type of kidney stones you have  Tests can show the size of your kidney stones and where they are in your urinary system  You may have one or more of the following:  · Urine tests  may show if you have blood in your urine  They may also show high amounts of the substances that form kidney stones, such as uric acid  · Blood tests  show how well your kidneys are working   They may also be used to check the levels of calcium or uric acid in your blood  · A noncontrast helical CT scan  is a type of x-ray that uses a computer to take pictures of your kidneys  Healthcare providers use the pictures to check for kidney stones or other problems  · X-rays  of your kidneys, bladder, and ureters may be done  You may be given a dye before the pictures are taken to help healthcare providers see the pictures better  You may need to have more than one x-ray  Tell the healthcare provider if you have ever had an allergic reaction to contrast dye  · An abdominal ultrasound  uses sound waves to show pictures of your abdomen on a monitor  How are kidney stones treated? · NSAIDs , such as ibuprofen, help decrease swelling, pain, and fever  This medicine is available with or without a doctor's order  NSAIDs can cause stomach bleeding or kidney problems in certain people  If you take blood thinner medicine, always ask your healthcare provider if NSAIDs are safe for you  Always read the medicine label and follow directions  · Prescription medicine  may be given  Ask how to take this medicine safely  · Medicines  to balance your electrolytes may be needed  · A procedure or surgery  to remove the kidney stones may be needed if they do not pass on their own  Your treatment will depend on the size and location of your kidney stones  How can I manage my symptoms? · Drink plenty of liquids  Your healthcare provider may tell you to drink at least 8 to 12 (eight-ounce) cups of liquids each day  This helps flush out the kidney stones when you urinate  Water is the best liquid to drink  · Strain your urine every time you go to the bathroom  Urinate through a strainer or a piece of thin cloth to catch the stones  Take the stones to your healthcare provider so they can be sent to the lab for tests  This will help your healthcare providers plan the best treatment for you             · Eat a variety of healthy foods  Healthy foods include fruits, vegetables, whole-grain breads, low-fat dairy products, beans, and fish  You may need to limit how much sodium (salt) or protein you eat  Ask for information about the best foods for you  · Exercise regularly  Your stones may pass more easily if you stay active  Ask about the best activities for you  When should I seek immediate care? · You have vomiting that is not relieved by medicine  When should I contact my healthcare provider? · You have a fever  · You have trouble passing urine  · You see blood in your urine  · You have severe pain  · You have any questions or concerns about your condition or care  CARE AGREEMENT:   You have the right to help plan your care  Learn about your health condition and how it may be treated  Discuss treatment options with your caregivers to decide what care you want to receive  You always have the right to refuse treatment  The above information is an  only  It is not intended as medical advice for individual conditions or treatments  Talk to your doctor, nurse or pharmacist before following any medical regimen to see if it is safe and effective for you  © 2017 [employer] Information is for End User's use only and may not be sold, redistributed or otherwise used for commercial purposes  All illustrations and images included in CareNotes® are the copyrighted property of A [employer] M [employer]  or [employer]erre[employer]

## 2019-09-18 LAB — BACTERIA UR CULT: NORMAL

## 2019-09-18 NOTE — TELEPHONE ENCOUNTER
Patient informed that an appointment was made for her infusion for October 1st at 1:30 pm  Patient informed and agreed to appointment

## 2019-09-24 ENCOUNTER — TELEPHONE (OUTPATIENT)
Dept: HEMATOLOGY ONCOLOGY | Facility: CLINIC | Age: 69
End: 2019-09-24

## 2019-09-24 ENCOUNTER — OFFICE VISIT (OUTPATIENT)
Dept: HEMATOLOGY ONCOLOGY | Facility: CLINIC | Age: 69
End: 2019-09-24
Payer: MEDICARE

## 2019-09-24 ENCOUNTER — APPOINTMENT (OUTPATIENT)
Dept: LAB | Facility: CLINIC | Age: 69
End: 2019-09-24
Payer: MEDICARE

## 2019-09-24 VITALS
SYSTOLIC BLOOD PRESSURE: 122 MMHG | HEIGHT: 62 IN | OXYGEN SATURATION: 96 % | TEMPERATURE: 98.6 F | DIASTOLIC BLOOD PRESSURE: 68 MMHG | RESPIRATION RATE: 14 BRPM | HEART RATE: 100 BPM | BODY MASS INDEX: 25.86 KG/M2 | WEIGHT: 140.5 LBS

## 2019-09-24 DIAGNOSIS — D64.9 ANEMIA, UNSPECIFIED TYPE: Primary | ICD-10-CM

## 2019-09-24 DIAGNOSIS — D47.1 MYELOPROLIFERATIVE DISORDER (HCC): ICD-10-CM

## 2019-09-24 DIAGNOSIS — D75.839 THROMBOCYTOSIS: ICD-10-CM

## 2019-09-24 DIAGNOSIS — D53.9 NUTRITIONAL ANEMIA: ICD-10-CM

## 2019-09-24 DIAGNOSIS — D64.9 ANEMIA, UNSPECIFIED TYPE: ICD-10-CM

## 2019-09-24 LAB
ALBUMIN SERPL BCP-MCNC: 2.3 G/DL (ref 3.5–5)
ALP SERPL-CCNC: 133 U/L (ref 46–116)
ALT SERPL W P-5'-P-CCNC: 12 U/L (ref 12–78)
ANION GAP SERPL CALCULATED.3IONS-SCNC: 6 MMOL/L (ref 4–13)
AST SERPL W P-5'-P-CCNC: 18 U/L (ref 5–45)
BASOPHILS # BLD AUTO: 0.05 THOUSANDS/ΜL (ref 0–0.1)
BASOPHILS NFR BLD AUTO: 1 % (ref 0–1)
BILIRUB SERPL-MCNC: 0.2 MG/DL (ref 0.2–1)
BUN SERPL-MCNC: 19 MG/DL (ref 5–25)
CALCIUM SERPL-MCNC: 8.8 MG/DL (ref 8.3–10.1)
CHLORIDE SERPL-SCNC: 101 MMOL/L (ref 100–108)
CO2 SERPL-SCNC: 28 MMOL/L (ref 21–32)
CREAT SERPL-MCNC: 1.19 MG/DL (ref 0.6–1.3)
EOSINOPHIL # BLD AUTO: 0.22 THOUSAND/ΜL (ref 0–0.61)
EOSINOPHIL NFR BLD AUTO: 2 % (ref 0–6)
ERYTHROCYTE [DISTWIDTH] IN BLOOD BY AUTOMATED COUNT: 15.9 % (ref 11.6–15.1)
ERYTHROCYTE [SEDIMENTATION RATE] IN BLOOD: 101 MM/HOUR (ref 0–20)
FERRITIN SERPL-MCNC: 289 NG/ML (ref 8–388)
FOLATE SERPL-MCNC: 7.1 NG/ML (ref 3.1–17.5)
GFR SERPL CREATININE-BSD FRML MDRD: 46 ML/MIN/1.73SQ M
GLUCOSE SERPL-MCNC: 96 MG/DL (ref 65–140)
HCT VFR BLD AUTO: 31.4 % (ref 34.8–46.1)
HGB BLD-MCNC: 9.5 G/DL (ref 11.5–15.4)
IMM GRANULOCYTES # BLD AUTO: 0.03 THOUSAND/UL (ref 0–0.2)
IMM GRANULOCYTES NFR BLD AUTO: 0 % (ref 0–2)
IRON SATN MFR SERPL: 17 %
IRON SERPL-MCNC: 31 UG/DL (ref 50–170)
LYMPHOCYTES # BLD AUTO: 0.82 THOUSANDS/ΜL (ref 0.6–4.47)
LYMPHOCYTES NFR BLD AUTO: 8 % (ref 14–44)
MCH RBC QN AUTO: 27.4 PG (ref 26.8–34.3)
MCHC RBC AUTO-ENTMCNC: 30.3 G/DL (ref 31.4–37.4)
MCV RBC AUTO: 91 FL (ref 82–98)
MONOCYTES # BLD AUTO: 0.75 THOUSAND/ΜL (ref 0.17–1.22)
MONOCYTES NFR BLD AUTO: 8 % (ref 4–12)
NEUTROPHILS # BLD AUTO: 8.12 THOUSANDS/ΜL (ref 1.85–7.62)
NEUTS SEG NFR BLD AUTO: 81 % (ref 43–75)
NRBC BLD AUTO-RTO: 0 /100 WBCS
PLATELET # BLD AUTO: 504 THOUSANDS/UL (ref 149–390)
PMV BLD AUTO: 9 FL (ref 8.9–12.7)
POTASSIUM SERPL-SCNC: 4.1 MMOL/L (ref 3.5–5.3)
PROT SERPL-MCNC: 9.4 G/DL (ref 6.4–8.2)
RBC # BLD AUTO: 3.47 MILLION/UL (ref 3.81–5.12)
RETICS # AUTO: NORMAL 10*3/UL (ref 14097–95744)
RETICS # CALC: 1.45 % (ref 0.37–1.87)
SODIUM SERPL-SCNC: 135 MMOL/L (ref 136–145)
TIBC SERPL-MCNC: 184 UG/DL (ref 250–450)
TSH SERPL DL<=0.05 MIU/L-ACNC: 0.75 UIU/ML (ref 0.36–3.74)
VIT B12 SERPL-MCNC: 435 PG/ML (ref 100–900)
WBC # BLD AUTO: 9.99 THOUSAND/UL (ref 4.31–10.16)

## 2019-09-24 PROCEDURE — 83540 ASSAY OF IRON: CPT

## 2019-09-24 PROCEDURE — 84443 ASSAY THYROID STIM HORMONE: CPT

## 2019-09-24 PROCEDURE — 82746 ASSAY OF FOLIC ACID SERUM: CPT

## 2019-09-24 PROCEDURE — 82607 VITAMIN B-12: CPT

## 2019-09-24 PROCEDURE — 99215 OFFICE O/P EST HI 40 MIN: CPT | Performed by: PHYSICIAN ASSISTANT

## 2019-09-24 PROCEDURE — 83550 IRON BINDING TEST: CPT

## 2019-09-24 PROCEDURE — 80053 COMPREHEN METABOLIC PANEL: CPT

## 2019-09-24 PROCEDURE — 85025 COMPLETE CBC W/AUTO DIFF WBC: CPT

## 2019-09-24 PROCEDURE — 82728 ASSAY OF FERRITIN: CPT

## 2019-09-24 PROCEDURE — 85652 RBC SED RATE AUTOMATED: CPT

## 2019-09-24 PROCEDURE — 81270 JAK2 GENE: CPT

## 2019-09-24 PROCEDURE — 36415 COLL VENOUS BLD VENIPUNCTURE: CPT

## 2019-09-24 PROCEDURE — 85045 AUTOMATED RETICULOCYTE COUNT: CPT

## 2019-09-24 NOTE — TELEPHONE ENCOUNTER
Spoke to Talia and informed her that this test needed to be added to the patient blood draw from 9/24/19  She stated she will have it taken care of

## 2019-09-24 NOTE — PROGRESS NOTES
Hematology/Oncology Outpatient Follow-up  Kwasi King 68 y o  female 1950 [id number]    Date:  9/24/2019      Assessment and Plan:  1  Anemia, unspecified type, 2  Thrombocytosis ([address] ), 3  R/O Myeloproliferative disorder [facility])  13-year-old female presents for follow-up regarding history of multiple malignancies such as Hodgkin's lymphoma, breast cancer, localized melanoma  She has developed anemia since she was here last year  She also had significant thrombocytosis at this time  The highest this reached was approximately 800,000  This has improved and is in the 500,000 range  Hemoglobin still remains only fair  She was given IV iron in August 2019  She states that she had symptomatic improvement  Hemoglobin did not improve very much  Iron studies were not completed prior to IV iron therapy therefore it is unclear if she had iron deficiency at the start  Her repeat iron panel following showed picture of chronic disease, not iron deficiency  Reviewed with patient that it does not appear that is iron deficiency that is causing her anemia as well as thrombocytosis  Workup as below is requested  If she does not have iron deficiency remainder of iron infusions in October will be canceled as she will not need this  We will communicate this over the phone  If further labs need to be completed these also will be ordered  We also discussed that if laboratory workup is negative bone marrow biopsy is recommended  She will come back in 4 weeks  - Occult Blood, Fecal Immunochemical; Future  - CBC and differential; Future  - Reticulocytes; Future  - TSH, 3rd generation with Free T4 reflex; Future  - Iron Panel (Includes Ferritin, Iron Sat%, Iron, and TIBC); Future  - Comprehensive metabolic panel; Future  - JAK2 V617F,Ql,W/RFL Exons 12,13 and MPL M500,U112; Future  - Sedimentation rate, automated; Future  - Protein electrophoresis, serum; Future  - Vitamin B12; Future  - Folate; Future    4  History PE and DVT   From approximately October to December 2018 patient was ill following an infection with Streptococcus  She states that she was extremely mobile lying around for those months  She then developed a lower extremity DVT was subsequent PE and was would admitted to the hospital in January 2019  She was discharged on Eliquis 5 mg b i d  Tanna Villar Approximately a month or so ago she called her PCP with bruising of her lower extremities  Her Eliquis dose was decreased to 2 5 mg once per day  She remained on this at present  When she was at [facility] in July for her syncopal event is CT of the chest was performed which showed resolution of pulmonary embolism  Likely patient had a provoked PE secondary to immobility 4 months  She will remain on anticoagulation at this time until we rule out any underlying malignancy due to have abnormal CBC  HPI:  57-year-old female presents for follow-up regarding history of multiple malignancies  In 1995 patient was diagnosed to have stage II Hodgkin's disease above the diaphragm  She had staging laparotomy, splenectomy, extended field radiation therapy  In 2006 she was diagnosed to have left-sided breast cancer, hormone receptor positive, HER2 negative, T1, N1, stage IIA  She underwent left mastectomy, reconstruction surgery  She then had 4 cycles of adjuvant Taxotere plus Cytoxan followed by adjuvant Arimidex for 5 years which she completed in 2011  She did not receive any adjuvant radiation therapy secondary to previous radiation for Hodgkin's disease as above  In March 2016 she had melanoma surgery for T1a, 0 6 mm melanoma of the right flank area  She follows with Dr Nicole Simon every 6 months  Interval history:  Oct/Nov 2018 she got an infection, strep; started in her urine  She was sick and feeling ill for over 2 months   She was laying down not doing anything from Oct-Dec  She states she did not even go to her family's house for the "[employer] holiday  She then presented to the hospital on 1/12/19 and was found to have acute bibasilar pulmonary emboli  Venous Doppler was then also ordered and this showed an acute nonocclusive DVT in the popliteal and paired gastrocnemius veins of the left lower extremity  Right lower extremity was negative for DVT  She was placed on Eliquis 5 mg BID  She then was at a [facility] and had a syncopal event  She was brought to [facility] in July 2019  Work up was negative and she was thought to have vasovagal event  About a month and a half ago (Aug 2019) patient called the PCP due to bruises on her bilateral lower extremities  Eliquis dose was adjusted  She is presently taking 5mg, 1/2 tablet, cutting it on her own, only once daily  8/9, 8/16, 8/23 she had Venofer from PCP  CBC at [facility]  7/29/2019 7/27/2019     5 2 11 5 (H)   2 98 (L) 3 64 (L)   7 9 (L) 9 7 (L)   25 8 (L) 31 9 (L)   87 88   26 5 26 6   30 6 (L) 30 4 (L)   15 9 (H) 15 8   481 (H) 573 (H)   9 3 9 0   0 0 0 0     ROS: Review of Systems   Constitutional: Positive for fatigue  Negative for appetite change, chills, fever and unexpected weight change  Respiratory: Negative for cough and shortness of breath  Cardiovascular: Negative for chest pain, palpitations and leg swelling  Gastrointestinal: Negative for abdominal pain, blood in stool, constipation, diarrhea, nausea and vomiting  Denies dark or black stool   Genitourinary: Negative for difficulty urinating and dysuria  Musculoskeletal: Negative for arthralgias  Skin: Negative  Neurological: Negative for dizziness, weakness, light-headedness, numbness and headaches  Hematological: Negative  Psychiatric/Behavioral: Negative          Past Medical History:   Diagnosis Date    [facility]    Anemia     BCC (basal cell carcinoma of skin)     facial    Breast cancer ([facility] ) 2006    s/p mastectomy     Calculus, kidney 2013    Cervical cancer ([facility] ) 1983    Disease of thyroid gland     Ganglion     Last Assessed:7/9/13    Heart murmur     mitral valve regurgitation    [facility]     Hydronephrosis 2013    Hyperlipidemia     Hypertension     NSTEMI (non-ST elevated myocardial infarction) [facility] 1/12/2019    Osteopenia     Last Assessed:7/9/13    Osteoporosis     [facility]oxys[facility]ricular tachycardia [facility]     Renal calculi     Last Assessed:3/19/14    Renal cyst     Shoulder impingement     Last Assessed:1/18/13    UTI (urinary tract infection) 2013    Vasculitis ([facility] ) 1/12/2019       Past Surgical History:   Procedure Laterality Date    APPENDECTOMY      BREAST RECONSTRUCTION      COMPLEX WOUND CLOSURE TO EXTREMITY Left 10/9/2018    Procedure: COMPLEX CLOSURE RECONSTRUCTION;  Surgeon: Nusrat Garcia MD;  [facility];  Service: Plastics    CYSTOSCOPY W/ RETROGRADES  2009    CYSTOSCOPY W/ URETEROSCOPY  2002    EXTRACORPOREAL SHOCK WAVE LITHOTRIPSY Right 2005    FLAP LOCAL HEAD / NECK Left 10/9/2018    Procedure: FLAP RECONSTRUCTION;  Surgeon: Nusrat Garcia MD;  [facility];  Service: Plastics    FULL THICKNESS SKIN GRAFT Left 10/9/2018    Procedure: FULL THICKNESS SKIN GRAFT RECONSTRUCTION;  Surgeon: Nusrat Garcia MD;  [facility];  Service: Plastics    HYSTERECTOMY      1983-cervical cancer    INTRAOPERATIVE RADIATION THERAPY ([facility])      Radiation Therapy    KIDNEY SURGERY      MASTECTOMY Left 2006    OTHER SURGICAL HISTORY      Chemotherapeutics    SENTINEL LYMPH NODE BIOPSY      SPLENECTOMY      1985-Hodgkin's Disease    SQUAMOUS CELL CARCINOMA EXCISION Left 10/9/2018    Procedure: NOSE/CHEEK BCC EXCISION; FROZEN SECTION;  Surgeon: Nusrat Garcia MD;  [facility];  Service: Plastics       Social History     Socioeconomic History    Marital status: /Civil Union     Spouse name: None    Number of children: None    Years of education: None    Highest education level: None   Occupational History    None   Social Needs    Financial resource strain: None    Food insecurity:     Worry: None     Inability: None    Transportation needs:     Medical: None     Non-medical: None   Tobacco Use    Smoking status: Former Smoker     Last attempt to quit:      Years since quittin 7    Smokeless tobacco: Never Used   Substance and Sexual Activity    Alcohol use: Yes     Comment: caffeine use; social drinker-1 beer every 2/3 months    Drug use: No    Sexual activity: None   Lifestyle    Physical activity:     Days per week: None     Minutes per session: None    Stress: None   Relationships    Social connections:     Talks on phone: None     Gets together: None     Attends Yarsanism service: None     Active member of club or organization: None     Attends meetings of clubs or organizations: None     Relationship status: None    Intimate partner violence:     Fear of current or ex partner: None     Emotionally abused: None     Physically abused: None     Forced sexual activity: None   Other Topics Concern    None   Social History Narrative    Advance directive declined by patient    Drinks coffee    Exercise habits       Family History   Problem Relation Age of Onset    Leukemia Mother     Colon cancer Paternal Grandmother     Heart disease Family     Heart attack Father     Coronary artery disease Father         stent- five    Hypertension Father     Stroke Neg Hx     Anuerysm Neg Hx     Clotting disorder Neg Hx     Arrhythmia Neg Hx     Heart failure Neg Hx     Hyperlipidemia Neg Hx        Allergies   Allergen Reactions    Ciprofloxacin Other (See Comments)     Reaction Date: 2011;    Hives/Uticaria    Sulfamethoxazole-Trimethoprim Hives     Patient does not remember rx         Current Outpatient Medications:     apixaban (ELIQUIS) 2 5 mg, Take 1 tablet (2 5 mg total) by mouth 2 (two) times a day (Patient taking differently: Take 2 5 mg by mouth daily ), Disp: 60 tablet, Rfl: 3    cholecalciferol (VITAMIN D3) 1,000 units tablet, Take 1,000 Units by mouth daily  , Disp: , Rfl:     ferrous sulfate 325 (65 Fe) mg tablet, Take 325 mg by mouth daily with breakfast, Disp: , Rfl:     levothyroxine 75 mcg tablet, TAKE 1 TABLET BY MOUTH DAILY, Disp: 90 tablet, Rfl: 3    Loratadine (CLARITIN) 10 MG CAPS, Take by mouth as needed , Disp: , Rfl:     nebivolol (BYSTOLIC) 5 mg tablet, Take 1 tablet (5 mg total) by mouth daily for 183 days, Disp: 90 tablet, Rfl: 3    simvastatin (ZOCOR) 40 mg tablet, TAKE 1 TABLET BY MOUTH EVERY DAY, Disp: 90 tablet, Rfl: 3      Physical Exam:  /68 (BP Location: Right arm, Patient Position: Sitting, Cuff Size: Adult)   Pulse 100   Temp 98 6 °F (37 °C) (Oral)   Resp 14   Ht 5' 1 5" (1 562 m)   Wt 63 7 kg (140 lb 8 oz)   LMP  (LMP Unknown)   SpO2 96%   BMI 26 12 kg/m²     Physical Exam   Constitutional: She is oriented to person, place, and time  She appears well-developed and well-nourished  No distress  HENT:   Head: Normocephalic and atraumatic  Eyes: Conjunctivae are normal  No scleral icterus  Neck: Normal range of motion  Neck supple  Cardiovascular: Normal rate, regular rhythm and normal heart sounds  No murmur heard  Pulmonary/Chest: Effort normal and breath sounds normal  No respiratory distress  Abdominal: Soft  There is no tenderness  Musculoskeletal: Normal range of motion  She exhibits no edema or tenderness  Lymphadenopathy:     She has no cervical adenopathy  Neurological: She is alert and oriented to person, place, and time  No cranial nerve deficit  Skin: Skin is warm and dry  Psychiatric: She has a normal mood and affect           Labs:  Lab Results   Component Value Date    WBC 8 20 08/30/2019    HGB 9 4 (L) 08/30/2019    HCT 30 8 (L) 08/30/2019    MCV 90 08/30/2019     (H) 08/30/2019     Lab Results   Component Value Date    K 4 4 08/30/2019     08/30/2019    CO2 28 08/30/2019    BUN 19 08/30/2019    CREATININE 1 30 08/30/2019    GLUF 88 08/30/2019    CALCIUM 8 7 08/30/2019    AST 50 (H) 01/11/2019    ALT 45 01/11/2019    ALKPHOS 218 (H) 01/11/2019    EGFR 41 08/30/2019       Patient voiced understanding and agreement in the above discussion  Aware to contact our office with questions/symptoms in the interim  I have spent 40 minutes with Patient and family today in which greater than 50% of this time was spent in counseling/coordination of care regarding Diagnostic results, Risks and benefits of tx options, Intructions for management, Patient and family education and Impressions

## 2019-09-24 NOTE — TELEPHONE ENCOUNTER
Please call [facility] and see if they can add serum protein electrophoresis for labs drawn this morning

## 2019-09-26 ENCOUNTER — TELEPHONE (OUTPATIENT)
Dept: HEMATOLOGY ONCOLOGY | Facility: CLINIC | Age: 69
End: 2019-09-26

## 2019-09-26 ENCOUNTER — APPOINTMENT (OUTPATIENT)
Dept: LAB | Facility: CLINIC | Age: 69
End: 2019-09-26
Payer: MEDICARE

## 2019-09-26 DIAGNOSIS — D64.9 ANEMIA, UNSPECIFIED TYPE: ICD-10-CM

## 2019-09-26 PROCEDURE — 84165 PROTEIN E-PHORESIS SERUM: CPT

## 2019-09-26 PROCEDURE — 84165 PROTEIN E-PHORESIS SERUM: CPT | Performed by: PATHOLOGY

## 2019-09-26 PROCEDURE — 36415 COLL VENOUS BLD VENIPUNCTURE: CPT

## 2019-09-26 NOTE — TELEPHONE ENCOUNTER
Called [facility] lab and informed them that we were told that the SPEP was going to be added to the lab draw that was completed on 9/24/19  They stated that the tube that they would use to for the testing has been shipped out of another lab test that was already ordered  I contacted the patient and informed her that MARYANN Kelly ordered a test that she wanted her to have completed  Patient stated she will be going to lab shortly to have her labs draw  I informed her once the testing is resulted we will contact her to discuss the results further  Patient voiced understanding

## 2019-09-27 LAB
ALBUMIN SERPL ELPH-MCNC: 2.78 G/DL (ref 3.5–5)
ALBUMIN SERPL ELPH-MCNC: 31.2 % (ref 52–65)
ALPHA1 GLOB SERPL ELPH-MCNC: 0.53 G/DL (ref 0.1–0.4)
ALPHA1 GLOB SERPL ELPH-MCNC: 5.9 % (ref 2.5–5)
ALPHA2 GLOB SERPL ELPH-MCNC: 0.96 G/DL (ref 0.4–1.2)
ALPHA2 GLOB SERPL ELPH-MCNC: 10.8 % (ref 7–13)
BETA GLOB ABNORMAL SERPL ELPH-MCNC: 0.53 G/DL (ref 0.4–0.8)
BETA1 GLOB SERPL ELPH-MCNC: 5.9 % (ref 5–13)
BETA2 GLOB SERPL ELPH-MCNC: 6.8 % (ref 2–8)
BETA2+GAMMA GLOB SERPL ELPH-MCNC: 0.61 G/DL (ref 0.2–0.5)
GAMMA GLOB ABNORMAL SERPL ELPH-MCNC: 3.51 G/DL (ref 0.5–1.6)
GAMMA GLOB SERPL ELPH-MCNC: 39.4 % (ref 12–22)
IGG/ALB SER: 0.45 {RATIO} (ref 1.1–1.8)
PROT PATTERN SERPL ELPH-IMP: ABNORMAL
PROT SERPL-MCNC: 8.9 G/DL (ref 6.4–8.2)

## 2019-09-30 ENCOUNTER — TELEPHONE (OUTPATIENT)
Dept: FAMILY MEDICINE CLINIC | Facility: CLINIC | Age: 69
End: 2019-09-30

## 2019-09-30 NOTE — TELEPHONE ENCOUNTER
Per Dr Erika Hutton called [facility] to inform her that we would be cancelling her infusion appointments  Moving forward she should follow with hematology for any further infusions  I also called the infusion center and spoke with Corin Andres to cancel

## 2019-10-01 ENCOUNTER — HOSPITAL ENCOUNTER (OUTPATIENT)
Dept: INFUSION CENTER | Facility: CLINIC | Age: 69
End: 2019-10-01

## 2019-10-01 ENCOUNTER — APPOINTMENT (OUTPATIENT)
Dept: LAB | Facility: CLINIC | Age: 69
End: 2019-10-01
Payer: MEDICARE

## 2019-10-01 DIAGNOSIS — D64.9 ANEMIA, UNSPECIFIED TYPE: ICD-10-CM

## 2019-10-01 LAB — HEMOCCULT STL QL IA: NEGATIVE

## 2019-10-01 PROCEDURE — G0328 FECAL BLOOD SCRN IMMUNOASSAY: HCPCS

## 2019-10-10 LAB — MISCELLANEOUS LAB TEST RESULT: NORMAL

## 2019-10-18 ENCOUNTER — OFFICE VISIT (OUTPATIENT)
Dept: FAMILY MEDICINE CLINIC | Facility: CLINIC | Age: 69
End: 2019-10-18
Payer: MEDICARE

## 2019-10-18 VITALS
HEART RATE: 92 BPM | HEIGHT: 62 IN | RESPIRATION RATE: 16 BRPM | BODY MASS INDEX: 25.76 KG/M2 | DIASTOLIC BLOOD PRESSURE: 62 MMHG | WEIGHT: 140 LBS | TEMPERATURE: 97.9 F | SYSTOLIC BLOOD PRESSURE: 116 MMHG

## 2019-10-18 DIAGNOSIS — D64.9 NORMOCYTIC ANEMIA: ICD-10-CM

## 2019-10-18 DIAGNOSIS — E03.9 ACQUIRED HYPOTHYROIDISM: ICD-10-CM

## 2019-10-18 DIAGNOSIS — I36.1 NON-RHEUMATIC TRICUSPID VALVE INSUFFICIENCY: ICD-10-CM

## 2019-10-18 DIAGNOSIS — I35.1 NONRHEUMATIC AORTIC VALVE INSUFFICIENCY: ICD-10-CM

## 2019-10-18 DIAGNOSIS — Z86.718 HISTORY OF DVT OF LOWER EXTREMITY: ICD-10-CM

## 2019-10-18 DIAGNOSIS — I10 ESSENTIAL HYPERTENSION: Primary | ICD-10-CM

## 2019-10-18 DIAGNOSIS — Z85.3 HISTORY OF BREAST CANCER: ICD-10-CM

## 2019-10-18 DIAGNOSIS — I34.0 NONRHEUMATIC MITRAL VALVE REGURGITATION: ICD-10-CM

## 2019-10-18 DIAGNOSIS — Z86.711 HISTORY OF PULMONARY EMBOLUS (PE): ICD-10-CM

## 2019-10-18 DIAGNOSIS — E78.00 HYPERCHOLESTEREMIA: ICD-10-CM

## 2019-10-18 DIAGNOSIS — I27.20 PULMONARY HYPERTENSION (HCC): ICD-10-CM

## 2019-10-18 DIAGNOSIS — N18.30 CKD (CHRONIC KIDNEY DISEASE), STAGE III (HCC): ICD-10-CM

## 2019-10-18 DIAGNOSIS — Z00.00 MEDICARE ANNUAL WELLNESS VISIT, SUBSEQUENT: ICD-10-CM

## 2019-10-18 DIAGNOSIS — D75.839 THROMBOCYTOSIS: ICD-10-CM

## 2019-10-18 DIAGNOSIS — Z85.71 HISTORY OF HODGKIN'S DISEASE: ICD-10-CM

## 2019-10-18 PROCEDURE — G0439 PPPS, SUBSEQ VISIT: HCPCS | Performed by: FAMILY MEDICINE

## 2019-10-18 PROCEDURE — 99214 OFFICE O/P EST MOD 30 MIN: CPT | Performed by: FAMILY MEDICINE

## 2019-10-18 RX ORDER — SIMVASTATIN 20 MG
20 TABLET ORAL
Qty: 90 TABLET | Refills: 3 | Status: SHIPPED | OUTPATIENT
Start: 2019-10-18 | End: 2020-01-01

## 2019-10-18 RX ORDER — LEVOTHYROXINE SODIUM 0.07 MG/1
75 TABLET ORAL DAILY
Qty: 90 TABLET | Refills: 3 | Status: SHIPPED | OUTPATIENT
Start: 2019-10-18 | End: 2020-01-01 | Stop reason: DRUGHIGH

## 2019-10-18 NOTE — PROGRESS NOTES
Assessment and Plan:     Problem List Items Addressed This Visit        Endocrine    Hypothyroidism    Relevant Medications    levothyroxine 75 mcg tablet       Cardiovascular and Mediastinum    Hypertension - Primary    Mitral regurgitation    Pulmonary hypertension (HCC)    Non-rheumatic tricuspid valve insufficiency    Nonrheumatic aortic valve insufficiency       Hematopoietic and Hemostatic    Thrombocytosis (HCC)       Genitourinary    CKD (chronic kidney disease), stage III (HCC)       Other    History of breast cancer    History of Hodgkin's disease    Normocytic anemia    History of pulmonary embolus (PE)    History of DVT of lower extremity      Other Visit Diagnoses     Hypercholesteremia        Relevant Medications    simvastatin (ZOCOR) 20 mg tablet    Medicare annual wellness visit, subsequent               Preventive health issues were discussed with patient, and age appropriate screening tests were ordered as noted in patient's After Visit Summary  Personalized health advice and appropriate referrals for health education or preventive services given if needed, as noted in patient's After Visit Summary       History of Present Illness:     Patient presents for Medicare Annual Wellness visit    Patient Care Team:  Corry Madison MD as PCP - General  MD Hardy Landaverde MD Tad Spillers, MD     Problem List:     Patient Active Problem List   Diagnosis    Bilateral kidney stones    Hypertension    Hyperlipidemia    Melanoma of skin of trunk ([address] )    Mitral regurgitation    Pulmonary hypertension ([address] )    Acquired complex renal cyst    Elevated serum protein level    Hypothyroidism    Osteoporosis    Vitamin D deficiency    Basal cell carcinoma (BCC) of left side of nose    BCC (basal cell carcinoma), face    History of breast cancer    History of Hodgkin's disease    History of basal cell cancer    Normocytic anemia    Hyponatremia    Elevated liver enzymes    Cystitis    Paroxysmal supraventricular tachycardia (HCC)    CKD (chronic kidney disease), stage III (HCC)    History of pulmonary embolus (PE)    History of DVT of lower extremity    Non-rheumatic tricuspid valve insufficiency    Thrombocytosis (HCC)    Nonrheumatic aortic valve insufficiency    Cervical cancer (HCC)    Iron deficiency anemia      Past Medical and Surgical History:     Past Medical History:   Diagnosis Date    [facility]EDELIA [facility]) ([facility] ) 1/12/2019    Anemia     BCC (basal cell carcinoma of skin)     facial    Breast cancer ([facility] ) 2006    s/p mastectomy     Calculus, kidney 2013    Cervical cancer ([facility] ) 1983    Disease of thyroid gland     Ganglion     Last Assessed:7/9/13    Heart murmur     mitral valve regurgitation    Hodgkin disease ([facility]  )     Hydronephrosis 2013    Hyperlipidemia     Hypertension     NSTEMI (non-ST elevated myocardial infarction) ([facility] ) 1/12/2019    Osteopenia     Last Assessed:7/9/13    Osteoporosis     Paroxysmal supraventricular tachycardia ([place] [address] )     Renal calculi     Last Assessed:3/19/14    Renal cyst     Shoulder impingement     Last Assessed:1/18/13    Syncope 7/28/2019    Last Assessment & Plan:  Pre-syncopal symptoms leading to short syncopal episode without post-ictal period  Suspect dehydration/hypovolemia  Infectious, cardiopulm or neurologic cause less likely  No arrhythmias or AV block on ECG or telemetry thus far  No PE on CTA  No clinical signs of seizure   No acute pathology or mass on CTH  - s/p IVF; encourage PO intake - continue on telemetry - TTE c/f ne    UTI (urinary tract infection) 2013    Vasculitis ([place] [address] ) 1/12/2019     Past Surgical History:   Procedure Laterality Date    APPENDECTOMY      BREAST RECONSTRUCTION      COMPLEX WOUND CLOSURE TO EXTREMITY Left 10/9/2018    Procedure: COMPLEX CLOSURE RECONSTRUCTION;  Surgeon: Sanjuana Jaimes MD;  [facility];  Service: Plastics    CYSTOSCOPY W/ RETROGRADES     CYSTOSCOPY W/ URETEROSCOPY  2002    EXTRACORPOREAL SHOCK WAVE LITHOTRIPSY Right 2005    FLAP LOCAL HEAD / NECK Left 10/9/2018    Procedure: FLAP RECONSTRUCTION;  Surgeon: Rayo Wheeler MD;  [facility];  Service: Plastics    FULL THICKNESS SKIN GRAFT Left 10/9/2018    Procedure: FULL THICKNESS SKIN GRAFT RECONSTRUCTION;  Surgeon: Rayo Wheeler MD;  [facility];  Service: Plastics    HYSTERECTOMY      -cervical cancer    INTRAOPERATIVE RADIATION THERAPY ([facility])      Radiation Therapy    KIDNEY SURGERY      MASTECTOMY Left     OTHER SURGICAL HISTORY      Chemotherapeutics    SENTINEL LYMPH NODE BIOPSY      SPLENECTOMY      -Hodgkin's Disease    SQUAMOUS CELL CARCINOMA EXCISION Left 10/9/2018    Procedure: NOSE/CHEEK [address] EXCISION; FROZEN SECTION;  Surgeon: Rayo Wheeler MD;  [facility];  Service: Plastics      Family History:     Family History   Problem Relation Age of Onset    Leukemia Mother     Colon cancer Paternal Grandmother     Heart disease Family     Heart attack Father     Coronary artery disease Father         stent- five    Hypertension Father     Stroke Neg Hx     Anuerysm Neg Hx     Clotting disorder Neg Hx     Arrhythmia Neg Hx     Heart failure Neg Hx     Hyperlipidemia Neg Hx       Social History:     Social History     Socioeconomic History    Marital status: /Civil Union     Spouse name: None    Number of children: None    Years of education: None    Highest education level: None   Occupational History    None   Social Needs    Financial resource strain: None    Food insecurity:     Worry: None     Inability: None    Transportation needs:     Medical: None     Non-medical: None   Tobacco Use    Smoking status: Former Smoker     Last attempt to quit:      Years since quittin 8    Smokeless tobacco: Never Used   Substance and Sexual Activity    Alcohol use: Yes     Comment: caffeine use; social drinker-1 beer every 2/3 months    Drug use: Never    Sexual activity: None   Lifestyle    Physical activity:     Days per week: None     Minutes per session: None    Stress: None   Relationships    Social connections:     Talks on phone: None     Gets together: None     Attends Jew service: None     Active member of club or organization: None     Attends meetings of clubs or organizations: None     Relationship status: None    Intimate partner violence:     Fear of current or ex partner: None     Emotionally abused: None     Physically abused: None     Forced sexual activity: None   Other Topics Concern    None   Social History Narrative    Advance directive declined by patient    Drinks coffee    Exercise habits       Medications and Allergies:     Current Outpatient Medications   Medication Sig Dispense Refill    apixaban (ELIQUIS) 2 5 mg Take 1 tablet (2 5 mg total) by mouth 2 (two) times a day (Patient taking differently: Take 2 5 mg by mouth daily ) 60 tablet 3    cholecalciferol (VITAMIN D3) 1,000 units tablet Take 1,000 Units by mouth daily        ferrous sulfate 325 (65 Fe) mg tablet Take 325 mg by mouth daily with breakfast      levothyroxine 75 mcg tablet Take 1 tablet (75 mcg total) by mouth daily 90 tablet 3    Loratadine (CLARITIN) 10 MG CAPS Take by mouth as needed       nebivolol (BYSTOLIC) 5 mg tablet Take 1 tablet (5 mg total) by mouth daily for 183 days (Patient taking differently: Take 2 5 mg by mouth daily ) 90 tablet 3    simvastatin (ZOCOR) 20 mg tablet Take 1 tablet (20 mg total) by mouth daily at bedtime 90 tablet 3     No current facility-administered medications for this visit  Allergies   Allergen Reactions    Ciprofloxacin Other (See Comments)     Reaction Date: 24Jun2011;    Hives/Uticaria    Sulfamethoxazole-Trimethoprim Hives     Patient does not remember rx      Immunizations:     Immunization History   Administered Date(s) Administered   Lore Andres INFLUENZA 11/10/2014, 10/21/2015, 11/01/2016    Influenza Quadrivalent Preservative Free 3 years and older IM 10/18/2013    Influenza Split High Dose Preservative Free IM 10/21/2015, 11/01/2016    Influenza TIV (IM) 11/10/2012, 10/01/2014    Pneumococcal Conjugate 13-Valent 10/21/2015    Pneumococcal Polysaccharide PPV23 01/18/2013, 01/18/2013    Zoster 03/21/2014      Health Maintenance:         Topic Date Due    CRC Screening: Colonoscopy  1950    MAMMOGRAM  01/02/2019    CRC Screening: FOBTx3/FIT  10/01/2020    Hepatitis C Screening  Completed         Topic Date Due    HEPATITIS B VACCINES (1 of 3 - Risk 3-dose series) 01/13/1967      Medicare Health Risk Assessment:     /62   Pulse 92   Temp 97 9 °F (36 6 °C)   Resp 16   Ht 5' 1 5" (1 562 m)   Wt 63 5 kg (140 lb)   LMP  (LMP Unknown)   BMI 26 02 kg/m²      Linnea Carvajal is here for her Subsequent Wellness visit  Last Medicare Wellness visit information reviewed, patient interviewed and updates made to the record today  Health Risk Assessment:   Patient rates overall health as fair  Patient feels that their physical health rating is slightly worse  Eyesight was rated as same  Hearing was rated as same  Patient feels that their emotional and mental health rating is same  Pain experienced in the last 7 days has been none  Patient states that she has experienced no weight loss or gain in last 6 months  Fall Risk Screening: In the past year, patient has experienced: history of falling in past year    Number of falls: 1  Injured during fall?: Yes    Feels unsteady when standing or walking?: No    Worried about falling?: No      Urinary Incontinence Screening:   Patient has leaked urine accidently in the last six months  Mild UI     Home Safety:  Patient has trouble with stairs inside or outside of their home  Patient has working smoke alarms and has working carbon monoxide detector  Home safety hazards include: none       Nutrition: Current diet is Regular  Medications:   Patient is currently taking over-the-counter supplements  OTC medications include: see medication list  Patient is able to manage medications  Activities of Daily Living (ADLs)/Instrumental Activities of Daily Living (IADLs):   Walk and transfer into and out of bed and chair?: Yes  Dress and groom yourself?: Yes    Bathe or shower yourself?: Yes    Feed yourself? Yes  Do your laundry/housekeeping?: Yes  Manage your money, pay your bills and track your expenses?: Yes  Make your own meals?: Yes    Do your own shopping?: Yes    Previous Hospitalizations:   Any hospitalizations or ED visits within the last 12 months?: Yes    How many hospitalizations have you had in the last year?: 1-2    Hospitalization Comments: 01/2019 s/p DVT/PE  07/2019 admission for syncope     Advance Care Planning:   Living will: No    Durable POA for healthcare: No    Advanced directive: No      Cognitive Screening:   Provider or family/friend/caregiver concerned regarding cognition?: No    PREVENTIVE SCREENINGS      Cardiovascular Screening:    General: Screening Not Indicated and History Lipid Disorder      Diabetes Screening:     General: Screening Current      Colorectal Cancer Screening:     General: Screening Current      Breast Cancer Screening:     General: History Breast Cancer      Cervical Cancer Screening:    General: History Cervical Cancer      Osteoporosis Screening:    General: Screening Not Indicated and History Osteoporosis      Abdominal Aortic Aneurysm (AAA) Screening:        General: Screening Not Indicated      Lung Cancer Screening:     General: Screening Not Indicated      Hepatitis C Screening:    General: Screening Current    Other Counseling Topics:   Skin self-exam, sunscreen and calcium and vitamin D intake and regular weightbearing exercise         Efren Talbot MD

## 2019-10-18 NOTE — PATIENT INSTRUCTIONS
Medicare Preventive Visit Patient Instructions  Thank you for completing your Welcome to Medicare Visit or Medicare Annual Wellness Visit today  Your next wellness visit will be due in one year (10/18/2020)  The screening/preventive services that you may require over the next 5-10 years are detailed below  Some tests may not apply to you based off risk factors and/or age  Screening tests ordered at today's visit but not completed yet may show as past due  Also, please note that scanned in results may not display below  Preventive Screenings:  Service Recommendations Previous Testing/Comments   Colorectal Cancer Screening  * Colonoscopy    * Fecal Occult Blood Test (FOBT)/Fecal Immunochemical Test (FIT)  * Fecal DNA/Cologuard Test  * Flexible Sigmoidoscopy Age: 54-65 years old   Colonoscopy: every 10 years (may be performed more frequently if at higher risk)  OR  FOBT/FIT: every 1 year  OR  Cologuard: every 3 years  OR  Sigmoidoscopy: every 5 years  Screening may be recommended earlier than age 48 if at higher risk for colorectal cancer  Also, an individualized decision between you and your healthcare provider will decide whether screening between the ages of 74-80 would be appropriate  Colonoscopy: Not on file  FOBT/FIT: 10/01/2019  Cologuard: Not on file  Sigmoidoscopy: Not on file    Screening Current     Breast Cancer Screening Age: 36 years old  Frequency: every 1-2 years  Not required if history of left and right mastectomy Mammogram: 01/02/2018    History Breast Cancer   Cervical Cancer Screening Between the ages of 21-29, pap smear recommended once every 3 years  Between the ages of 33-67, can perform pap smear with HPV co-testing every 5 years     Recommendations may differ for women with a history of total hysterectomy, cervical cancer, or abnormal pap smears in past  Pap Smear: Not on file    History Cervical Cancer   Hepatitis C Screening Once for adults born between [facility]  More frequently in patients at high risk for Hepatitis C Hep C Antibody: 12/27/2018    Screening Current   Diabetes Screening 1-2 times per year if you're at risk for diabetes or have pre-diabetes Fasting glucose: 88 mg/dL   A1C: No results in last 5 years    Screening Current   Cholesterol Screening Once every 5 years if you don't have a lipid disorder  May order more often based on risk factors  Lipid panel: 07/24/2019    Screening Not Indicated  History Lipid Disorder     Other Preventive Screenings Covered by Medicare:  1  Abdominal Aortic Aneurysm (AAA) Screening: covered once if your at risk  You're considered to be at risk if you have a family history of AAA  2  Lung Cancer Screening: covers low dose CT scan once per year if you meet all of the following conditions: (1) Age 50-69; (2) No signs or symptoms of lung cancer; (3) Current smoker or have quit smoking within the last 15 years; (4) You have a tobacco smoking history of at least 30 pack years (packs per day multiplied by number of years you smoked); (5) You get a written order from a healthcare provider  3  Glaucoma Screening: covered annually if you're considered high risk: (1) You have diabetes OR (2) Family history of glaucoma OR (3)  aged 48 and older OR (3)  American aged 72 and older  3  Osteoporosis Screening: covered every 2 years if you meet one of the following conditions: (1) You're estrogen deficient and at risk for osteoporosis based off medical history and other findings; (2) Have a vertebral abnormality; (3) On glucocorticoid therapy for more than 3 months; (4) Have primary hyperparathyroidism; (5) On osteoporosis medications and need to assess response to drug therapy  · Last bone density test (DXA Scan): Not on file  5  HIV Screening: covered annually if you're between the age of 12-76  Also covered annually if you are younger than 13 and older than 72 with risk factors for HIV infection   For pregnant patients, it is covered up to 3 times per pregnancy  Immunizations:  Immunization Recommendations   Influenza Vaccine Annual influenza vaccination during flu season is recommended for all persons aged >= 6 months who do not have contraindications   Pneumococcal Vaccine (Prevnar and Pneumovax)  * Prevnar = PCV13  * Pneumovax = PPSV23   Adults 25-60 years old: 1-3 doses may be recommended based on certain risk factors  Adults 72 years old: Prevnar (PCV13) vaccine recommended followed by Pneumovax (PPSV23) vaccine  If already received PPSV23 since turning 65, then PCV13 recommended at least one year after PPSV23 dose  Hepatitis B Vaccine 3 dose series if at intermediate or high risk (ex: diabetes, end stage renal disease, liver disease)   Tetanus (Td) Vaccine - COST NOT COVERED BY MEDICARE PART B Following completion of primary series, a booster dose should be given every 10 years to maintain immunity against tetanus  Td may also be given as tetanus wound prophylaxis  Tdap Vaccine - COST NOT COVERED BY MEDICARE PART B Recommended at least once for all adults  For pregnant patients, recommended with each pregnancy  Shingles Vaccine (Shingrix) - COST NOT COVERED BY MEDICARE PART B  2 shot series recommended in those aged 48 and above     Health Maintenance Due:      Topic Date Due    CRC Screening: Colonoscopy  1950    MAMMOGRAM  01/02/2019    Hepatitis C Screening  Completed     Immunizations Due:      Topic Date Due    HEPATITIS B VACCINES (1 of 3 - Risk 3-dose series) 01/13/1969     Advance Directives   What are advance directives? Advance directives are legal documents that state your wishes and plans for medical care  These plans are made ahead of time in case you lose your ability to make decisions for yourself  Advance directives can apply to any medical decision, such as the treatments you want, and if you want to donate organs  What are the types of advance directives?   There are many types of advance directives, and each state has rules about how to use them  You may choose a combination of any of the following:  · Living will: This is a written record of the treatment you want  You can also choose which treatments you do not want, which to limit, and which to stop at a certain time  This includes surgery, medicine, IV fluid, and tube feedings  · Durable power of  for healthcare [facility]): This is a written record that states who you want to make healthcare choices for you when you are unable to make them for yourself  This person, called a proxy, is usually a family member or a friend  You may choose more than 1 proxy  · Do not resuscitate (DNR) order:  A DNR order is used in case your heart stops beating or you stop breathing  It is a request not to have certain forms of treatment, such as CPR  A DNR order may be included in other types of advance directives  · Medical directive: This covers the care that you want if you are in a coma, near death, or unable to make decisions for yourself  You can list the treatments you want for each condition  Treatment may include pain medicine, surgery, blood transfusions, dialysis, IV or tube feedings, and a ventilator (breathing machine)  · Values history: This document has questions about your views, beliefs, and how you feel and think about life  This information can help others choose the care that you would choose  Why are advance directives important? An advance directive helps you control your care  Although spoken wishes may be used, it is better to have your wishes written down  Spoken wishes can be misunderstood, or not followed  Treatments may be given even if you do not want them  An advance directive may make it easier for your family to make difficult choices about your care  Fall Prevention    Fall prevention  includes ways to make your home and other areas safer  It also includes ways you can move more carefully to prevent a fall   Health conditions that cause changes in your blood pressure, vision, or muscle strength and coordination may increase your risk for falls  Medicines may also increase your risk for falls if they make you dizzy, weak, or sleepy  Fall prevention tips:   · Stand or sit up slowly  · Use assistive devices as directed  · Wear shoes that fit well and have soles that   · Wear a personal alarm  · Stay active  · Manage your medical conditions  Home Safety Tips:  · Add items to prevent falls in the bathroom  · Keep paths clear  · Install bright lights in your home  · Keep items you use often on shelves within reach  · Paint or place reflective tape on the edges of your stairs  Urinary Incontinence   Urinary incontinence (UI)  is when you lose control of your bladder  UI develops because your bladder cannot store or empty urine properly  The 3 most common types of UI are stress incontinence, urge incontinence, or both  Medicines:   · May be given to help strengthen your bladder control  Report any side effects of medication to your healthcare provider  Do pelvic muscle exercises often:  Your pelvic muscles help you stop urinating  Squeeze these muscles tight for 5 seconds, then relax for 5 seconds  Gradually work up to squeezing for 10 seconds  Do 3 sets of 15 repetitions a day, or as directed  This will help strengthen your pelvic muscles and improve bladder control  Train your bladder:  Go to the bathroom at set times, such as every 2 hours, even if you do not feel the urge to go  You can also try to hold your urine when you feel the urge to go  For example, hold your urine for 5 minutes when you feel the urge to go  As that becomes easier, hold your urine for 10 minutes  Self-care:   · Keep a UI record  Write down how often you leak urine and how much you leak  Make a note of what you were doing when you leaked urine  · Drink liquids as directed   You may need to limit the amount of liquid you drink to help control your urine leakage  Do not drink any liquid right before you go to bed  Limit or do not have drinks that contain caffeine or alcohol  · Prevent constipation  Eat a variety of high-fiber foods  Good examples are high-fiber cereals, beans, vegetables, and whole-grain breads  Walking is the best way to trigger your intestines to have a bowel movement  · Exercise regularly and maintain a healthy weight  Weight loss and exercise will decrease pressure on your bladder and help you control your leakage  · Use a catheter as directed  to help empty your bladder  A catheter is a tiny, plastic tube that is put into your bladder to drain your urine  · Go to behavior therapy as directed  Behavior therapy may be used to help you learn to control your urge to urinate  Weight Management   Why it is important to manage your weight:  Being overweight increases your risk of health conditions such as heart disease, high blood pressure, type 2 diabetes, and certain types of cancer  It can also increase your risk for osteoarthritis, sleep apnea, and other respiratory problems  Aim for a slow, steady weight loss  Even a small amount of weight loss can lower your risk of health problems  How to lose weight safely:  A safe and healthy way to lose weight is to eat fewer calories and get regular exercise  You can lose up about 1 pound a week by decreasing the number of calories you eat by 500 calories each day  Healthy meal plan for weight management:  A healthy meal plan includes a variety of foods, contains fewer calories, and helps you stay healthy  A healthy meal plan includes the following:  · Eat whole-grain foods more often  A healthy meal plan should contain fiber  Fiber is the part of grains, fruits, and vegetables that is not broken down by your body  Whole-grain foods are healthy and provide extra fiber in your diet   Some examples of whole-grain foods are whole-wheat breads and pastas, oatmeal, brown rice, and bulgur  · Eat a variety of vegetables every day  Include dark, leafy greens such as spinach, kale, zacarias greens, and mustard greens  Eat yellow and orange vegetables such as carrots, sweet potatoes, and winter squash  · Eat a variety of fruits every day  Choose fresh or canned fruit (canned in its own juice or light syrup) instead of juice  Fruit juice has very little or no fiber  · Eat low-fat dairy foods  Drink fat-free (skim) milk or 1% milk  Eat fat-free yogurt and low-fat cottage cheese  Try low-fat cheeses such as mozzarella and other reduced-fat cheeses  · Choose meat and other protein foods that are low in fat  Choose beans or other legumes such as split peas or lentils  Choose fish, skinless poultry (chicken or turkey), or lean cuts of red meat (beef or pork)  Before you cook meat or poultry, cut off any visible fat  · Use less fat and oil  Try baking foods instead of frying them  Add less fat, such as margarine, sour cream, regular salad dressing and mayonnaise to foods  Eat fewer high-fat foods  Some examples of high-fat foods include french fries, doughnuts, ice cream, and cakes  · Eat fewer sweets  Limit foods and drinks that are high in sugar  This includes candy, cookies, regular soda, and sweetened drinks  Exercise:  Exercise at least 30 minutes per day on most days of the week  Some examples of exercise include walking, biking, dancing, and swimming  You can also fit in more physical activity by taking the stairs instead of the elevator or parking farther away from stores  Ask your healthcare provider about the best exercise plan for you  © Copyright [employer] 2018 Information is for End User's use only and may not be sold, redistributed or otherwise used for commercial purposes   All illustrations and images included in CareNotes® are the copyrighted property of [employer]  or [address] [place] [address]

## 2019-10-18 NOTE — PROGRESS NOTES
Assessment/Plan:     Diagnoses and all orders for this visit:    Essential hypertension    Hypercholesteremia  -     simvastatin (ZOCOR) 20 mg tablet; Take 1 tablet (20 mg total) by mouth daily at bedtime    Acquired hypothyroidism  -     levothyroxine 75 mcg tablet; Take 1 tablet (75 mcg total) by mouth daily    Nonrheumatic mitral valve regurgitation    Nonrheumatic aortic valve insufficiency    Non-rheumatic tricuspid valve insufficiency    Pulmonary hypertension (HCC)    Thrombocytosis (HCC)    CKD (chronic kidney disease), stage III (HCC)    Normocytic anemia    History of pulmonary embolus (PE)    History of DVT of lower extremity    History of breast cancer    History of Hodgkin's disease    Medicare annual wellness visit, subsequent    Other orders  -     Cancel: influenza vaccine, [id number], high-dose, PF 0 5 mL (FLUZONE HIGH-DOSE)        Patient requesting a reduction in a her Simvastatin dose to 20 mg daily  She is going to continue with the lower dose of Bisoprolol 2 5 mg/day  She is currently on low dose Eliquis 2 5 mg/day s/p DVT/PE January 2019  Her D dimer July 2019 was still elevated at 1,001  Oral iron is listed on her medication list but she has not started taking  She will discuss with hematology at her appointment next week  I recommended started a B complex vitamin with R22 and folic acid  Patient offered a flu vaccine she declined  BMI Counseling: Body mass index is 26 02 kg/m²  The BMI is above normal  Nutrition recommendations include consuming healthier snacks, moderation in carbohydrate intake, reducing intake of saturated fat and trans fat and reducing intake of cholesterol  Exercise recommendations include exercising 3-5 times per week  Patient ID: Alyssia Houston is a 68 y o  female  Follow-up visit  Hypertension dose of Bystolic 5 mg decreased to 5 mg 1/2 tablet daily  09/2019 creatinine 1 19  GFR 46  Electrolytes normal except for Na+ 135  Hgb 9 5    Hyperlipidemia on Simvastatin 40 mg daily  Lipid profile 07/2019 Cholesterol 112  Triglycerides 72  HDL 37  LDL 61   s/p admission 07/2019 for syncope [place] in [place] at a baseball game  Patient was sitting down  She had an episode of vomiting  Subsequent syncopal episode with transient LOC  Admission labs random blood glucose 101  Creatinine 1 5  Electrolytes normal   CBC WBC 11,500  Hemoglobin 9 7  MCV 88  Platelet count 302,642  Troponin negative  C-reactive protein 7 0  NT proBNP 4916  D-dimer elevated at 1001  EKG sinus tachycardia  Probable left atrial abnormality  Repolarization abnormality suggest ischemia, diffuse leads  Chest x-ray no consolidation or edema  Small right pleural effusion  No pneumothorax  Multiple calcified lymph nodes projecting over the mediastinum  CT scan chest with contrast no evidence of pulmonary embolus  Staghorn calculi throughout the right kidney resulting in marked obstruction of the upper pole calices  Additional nonobstructing 1 5 cm left upper pole calculus  Incompletely characterized 2 9 cm solid renal right mass  Bilateral pleural effusions with associated atelectasis  Mosaic attenuation of lung parenchyma suggesting air trapping  Cholelithiasis  Surgical changes of splenectomy with left lateral abdominal wall herniation containing nonobstructed small large bowel  Echocardiogram normal left ventricular chamber size  Mildly decreased left ventricular systolic function with segmental wall abnormalities  Basal to mid inferolateral akinesis with thinning  Basal inferior thinning and akinesis  Low normal contraction of the remaining wall  Ejection fraction 45%  Left atrium mildly dilated  Mild mitral regurgitation  Mild aortic regurgitation  Mild to moderate tricuspid regurgitation  Severe pulmonary hypertension present           The following portions of the patient's history were reviewed and updated as appropriate: allergies, current medications, past family history, past medical history, past social history, past surgical history and problem list     Review of Systems   Constitutional: Negative for appetite change, chills, fatigue, fever and unexpected weight change  HENT: Negative for congestion, ear pain, rhinorrhea, sore throat, trouble swallowing and voice change  Eyes: Negative for visual disturbance  Respiratory: Negative for cough, shortness of breath and wheezing  See HPI  S/p admission 01/2019 for PE/DVT  CXR no active disease  EKG sinus tachycardia with non specific T wave changes inferolaterally  Troponin 0 06  Elevated D dimer at 10,000  Acute bibasilar pulmonary emboli  Mild diffuse patchy ground-glass alveolar opacity, trace pleural effusions and smooth septal thickening suggestive of pulmonary vascular congestion/fluid overload  Trace bibasilar and lingular subsegmental atelectasis  No cardiomegaly  No pericardial effusion  Aortic and coronary artery calcification  Calcified prevascular lymph nodes  Shotty bilateral hilar, left paratracheal and subcarinal lymph nodes  Small sliding hiatal hernia  Venous Dopplers normal except for left leg acute nonocclusive deep vein thrombosis in the popliteal and 1 of the paired gastrocnemius veins  Admission labs CBC WBC 9,450  Hemoglobin 10 3  MCV 85  Platelet count 474,750 Chemistry profile  blood glucose 106  Electrolytes normal except for sodium 134 and chloride 98  creatinine 1 46  LFTs normal except for AST 50 and alkaline phosphatase 218  Total protein elevated 9 6  Albumin 2 3  NT pro BNP 3,376  Repeat CBC WBC 7,740  Hgb 8 8  MCV 86  Platelets 998,812  Cardiovascular: Negative for chest pain, palpitations and leg swelling  Lower extremity edema resolved no longer on Furosemide  01/2019 echocardiogram normal left ventricular systolic function  EF 55%  Right ventricle normal   Mild MR  Moderate to severe AR    Moderate TR with finding suggesting severe pulmonary hypertension  No pericardial effusion  Normal aortic root  Repeat echocardiogram 04/2019 normal left ventricular systolic function  EF 55%  No regional wall motion abnormalities  Left atrium mildly dilated  No atrial septal defect or PFO  Mildly dilated right atrium  Mild to moderate MR  Moderate AR  Mild to moderate tricuspid regurgitation  No pericardial effusion  Aortic root normal size  Gastrointestinal: Negative for abdominal pain, blood in stool, constipation, diarrhea, nausea and vomiting  See HPI   no prior colonoscopy  CT scan abdomen 12/2016 + cholelithiasis asymptomatic  Hernia left flank containing fat and nonobstructive bowel  Small subscapular area of enhancement right hepatic lobe  Stable retroperitoneal nodule  hiatal hernia  Stable nephrolithiasis and renal scarring/complex renal cystic mass on the right  Endocrine:        Hypothyroidism on Levothyroxine 75 mcg daily  09/2019 TSH 0 751  Osteoporosis on vitamin D 1,000 IU daily  Genitourinary: Negative for difficulty urinating, dysuria and hematuria  12/2018 abdominal ultrasound markedly enlarged incompletely evaluated right kidney with complex masses  Staghorn calculus  Cholelithiasis  01/2019 CT scan renal protocol no solid enhancing renal or urothelial mass  Multifocal right renal staghorn calculi including a 2 cm pelvic calculus  Interval progressive cystic hydronephrosis of the upper pole  Stable cystic caliectasis  posterior lower pole  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  Left nephrolithiasis 1 cm and less no obstructive uropathy  No acute intra-abdominal or intrapelvic process  Cholelithiasis  history of nephrolithiasis including staghorn calculi followed by urology  03/2018 renal ultrasound large right kidney with upper pole complex cystic mass and lower pole complex solid mass  Large right-sided staghorn calculus    No obvious hydronephrosis  Nonobstructing left renal collecting system calculus  Lower pole cortical scarring  08/2017 nuclear renal flow scan abnormal delayed perfusion and diminished functioning within the right kidney with a split function measuring only 17% on the right  83% on the left  Musculoskeletal: Negative for arthralgias and myalgias  Skin: Negative for rash  Non pruritic rash on lower extremities at that time of admission 01/2019  Diagnosis leukocytoclastic vasculitis  history of melanoma right flank  biopsy left arm 11/2016 SCC in situ  06/2018 biopsy lesion left side of nose BCC  Allergic/Immunologic: Positive for environmental allergies  On Loratadine  Neurological: Negative for dizziness, weakness and headaches  Hematological: Negative for adenopathy  Bruises/bleeds easily  Hematology evaluation 09/2019 for anemia  10/2019 hemoccult negative  09/2019 CBC WBC 9,990  Hgb 9 5  MCV 91  Platelets 823,980  Reticulocyte count 1 45  Folate 7 1 decreased from 9 7  Vitamin 435 decreased from 656    SPEP no monoclonal bands  TESS mutation negative   Normocytic anemia dating back to 12/2018  Hgb 10 2   02/2019 anemia studies vitamin B12 656  Folic acid 9 7  Iron low at 20  TIBC 151  % saturation 13  Ferritin 260  Reticulocyte count 1 45  SPEP/immunofixation no monoclonal gammopathy  No rectal bleeding or melena  No prior colonoscopy  Patient refused GI work up  Intolerance to oral iron  She received 3 infusions of IV iron 06/2019 08/2018 CBC WBC 8,510  Hgb 12 8  MCV 89  Platelets 301,412  13/4556 CBC WBC 12,950  Hgb 10 2  MCV 85  Platelets 560,784  remote history of Hodgkin's disease  breast CA s/p left mastectomy with chemotherapy  she completed a course of Arimidex in 2011  followed by oncology  Psychiatric/Behavioral: Negative for dysphoric mood and sleep disturbance           Objective:    /62   Pulse 92   Temp 97 9 °F (36 6 °C)   Resp 16   Ht 5' 1 5" (1 562 m)   Wt 63 5 kg (140 lb)   LMP  (LMP Unknown)   BMI 26 02 kg/m²        Physical Exam   Constitutional: She is oriented to person, place, and time  She appears well-developed and well-nourished  No distress  HENT:   Mouth/Throat: Oropharynx is clear and moist and mucous membranes are normal  No oral lesions  Normal dentition  Eyes: Pupils are equal, round, and reactive to light  Conjunctivae and EOM are normal  No scleral icterus  Neck: No JVD present  Carotid bruit is not present  No tracheal deviation present  No thyroid mass and no thyromegaly present  Cardiovascular: Normal rate, regular rhythm and normal heart sounds  Exam reveals no gallop  No murmur heard  Pulmonary/Chest: Effort normal and breath sounds normal  No respiratory distress  She has no wheezes  She has no rales  Abdominal: Soft  Bowel sounds are normal  She exhibits no distension, no abdominal bruit and no mass  There is no hepatosplenomegaly  There is no tenderness  There is no rebound and no guarding  Musculoskeletal: Normal range of motion  She exhibits no edema  Lymphadenopathy:     She has no cervical adenopathy  Neurological: She is alert and oriented to person, place, and time  No cranial nerve deficit  Skin: No rash noted  No cyanosis  Nails show no clubbing  Psychiatric: She has a normal mood and affect  Nursing note and vitals reviewed          Iron (ug/dL)   Date Value   09/24/2019 31 (L)   08/30/2019 35 (L)   03/26/2019 30 (L)     Hemoglobin (g/dL)   Date Value   09/24/2019 9 5 (L)   08/30/2019 9 4 (L)   03/26/2019 8 6 (L)               Recent Results (from the past 672 hour(s))   CBC and differential    Collection Time: 09/24/19 11:23 AM   Result Value Ref Range    WBC 9 99 4 31 - 10 16 Thousand/uL    RBC 3 47 (L) 3 81 - 5 12 Million/uL    Hemoglobin 9 5 (L) 11 5 - 15 4 g/dL    Hematocrit 31 4 (L) 34 8 - 46 1 %    MCV 91 82 - 98 fL    MCH 27 4 26 8 - 34 3 pg    MCHC 30 3 (L) 31 4 - 37 4 g/dL    RDW 15 9 (H) 11 6 - 15 1 %    MPV 9 0 8 9 - 12 7 fL    Platelets 807 (H) 838 - 390 Thousands/uL    nRBC 0 /100 WBCs    Neutrophils Relative 81 (H) 43 - 75 %    Immat GRANS % 0 0 - 2 %    Lymphocytes Relative 8 (L) 14 - 44 %    Monocytes Relative 8 4 - 12 %    Eosinophils Relative 2 0 - 6 %    Basophils Relative 1 0 - 1 %    Neutrophils Absolute 8 12 (H) 1 85 - 7 62 Thousands/µL    Immature Grans Absolute 0 03 0 00 - 0 20 Thousand/uL    Lymphocytes Absolute 0 82 0 60 - 4 47 Thousands/µL    Monocytes Absolute 0 75 0 17 - 1 22 Thousand/µL    Eosinophils Absolute 0 22 0 00 - 0 61 Thousand/µL    Basophils Absolute 0 05 0 00 - 0 10 Thousands/µL   Reticulocytes    Collection Time: 09/24/19 11:23 AM   Result Value Ref Range    Retic Ct Abs 50,300 94,298-77,904    Retic Ct Pct 1 45 0 37 - 1 87 %   TSH, 3rd generation with Free T4 reflex    Collection Time: 09/24/19 11:23 AM   Result Value Ref Range    TSH 3RD GENERATON 0 751 0 358 - 3 740 uIU/mL   Vitamin B12    Collection Time: 09/24/19 11:23 AM   Result Value Ref Range    Vitamin B-12 435 100 - 900 pg/mL   Folate    Collection Time: 09/24/19 11:23 AM   Result Value Ref Range    Folate 7 1 3 1 - 17 5 ng/mL   Comprehensive metabolic panel    Collection Time: 09/24/19 11:23 AM   Result Value Ref Range    Sodium 135 (L) 136 - 145 mmol/L    Potassium 4 1 3 5 - 5 3 mmol/L    Chloride 101 100 - 108 mmol/L    CO2 28 21 - 32 mmol/L    ANION GAP 6 4 - 13 mmol/L    BUN 19 5 - 25 mg/dL    Creatinine 1 19 0 60 - 1 30 mg/dL    Glucose 96 65 - 140 mg/dL    Calcium 8 8 8 3 - 10 1 mg/dL    AST 18 5 - 45 U/L    ALT 12 12 - 78 U/L    Alkaline Phosphatase 133 (H) 46 - 116 U/L    Total Protein 9 4 (H) 6 4 - 8 2 g/dL    Albumin 2 3 (L) 3 5 - 5 0 g/dL    Total Bilirubin 0 20 0 20 - 1 00 mg/dL    eGFR 46 ml/min/1 73sq m   JAK2 V617F,Ql,W/RFL Exons 12,13 and MPL Z394,U122    Collection Time: 09/24/19 11:23 AM   Result Value Ref Range    Miscellaneous Lab Test Result SEE WRITTEN REPORT    Sedimentation rate, automated    Collection Time: 09/24/19 11:23 AM   Result Value Ref Range    Sed Rate 101 (H) 0 - 20 mm/hour   Iron Saturation %    Collection Time: 09/24/19 11:23 AM   Result Value Ref Range    Iron Saturation 17 %    TIBC 184 (L) 250 - 450 ug/dL    Iron 31 (L) 50 - 170 ug/dL   Ferritin    Collection Time: 09/24/19 11:23 AM   Result Value Ref Range    Ferritin 289 8 - 388 ng/mL   Protein electrophoresis, serum    Collection Time: 09/26/19 10:34 AM   Result Value Ref Range    A/G Ratio 0 45 (L) 1 10 - 1 80    Albumin Electrophoresis 31 2 (L) 52 0 - 65 0 %    Albumin CONC 2 78 (L) 3 50 - 5 00 g/dl    Alpha 1 5 9 (H) 2 5 - 5 0 %    ALPHA 1 CONC 0 53 (H) 0 10 - 0 40 g/dL    Alpha 2 10 8 7 0 - 13 0 %    ALPHA 2 CONC 0 96 0 40 - 1 20 g/dL    Beta-1 5 9 5 0 - 13 0 %    BETA 1 CONC 0 53 0 40 - 0 80 g/dL    Beta-2 6 8 2 0 - 8 0 %    BETA 2 CONC 0 61 (H) 0 20 - 0 50 g/dL    Gamma Globulin 39 4 (H) 12 0 - 22 0 %    GAMMA CONC 3 51 (H) 0 50 - 1 60 g/dL    Total Protein 8 9 (H) 6 4 - 8 2 g/dL    SPEP Interpretation       No monoclonal bands noted  Previous immunofixation 02/15/19 showed no monoclonal gammopathy   Reviewed by:  Francecso Khan MD  ([id number]) **Electronic Signature**   Occult Blood, Fecal Immunochemical    Collection Time: 10/01/19 12:58 PM   Result Value Ref Range    OCCULT BLD, FECAL IMMUNOLOGICAL Negative Negative

## 2019-10-20 PROBLEM — I77.6 VASCULITIS (HCC): Status: RESOLVED | Noted: 2019-01-12 | Resolved: 2019-10-20

## 2019-10-20 PROBLEM — R55 SYNCOPE: Status: RESOLVED | Noted: 2019-07-28 | Resolved: 2019-10-20

## 2019-10-21 ENCOUNTER — TELEPHONE (OUTPATIENT)
Dept: HEMATOLOGY ONCOLOGY | Facility: CLINIC | Age: 69
End: 2019-10-21

## 2019-10-21 NOTE — TELEPHONE ENCOUNTER
Spoke to patient and informed her that his appt on 10/25/19 needed to be R/S  Her new appt is 10/25/19 at 2:30 pm with MARYANN Danielle in the [place] office   Patient voiced understanding of all appt information

## 2019-10-23 ENCOUNTER — HOSPITAL ENCOUNTER (OUTPATIENT)
Dept: RADIOLOGY | Age: 69
Discharge: HOME | End: 2019-10-23
Payer: MEDICARE

## 2019-10-23 VITALS — HEIGHT: 61 IN | WEIGHT: 140 LBS | BODY MASS INDEX: 26.43 KG/M2

## 2019-10-23 DIAGNOSIS — Z85.3 PERSONAL HISTORY OF MALIGNANT NEOPLASM OF BREAST: ICD-10-CM

## 2019-10-23 PROCEDURE — 77063 BREAST TOMOSYNTHESIS BI: CPT

## 2019-10-23 PROCEDURE — 77067 SCR MAMMO BI INCL CAD: CPT

## 2019-10-25 ENCOUNTER — OFFICE VISIT (OUTPATIENT)
Dept: HEMATOLOGY ONCOLOGY | Facility: CLINIC | Age: 69
End: 2019-10-25
Payer: MEDICARE

## 2019-10-25 VITALS
DIASTOLIC BLOOD PRESSURE: 66 MMHG | TEMPERATURE: 97.8 F | RESPIRATION RATE: 18 BRPM | OXYGEN SATURATION: 98 % | HEART RATE: 103 BPM | SYSTOLIC BLOOD PRESSURE: 122 MMHG | HEIGHT: 61 IN | BODY MASS INDEX: 26.43 KG/M2 | WEIGHT: 140 LBS

## 2019-10-25 DIAGNOSIS — D64.9 ANEMIA, UNSPECIFIED TYPE: Primary | ICD-10-CM

## 2019-10-25 DIAGNOSIS — D75.839 THROMBOCYTOSIS: ICD-10-CM

## 2019-10-25 DIAGNOSIS — Z86.711 HISTORY OF PULMONARY EMBOLUS (PE): ICD-10-CM

## 2019-10-25 PROCEDURE — 99214 OFFICE O/P EST MOD 30 MIN: CPT | Performed by: PHYSICIAN ASSISTANT

## 2019-10-25 NOTE — H&P (VIEW-ONLY)
Hematology/Oncology Outpatient Follow-up  Patricia Morrow 68 y o  female 1950 [id number]    Date:  10/25/2019      Assessment and Plan:  1  Anemia, unspecified type, 2  Thrombocytosis [facility])   49-year-old female presents for follow-up regarding anemia and thrombocytosis  JAK2 mutation was negative  Hemoglobin is slightly improved however still decreased  B12 was in the 400 range  She has started on oral B12  She states she is taking B complex  Folic acid was normal   SPEP was negative  Stool testing for blood was negative  TSH normal   Reticulocyte normal   Iron panel consistent with chronic disease, not iron deficiency  Discussed that bone marrow biopsy is indicated at this point due to her abnormal CBC and history of multiple malignancies  She asked as to what possible scenarios could occur  Discussed that she could have a low-grade lymphoma or possibly low-grade MDS  Both of these treatments would not include invasive chemotherapy  She was happy to see this  Reviewed bone marrow biopsy procedure  She will follow up 1-2 weeks following bone marrow biopsy  - IR bone marrow biopsy/aspiration; Future    3  History of pulmonary embolus (PE)  She also has history of PE as below  This likely was provoked secondary to sedentary lifestyle for 4 months following infection  However she will remain on anticoagulation until we have the results from bone marrow biopsy to rule out malignancy  We will rediscuss dosing at her next visit  Due to bleeding symptoms she is only taking 2 5 daily per her PCP  She distally asked about her D-dimer result  Reviewed that this was last assessed in August and was 1000, which is not very elevated  Reviewed that this is not a good indicator for future monitoring of thromboses as this is not highly specific  Reviewed that her CTA performed at [facility] was negative for any thromboses        HPI:  49-year-old female presents for follow-up regarding history of multiple malignancies  In 1995 patient was diagnosed to have stage II Hodgkin's disease above the diaphragm  She had staging laparotomy, splenectomy, extended field radiation therapy      In 2006 she was diagnosed to have left-sided breast cancer, hormone receptor positive, HER2 negative, T1, N1, stage IIA  She underwent left mastectomy, reconstruction surgery  She then had 4 cycles of adjuvant Taxotere plus Cytoxan followed by adjuvant Arimidex for 5 years which she completed in 2011  She did not receive any adjuvant radiation therapy secondary to previous radiation for Hodgkin's disease as above      In March 2016 she had melanoma surgery for T1a, 0 6 mm melanoma of the right flank area  She follows with Dr Reggie Leiva every 6 months  Oct/Nov 2018 she got an infection, strep; started in her urine  She was sick and feeling ill for over 2 months  She was laying down not doing anything from Oct-Dec  She states she did not even go to her family's house for the Christmas holiday      She then presented to the hospital on 1/12/19 and was found to have acute bibasilar pulmonary emboli  Venous Doppler was then also ordered and this showed an acute nonocclusive DVT in the popliteal and paired gastrocnemius veins of the left lower extremity  Right lower extremity was negative for DVT      She was placed on Eliquis 5 mg BID      She then was at a vivit and had a syncopal event  She was brought to [place] in July 2019  Work up was negative and she was thought to have vasovagal event  CBC at [facility] was performed at [place] and was negative for PE  Due to above anemia, patient was given IV iron Venofer ordered by PCP on 8/9, 8/16, 8/23     Aug 2019 patient called the PCP due to bruises on her bilateral lower extremities  Eliquis dose was adjusted  She is presently taking 5mg, 1/2 tablet, cutting it on her own, only once daily    Interval history: Patient states she is feeling well  Fatigue continues to improve  ROS: Review of Systems   Constitutional: Positive for fatigue (slight)  Negative for activity change, appetite change, chills, fever and unexpected weight change  Respiratory: Negative for cough and shortness of breath  Cardiovascular: Negative for chest pain, palpitations and leg swelling  Gastrointestinal: Negative for abdominal pain, blood in stool, constipation, diarrhea, nausea and vomiting  Genitourinary: Negative for difficulty urinating and dysuria  Musculoskeletal: Negative for arthralgias  Skin: Negative  Neurological: Negative for dizziness, weakness, light-headedness, numbness and headaches  Hematological: Negative  Psychiatric/Behavioral: Negative  Past Medical History:   Diagnosis Date    ENEDELIA (acute kidney injury) ([facility] ) 1/12/2019    Anemia     BCC (basal cell carcinoma of skin)     facial    Breast cancer ([place] [address] ) 2006    s/p mastectomy     Calculus, kidney 2013    Cervical cancer ([facility] ) 1983    Disease of thyroid gland     Ganglion     Last Assessed:7/9/13    Heart murmur     mitral valve regurgitation    Hodgkin disease ([place] [address] )     Hydronephrosis 2013    Hyperlipidemia     Hypertension     NSTEMI (non-ST elevated myocardial infarction) ([place] [address] ) 1/12/2019    Osteopenia     Last Assessed:7/9/13    Osteoporosis     Paroxysmal supraventricular tachycardia ([place] [address] )     Renal calculi     Last Assessed:3/19/14    Renal cyst     Shoulder impingement     Last Assessed:1/18/13    Syncope 7/28/2019    Last Assessment & Plan:  Pre-syncopal symptoms leading to short syncopal episode without post-ictal period  Suspect dehydration/hypovolemia  Infectious, cardiopulm or neurologic cause less likely  No arrhythmias or AV block on ECG or telemetry thus far  No PE on CTA  No clinical signs of seizure   No acute pathology or mass on CTH  - s/p IVF; encourage PO intake - continue on telemetry - TTE c/f ne    UTI (urinary tract infection) 2013    Vasculitis [facility] ) 2019       Past Surgical History:   Procedure Laterality Date    APPENDECTOMY      BREAST BIOPSY Left 2006    BREAST RECONSTRUCTION      COMPLEX WOUND CLOSURE TO EXTREMITY Left 10/9/2018    Procedure: COMPLEX CLOSURE RECONSTRUCTION;  Surgeon: Hany Palma MD;  [facility];  Service: Plastics    CYSTOSCOPY W/ RETROGRADES      CYSTOSCOPY W/ URETEROSCOPY      EXTRACORPOREAL SHOCK WAVE LITHOTRIPSY Right 2005    FLAP LOCAL HEAD / NECK Left 10/9/2018    Procedure: FLAP RECONSTRUCTION;  Surgeon: Hany Palma MD;  [facility];  Service: Plastics    FULL THICKNESS SKIN GRAFT Left 10/9/2018    Procedure: FULL THICKNESS SKIN GRAFT RECONSTRUCTION;  Surgeon: Hany Palma MD;  [facility];  Service: Plastics    HYSTERECTOMY      -cervical cancer    INTRAOPERATIVE RADIATION THERAPY ([facility])      Radiation Therapy    KIDNEY SURGERY      MASTECTOMY Left 2006    OOPHORECTOMY Left     OTHER SURGICAL HISTORY      Chemotherapeutics    REDUCTION MAMMAPLASTY Right 2006    SENTINEL LYMPH NODE BIOPSY      SPLENECTOMY      1985-Hodgkin's Disease    SQUAMOUS CELL CARCINOMA EXCISION Left 10/9/2018    Procedure: NOSE/CHEEK [address] EXCISION; FROZEN SECTION;  Surgeon: Hany Palma MD;  [facility];  Service: Plastics       Social History     Socioeconomic History    Marital status: /Civil Union     Spouse name: None    Number of children: None    Years of education: None    Highest education level: None   Occupational History    None   Social Needs    Financial resource strain: None    Food insecurity:     Worry: None     Inability: None    Transportation needs:     Medical: None     Non-medical: None   Tobacco Use    Smoking status: Former Smoker     Last attempt to quit:      Years since quittin 8    Smokeless tobacco: Never Used   Substance and Sexual Activity    Alcohol use:  Yes Comment: caffeine use; social drinker-1 beer every 2/3 months    Drug use: Never    Sexual activity: None   Lifestyle    Physical activity:     Days per week: None     Minutes per session: None    Stress: None   Relationships    Social connections:     Talks on phone: None     Gets together: None     Attends Rastafari service: None     Active member of club or organization: None     Attends meetings of clubs or organizations: None     Relationship status: None    Intimate partner violence:     Fear of current or ex partner: None     Emotionally abused: None     Physically abused: None     Forced sexual activity: None   Other Topics Concern    None   Social History Narrative    Advance directive declined by patient    Drinks coffee    Exercise habits       Family History   Problem Relation Age of Onset    Leukemia Mother     Colon cancer Paternal Grandmother 80    Heart disease Family     Heart attack Father     Coronary artery disease Father         stent- five    Hypertension Father     Colon cancer Father 80    No Known Problems Maternal Grandmother     No Known Problems Maternal Grandfather     No Known Problems Paternal Grandfather     No Known Problems Paternal Aunt     Stroke Neg Hx     Anuerysm Neg Hx     Clotting disorder Neg Hx     Arrhythmia Neg Hx     Heart failure Neg Hx     Hyperlipidemia Neg Hx        Allergies   Allergen Reactions    Ciprofloxacin Other (See Comments)     Reaction Date: 24Jun2011;    Hives/Uticaria    Sulfamethoxazole-Trimethoprim Hives     Patient does not remember rx         Current Outpatient Medications:     apixaban (ELIQUIS) 2 5 mg, Take 1 tablet (2 5 mg total) by mouth 2 (two) times a day (Patient taking differently: Take 2 5 mg by mouth daily ), Disp: 60 tablet, Rfl: 3    cholecalciferol (VITAMIN D3) 1,000 units tablet, Take 1,000 Units by mouth daily  , Disp: , Rfl:     ferrous sulfate 325 (65 Fe) mg tablet, Take 325 mg by mouth daily with breakfast, Disp: , Rfl:     levothyroxine 75 mcg tablet, Take 1 tablet (75 mcg total) by mouth daily, Disp: 90 tablet, Rfl: 3    Loratadine (CLARITIN) 10 MG CAPS, Take by mouth as needed , Disp: , Rfl:     nebivolol (BYSTOLIC) 5 mg tablet, Take 1 tablet (5 mg total) by mouth daily for 183 days (Patient taking differently: Take 2 5 mg by mouth daily ), Disp: 90 tablet, Rfl: 3    simvastatin (ZOCOR) 20 mg tablet, Take 1 tablet (20 mg total) by mouth daily at bedtime, Disp: 90 tablet, Rfl: 3      Physical Exam:  /66 (BP Location: Right arm, Patient Position: Sitting, Cuff Size: Adult)   Pulse 103   Temp 97 8 °F (36 6 °C)   Resp 18   Ht 5' 1" (1 549 m)   Wt 63 5 kg (140 lb)   LMP  (LMP Unknown)   SpO2 98%   BMI 26 45 kg/m²     Physical Exam   Constitutional: She is oriented to person, place, and time  She appears well-developed and well-nourished  No distress  HENT:   Head: Normocephalic and atraumatic  Eyes: Conjunctivae are normal  No scleral icterus  Neck: Normal range of motion  Neck supple  Cardiovascular: Normal rate, regular rhythm and normal heart sounds  No murmur heard  Pulmonary/Chest: Effort normal and breath sounds normal  No respiratory distress  Abdominal: Soft  There is no tenderness  Musculoskeletal: Normal range of motion  She exhibits no edema or tenderness  Lymphadenopathy:     She has no cervical adenopathy  Neurological: She is alert and oriented to person, place, and time  No cranial nerve deficit  Skin: Skin is warm and dry  Psychiatric: She has a normal mood and affect           Labs:  Lab Results   Component Value Date    WBC 9 99 09/24/2019    HGB 9 5 (L) 09/24/2019    HCT 31 4 (L) 09/24/2019    MCV 91 09/24/2019     (H) 09/24/2019     Lab Results   Component Value Date    K 4 1 09/24/2019     09/24/2019    CO2 28 09/24/2019    BUN 19 09/24/2019    CREATININE 1 19 09/24/2019    GLUF 88 08/30/2019    CALCIUM 8 8 09/24/2019    AST 18 09/24/2019    ALT 12 09/24/2019    ALKPHOS 133 (H) 09/24/2019    EGFR 46 09/24/2019       Patient voiced understanding and agreement in the above discussion  Aware to contact our office with questions/symptoms in the interim  This note has been generated by voice recognition software system  Therefore, there may be spelling, grammar, and or syntax errors  Please contact if questions arise

## 2019-10-25 NOTE — PROGRESS NOTES
Hematology/Oncology Outpatient Follow-up  Leopoldo Disla 68 y o  female 1950 [id number]    Date:  10/25/2019      Assessment and Plan:  1  Anemia, unspecified type, 2  Thrombocytosis [facility])   75-year-old female presents for follow-up regarding anemia and thrombocytosis  JAK2 mutation was negative  Hemoglobin is slightly improved however still decreased  B12 was in the 400 range  She has started on oral B12  She states she is taking B complex  Folic acid was normal   SPEP was negative  Stool testing for blood was negative  TSH normal   Reticulocyte normal   Iron panel consistent with chronic disease, not iron deficiency  Discussed that bone marrow biopsy is indicated at this point due to her abnormal CBC and history of multiple malignancies  She asked as to what possible scenarios could occur  Discussed that she could have a low-grade lymphoma or possibly low-grade MDS  Both of these treatments would not include invasive chemotherapy  She was happy to see this  Reviewed bone marrow biopsy procedure  She will follow up 1-2 weeks following bone marrow biopsy  - IR bone marrow biopsy/aspiration; Future    3  History of pulmonary embolus (PE)  She also has history of PE as below  This likely was provoked secondary to sedentary lifestyle for 4 months following infection  However she will remain on anticoagulation until we have the results from bone marrow biopsy to rule out malignancy  We will rediscuss dosing at her next visit  Due to bleeding symptoms she is only taking 2 5 daily per her PCP  She distally asked about her D-dimer result  Reviewed that this was last assessed in August and was 1000, which is not very elevated  Reviewed that this is not a good indicator for future monitoring of thromboses as this is not highly specific  Reviewed that her CTA performed at [facility] was negative for any thromboses        HPI:  75-year-old female presents for follow-up regarding history of multiple malignancies  In 1995 patient was diagnosed to have stage II Hodgkin's disease above the diaphragm  She had staging laparotomy, splenectomy, extended field radiation therapy      In 2006 she was diagnosed to have left-sided breast cancer, hormone receptor positive, HER2 negative, T1, N1, stage IIA  She underwent left mastectomy, reconstruction surgery  She then had 4 cycles of adjuvant Taxotere plus Cytoxan followed by adjuvant Arimidex for 5 years which she completed in 2011  She did not receive any adjuvant radiation therapy secondary to previous radiation for Hodgkin's disease as above      In March 2016 she had melanoma surgery for T1a, 0 6 mm melanoma of the right flank area  She follows with Dr Reggie Leiva every 6 months  Oct/Nov 2018 she got an infection, strep; started in her urine  She was sick and feeling ill for over 2 months  She was laying down not doing anything from Oct-Dec  She states she did not even go to her family's house for the Christmas holiday      She then presented to the hospital on 1/12/19 and was found to have acute bibasilar pulmonary emboli  Venous Doppler was then also ordered and this showed an acute nonocclusive DVT in the popliteal and paired gastrocnemius veins of the left lower extremity  Right lower extremity was negative for DVT      She was placed on Eliquis 5 mg BID      She then was at a [email or web address] and had a syncopal event  She was brought to [facility] in July 2019  Work up was negative and she was thought to have vasovagal event  CBC at [facility] was performed at [facility] and was negative for PE  Due to above anemia, patient was given IV iron Venofer ordered by PCP on 8/9, 8/16, 8/23     Aug 2019 patient called the PCP due to bruises on her bilateral lower extremities  Eliquis dose was adjusted  She is presently taking 5mg, 1/2 tablet, cutting it on her own, only once daily    Interval history: Patient states she is feeling well  Fatigue continues to improve  ROS: Review of Systems   Constitutional: Positive for fatigue (slight)  Negative for activity change, appetite change, chills, fever and unexpected weight change  Respiratory: Negative for cough and shortness of breath  Cardiovascular: Negative for chest pain, palpitations and leg swelling  Gastrointestinal: Negative for abdominal pain, blood in stool, constipation, diarrhea, nausea and vomiting  Genitourinary: Negative for difficulty urinating and dysuria  Musculoskeletal: Negative for arthralgias  Skin: Negative  Neurological: Negative for dizziness, weakness, light-headedness, numbness and headaches  Hematological: Negative  Psychiatric/Behavioral: Negative  Past Medical History:   Diagnosis Date    ENEDELIA (acute kidney injury) ([facility] ) 1/12/2019    Anemia     BCC (basal cell carcinoma of skin)     facial    Breast cancer ([place] [address] ) 2006    s/p mastectomy     Calculus, kidney 2013    Cervical cancer ([facility] ) 1983    Disease of thyroid gland     Ganglion     Last Assessed:7/9/13    Heart murmur     mitral valve regurgitation    Hodgkin disease ([place] [address] )     Hydronephrosis 2013    Hyperlipidemia     Hypertension     NSTEMI (non-ST elevated myocardial infarction) ([place] [address] ) 1/12/2019    Osteopenia     Last Assessed:7/9/13    Osteoporosis     Paroxysmal supraventricular tachycardia ([place] [address] )     Renal calculi     Last Assessed:3/19/14    Renal cyst     Shoulder impingement     Last Assessed:1/18/13    Syncope 7/28/2019    Last Assessment & Plan:  Pre-syncopal symptoms leading to short syncopal episode without post-ictal period  Suspect dehydration/hypovolemia  Infectious, cardiopulm or neurologic cause less likely  No arrhythmias or AV block on ECG or telemetry thus far  No PE on CTA  No clinical signs of seizure   No acute pathology or mass on CTH  - s/p IVF; encourage PO intake - continue on telemetry - TTE c/f ne    UTI (urinary tract infection) 2013    Vasculitis ([facility] ) 2019       Past Surgical History:   Procedure Laterality Date    APPENDECTOMY      BREAST BIOPSY Left 2006    BREAST RECONSTRUCTION      COMPLEX WOUND CLOSURE TO EXTREMITY Left 10/9/2018    Procedure: COMPLEX CLOSURE RECONSTRUCTION;  Surgeon: Omayra Albarado MD;  [facility];  Service: Plastics    CYSTOSCOPY W/ RETROGRADES      CYSTOSCOPY W/ URETEROSCOPY      EXTRACORPOREAL SHOCK WAVE LITHOTRIPSY Right 2005    FLAP LOCAL HEAD / NECK Left 10/9/2018    Procedure: FLAP RECONSTRUCTION;  Surgeon: Omayra Albarado MD;  [facility];  Service: Plastics    FULL THICKNESS SKIN GRAFT Left 10/9/2018    Procedure: FULL THICKNESS SKIN GRAFT RECONSTRUCTION;  Surgeon: Omayra Albarado MD;  [facility];  Service: Plastics    HYSTERECTOMY      -cervical cancer    INTRAOPERATIVE RADIATION THERAPY ([facility])      Radiation Therapy    KIDNEY SURGERY      MASTECTOMY Left 2006    OOPHORECTOMY Left     OTHER SURGICAL HISTORY      Chemotherapeutics    REDUCTION MAMMAPLASTY Right     SENTINEL LYMPH NODE BIOPSY      SPLENECTOMY      1985-Hodgkin's Disease    SQUAMOUS CELL CARCINOMA EXCISION Left 10/9/2018    Procedure: NOSE/CHEEK [address] EXCISION; FROZEN SECTION;  Surgeon: Omayra Albarado MD;  [facility];  Service: Plastics       Social History     Socioeconomic History    Marital status: /Civil Union     Spouse name: None    Number of children: None    Years of education: None    Highest education level: None   Occupational History    None   Social Needs    Financial resource strain: None    Food insecurity:     Worry: None     Inability: None    Transportation needs:     Medical: None     Non-medical: None   Tobacco Use    Smoking status: Former Smoker     Last attempt to quit:      Years since quittin 8    Smokeless tobacco: Never Used   Substance and Sexual Activity    Alcohol use:  Yes Comment: caffeine use; social drinker-1 beer every 2/3 months    Drug use: Never    Sexual activity: None   Lifestyle    Physical activity:     Days per week: None     Minutes per session: None    Stress: None   Relationships    Social connections:     Talks on phone: None     Gets together: None     Attends Hinduism service: None     Active member of club or organization: None     Attends meetings of clubs or organizations: None     Relationship status: None    Intimate partner violence:     Fear of current or ex partner: None     Emotionally abused: None     Physically abused: None     Forced sexual activity: None   Other Topics Concern    None   Social History Narrative    Advance directive declined by patient    Drinks coffee    Exercise habits       Family History   Problem Relation Age of Onset    Leukemia Mother     Colon cancer Paternal Grandmother 80    Heart disease Family     Heart attack Father     Coronary artery disease Father         stent- five    Hypertension Father     Colon cancer Father 80    No Known Problems Maternal Grandmother     No Known Problems Maternal Grandfather     No Known Problems Paternal Grandfather     No Known Problems Paternal Aunt     Stroke Neg Hx     Anuerysm Neg Hx     Clotting disorder Neg Hx     Arrhythmia Neg Hx     Heart failure Neg Hx     Hyperlipidemia Neg Hx        Allergies   Allergen Reactions    Ciprofloxacin Other (See Comments)     Reaction Date: 24Jun2011;    Hives/Uticaria    Sulfamethoxazole-Trimethoprim Hives     Patient does not remember rx         Current Outpatient Medications:     apixaban (ELIQUIS) 2 5 mg, Take 1 tablet (2 5 mg total) by mouth 2 (two) times a day (Patient taking differently: Take 2 5 mg by mouth daily ), Disp: 60 tablet, Rfl: 3    cholecalciferol (VITAMIN D3) 1,000 units tablet, Take 1,000 Units by mouth daily  , Disp: , Rfl:     ferrous sulfate 325 (65 Fe) mg tablet, Take 325 mg by mouth daily with breakfast, Disp: , Rfl:     levothyroxine 75 mcg tablet, Take 1 tablet (75 mcg total) by mouth daily, Disp: 90 tablet, Rfl: 3    Loratadine (CLARITIN) 10 MG CAPS, Take by mouth as needed , Disp: , Rfl:     nebivolol (BYSTOLIC) 5 mg tablet, Take 1 tablet (5 mg total) by mouth daily for 183 days (Patient taking differently: Take 2 5 mg by mouth daily ), Disp: 90 tablet, Rfl: 3    simvastatin (ZOCOR) 20 mg tablet, Take 1 tablet (20 mg total) by mouth daily at bedtime, Disp: 90 tablet, Rfl: 3      Physical Exam:  /66 (BP Location: Right arm, Patient Position: Sitting, Cuff Size: Adult)   Pulse 103   Temp 97 8 °F (36 6 °C)   Resp 18   Ht 5' 1" (1 549 m)   Wt 63 5 kg (140 lb)   LMP  (LMP Unknown)   SpO2 98%   BMI 26 45 kg/m²     Physical Exam   Constitutional: She is oriented to person, place, and time  She appears well-developed and well-nourished  No distress  HENT:   Head: Normocephalic and atraumatic  Eyes: Conjunctivae are normal  No scleral icterus  Neck: Normal range of motion  Neck supple  Cardiovascular: Normal rate, regular rhythm and normal heart sounds  No murmur heard  Pulmonary/Chest: Effort normal and breath sounds normal  No respiratory distress  Abdominal: Soft  There is no tenderness  Musculoskeletal: Normal range of motion  She exhibits no edema or tenderness  Lymphadenopathy:     She has no cervical adenopathy  Neurological: She is alert and oriented to person, place, and time  No cranial nerve deficit  Skin: Skin is warm and dry  Psychiatric: She has a normal mood and affect           Labs:  Lab Results   Component Value Date    WBC 9 99 09/24/2019    HGB 9 5 (L) 09/24/2019    HCT 31 4 (L) 09/24/2019    MCV 91 09/24/2019     (H) 09/24/2019     Lab Results   Component Value Date    K 4 1 09/24/2019     09/24/2019    CO2 28 09/24/2019    BUN 19 09/24/2019    CREATININE 1 19 09/24/2019    GLUF 88 08/30/2019    CALCIUM 8 8 09/24/2019    AST 18 09/24/2019    ALT 12 09/24/2019    ALKPHOS 133 (H) 09/24/2019    EGFR 46 09/24/2019       Patient voiced understanding and agreement in the above discussion  Aware to contact our office with questions/symptoms in the interim  This note has been generated by voice recognition software system  Therefore, there may be spelling, grammar, and or syntax errors  Please contact if questions arise

## 2019-11-15 ENCOUNTER — HOSPITAL ENCOUNTER (OUTPATIENT)
Dept: RADIOLOGY | Facility: HOSPITAL | Age: 69
Discharge: HOME | End: 2019-11-15
Attending: RADIOLOGY | Admitting: RADIOLOGY
Payer: MEDICARE

## 2019-11-15 VITALS
OXYGEN SATURATION: 98 % | DIASTOLIC BLOOD PRESSURE: 56 MMHG | HEART RATE: 93 BPM | SYSTOLIC BLOOD PRESSURE: 115 MMHG | WEIGHT: 138 LBS | HEIGHT: 61 IN | RESPIRATION RATE: 18 BRPM | BODY MASS INDEX: 26.06 KG/M2 | TEMPERATURE: 97.8 F

## 2019-11-15 DIAGNOSIS — D64.9 ANEMIA, UNSPECIFIED TYPE: ICD-10-CM

## 2019-11-15 DIAGNOSIS — D75.839 THROMBOCYTOSIS: ICD-10-CM

## 2019-11-15 PROCEDURE — 88364 INSITU HYBRIDIZATION (FISH): CPT | Performed by: PATHOLOGY

## 2019-11-15 PROCEDURE — 81272 KIT GENE TARGETED SEQ ANALYS: CPT

## 2019-11-15 PROCEDURE — 77012 CT SCAN FOR NEEDLE BIOPSY: CPT

## 2019-11-15 PROCEDURE — 99153 MOD SED SAME PHYS/QHP EA: CPT

## 2019-11-15 PROCEDURE — 88333 PATH CONSLTJ SURG CYTO XM 1: CPT | Performed by: PATHOLOGY

## 2019-11-15 PROCEDURE — 88342 IMHCHEM/IMCYTCHM 1ST ANTB: CPT | Performed by: PATHOLOGY

## 2019-11-15 PROCEDURE — 81219 CALR GENE COM VARIANTS: CPT

## 2019-11-15 PROCEDURE — 88237 TISSUE CULTURE BONE MARROW: CPT | Performed by: PHYSICIAN ASSISTANT

## 2019-11-15 PROCEDURE — 88341 IMHCHEM/IMCYTCHM EA ADD ANTB: CPT | Performed by: PATHOLOGY

## 2019-11-15 PROCEDURE — 88365 INSITU HYBRIDIZATION (FISH): CPT | Performed by: PATHOLOGY

## 2019-11-15 PROCEDURE — 81403 MOPATH PROCEDURE LEVEL 4: CPT

## 2019-11-15 PROCEDURE — 81270 JAK2 GENE: CPT

## 2019-11-15 PROCEDURE — 88313 SPECIAL STAINS GROUP 2: CPT | Performed by: PATHOLOGY

## 2019-11-15 PROCEDURE — 38222 DX BONE MARROW BX & ASPIR: CPT

## 2019-11-15 PROCEDURE — 88305 TISSUE EXAM BY PATHOLOGIST: CPT | Performed by: PATHOLOGY

## 2019-11-15 PROCEDURE — 99152 MOD SED SAME PHYS/QHP 5/>YRS: CPT

## 2019-11-15 PROCEDURE — 81405 MOPATH PROCEDURE LEVEL 6: CPT

## 2019-11-15 PROCEDURE — 88311 DECALCIFY TISSUE: CPT | Performed by: PATHOLOGY

## 2019-11-15 PROCEDURE — 85097 BONE MARROW INTERPRETATION: CPT | Performed by: PATHOLOGY

## 2019-11-15 PROCEDURE — 88184 FLOWCYTOMETRY/ TC 1 MARKER: CPT | Performed by: PHYSICIAN ASSISTANT

## 2019-11-15 PROCEDURE — 88185 FLOWCYTOMETRY/TC ADD-ON: CPT

## 2019-11-15 PROCEDURE — 81237 EZH2 GENE COMMON VARIANTS: CPT

## 2019-11-15 PROCEDURE — 81121 IDH2 COMMON VARIANTS: CPT

## 2019-11-15 PROCEDURE — 81176 ASXL1 GENE TARGET SEQ ALYS: CPT

## 2019-11-15 PROCEDURE — 88262 CHROMOSOME ANALYSIS 15-20: CPT

## 2019-11-15 PROCEDURE — 81170 ABL1 GENE: CPT

## 2019-11-15 PROCEDURE — 81120 IDH1 COMMON VARIANTS: CPT | Performed by: PHYSICIAN ASSISTANT

## 2019-11-15 RX ORDER — FENTANYL CITRATE 50 UG/ML
INJECTION, SOLUTION INTRAMUSCULAR; INTRAVENOUS CODE/TRAUMA/SEDATION MEDICATION
Status: COMPLETED | OUTPATIENT
Start: 2019-11-15 | End: 2019-11-15

## 2019-11-15 RX ORDER — ACETAMINOPHEN 325 MG/1
650 TABLET ORAL EVERY 4 HOURS PRN
Status: DISCONTINUED | OUTPATIENT
Start: 2019-11-15 | End: 2019-11-15 | Stop reason: HOSPADM

## 2019-11-15 RX ORDER — SODIUM CHLORIDE 9 MG/ML
75 INJECTION, SOLUTION INTRAVENOUS CONTINUOUS
Status: DISCONTINUED | OUTPATIENT
Start: 2019-11-15 | End: 2019-11-15 | Stop reason: HOSPADM

## 2019-11-15 RX ORDER — OXYCODONE HYDROCHLORIDE 5 MG/1
5 TABLET ORAL EVERY 4 HOURS PRN
Status: DISCONTINUED | OUTPATIENT
Start: 2019-11-15 | End: 2019-11-15 | Stop reason: HOSPADM

## 2019-11-15 RX ORDER — MIDAZOLAM HYDROCHLORIDE 2 MG/2ML
INJECTION, SOLUTION INTRAMUSCULAR; INTRAVENOUS CODE/TRAUMA/SEDATION MEDICATION
Status: COMPLETED | OUTPATIENT
Start: 2019-11-15 | End: 2019-11-15

## 2019-11-15 RX ADMIN — MIDAZOLAM 1 MG: 1 INJECTION INTRAMUSCULAR; INTRAVENOUS at 11:27

## 2019-11-15 RX ADMIN — SODIUM CHLORIDE 75 ML/HR: 0.9 INJECTION, SOLUTION INTRAVENOUS at 09:56

## 2019-11-15 RX ADMIN — FENTANYL CITRATE 25 MCG: 50 INJECTION, SOLUTION INTRAMUSCULAR; INTRAVENOUS at 11:27

## 2019-11-15 RX ADMIN — MIDAZOLAM 0.5 MG: 1 INJECTION INTRAMUSCULAR; INTRAVENOUS at 11:39

## 2019-11-15 RX ADMIN — FENTANYL CITRATE 50 MCG: 50 INJECTION, SOLUTION INTRAMUSCULAR; INTRAVENOUS at 11:39

## 2019-11-15 NOTE — BRIEF OP NOTE (RAD/CATH)
CT BONE MARROW BIOPSY AND ASPIRATION Procedure Note    PATIENT NAME: Patricia Morrow  : 1950  MRN: [id number]    Pre-op Diagnosis:   1  Anemia, unspecified type    2  Thrombocytosis (HCC)      Post-op Diagnosis:   1  Anemia, unspecified type    2  [facility] )        Surgeon:   Blanco Hoover MD  Assistants:     No qualified resident was available, Resident is only observing    Estimated Blood Loss: minimal     Findings:     Left iliac crest bone marrow aspirate and core biopsy performed  Bone marrow elements present  Specimens:  11 gauge core and marrow samples       Complications:  none    Anesthesia: Conscious sedation and Edwin Aguilar MD     Date: 11/15/2019  Time: 11:53 AM

## 2019-11-15 NOTE — DISCHARGE INSTRUCTIONS
Bone Marrow Biopsy     WHAT YOU NEED TO KNOW:   A bone marrow biopsy is a procedure to remove a small amount of bone marrow from your bone  Bone marrow is the soft tissue inside your bone that helps to make blood cells  The sample is tested for disease or infection  DISCHARGE INSTRUCTIONS:     1  Limit your activities day of biopsy as directed by your doctor  2  Use medication as ordered  3  Return to your normal diet  Small sips of flat soda will help with nausea  4  Remove band-aid or dressing 24 hours after procedure  Contact Interventional Radiology at [phone] [place] PATIENTS: Contact Interventional Radiology at [phone]) Karthik Amador PATIENTS: Contact Interventional Radiology at [phone]) if:    1  Difficulty breathing, nausea or vomiting  2  Chills or fever above 101 F     3  Pain at biopsy site not relieved by medication  4  Develop any redness, swelling, heat, unusual drainage, heavy bruising or bleeding from biopsy site

## 2019-11-15 NOTE — INTERVAL H&P NOTE
Update:     H&P reviewed  After examining the patient, I find no changed to the H&P since it had been written  /79 (BP Location: Right arm)   Pulse 95   Temp 97 8 °F (36 6 °C) (Oral)   Resp 18   Ht 5' 1" (1 549 m)   Wt 62 6 kg (138 lb)   LMP  (LMP Unknown)   SpO2 100%   BMI 26 07 kg/m²       Patient re-evaluated   Accept as history and physical     Emile Pacheco MD/November 15, 2019/11:13 AM

## 2019-11-20 LAB — MISCELLANEOUS LAB TEST RESULT: NORMAL

## 2019-11-21 LAB — MISCELLANEOUS LAB TEST RESULT: NORMAL

## 2019-11-26 ENCOUNTER — APPOINTMENT (OUTPATIENT)
Dept: LAB | Facility: CLINIC | Age: 69
End: 2019-11-26
Payer: MEDICARE

## 2019-11-26 ENCOUNTER — TELEPHONE (OUTPATIENT)
Dept: HEMATOLOGY ONCOLOGY | Facility: CLINIC | Age: 69
End: 2019-11-26

## 2019-11-26 ENCOUNTER — OFFICE VISIT (OUTPATIENT)
Dept: HEMATOLOGY ONCOLOGY | Facility: CLINIC | Age: 69
End: 2019-11-26
Payer: MEDICARE

## 2019-11-26 VITALS
HEIGHT: 62 IN | TEMPERATURE: 98.1 F | HEART RATE: 99 BPM | SYSTOLIC BLOOD PRESSURE: 120 MMHG | WEIGHT: 141 LBS | DIASTOLIC BLOOD PRESSURE: 70 MMHG | RESPIRATION RATE: 18 BRPM | BODY MASS INDEX: 25.95 KG/M2 | OXYGEN SATURATION: 99 %

## 2019-11-26 DIAGNOSIS — D75.839 THROMBOCYTOSIS: ICD-10-CM

## 2019-11-26 DIAGNOSIS — D64.9 ANEMIA, UNSPECIFIED TYPE: Primary | ICD-10-CM

## 2019-11-26 DIAGNOSIS — Z86.718 HISTORY OF DVT OF LOWER EXTREMITY: ICD-10-CM

## 2019-11-26 DIAGNOSIS — Z86.711 HISTORY OF PULMONARY EMBOLUS (PE): ICD-10-CM

## 2019-11-26 LAB
BASOPHILS # BLD AUTO: 0.08 THOUSANDS/ΜL (ref 0–0.1)
BASOPHILS NFR BLD AUTO: 1 % (ref 0–1)
EOSINOPHIL # BLD AUTO: 0.38 THOUSAND/ΜL (ref 0–0.61)
EOSINOPHIL NFR BLD AUTO: 5 % (ref 0–6)
ERYTHROCYTE [DISTWIDTH] IN BLOOD BY AUTOMATED COUNT: 16.7 % (ref 11.6–15.1)
HCT VFR BLD AUTO: 34.1 % (ref 34.8–46.1)
HGB BLD-MCNC: 10.1 G/DL (ref 11.5–15.4)
IMM GRANULOCYTES # BLD AUTO: 0.03 THOUSAND/UL (ref 0–0.2)
IMM GRANULOCYTES NFR BLD AUTO: 0 % (ref 0–2)
LYMPHOCYTES # BLD AUTO: 1.07 THOUSANDS/ΜL (ref 0.6–4.47)
LYMPHOCYTES NFR BLD AUTO: 14 % (ref 14–44)
MCH RBC QN AUTO: 26.9 PG (ref 26.8–34.3)
MCHC RBC AUTO-ENTMCNC: 29.6 G/DL (ref 31.4–37.4)
MCV RBC AUTO: 91 FL (ref 82–98)
MONOCYTES # BLD AUTO: 0.65 THOUSAND/ΜL (ref 0.17–1.22)
MONOCYTES NFR BLD AUTO: 8 % (ref 4–12)
NEUTROPHILS # BLD AUTO: 5.61 THOUSANDS/ΜL (ref 1.85–7.62)
NEUTS SEG NFR BLD AUTO: 72 % (ref 43–75)
NRBC BLD AUTO-RTO: 0 /100 WBCS
PLATELET # BLD AUTO: 489 THOUSANDS/UL (ref 149–390)
PMV BLD AUTO: 9.6 FL (ref 8.9–12.7)
RBC # BLD AUTO: 3.75 MILLION/UL (ref 3.81–5.12)
WBC # BLD AUTO: 7.82 THOUSAND/UL (ref 4.31–10.16)

## 2019-11-26 PROCEDURE — 85025 COMPLETE CBC W/AUTO DIFF WBC: CPT | Performed by: PHYSICIAN ASSISTANT

## 2019-11-26 PROCEDURE — 99214 OFFICE O/P EST MOD 30 MIN: CPT | Performed by: PHYSICIAN ASSISTANT

## 2019-11-26 PROCEDURE — 36415 COLL VENOUS BLD VENIPUNCTURE: CPT | Performed by: PHYSICIAN ASSISTANT

## 2019-11-26 NOTE — PROGRESS NOTES
Hematology/Oncology Outpatient Follow-up  Vesta Smith 68 y o  female 1950 [id number]    Date:  11/26/2019      Assessment and Plan:  1  Anemia, unspecified type, 2  Thrombocytosis [facility])  73-year-old female presents for follow-up regarding anemia and thrombocytosis  She had complete evaluation regarding this  She was JAK2 mutation positive  She had a bone marrow biopsy which showed a reactive finding  Iron was present  Her most recent CBC in September showed improvement in both anemia and thrombocytosis  She is going to have a repeat CBC today  She will continue to have repeat CBCs every 2 months at this time  Follow-up in 4 months  At time of dictation CBC is resulted  Patient was called regarding this result  Hemoglobin has improved to 10 1 and platelets have improved to 489      - CBC and differential; Standing  - CBC and differential      3  History of pulmonary embolus (PE), 4  History of DVT of lower extremity  Patient had history of provoked DVT and PE secondary to immobilization secondary to infection as below  She had a repeat CT a which showed resolution of PE  She was on anticoagulation for greater than 6 months  She then was decreased to 2 5 milligrams once daily of Eliquis secondary to bleeding or bruising symptoms  Discussed that since she does not have any evidence of malignancy at present and that she already is on a very low-dose of Eliquis without recurrent thromboses, she could switch to aspirin 81 milligram p o  Daily with food  Reviewed that she is at greater risk of recurrent PE and DVT due to the fact of having 1 previously  She is aware of signs symptoms and aware to contact medical provider urgently or present to the emergency department        HPI:  73-year-old female presents for follow-up regarding history of multiple malignancies  [facility] patient was diagnosed to have stage II Hodgkin's disease above the diaphragm   She had staging laparotomy, splenectomy, extended field radiation therapy      In 2006 she was diagnosed to have left-sided breast cancer, hormone receptor positive, HER2 negative, T1, N1, stage IIA   She underwent left mastectomy, reconstruction surgery   She then had 4 cycles of adjuvant Taxotere plus Cytoxan followed by adjuvant Arimidex for 5 years which she completed in 2011  She did not receive any adjuvant radiation therapy secondary to previous radiation for Hodgkin's disease as above      In March 2016 she had melanoma surgery for T1a, 0 6 mm melanoma of the right flank area   She follows with Dr Yann Guajardo every 6 months  she is up to date with office visit and exam      Oct/Nov 2018 she got an infection, strep; started in her urine  She was sick and feeling ill for over 2 months  She was laying down not doing anything from Oct-Dec  She states she did not even go to her family's house for the Christmas holiday      She then presented to the hospital on 1/12/19 and was found to have acute bibasilar pulmonary emboli   Venous Doppler was then also ordered and this showed an acute nonocclusive DVT in the popliteal and paired gastrocnemius veins of the left lower extremity   Right lower extremity was negative for DVT      She was placed on Eliquis 5 mg BID      She then was at a [facility] and had a syncopal event  She was brought to [facility] in July 2019  Work up was negative and she was thought to have vasovagal event       CBC at [facility] was performed at [facility] and was negative for PE  Due to above anemia, patient was given IV iron Venofer ordered by PCP on 8/9, 8/16, 8/23      Aug 2019 patient called the PCP due to bruises on her bilateral lower extremities   Eliquis dose was adjusted  She is presently taking 5mg, 1/2 tablet, cutting it on her own, only once daily    Interval history: continues to have muscle soreness of legs     ROS: Review of Systems   Constitutional: Negative for appetite change, chills, fatigue, fever and unexpected weight change  HENT: Negative for trouble swallowing  Respiratory: Negative for cough and shortness of breath  Cardiovascular: Negative for chest pain, palpitations and leg swelling  Gastrointestinal: Negative for abdominal pain, blood in stool, constipation, diarrhea, nausea and vomiting  Genitourinary: Negative for difficulty urinating and dysuria  Musculoskeletal: Positive for arthralgias and myalgias (intermittent of legs, thigh region)  Skin: Negative  Neurological: Negative for dizziness, weakness, light-headedness, numbness and headaches  Hematological: Negative  Psychiatric/Behavioral: Negative  Past Medical History:   Diagnosis Date    ENEDELIA (acute kidney injury) ([place] [address] ) 1/12/2019    Anemia     BCC (basal cell carcinoma of skin)     facial    Breast cancer ([place] [address] ) 2006    s/p mastectomy     Calculus, kidney 2013    Cervical cancer ([place] [address] ) 1983    Disease of thyroid gland     Ganglion     Last Assessed:7/9/13    Heart murmur     mitral valve regurgitation    Hodgkin disease ([place] [address] )     Hydronephrosis 2013    Hyperlipidemia     Hypertension     NSTEMI (non-ST elevated myocardial infarction) ([place] [address] ) 1/12/2019    Osteopenia     Last Assessed:7/9/13    Osteoporosis     Paroxysmal supraventricular tachycardia ([place] [address] )     Renal calculi     Last Assessed:3/19/14    Renal cyst     Shoulder impingement     Last Assessed:1/18/13    Syncope 7/28/2019    Last Assessment & Plan:  Pre-syncopal symptoms leading to short syncopal episode without post-ictal period  Suspect dehydration/hypovolemia  Infectious, cardiopulm or neurologic cause less likely  No arrhythmias or AV block on ECG or telemetry thus far  No PE on CTA  No clinical signs of seizure   No acute pathology or mass on CTH  - s/p IVF; encourage PO intake - continue on telemetry - TTE c/f ne    UTI (urinary tract infection) 2013    Vasculitis ([place] [address] ) 1/12/2019       Past Surgical History:   Procedure Laterality Date  APPENDECTOMY      BREAST BIOPSY Left 2006    BREAST RECONSTRUCTION      COMPLEX WOUND CLOSURE TO EXTREMITY Left 10/9/2018    Procedure: COMPLEX CLOSURE RECONSTRUCTION;  Surgeon: Rosangela Martin MD;  [facility];  Service: Plastics    CT BONE MARROW BIOPSY AND ASPIRATION  11/15/2019    CYSTOSCOPY W/ RETROGRADES      CYSTOSCOPY W/ URETEROSCOPY      EXTRACORPOREAL SHOCK WAVE LITHOTRIPSY Right 2005    FLAP LOCAL HEAD / NECK Left 10/9/2018    Procedure: FLAP RECONSTRUCTION;  Surgeon: Rosangela Martin MD;  [facility];  Service: Plastics    FULL THICKNESS SKIN GRAFT Left 10/9/2018    Procedure: FULL THICKNESS SKIN GRAFT RECONSTRUCTION;  Surgeon: Rosangela Martin MD;  [facility];  Service: Plastics    HYSTERECTOMY      -cervical cancer    INTRAOPERATIVE RADIATION THERAPY ([facility])      Radiation Therapy    KIDNEY SURGERY      MASTECTOMY Left 2006    OOPHORECTOMY Left     OTHER SURGICAL HISTORY      Chemotherapeutics    REDUCTION MAMMAPLASTY Right 2006    SENTINEL LYMPH NODE BIOPSY      SPLENECTOMY      1985-Hodgkin's Disease    SQUAMOUS CELL CARCINOMA EXCISION Left 10/9/2018    Procedure: NOSE/CHEEK [address] EXCISION; FROZEN SECTION;  Surgeon: Rosangela Martin MD;  [facility];  Service: Plastics       Social History     Socioeconomic History    Marital status: /Civil Union     Spouse name: Not on file    Number of children: Not on file    Years of education: Not on file    Highest education level: Not on file   Occupational History    Not on file   Social Needs    Financial resource strain: Not on file    Food insecurity:     Worry: Not on file     Inability: Not on file    Transportation needs:     Medical: Not on file     Non-medical: Not on file   Tobacco Use    Smoking status: Former Smoker     Last attempt to quit:      Years since quittin 9    Smokeless tobacco: Never Used   Substance and Sexual Activity    Alcohol use: Yes     Comment: caffeine use; social drinker-1 beer every 2/3 months    Drug use: Never    Sexual activity: Not on file   Lifestyle    Physical activity:     Days per week: Not on file     Minutes per session: Not on file    Stress: Not on file   Relationships    Social connections:     Talks on phone: Not on file     Gets together: Not on file     Attends [employer] service: Not on file     Active member of club or organization: Not on file     Attends meetings of clubs or organizations: Not on file     Relationship status: Not on file    Intimate partner violence:     Fear of current or ex partner: Not on file     Emotionally abused: Not on file     Physically abused: Not on file     Forced sexual activity: Not on file   Other Topics Concern    Not on file   Social History Narrative    Advance directive declined by patient    Drinks coffee    Exercise habits       Family History   Problem Relation Age of Onset    Leukemia Mother     Colon cancer Paternal Grandmother 80    Heart disease Family     Heart attack Father     Coronary artery disease Father         stent- five    Hypertension Father     Colon cancer Father 80    No Known Problems Maternal Grandmother     No Known Problems Maternal Grandfather     No Known Problems Paternal Grandfather     No Known Problems Paternal Aunt     Stroke Neg Hx     Anuerysm Neg Hx     Clotting disorder Neg Hx     Arrhythmia Neg Hx     Heart failure Neg Hx     Hyperlipidemia Neg Hx        Allergies   Allergen Reactions    Ciprofloxacin Other (See Comments)     Reaction Date: 24Jun2011;    Hives/Uticaria    Sulfamethoxazole-Trimethoprim Hives     Patient does not remember rx         Current Outpatient Medications:     apixaban (ELIQUIS) 2 5 mg, Take 1 tablet (2 5 mg total) by mouth 2 (two) times a day (Patient taking differently: Take 2 5 mg by mouth daily ), Disp: 60 tablet, Rfl: 3    cholecalciferol (VITAMIN D3) 1,000 units tablet, Take 1,000 Units by mouth daily  , Disp: , Rfl:     ferrous sulfate 325 (65 Fe) mg tablet, Take 325 mg by mouth daily with breakfast, Disp: , Rfl:     levothyroxine 75 mcg tablet, Take 1 tablet (75 mcg total) by mouth daily, Disp: 90 tablet, Rfl: 3    Loratadine (CLARITIN) 10 MG CAPS, Take by mouth as needed , Disp: , Rfl:     nebivolol (BYSTOLIC) 5 mg tablet, Take 1 tablet (5 mg total) by mouth daily for 183 days (Patient taking differently: Take 2 5 mg by mouth daily ), Disp: 90 tablet, Rfl: 3    simvastatin (ZOCOR) 20 mg tablet, Take 1 tablet (20 mg total) by mouth daily at bedtime, Disp: 90 tablet, Rfl: 3      Physical Exam:  /70 (BP Location: Right arm, Patient Position: Sitting, Cuff Size: Adult)   Pulse 99   Temp 98 1 °F (36 7 °C)   Resp 18   Ht 5' 1 75" (1 568 m)   Wt 64 kg (141 lb)   LMP  (LMP Unknown)   SpO2 99%   BMI 26 00 kg/m²     Physical Exam   Constitutional: She is oriented to person, place, and time  She appears well-developed and well-nourished  No distress  HENT:   Head: Normocephalic and atraumatic  Eyes: Conjunctivae are normal  No scleral icterus  Neck: Normal range of motion  Neck supple  Cardiovascular: Normal rate, regular rhythm and normal heart sounds  No murmur heard  Pulmonary/Chest: Effort normal and breath sounds normal  No respiratory distress  Abdominal: Soft  There is no tenderness  Musculoskeletal: Normal range of motion  She exhibits no edema or tenderness  Lymphadenopathy:     She has no cervical adenopathy  She has no axillary adenopathy  Right: No supraclavicular adenopathy present  Left: No supraclavicular adenopathy present  Neurological: She is alert and oriented to person, place, and time  No cranial nerve deficit  Skin: Skin is warm and dry  Psychiatric: She has a normal mood and affect           Labs:  Lab Results   Component Value Date    WBC 7 82 11/26/2019    HGB 10 1 (L) 11/26/2019    HCT 34 1 (L) 11/26/2019    MCV 91 11/26/2019     (H) 11/26/2019     Lab Results   Component Value Date    K 4 1 09/24/2019     09/24/2019    CO2 28 09/24/2019    BUN 19 09/24/2019    CREATININE 1 19 09/24/2019    GLUF 88 08/30/2019    CALCIUM 8 8 09/24/2019    AST 18 09/24/2019    ALT 12 09/24/2019    ALKPHOS 133 (H) 09/24/2019    EGFR 46 09/24/2019       Patient voiced understanding and agreement in the above discussion  Aware to contact our office with questions/symptoms in the interim  This note has been generated by voice recognition software system  Therefore, there may be spelling, grammar, and or syntax errors  Please contact if questions arise

## 2019-11-26 NOTE — TELEPHONE ENCOUNTER
Please call patient and let her know hemoglobin continues to improve, 10 1; platelet count 808  Repeat level in 2 months

## 2019-12-03 LAB — MISCELLANEOUS LAB TEST RESULT: NORMAL

## 2020-01-01 ENCOUNTER — PATIENT OUTREACH (OUTPATIENT)
Dept: CASE MANAGEMENT | Facility: HOSPITAL | Age: 70
End: 2020-01-01

## 2020-01-01 ENCOUNTER — TELEPHONE (OUTPATIENT)
Dept: HEMATOLOGY ONCOLOGY | Facility: CLINIC | Age: 70
End: 2020-01-01

## 2020-01-01 ENCOUNTER — HOSPITAL ENCOUNTER (OUTPATIENT)
Dept: NUCLEAR MEDICINE | Facility: HOSPITAL | Age: 70
Discharge: HOME | End: 2020-09-24
Attending: INTERNAL MEDICINE
Payer: MEDICARE

## 2020-01-01 ENCOUNTER — DOCUMENTATION (OUTPATIENT)
Dept: HEMATOLOGY ONCOLOGY | Facility: CLINIC | Age: 70
End: 2020-01-01

## 2020-01-01 ENCOUNTER — TELEMEDICINE (OUTPATIENT)
Dept: DERMATOLOGY | Facility: CLINIC | Age: 70
End: 2020-01-01
Payer: MEDICARE

## 2020-01-01 ENCOUNTER — TELEPHONE (OUTPATIENT)
Dept: FAMILY MEDICINE CLINIC | Facility: CLINIC | Age: 70
End: 2020-01-01

## 2020-01-01 ENCOUNTER — HOSPITAL ENCOUNTER (OUTPATIENT)
Dept: RADIOLOGY | Facility: HOSPITAL | Age: 70
Discharge: HOME | End: 2020-09-21
Attending: INTERNAL MEDICINE
Payer: MEDICARE

## 2020-01-01 ENCOUNTER — OFFICE VISIT (OUTPATIENT)
Dept: FAMILY MEDICINE CLINIC | Facility: CLINIC | Age: 70
End: 2020-01-01
Payer: MEDICARE

## 2020-01-01 ENCOUNTER — TELEPHONE (OUTPATIENT)
Dept: GENETICS | Facility: CLINIC | Age: 70
End: 2020-01-01

## 2020-01-01 ENCOUNTER — APPOINTMENT (INPATIENT)
Dept: NON INVASIVE DIAGNOSTICS | Facility: HOSPITAL | Age: 70
DRG: 291 | End: 2020-01-01
Payer: MEDICARE

## 2020-01-01 ENCOUNTER — TELEPHONE (OUTPATIENT)
Dept: SURGICAL ONCOLOGY | Facility: CLINIC | Age: 70
End: 2020-01-01

## 2020-01-01 ENCOUNTER — HOSPITAL ENCOUNTER (OUTPATIENT)
Dept: RADIOLOGY | Facility: HOSPITAL | Age: 70
Discharge: HOME | End: 2020-10-16
Attending: INTERNAL MEDICINE
Payer: MEDICARE

## 2020-01-01 ENCOUNTER — TELEPHONE (OUTPATIENT)
Dept: DERMATOLOGY | Facility: CLINIC | Age: 70
End: 2020-01-01

## 2020-01-01 ENCOUNTER — HOSPITAL ENCOUNTER (OUTPATIENT)
Dept: ULTRASOUND IMAGING | Facility: HOSPITAL | Age: 70
Discharge: HOME | End: 2020-09-24
Attending: INTERNAL MEDICINE
Payer: MEDICARE

## 2020-01-01 ENCOUNTER — TELEPHONE (OUTPATIENT)
Dept: GYNECOLOGIC ONCOLOGY | Facility: CLINIC | Age: 70
End: 2020-01-01

## 2020-01-01 ENCOUNTER — TELEMEDICINE (OUTPATIENT)
Dept: FAMILY MEDICINE CLINIC | Facility: CLINIC | Age: 70
End: 2020-01-01
Payer: MEDICARE

## 2020-01-01 ENCOUNTER — LAB (OUTPATIENT)
Dept: LAB | Facility: CLINIC | Age: 70
End: 2020-01-01
Payer: MEDICARE

## 2020-01-01 ENCOUNTER — OFFICE VISIT (OUTPATIENT)
Dept: HEMATOLOGY ONCOLOGY | Facility: CLINIC | Age: 70
End: 2020-01-01
Payer: MEDICARE

## 2020-01-01 ENCOUNTER — OFFICE VISIT (OUTPATIENT)
Dept: DERMATOLOGY | Facility: CLINIC | Age: 70
End: 2020-01-01
Payer: MEDICARE

## 2020-01-01 ENCOUNTER — OFFICE VISIT (OUTPATIENT)
Dept: GASTROENTEROLOGY | Facility: CLINIC | Age: 70
End: 2020-01-01
Payer: MEDICARE

## 2020-01-01 ENCOUNTER — TRANSCRIBE ORDERS (OUTPATIENT)
Dept: LAB | Facility: CLINIC | Age: 70
End: 2020-01-01

## 2020-01-01 ENCOUNTER — TRANSITIONAL CARE MANAGEMENT (OUTPATIENT)
Dept: FAMILY MEDICINE CLINIC | Facility: CLINIC | Age: 70
End: 2020-01-01

## 2020-01-01 ENCOUNTER — APPOINTMENT (OUTPATIENT)
Dept: LAB | Facility: CLINIC | Age: 70
End: 2020-01-01
Payer: MEDICARE

## 2020-01-01 ENCOUNTER — OFFICE VISIT (OUTPATIENT)
Dept: CARDIOLOGY CLINIC | Facility: CLINIC | Age: 70
End: 2020-01-01
Payer: MEDICARE

## 2020-01-01 ENCOUNTER — APPOINTMENT (EMERGENCY)
Dept: RADIOLOGY | Facility: HOSPITAL | Age: 70
DRG: 291 | End: 2020-01-01
Payer: MEDICARE

## 2020-01-01 ENCOUNTER — HOSPITAL ENCOUNTER (OUTPATIENT)
Dept: RADIOLOGY | Age: 70
Discharge: HOME | End: 2020-09-25
Payer: MEDICARE

## 2020-01-01 ENCOUNTER — OFFICE VISIT (OUTPATIENT)
Dept: SURGERY | Facility: CLINIC | Age: 70
End: 2020-01-01
Payer: MEDICARE

## 2020-01-01 ENCOUNTER — HOSPITAL ENCOUNTER (INPATIENT)
Facility: HOSPITAL | Age: 70
LOS: 6 days | Discharge: HOME | DRG: 291 | End: 2020-11-14
Attending: EMERGENCY MEDICINE | Admitting: HOSPITALIST
Payer: MEDICARE

## 2020-01-01 VITALS
OXYGEN SATURATION: 96 % | TEMPERATURE: 98.3 F | WEIGHT: 133.2 LBS | HEART RATE: 89 BPM | DIASTOLIC BLOOD PRESSURE: 52 MMHG | SYSTOLIC BLOOD PRESSURE: 111 MMHG | BODY MASS INDEX: 25.17 KG/M2 | RESPIRATION RATE: 20 BRPM

## 2020-01-01 VITALS
DIASTOLIC BLOOD PRESSURE: 74 MMHG | SYSTOLIC BLOOD PRESSURE: 130 MMHG | HEIGHT: 61 IN | WEIGHT: 129.8 LBS | HEART RATE: 101 BPM | BODY MASS INDEX: 24.51 KG/M2 | OXYGEN SATURATION: 100 %

## 2020-01-01 VITALS
DIASTOLIC BLOOD PRESSURE: 70 MMHG | RESPIRATION RATE: 16 BRPM | WEIGHT: 132 LBS | BODY MASS INDEX: 24.29 KG/M2 | SYSTOLIC BLOOD PRESSURE: 122 MMHG | TEMPERATURE: 97.2 F | HEART RATE: 96 BPM | HEIGHT: 62 IN

## 2020-01-01 VITALS
BODY MASS INDEX: 26.91 KG/M2 | OXYGEN SATURATION: 99 % | HEIGHT: 61 IN | RESPIRATION RATE: 18 BRPM | HEART RATE: 95 BPM | WEIGHT: 142.5 LBS | DIASTOLIC BLOOD PRESSURE: 68 MMHG | TEMPERATURE: 98.3 F | SYSTOLIC BLOOD PRESSURE: 126 MMHG

## 2020-01-01 VITALS
WEIGHT: 138 LBS | OXYGEN SATURATION: 99 % | SYSTOLIC BLOOD PRESSURE: 122 MMHG | TEMPERATURE: 97.1 F | DIASTOLIC BLOOD PRESSURE: 72 MMHG | HEIGHT: 61 IN | BODY MASS INDEX: 26.06 KG/M2 | HEART RATE: 84 BPM

## 2020-01-01 VITALS — BODY MASS INDEX: 26.81 KG/M2 | WEIGHT: 142 LBS | HEIGHT: 61 IN | TEMPERATURE: 95.3 F

## 2020-01-01 VITALS
BODY MASS INDEX: 25.95 KG/M2 | SYSTOLIC BLOOD PRESSURE: 120 MMHG | HEIGHT: 62 IN | DIASTOLIC BLOOD PRESSURE: 70 MMHG | WEIGHT: 141 LBS | HEART RATE: 97 BPM | TEMPERATURE: 96.7 F

## 2020-01-01 VITALS
SYSTOLIC BLOOD PRESSURE: 132 MMHG | DIASTOLIC BLOOD PRESSURE: 72 MMHG | HEIGHT: 62 IN | OXYGEN SATURATION: 100 % | TEMPERATURE: 98.1 F | WEIGHT: 142 LBS | BODY MASS INDEX: 26.13 KG/M2 | RESPIRATION RATE: 16 BRPM | HEART RATE: 94 BPM

## 2020-01-01 VITALS — BODY MASS INDEX: 25.69 KG/M2 | WEIGHT: 136.1 LBS | TEMPERATURE: 97.5 F | HEIGHT: 61 IN

## 2020-01-01 VITALS
SYSTOLIC BLOOD PRESSURE: 128 MMHG | HEIGHT: 61 IN | WEIGHT: 149.6 LBS | TEMPERATURE: 98.7 F | BODY MASS INDEX: 28.25 KG/M2 | HEART RATE: 97 BPM | DIASTOLIC BLOOD PRESSURE: 70 MMHG

## 2020-01-01 DIAGNOSIS — D47.1 MYELOPROLIFERATIVE DISORDER (HCC): ICD-10-CM

## 2020-01-01 DIAGNOSIS — N39.0 URINARY TRACT INFECTION WITHOUT HEMATURIA, SITE UNSPECIFIED: Primary | ICD-10-CM

## 2020-01-01 DIAGNOSIS — I50.32 CHRONIC DIASTOLIC HEART FAILURE (HCC): Primary | ICD-10-CM

## 2020-01-01 DIAGNOSIS — Z85.41 HISTORY OF CERVICAL CANCER: ICD-10-CM

## 2020-01-01 DIAGNOSIS — Z86.718 HISTORY OF DVT OF LOWER EXTREMITY: ICD-10-CM

## 2020-01-01 DIAGNOSIS — E03.9 ACQUIRED HYPOTHYROIDISM: ICD-10-CM

## 2020-01-01 DIAGNOSIS — I50.31 DIASTOLIC CHF, ACUTE (HCC): ICD-10-CM

## 2020-01-01 DIAGNOSIS — I36.1 NON-RHEUMATIC TRICUSPID VALVE INSUFFICIENCY: ICD-10-CM

## 2020-01-01 DIAGNOSIS — M80.00XA AGE-RELATED OSTEOPOROSIS WITH CURRENT PATHOLOGICAL FRACTURE, INITIAL ENCOUNTER: ICD-10-CM

## 2020-01-01 DIAGNOSIS — I47.1 PAROXYSMAL SUPRAVENTRICULAR TACHYCARDIA (HCC): Primary | ICD-10-CM

## 2020-01-01 DIAGNOSIS — S22.080A COMPRESSION FRACTURE OF T11 VERTEBRA, INITIAL ENCOUNTER (HCC): ICD-10-CM

## 2020-01-01 DIAGNOSIS — I77.6 SMALL VESSEL VASCULITIS (HCC): ICD-10-CM

## 2020-01-01 DIAGNOSIS — Z85.828 HISTORY OF BASAL CELL CANCER: ICD-10-CM

## 2020-01-01 DIAGNOSIS — I10 ESSENTIAL HYPERTENSION: ICD-10-CM

## 2020-01-01 DIAGNOSIS — Z86.711 HISTORY OF PULMONARY EMBOLUS (PE): ICD-10-CM

## 2020-01-01 DIAGNOSIS — I35.1 NONRHEUMATIC AORTIC VALVE INSUFFICIENCY: ICD-10-CM

## 2020-01-01 DIAGNOSIS — M81.0 AGE RELATED OSTEOPOROSIS, UNSPECIFIED PATHOLOGICAL FRACTURE PRESENCE: ICD-10-CM

## 2020-01-01 DIAGNOSIS — Z86.711 HISTORY OF PULMONARY EMBOLUS (PE): Primary | ICD-10-CM

## 2020-01-01 DIAGNOSIS — M81.0 AGE-RELATED OSTEOPOROSIS WITHOUT CURRENT PATHOLOGICAL FRACTURE: Primary | ICD-10-CM

## 2020-01-01 DIAGNOSIS — D75.839 THROMBOCYTOSIS: ICD-10-CM

## 2020-01-01 DIAGNOSIS — K82.9 GALLBLADDER DISEASE: ICD-10-CM

## 2020-01-01 DIAGNOSIS — I50.32 CHRONIC DIASTOLIC HEART FAILURE (HCC): ICD-10-CM

## 2020-01-01 DIAGNOSIS — C43.59 MELANOMA OF SKIN OF TRUNK (HCC): ICD-10-CM

## 2020-01-01 DIAGNOSIS — D47.1 MYELOPROLIFERATIVE DISORDER (HCC): Primary | ICD-10-CM

## 2020-01-01 DIAGNOSIS — R79.89 D-DIMER, ELEVATED: ICD-10-CM

## 2020-01-01 DIAGNOSIS — I27.20 PULMONARY HYPERTENSION (HCC): ICD-10-CM

## 2020-01-01 DIAGNOSIS — Z85.3 HISTORY OF BREAST CANCER: ICD-10-CM

## 2020-01-01 DIAGNOSIS — I34.0 NONRHEUMATIC MITRAL VALVE REGURGITATION: ICD-10-CM

## 2020-01-01 DIAGNOSIS — I47.1 PAROXYSMAL SUPRAVENTRICULAR TACHYCARDIA (HCC): ICD-10-CM

## 2020-01-01 DIAGNOSIS — R14.0 ABDOMINAL BLOATING: Primary | ICD-10-CM

## 2020-01-01 DIAGNOSIS — N18.30 CKD (CHRONIC KIDNEY DISEASE), STAGE III (HCC): ICD-10-CM

## 2020-01-01 DIAGNOSIS — E78.00 HYPERCHOLESTEREMIA: ICD-10-CM

## 2020-01-01 DIAGNOSIS — Z85.71 HISTORY OF HODGKIN'S DISEASE: ICD-10-CM

## 2020-01-01 DIAGNOSIS — E03.9 ACQUIRED HYPOTHYROIDISM: Primary | ICD-10-CM

## 2020-01-01 DIAGNOSIS — R06.02 SOB (SHORTNESS OF BREATH): Primary | ICD-10-CM

## 2020-01-01 DIAGNOSIS — R79.89 D-DIMER, ELEVATED: Primary | ICD-10-CM

## 2020-01-01 DIAGNOSIS — I77.6 VASCULITIS (HCC): Primary | ICD-10-CM

## 2020-01-01 DIAGNOSIS — R11.0 NAUSEA: ICD-10-CM

## 2020-01-01 DIAGNOSIS — Z11.59 ENCOUNTER FOR SCREENING FOR OTHER VIRAL DISEASES: ICD-10-CM

## 2020-01-01 DIAGNOSIS — Z11.59 ENCOUNTER FOR SCREENING FOR OTHER VIRAL DISEASES: Primary | ICD-10-CM

## 2020-01-01 DIAGNOSIS — N28.1 ACQUIRED COMPLEX RENAL CYST: ICD-10-CM

## 2020-01-01 DIAGNOSIS — I50.9 CHF (CONGESTIVE HEART FAILURE) (HCC): ICD-10-CM

## 2020-01-01 DIAGNOSIS — R21 RASH: ICD-10-CM

## 2020-01-01 DIAGNOSIS — D64.9 NORMOCYTIC ANEMIA: ICD-10-CM

## 2020-01-01 DIAGNOSIS — I50.9 ACUTE CHF (HCC): ICD-10-CM

## 2020-01-01 DIAGNOSIS — E78.00 PURE HYPERCHOLESTEROLEMIA: ICD-10-CM

## 2020-01-01 DIAGNOSIS — I10 ESSENTIAL HYPERTENSION: Primary | ICD-10-CM

## 2020-01-01 LAB
ALBUMIN SERPL BCP-MCNC: 2.4 G/DL (ref 3.5–5)
ALBUMIN SERPL BCP-MCNC: 2.6 G/DL (ref 3.5–5)
ALBUMIN SERPL ELPH-MCNC: 2.87 G/DL (ref 3.5–5)
ALBUMIN SERPL ELPH-MCNC: 36.3 % (ref 52–65)
ALBUMIN UR ELPH-MCNC: 29.9 %
ALP SERPL-CCNC: 233 U/L (ref 46–116)
ALP SERPL-CCNC: 255 U/L (ref 46–116)
ALPHA1 GLOB MFR UR ELPH: 3.9 %
ALPHA1 GLOB SERPL ELPH-MCNC: 0.47 G/DL (ref 0.1–0.4)
ALPHA1 GLOB SERPL ELPH-MCNC: 5.9 % (ref 2.5–5)
ALPHA2 GLOB MFR UR ELPH: 11.8 %
ALPHA2 GLOB SERPL ELPH-MCNC: 0.74 G/DL (ref 0.4–1.2)
ALPHA2 GLOB SERPL ELPH-MCNC: 9.4 % (ref 7–13)
ALT SERPL W P-5'-P-CCNC: 17 U/L (ref 12–78)
ALT SERPL W P-5'-P-CCNC: 19 U/L (ref 12–78)
ANION GAP SERPL CALCULATED.3IONS-SCNC: 5 MMOL/L (ref 4–13)
ANION GAP SERPL CALCULATED.3IONS-SCNC: 5 MMOL/L (ref 4–13)
ANION GAP SERPL CALCULATED.3IONS-SCNC: 6 MMOL/L (ref 4–13)
ANION GAP SERPL CALCULATED.3IONS-SCNC: 6 MMOL/L (ref 4–13)
ANION GAP SERPL CALCULATED.3IONS-SCNC: 7 MMOL/L (ref 4–13)
ANION GAP SERPL CALCULATED.3IONS-SCNC: 8 MMOL/L (ref 4–13)
ANION GAP SERPL CALCULATED.3IONS-SCNC: 9 MMOL/L (ref 4–13)
AST SERPL W P-5'-P-CCNC: 20 U/L (ref 5–45)
AST SERPL W P-5'-P-CCNC: 20 U/L (ref 5–45)
ATRIAL RATE: 95 BPM
B-GLOBULIN MFR UR ELPH: 16.7 %
BACTERIA UR CULT: ABNORMAL
BACTERIA UR CULT: ABNORMAL
BASOPHILS # BLD AUTO: 0.05 THOUSANDS/ΜL (ref 0–0.1)
BASOPHILS # BLD AUTO: 0.06 THOUSANDS/ΜL (ref 0–0.1)
BASOPHILS # BLD AUTO: 0.06 THOUSANDS/ΜL (ref 0–0.1)
BASOPHILS # BLD AUTO: 0.07 THOUSANDS/ΜL (ref 0–0.1)
BASOPHILS NFR BLD AUTO: 1 % (ref 0–1)
BETA GLOB ABNORMAL SERPL ELPH-MCNC: 0.49 G/DL (ref 0.4–0.8)
BETA1 GLOB SERPL ELPH-MCNC: 6.2 % (ref 5–13)
BETA2 GLOB SERPL ELPH-MCNC: 5.7 % (ref 2–8)
BETA2+GAMMA GLOB SERPL ELPH-MCNC: 0.45 G/DL (ref 0.2–0.5)
BILIRUB SERPL-MCNC: 0.33 MG/DL (ref 0.2–1)
BILIRUB SERPL-MCNC: 0.37 MG/DL (ref 0.2–1)
BUN SERPL-MCNC: 21 MG/DL (ref 5–25)
BUN SERPL-MCNC: 24 MG/DL (ref 5–25)
BUN SERPL-MCNC: 27 MG/DL (ref 5–25)
BUN SERPL-MCNC: 31 MG/DL (ref 5–25)
BUN SERPL-MCNC: 31 MG/DL (ref 5–25)
BUN SERPL-MCNC: 36 MG/DL (ref 5–25)
BUN SERPL-MCNC: 36 MG/DL (ref 5–25)
BUN SERPL-MCNC: 37 MG/DL (ref 5–25)
BUN SERPL-MCNC: 37 MG/DL (ref 5–25)
BUN SERPL-MCNC: 40 MG/DL (ref 5–25)
C3 SERPL-MCNC: 123 MG/DL (ref 90–180)
C4 SERPL-MCNC: 19 MG/DL (ref 10–40)
CALCIUM ALBUM COR SERPL-MCNC: 10.2 MG/DL (ref 8.3–10.1)
CALCIUM ALBUM COR SERPL-MCNC: 9.9 MG/DL (ref 8.3–10.1)
CALCIUM SERPL-MCNC: 8.6 MG/DL (ref 8.3–10.1)
CALCIUM SERPL-MCNC: 8.7 MG/DL (ref 8.3–10.1)
CALCIUM SERPL-MCNC: 8.7 MG/DL (ref 8.3–10.1)
CALCIUM SERPL-MCNC: 8.8 MG/DL (ref 8.3–10.1)
CALCIUM SERPL-MCNC: 8.9 MG/DL (ref 8.3–10.1)
CALCIUM SERPL-MCNC: 9 MG/DL (ref 8.3–10.1)
CALCIUM SERPL-MCNC: 9 MG/DL (ref 8.3–10.1)
CHLORIDE SERPL-SCNC: 101 MMOL/L (ref 100–108)
CHLORIDE SERPL-SCNC: 102 MMOL/L (ref 100–108)
CHLORIDE SERPL-SCNC: 102 MMOL/L (ref 100–108)
CHLORIDE SERPL-SCNC: 103 MMOL/L (ref 100–108)
CHLORIDE SERPL-SCNC: 103 MMOL/L (ref 100–108)
CHLORIDE SERPL-SCNC: 104 MMOL/L (ref 100–108)
CHLORIDE SERPL-SCNC: 99 MMOL/L (ref 100–108)
CHLORIDE SERPL-SCNC: 99 MMOL/L (ref 100–108)
CHOLEST SERPL-MCNC: 142 MG/DL (ref 50–200)
CO2 SERPL-SCNC: 26 MMOL/L (ref 21–32)
CO2 SERPL-SCNC: 28 MMOL/L (ref 21–32)
CO2 SERPL-SCNC: 29 MMOL/L (ref 21–32)
CO2 SERPL-SCNC: 29 MMOL/L (ref 21–32)
CO2 SERPL-SCNC: 30 MMOL/L (ref 21–32)
CO2 SERPL-SCNC: 32 MMOL/L (ref 21–32)
CREAT SERPL-MCNC: 1.35 MG/DL (ref 0.6–1.3)
CREAT SERPL-MCNC: 1.41 MG/DL (ref 0.6–1.3)
CREAT SERPL-MCNC: 1.43 MG/DL (ref 0.6–1.3)
CREAT SERPL-MCNC: 1.47 MG/DL (ref 0.6–1.3)
CREAT SERPL-MCNC: 1.55 MG/DL (ref 0.6–1.3)
CREAT SERPL-MCNC: 1.57 MG/DL (ref 0.6–1.3)
CREAT SERPL-MCNC: 1.6 MG/DL (ref 0.6–1.3)
CREAT SERPL-MCNC: 1.64 MG/DL (ref 0.6–1.3)
CREAT SERPL-MCNC: 1.65 MG/DL (ref 0.6–1.3)
CREAT SERPL-MCNC: 1.73 MG/DL (ref 0.6–1.3)
CRYOGLOB RF SER-ACNC: ABNORMAL [IU]/ML
D DIMER PPP FEU-MCNC: 4.83 UG/ML FEU
EOSINOPHIL # BLD AUTO: 0.19 THOUSAND/ΜL (ref 0–0.61)
EOSINOPHIL # BLD AUTO: 0.25 THOUSAND/ΜL (ref 0–0.61)
EOSINOPHIL # BLD AUTO: 0.27 THOUSAND/ΜL (ref 0–0.61)
EOSINOPHIL # BLD AUTO: 0.28 THOUSAND/ΜL (ref 0–0.61)
EOSINOPHIL NFR BLD AUTO: 3 % (ref 0–6)
EOSINOPHIL NFR BLD AUTO: 4 % (ref 0–6)
EOSINOPHIL NFR BLD AUTO: 5 % (ref 0–6)
EOSINOPHIL NFR BLD AUTO: 6 % (ref 0–6)
ERYTHROCYTE [DISTWIDTH] IN BLOOD BY AUTOMATED COUNT: 16.6 % (ref 11.6–15.1)
ERYTHROCYTE [DISTWIDTH] IN BLOOD BY AUTOMATED COUNT: 17 % (ref 11.6–15.1)
ERYTHROCYTE [DISTWIDTH] IN BLOOD BY AUTOMATED COUNT: 17.2 % (ref 11.6–15.1)
ERYTHROCYTE [DISTWIDTH] IN BLOOD BY AUTOMATED COUNT: 17.3 % (ref 11.6–15.1)
ERYTHROCYTE [DISTWIDTH] IN BLOOD BY AUTOMATED COUNT: 17.3 % (ref 11.6–15.1)
ERYTHROCYTE [DISTWIDTH] IN BLOOD BY AUTOMATED COUNT: 17.7 % (ref 11.6–15.1)
FERRITIN SERPL-MCNC: 139 NG/ML (ref 8–388)
GAMMA GLOB ABNORMAL SERPL ELPH-MCNC: 2.88 G/DL (ref 0.5–1.6)
GAMMA GLOB MFR UR ELPH: 37.7 %
GAMMA GLOB SERPL ELPH-MCNC: 36.5 % (ref 12–22)
GFR SERPL CREATININE-BSD FRML MDRD: 29 ML/MIN/1.73SQ M
GFR SERPL CREATININE-BSD FRML MDRD: 31 ML/MIN/1.73SQ M
GFR SERPL CREATININE-BSD FRML MDRD: 31 ML/MIN/1.73SQ M
GFR SERPL CREATININE-BSD FRML MDRD: 32 ML/MIN/1.73SQ M
GFR SERPL CREATININE-BSD FRML MDRD: 33 ML/MIN/1.73SQ M
GFR SERPL CREATININE-BSD FRML MDRD: 33 ML/MIN/1.73SQ M
GFR SERPL CREATININE-BSD FRML MDRD: 35 ML/MIN/1.73SQ M
GFR SERPL CREATININE-BSD FRML MDRD: 37 ML/MIN/1.73SQ M
GFR SERPL CREATININE-BSD FRML MDRD: 37 ML/MIN/1.73SQ M
GFR SERPL CREATININE-BSD FRML MDRD: 39 ML/MIN/1.73SQ M
GLUCOSE P FAST SERPL-MCNC: 81 MG/DL (ref 65–99)
GLUCOSE SERPL-MCNC: 111 MG/DL (ref 65–140)
GLUCOSE SERPL-MCNC: 133 MG/DL (ref 65–140)
GLUCOSE SERPL-MCNC: 77 MG/DL (ref 65–140)
GLUCOSE SERPL-MCNC: 81 MG/DL (ref 65–140)
GLUCOSE SERPL-MCNC: 87 MG/DL (ref 65–140)
GLUCOSE SERPL-MCNC: 88 MG/DL (ref 65–140)
GLUCOSE SERPL-MCNC: 96 MG/DL (ref 65–140)
HBV CORE AB SER QL: NORMAL
HBV CORE IGM SER QL: NORMAL
HBV SURFACE AG SER QL: NORMAL
HCT VFR BLD AUTO: 31 % (ref 34.8–46.1)
HCT VFR BLD AUTO: 32.9 % (ref 34.8–46.1)
HCT VFR BLD AUTO: 33.4 % (ref 34.8–46.1)
HCT VFR BLD AUTO: 33.5 % (ref 34.8–46.1)
HCT VFR BLD AUTO: 35.3 % (ref 34.8–46.1)
HCT VFR BLD AUTO: 37.3 % (ref 34.8–46.1)
HCV AB SER QL: NORMAL
HDLC SERPL-MCNC: 37 MG/DL
HGB BLD-MCNC: 10.1 G/DL (ref 11.5–15.4)
HGB BLD-MCNC: 10.2 G/DL (ref 11.5–15.4)
HGB BLD-MCNC: 10.5 G/DL (ref 11.5–15.4)
HGB BLD-MCNC: 10.8 G/DL (ref 11.5–15.4)
HGB BLD-MCNC: 11.3 G/DL (ref 11.5–15.4)
HGB BLD-MCNC: 9.7 G/DL (ref 11.5–15.4)
HIV 1+2 AB+HIV1 P24 AG SERPL QL IA: NORMAL
HIV1 P24 AG SER QL: NORMAL
IGG/ALB SER: 0.57 {RATIO} (ref 1.1–1.8)
IMM GRANULOCYTES # BLD AUTO: 0.02 THOUSAND/UL (ref 0–0.2)
IMM GRANULOCYTES NFR BLD AUTO: 0 % (ref 0–2)
INTERPRETATION UR IFE-IMP: NORMAL
IRON SATN MFR SERPL: 17 %
IRON SERPL-MCNC: 37 UG/DL (ref 50–170)
LDLC SERPL CALC-MCNC: 94 MG/DL (ref 0–100)
LYMPHOCYTES # BLD AUTO: 0.46 THOUSANDS/ΜL (ref 0.6–4.47)
LYMPHOCYTES # BLD AUTO: 0.5 THOUSANDS/ΜL (ref 0.6–4.47)
LYMPHOCYTES # BLD AUTO: 0.54 THOUSANDS/ΜL (ref 0.6–4.47)
LYMPHOCYTES # BLD AUTO: 0.58 THOUSANDS/ΜL (ref 0.6–4.47)
LYMPHOCYTES NFR BLD AUTO: 11 % (ref 14–44)
LYMPHOCYTES NFR BLD AUTO: 11 % (ref 14–44)
LYMPHOCYTES NFR BLD AUTO: 7 % (ref 14–44)
LYMPHOCYTES NFR BLD AUTO: 9 % (ref 14–44)
MAGNESIUM SERPL-MCNC: 2 MG/DL (ref 1.6–2.6)
MAGNESIUM SERPL-MCNC: 2.3 MG/DL (ref 1.6–2.6)
MCH RBC QN AUTO: 26.9 PG (ref 26.8–34.3)
MCH RBC QN AUTO: 27 PG (ref 26.8–34.3)
MCH RBC QN AUTO: 27.2 PG (ref 26.8–34.3)
MCH RBC QN AUTO: 27.3 PG (ref 26.8–34.3)
MCH RBC QN AUTO: 27.3 PG (ref 26.8–34.3)
MCH RBC QN AUTO: 27.6 PG (ref 26.8–34.3)
MCHC RBC AUTO-ENTMCNC: 30.2 G/DL (ref 31.4–37.4)
MCHC RBC AUTO-ENTMCNC: 30.3 G/DL (ref 31.4–37.4)
MCHC RBC AUTO-ENTMCNC: 30.6 G/DL (ref 31.4–37.4)
MCHC RBC AUTO-ENTMCNC: 31 G/DL (ref 31.4–37.4)
MCHC RBC AUTO-ENTMCNC: 31.3 G/DL (ref 31.4–37.4)
MCHC RBC AUTO-ENTMCNC: 31.3 G/DL (ref 31.4–37.4)
MCV RBC AUTO: 87 FL (ref 82–98)
MCV RBC AUTO: 88 FL (ref 82–98)
MCV RBC AUTO: 88 FL (ref 82–98)
MCV RBC AUTO: 89 FL (ref 82–98)
MONOCYTES # BLD AUTO: 0.41 THOUSAND/ΜL (ref 0.17–1.22)
MONOCYTES # BLD AUTO: 0.45 THOUSAND/ΜL (ref 0.17–1.22)
MONOCYTES # BLD AUTO: 0.45 THOUSAND/ΜL (ref 0.17–1.22)
MONOCYTES # BLD AUTO: 0.48 THOUSAND/ΜL (ref 0.17–1.22)
MONOCYTES NFR BLD AUTO: 6 % (ref 4–12)
MONOCYTES NFR BLD AUTO: 8 % (ref 4–12)
MONOCYTES NFR BLD AUTO: 8 % (ref 4–12)
MONOCYTES NFR BLD AUTO: 9 % (ref 4–12)
MYELOPEROXIDASE AB SER IA-ACNC: <9 U/ML (ref 0–9)
NEUTROPHILS # BLD AUTO: 3.7 THOUSANDS/ΜL (ref 1.85–7.62)
NEUTROPHILS # BLD AUTO: 3.95 THOUSANDS/ΜL (ref 1.85–7.62)
NEUTROPHILS # BLD AUTO: 4.41 THOUSANDS/ΜL (ref 1.85–7.62)
NEUTROPHILS # BLD AUTO: 5.76 THOUSANDS/ΜL (ref 1.85–7.62)
NEUTS SEG NFR BLD AUTO: 73 % (ref 43–75)
NEUTS SEG NFR BLD AUTO: 75 % (ref 43–75)
NEUTS SEG NFR BLD AUTO: 78 % (ref 43–75)
NEUTS SEG NFR BLD AUTO: 83 % (ref 43–75)
NONHDLC SERPL-MCNC: 105 MG/DL
NRBC BLD AUTO-RTO: 0 /100 WBCS
NT-PROBNP SERPL-MCNC: ABNORMAL PG/ML
P AXIS: 70 DEGREES
PLATELET # BLD AUTO: 374 THOUSANDS/UL (ref 149–390)
PLATELET # BLD AUTO: 384 THOUSANDS/UL (ref 149–390)
PLATELET # BLD AUTO: 400 THOUSANDS/UL (ref 149–390)
PLATELET # BLD AUTO: 413 THOUSANDS/UL (ref 149–390)
PLATELET # BLD AUTO: 447 THOUSANDS/UL (ref 149–390)
PLATELET # BLD AUTO: 450 THOUSANDS/UL (ref 149–390)
PLATELET # BLD AUTO: 458 THOUSANDS/UL (ref 149–390)
PMV BLD AUTO: 10 FL (ref 8.9–12.7)
PMV BLD AUTO: 8.9 FL (ref 8.9–12.7)
PMV BLD AUTO: 9.4 FL (ref 8.9–12.7)
PMV BLD AUTO: 9.5 FL (ref 8.9–12.7)
PMV BLD AUTO: 9.7 FL (ref 8.9–12.7)
POTASSIUM SERPL-SCNC: 3.5 MMOL/L (ref 3.5–5.3)
POTASSIUM SERPL-SCNC: 3.7 MMOL/L (ref 3.5–5.3)
POTASSIUM SERPL-SCNC: 3.8 MMOL/L (ref 3.5–5.3)
POTASSIUM SERPL-SCNC: 3.8 MMOL/L (ref 3.5–5.3)
POTASSIUM SERPL-SCNC: 4 MMOL/L (ref 3.5–5.3)
POTASSIUM SERPL-SCNC: 4.2 MMOL/L (ref 3.5–5.3)
POTASSIUM SERPL-SCNC: 4.2 MMOL/L (ref 3.5–5.3)
POTASSIUM SERPL-SCNC: 5.4 MMOL/L (ref 3.5–5.3)
PR INTERVAL: 138 MS
PROT PATTERN SERPL ELPH-IMP: ABNORMAL
PROT PATTERN UR ELPH-IMP: ABNORMAL
PROT SERPL-MCNC: 7.9 G/DL (ref 6.4–8.2)
PROT SERPL-MCNC: 8.4 G/DL (ref 6.4–8.2)
PROT SERPL-MCNC: 9.2 G/DL (ref 6.4–8.2)
PROT UR-MCNC: 68 MG/DL
PROTEINASE3 AB SER IA-ACNC: <3.5 U/ML (ref 0–3.5)
QRS AXIS: 64 DEGREES
QRSD INTERVAL: 80 MS
QT INTERVAL: 324 MS
QTC INTERVAL: 399 MS
RBC # BLD AUTO: 3.55 MILLION/UL (ref 3.81–5.12)
RBC # BLD AUTO: 3.74 MILLION/UL (ref 3.81–5.12)
RBC # BLD AUTO: 3.75 MILLION/UL (ref 3.81–5.12)
RBC # BLD AUTO: 3.8 MILLION/UL (ref 3.81–5.12)
RBC # BLD AUTO: 3.96 MILLION/UL (ref 3.81–5.12)
RBC # BLD AUTO: 4.2 MILLION/UL (ref 3.81–5.12)
RHEUMATOID FACT SER QL LA: POSITIVE
RYE IGE QN: NEGATIVE
SARS-COV-2 RNA SPEC QL NAA+PROBE: NOT DETECTED
SL AMB  POCT GLUCOSE, UA: ABNORMAL
SL AMB LEUKOCYTE ESTERASE,UA: ABNORMAL
SL AMB POCT BILIRUBIN,UA: ABNORMAL
SL AMB POCT BLOOD,UA: ABNORMAL
SL AMB POCT CLARITY,UA: ABNORMAL
SL AMB POCT COLOR,UA: ABNORMAL
SL AMB POCT KETONES,UA: 5
SL AMB POCT NITRITE,UA: ABNORMAL
SL AMB POCT PH,UA: 6.5
SL AMB POCT SPECIFIC GRAVITY,UA: 1.01
SL AMB POCT URINE PROTEIN: 30
SL AMB POCT UROBILINOGEN: 0.2
SODIUM SERPL-SCNC: 134 MMOL/L (ref 136–145)
SODIUM SERPL-SCNC: 136 MMOL/L (ref 136–145)
SODIUM SERPL-SCNC: 136 MMOL/L (ref 136–145)
SODIUM SERPL-SCNC: 137 MMOL/L (ref 136–145)
SODIUM SERPL-SCNC: 137 MMOL/L (ref 136–145)
SODIUM SERPL-SCNC: 138 MMOL/L (ref 136–145)
SODIUM SERPL-SCNC: 138 MMOL/L (ref 136–145)
SODIUM SERPL-SCNC: 140 MMOL/L (ref 136–145)
T WAVE AXIS: 264 DEGREES
T4 FREE SERPL-MCNC: 1.4 NG/DL (ref 0.76–1.46)
TIBC SERPL-MCNC: 215 UG/DL (ref 250–450)
TRIGL SERPL-MCNC: 57 MG/DL
TROPONIN I SERPL-MCNC: 0.03 NG/ML
TROPONIN I SERPL-MCNC: 0.04 NG/ML
TROPONIN I SERPL-MCNC: 0.06 NG/ML
TSH SERPL DL<=0.05 MIU/L-ACNC: 3.67 UIU/ML (ref 0.36–3.74)
TSH SERPL DL<=0.05 MIU/L-ACNC: 6.38 UIU/ML (ref 0.36–3.74)
VENTRICULAR RATE: 91 BPM
WBC # BLD AUTO: 4.38 THOUSAND/UL (ref 4.31–10.16)
WBC # BLD AUTO: 5.04 THOUSAND/UL (ref 4.31–10.16)
WBC # BLD AUTO: 5.16 THOUSAND/UL (ref 4.31–10.16)
WBC # BLD AUTO: 5.33 THOUSAND/UL (ref 4.31–10.16)
WBC # BLD AUTO: 5.73 THOUSAND/UL (ref 4.31–10.16)
WBC # BLD AUTO: 6.9 THOUSAND/UL (ref 4.31–10.16)

## 2020-01-01 PROCEDURE — 71046 X-RAY EXAM CHEST 2 VIEWS: CPT

## 2020-01-01 PROCEDURE — U0003 INFECTIOUS AGENT DETECTION BY NUCLEIC ACID (DNA OR RNA); SEVERE ACUTE RESPIRATORY SYNDROME CORONAVIRUS 2 (SARS-COV-2) (CORONAVIRUS DISEASE [COVID-19]), AMPLIFIED PROBE TECHNIQUE, MAKING USE OF HIGH THROUGHPUT TECHNOLOGIES AS DESCRIBED BY CMS-2020-01-R: HCPCS | Performed by: PHYSICIAN ASSISTANT

## 2020-01-01 PROCEDURE — 84439 ASSAY OF FREE THYROXINE: CPT | Performed by: FAMILY MEDICINE

## 2020-01-01 PROCEDURE — 99232 SBSQ HOSP IP/OBS MODERATE 35: CPT | Performed by: INTERNAL MEDICINE

## 2020-01-01 PROCEDURE — 36415 COLL VENOUS BLD VENIPUNCTURE: CPT

## 2020-01-01 PROCEDURE — 93005 ELECTROCARDIOGRAM TRACING: CPT

## 2020-01-01 PROCEDURE — 86431 RHEUMATOID FACTOR QUANT: CPT | Performed by: INTERNAL MEDICINE

## 2020-01-01 PROCEDURE — 99232 SBSQ HOSP IP/OBS MODERATE 35: CPT | Performed by: HOSPITALIST

## 2020-01-01 PROCEDURE — 83735 ASSAY OF MAGNESIUM: CPT | Performed by: INTERNAL MEDICINE

## 2020-01-01 PROCEDURE — 99214 OFFICE O/P EST MOD 30 MIN: CPT | Performed by: DERMATOLOGY

## 2020-01-01 PROCEDURE — 80048 BASIC METABOLIC PNL TOTAL CA: CPT | Performed by: NURSE PRACTITIONER

## 2020-01-01 PROCEDURE — 93970 EXTREMITY STUDY: CPT

## 2020-01-01 PROCEDURE — 36415 COLL VENOUS BLD VENIPUNCTURE: CPT | Performed by: EMERGENCY MEDICINE

## 2020-01-01 PROCEDURE — 83550 IRON BINDING TEST: CPT | Performed by: FAMILY MEDICINE

## 2020-01-01 PROCEDURE — 87077 CULTURE AEROBIC IDENTIFY: CPT | Performed by: PHYSICIAN ASSISTANT

## 2020-01-01 PROCEDURE — 36415 COLL VENOUS BLD VENIPUNCTURE: CPT | Performed by: FAMILY MEDICINE

## 2020-01-01 PROCEDURE — 84166 PROTEIN E-PHORESIS/URINE/CSF: CPT | Performed by: INTERNAL MEDICINE

## 2020-01-01 PROCEDURE — 80048 BASIC METABOLIC PNL TOTAL CA: CPT | Performed by: PSYCHIATRY & NEUROLOGY

## 2020-01-01 PROCEDURE — 3074F SYST BP LT 130 MM HG: CPT | Performed by: FAMILY MEDICINE

## 2020-01-01 PROCEDURE — 99495 TRANSJ CARE MGMT MOD F2F 14D: CPT | Performed by: PHYSICIAN ASSISTANT

## 2020-01-01 PROCEDURE — 87186 SC STD MICRODIL/AGAR DIL: CPT | Performed by: PHYSICIAN ASSISTANT

## 2020-01-01 PROCEDURE — 85025 COMPLETE CBC W/AUTO DIFF WBC: CPT | Performed by: EMERGENCY MEDICINE

## 2020-01-01 PROCEDURE — 93970 EXTREMITY STUDY: CPT | Performed by: SURGERY

## 2020-01-01 PROCEDURE — A9540 TC99M MAA: HCPCS

## 2020-01-01 PROCEDURE — 86038 ANTINUCLEAR ANTIBODIES: CPT | Performed by: INTERNAL MEDICINE

## 2020-01-01 PROCEDURE — 82728 ASSAY OF FERRITIN: CPT | Performed by: FAMILY MEDICINE

## 2020-01-01 PROCEDURE — G0425 INPT/ED TELECONSULT30: HCPCS | Performed by: DERMATOLOGY

## 2020-01-01 PROCEDURE — 85025 COMPLETE CBC W/AUTO DIFF WBC: CPT | Performed by: PSYCHIATRY & NEUROLOGY

## 2020-01-01 PROCEDURE — 84443 ASSAY THYROID STIM HORMONE: CPT | Performed by: FAMILY MEDICINE

## 2020-01-01 PROCEDURE — 80053 COMPREHEN METABOLIC PANEL: CPT | Performed by: EMERGENCY MEDICINE

## 2020-01-01 PROCEDURE — 86335 IMMUNFIX E-PHORSIS/URINE/CSF: CPT | Performed by: PATHOLOGY

## 2020-01-01 PROCEDURE — 80048 BASIC METABOLIC PNL TOTAL CA: CPT | Performed by: INTERNAL MEDICINE

## 2020-01-01 PROCEDURE — 71045 X-RAY EXAM CHEST 1 VIEW: CPT

## 2020-01-01 PROCEDURE — 85379 FIBRIN DEGRADATION QUANT: CPT

## 2020-01-01 PROCEDURE — 85049 AUTOMATED PLATELET COUNT: CPT | Performed by: PSYCHIATRY & NEUROLOGY

## 2020-01-01 PROCEDURE — 87086 URINE CULTURE/COLONY COUNT: CPT | Performed by: PHYSICIAN ASSISTANT

## 2020-01-01 PROCEDURE — 80048 BASIC METABOLIC PNL TOTAL CA: CPT

## 2020-01-01 PROCEDURE — 86160 COMPLEMENT ANTIGEN: CPT | Performed by: INTERNAL MEDICINE

## 2020-01-01 PROCEDURE — 81002 URINALYSIS NONAUTO W/O SCOPE: CPT | Performed by: PHYSICIAN ASSISTANT

## 2020-01-01 PROCEDURE — 87340 HEPATITIS B SURFACE AG IA: CPT | Performed by: INTERNAL MEDICINE

## 2020-01-01 PROCEDURE — 96374 THER/PROPH/DIAG INJ IV PUSH: CPT

## 2020-01-01 PROCEDURE — 86705 HEP B CORE ANTIBODY IGM: CPT | Performed by: INTERNAL MEDICINE

## 2020-01-01 PROCEDURE — 88305 TISSUE EXAM BY PATHOLOGIST: CPT | Performed by: STUDENT IN AN ORGANIZED HEALTH CARE EDUCATION/TRAINING PROGRAM

## 2020-01-01 PROCEDURE — 84484 ASSAY OF TROPONIN QUANT: CPT | Performed by: PSYCHIATRY & NEUROLOGY

## 2020-01-01 PROCEDURE — 83540 ASSAY OF IRON: CPT | Performed by: FAMILY MEDICINE

## 2020-01-01 PROCEDURE — 93000 ELECTROCARDIOGRAM COMPLETE: CPT | Performed by: INTERNAL MEDICINE

## 2020-01-01 PROCEDURE — 77080 DXA BONE DENSITY AXIAL: CPT

## 2020-01-01 PROCEDURE — 84484 ASSAY OF TROPONIN QUANT: CPT | Performed by: EMERGENCY MEDICINE

## 2020-01-01 PROCEDURE — 87806 HIV AG W/HIV1&2 ANTB W/OPTIC: CPT | Performed by: INTERNAL MEDICINE

## 2020-01-01 PROCEDURE — 86704 HEP B CORE ANTIBODY TOTAL: CPT | Performed by: INTERNAL MEDICINE

## 2020-01-01 PROCEDURE — 78580 LUNG PERFUSION IMAGING: CPT

## 2020-01-01 PROCEDURE — 86334 IMMUNOFIX E-PHORESIS SERUM: CPT | Performed by: INTERNAL MEDICINE

## 2020-01-01 PROCEDURE — 99204 OFFICE O/P NEW MOD 45 MIN: CPT | Performed by: INTERNAL MEDICINE

## 2020-01-01 PROCEDURE — 99223 1ST HOSP IP/OBS HIGH 75: CPT | Performed by: HOSPITALIST

## 2020-01-01 PROCEDURE — 85025 COMPLETE CBC W/AUTO DIFF WBC: CPT | Performed by: FAMILY MEDICINE

## 2020-01-01 PROCEDURE — 99214 OFFICE O/P EST MOD 30 MIN: CPT | Performed by: INTERNAL MEDICINE

## 2020-01-01 PROCEDURE — 84443 ASSAY THYROID STIM HORMONE: CPT | Performed by: PSYCHIATRY & NEUROLOGY

## 2020-01-01 PROCEDURE — 4040F PNEUMOC VAC/ADMIN/RCVD: CPT | Performed by: FAMILY MEDICINE

## 2020-01-01 PROCEDURE — 84166 PROTEIN E-PHORESIS/URINE/CSF: CPT | Performed by: PATHOLOGY

## 2020-01-01 PROCEDURE — 85027 COMPLETE CBC AUTOMATED: CPT | Performed by: PSYCHIATRY & NEUROLOGY

## 2020-01-01 PROCEDURE — 76705 ECHO EXAM OF ABDOMEN: CPT

## 2020-01-01 PROCEDURE — 99239 HOSP IP/OBS DSCHRG MGMT >30: CPT | Performed by: INTERNAL MEDICINE

## 2020-01-01 PROCEDURE — 93306 TTE W/DOPPLER COMPLETE: CPT

## 2020-01-01 PROCEDURE — 84165 PROTEIN E-PHORESIS SERUM: CPT | Performed by: INTERNAL MEDICINE

## 2020-01-01 PROCEDURE — 80061 LIPID PANEL: CPT | Performed by: FAMILY MEDICINE

## 2020-01-01 PROCEDURE — G1004 CDSM NDSC: HCPCS

## 2020-01-01 PROCEDURE — 3008F BODY MASS INDEX DOCD: CPT | Performed by: FAMILY MEDICINE

## 2020-01-01 PROCEDURE — 88313 SPECIAL STAINS GROUP 2: CPT | Performed by: STUDENT IN AN ORGANIZED HEALTH CARE EDUCATION/TRAINING PROGRAM

## 2020-01-01 PROCEDURE — 86430 RHEUMATOID FACTOR TEST QUAL: CPT | Performed by: INTERNAL MEDICINE

## 2020-01-01 PROCEDURE — 83735 ASSAY OF MAGNESIUM: CPT | Performed by: PSYCHIATRY & NEUROLOGY

## 2020-01-01 PROCEDURE — 99222 1ST HOSP IP/OBS MODERATE 55: CPT | Performed by: INTERNAL MEDICINE

## 2020-01-01 PROCEDURE — 99285 EMERGENCY DEPT VISIT HI MDM: CPT | Performed by: EMERGENCY MEDICINE

## 2020-01-01 PROCEDURE — 1036F TOBACCO NON-USER: CPT | Performed by: FAMILY MEDICINE

## 2020-01-01 PROCEDURE — 93306 TTE W/DOPPLER COMPLETE: CPT | Performed by: INTERNAL MEDICINE

## 2020-01-01 PROCEDURE — 83880 ASSAY OF NATRIURETIC PEPTIDE: CPT | Performed by: EMERGENCY MEDICINE

## 2020-01-01 PROCEDURE — 99214 OFFICE O/P EST MOD 30 MIN: CPT | Performed by: FAMILY MEDICINE

## 2020-01-01 PROCEDURE — 99214 OFFICE O/P EST MOD 30 MIN: CPT | Performed by: NURSE PRACTITIONER

## 2020-01-01 PROCEDURE — 1160F RVW MEDS BY RX/DR IN RCRD: CPT | Performed by: FAMILY MEDICINE

## 2020-01-01 PROCEDURE — 99204 OFFICE O/P NEW MOD 45 MIN: CPT | Performed by: SURGERY

## 2020-01-01 PROCEDURE — 86803 HEPATITIS C AB TEST: CPT | Performed by: INTERNAL MEDICINE

## 2020-01-01 PROCEDURE — 80053 COMPREHEN METABOLIC PANEL: CPT | Performed by: FAMILY MEDICINE

## 2020-01-01 PROCEDURE — 99285 EMERGENCY DEPT VISIT HI MDM: CPT

## 2020-01-01 PROCEDURE — 99214 OFFICE O/P EST MOD 30 MIN: CPT | Performed by: PHYSICIAN ASSISTANT

## 2020-01-01 PROCEDURE — 93010 ELECTROCARDIOGRAM REPORT: CPT | Performed by: INTERNAL MEDICINE

## 2020-01-01 PROCEDURE — 84165 PROTEIN E-PHORESIS SERUM: CPT | Performed by: PATHOLOGY

## 2020-01-01 PROCEDURE — 3078F DIAST BP <80 MM HG: CPT | Performed by: FAMILY MEDICINE

## 2020-01-01 PROCEDURE — 83520 IMMUNOASSAY QUANT NOS NONAB: CPT | Performed by: INTERNAL MEDICINE

## 2020-01-01 RX ORDER — NEBIVOLOL 5 MG/1
5 TABLET ORAL DAILY
Qty: 90 TABLET | Refills: 3 | Status: SHIPPED | OUTPATIENT
Start: 2020-01-01 | End: 2020-01-01 | Stop reason: SDUPTHER

## 2020-01-01 RX ORDER — FUROSEMIDE 10 MG/ML
20 INJECTION INTRAMUSCULAR; INTRAVENOUS
Status: DISCONTINUED | OUTPATIENT
Start: 2020-01-01 | End: 2020-01-01

## 2020-01-01 RX ORDER — LORATADINE 10 MG/1
5 TABLET ORAL DAILY
Status: DISCONTINUED | OUTPATIENT
Start: 2020-01-01 | End: 2020-01-01 | Stop reason: HOSPADM

## 2020-01-01 RX ORDER — MAGNESIUM HYDROXIDE/ALUMINUM HYDROXICE/SIMETHICONE 120; 1200; 1200 MG/30ML; MG/30ML; MG/30ML
30 SUSPENSION ORAL EVERY 4 HOURS PRN
Status: DISCONTINUED | OUTPATIENT
Start: 2020-01-01 | End: 2020-01-01 | Stop reason: HOSPADM

## 2020-01-01 RX ORDER — NEBIVOLOL 5 MG/1
5 TABLET ORAL DAILY
Qty: 90 TABLET | Refills: 3
Start: 2020-01-01 | End: 2021-01-01

## 2020-01-01 RX ORDER — NEBIVOLOL 5 MG/1
5 TABLET ORAL DAILY
Status: DISCONTINUED | OUTPATIENT
Start: 2020-01-01 | End: 2020-01-01

## 2020-01-01 RX ORDER — FUROSEMIDE 10 MG/ML
40 INJECTION INTRAMUSCULAR; INTRAVENOUS
Status: DISCONTINUED | OUTPATIENT
Start: 2020-01-01 | End: 2020-01-01

## 2020-01-01 RX ORDER — PANTOPRAZOLE SODIUM 20 MG/1
20 TABLET, DELAYED RELEASE ORAL
Status: DISCONTINUED | OUTPATIENT
Start: 2020-01-01 | End: 2020-01-01

## 2020-01-01 RX ORDER — CEPHALEXIN 500 MG/1
500 CAPSULE ORAL EVERY 12 HOURS SCHEDULED
Qty: 10 CAPSULE | Refills: 0 | Status: SHIPPED | OUTPATIENT
Start: 2020-01-01 | End: 2020-01-01

## 2020-01-01 RX ORDER — LEVOTHYROXINE SODIUM 88 UG/1
88 TABLET ORAL
Status: DISCONTINUED | OUTPATIENT
Start: 2020-01-01 | End: 2020-01-01 | Stop reason: HOSPADM

## 2020-01-01 RX ORDER — ECHINACEA PURPUREA EXTRACT 125 MG
1 TABLET ORAL
Status: DISCONTINUED | OUTPATIENT
Start: 2020-01-01 | End: 2020-01-01 | Stop reason: HOSPADM

## 2020-01-01 RX ORDER — LEVOTHYROXINE SODIUM 88 UG/1
88 TABLET ORAL DAILY
Qty: 90 TABLET | Refills: 3 | Status: SHIPPED | OUTPATIENT
Start: 2020-01-01 | End: 2021-01-01

## 2020-01-01 RX ORDER — FUROSEMIDE 10 MG/ML
80 INJECTION INTRAMUSCULAR; INTRAVENOUS ONCE
Status: COMPLETED | OUTPATIENT
Start: 2020-01-01 | End: 2020-01-01

## 2020-01-01 RX ORDER — NITROFURANTOIN 25; 75 MG/1; MG/1
100 CAPSULE ORAL 2 TIMES DAILY
Qty: 10 CAPSULE | Refills: 0 | Status: SHIPPED | OUTPATIENT
Start: 2020-01-01 | End: 2020-01-01

## 2020-01-01 RX ORDER — FUROSEMIDE 10 MG/ML
20 INJECTION INTRAMUSCULAR; INTRAVENOUS ONCE
Status: COMPLETED | OUTPATIENT
Start: 2020-01-01 | End: 2020-01-01

## 2020-01-01 RX ORDER — FUROSEMIDE 10 MG/ML
40 INJECTION INTRAMUSCULAR; INTRAVENOUS DAILY
Status: DISCONTINUED | OUTPATIENT
Start: 2020-01-01 | End: 2020-01-01

## 2020-01-01 RX ORDER — FUROSEMIDE 40 MG/1
40 TABLET ORAL DAILY
Qty: 30 TABLET | Refills: 0 | Status: SHIPPED | OUTPATIENT
Start: 2020-01-01 | End: 2020-01-01 | Stop reason: SDUPTHER

## 2020-01-01 RX ORDER — CLOBETASOL PROPIONATE 0.5 MG/G
CREAM TOPICAL
Qty: 60 G | Refills: 0 | Status: SHIPPED | OUTPATIENT
Start: 2020-01-01 | End: 2021-04-28 | Stop reason: HOSPADM

## 2020-01-01 RX ORDER — POTASSIUM CHLORIDE 20 MEQ/1
20 TABLET, EXTENDED RELEASE ORAL ONCE
Status: COMPLETED | OUTPATIENT
Start: 2020-01-01 | End: 2020-01-01

## 2020-01-01 RX ORDER — FUROSEMIDE 40 MG/1
TABLET ORAL
Qty: 40 TABLET | Refills: 1 | Status: SHIPPED | OUTPATIENT
Start: 2020-01-01 | End: 2021-01-01 | Stop reason: SDUPTHER

## 2020-01-01 RX ORDER — ACETAMINOPHEN 325 MG/1
650 TABLET ORAL ONCE
Status: COMPLETED | OUTPATIENT
Start: 2020-01-01 | End: 2020-01-01

## 2020-01-01 RX ORDER — ACETAMINOPHEN 325 MG/1
650 TABLET ORAL EVERY 6 HOURS PRN
Status: DISCONTINUED | OUTPATIENT
Start: 2020-01-01 | End: 2020-01-01 | Stop reason: HOSPADM

## 2020-01-01 RX ORDER — ASPIRIN 81 MG/1
81 TABLET ORAL DAILY
Status: DISCONTINUED | OUTPATIENT
Start: 2020-01-01 | End: 2020-01-01 | Stop reason: HOSPADM

## 2020-01-01 RX ORDER — HEPARIN SODIUM 5000 [USP'U]/ML
5000 INJECTION, SOLUTION INTRAVENOUS; SUBCUTANEOUS EVERY 8 HOURS SCHEDULED
Status: DISCONTINUED | OUTPATIENT
Start: 2020-01-01 | End: 2020-01-01 | Stop reason: HOSPADM

## 2020-01-01 RX ORDER — FUROSEMIDE 40 MG/1
40 TABLET ORAL DAILY
Status: DISCONTINUED | OUTPATIENT
Start: 2020-01-01 | End: 2020-01-01 | Stop reason: HOSPADM

## 2020-01-01 RX ADMIN — HEPARIN SODIUM 5000 UNITS: 5000 INJECTION INTRAVENOUS; SUBCUTANEOUS at 21:23

## 2020-01-01 RX ADMIN — FUROSEMIDE 80 MG: 10 INJECTION, SOLUTION INTRAMUSCULAR; INTRAVENOUS at 16:57

## 2020-01-01 RX ADMIN — LEVOTHYROXINE SODIUM 88 MCG: 88 TABLET ORAL at 05:00

## 2020-01-01 RX ADMIN — FUROSEMIDE 20 MG: 10 INJECTION, SOLUTION INTRAMUSCULAR; INTRAVENOUS at 10:30

## 2020-01-01 RX ADMIN — ASPIRIN 81 MG: 81 TABLET, COATED ORAL at 10:15

## 2020-01-01 RX ADMIN — PANTOPRAZOLE SODIUM 20 MG: 20 TABLET, DELAYED RELEASE ORAL at 05:10

## 2020-01-01 RX ADMIN — ACETAMINOPHEN 650 MG: 325 TABLET, FILM COATED ORAL at 03:28

## 2020-01-01 RX ADMIN — HEPARIN SODIUM 5000 UNITS: 5000 INJECTION INTRAVENOUS; SUBCUTANEOUS at 14:47

## 2020-01-01 RX ADMIN — ASPIRIN 81 MG: 81 TABLET, COATED ORAL at 08:56

## 2020-01-01 RX ADMIN — HEPARIN SODIUM 5000 UNITS: 5000 INJECTION INTRAVENOUS; SUBCUTANEOUS at 05:01

## 2020-01-01 RX ADMIN — FUROSEMIDE 20 MG: 10 INJECTION, SOLUTION INTRAMUSCULAR; INTRAVENOUS at 09:06

## 2020-01-01 RX ADMIN — HEPARIN SODIUM 5000 UNITS: 5000 INJECTION INTRAVENOUS; SUBCUTANEOUS at 21:19

## 2020-01-01 RX ADMIN — FUROSEMIDE 20 MG: 10 INJECTION, SOLUTION INTRAMUSCULAR; INTRAVENOUS at 10:10

## 2020-01-01 RX ADMIN — FUROSEMIDE 20 MG: 10 INJECTION, SOLUTION INTRAMUSCULAR; INTRAVENOUS at 11:15

## 2020-01-01 RX ADMIN — ASPIRIN 81 MG: 81 TABLET, COATED ORAL at 08:06

## 2020-01-01 RX ADMIN — FUROSEMIDE 40 MG: 10 INJECTION, SOLUTION INTRAMUSCULAR; INTRAVENOUS at 11:12

## 2020-01-01 RX ADMIN — SALINE NASAL SPRAY 1 SPRAY: 1.5 SOLUTION NASAL at 18:12

## 2020-01-01 RX ADMIN — PANTOPRAZOLE SODIUM 20 MG: 20 TABLET, DELAYED RELEASE ORAL at 05:00

## 2020-01-01 RX ADMIN — LORATADINE 5 MG: 10 TABLET ORAL at 08:20

## 2020-01-01 RX ADMIN — ACETAMINOPHEN 650 MG: 325 TABLET, FILM COATED ORAL at 02:35

## 2020-01-01 RX ADMIN — HEPARIN SODIUM 5000 UNITS: 5000 INJECTION INTRAVENOUS; SUBCUTANEOUS at 05:11

## 2020-01-01 RX ADMIN — LORATADINE 5 MG: 10 TABLET ORAL at 11:12

## 2020-01-01 RX ADMIN — LEVOTHYROXINE SODIUM 88 MCG: 88 TABLET ORAL at 06:02

## 2020-01-01 RX ADMIN — PANTOPRAZOLE SODIUM 20 MG: 20 TABLET, DELAYED RELEASE ORAL at 05:18

## 2020-01-01 RX ADMIN — LEVOTHYROXINE SODIUM 88 MCG: 88 TABLET ORAL at 05:10

## 2020-01-01 RX ADMIN — HEPARIN SODIUM 5000 UNITS: 5000 INJECTION INTRAVENOUS; SUBCUTANEOUS at 14:18

## 2020-01-01 RX ADMIN — LORATADINE 5 MG: 10 TABLET ORAL at 09:07

## 2020-01-01 RX ADMIN — ASPIRIN 81 MG: 81 TABLET, COATED ORAL at 08:20

## 2020-01-01 RX ADMIN — ALUMINUM HYDROXIDE, MAGNESIUM HYDROXIDE, AND SIMETHICONE 30 ML: 200; 200; 20 SUSPENSION ORAL at 05:22

## 2020-01-01 RX ADMIN — ASPIRIN 81 MG: 81 TABLET, COATED ORAL at 09:07

## 2020-01-01 RX ADMIN — FUROSEMIDE 20 MG: 10 INJECTION, SOLUTION INTRAMUSCULAR; INTRAVENOUS at 08:56

## 2020-01-01 RX ADMIN — LEVOTHYROXINE SODIUM 88 MCG: 88 TABLET ORAL at 05:18

## 2020-01-01 RX ADMIN — FUROSEMIDE 40 MG: 10 INJECTION, SOLUTION INTRAMUSCULAR; INTRAVENOUS at 16:40

## 2020-01-01 RX ADMIN — HEPARIN SODIUM 5000 UNITS: 5000 INJECTION INTRAVENOUS; SUBCUTANEOUS at 21:08

## 2020-01-01 RX ADMIN — HEPARIN SODIUM 5000 UNITS: 5000 INJECTION INTRAVENOUS; SUBCUTANEOUS at 06:02

## 2020-01-01 RX ADMIN — ASPIRIN 81 MG: 81 TABLET, COATED ORAL at 11:12

## 2020-01-01 RX ADMIN — POTASSIUM CHLORIDE 20 MEQ: 1500 TABLET, EXTENDED RELEASE ORAL at 10:26

## 2020-01-01 RX ADMIN — HEPARIN SODIUM 5000 UNITS: 5000 INJECTION INTRAVENOUS; SUBCUTANEOUS at 21:16

## 2020-01-01 RX ADMIN — LEVOTHYROXINE SODIUM 88 MCG: 88 TABLET ORAL at 06:27

## 2020-01-01 RX ADMIN — PANTOPRAZOLE SODIUM 20 MG: 20 TABLET, DELAYED RELEASE ORAL at 06:02

## 2020-01-01 RX ADMIN — HEPARIN SODIUM 5000 UNITS: 5000 INJECTION INTRAVENOUS; SUBCUTANEOUS at 05:00

## 2020-01-01 RX ADMIN — HEPARIN SODIUM 5000 UNITS: 5000 INJECTION INTRAVENOUS; SUBCUTANEOUS at 13:59

## 2020-01-01 RX ADMIN — LEVOTHYROXINE SODIUM 88 MCG: 88 TABLET ORAL at 05:01

## 2020-01-01 RX ADMIN — LORATADINE 5 MG: 10 TABLET ORAL at 08:06

## 2020-01-01 RX ADMIN — FUROSEMIDE 20 MG: 10 INJECTION, SOLUTION INTRAMUSCULAR; INTRAVENOUS at 17:50

## 2020-01-01 RX ADMIN — HEPARIN SODIUM 5000 UNITS: 5000 INJECTION INTRAVENOUS; SUBCUTANEOUS at 05:18

## 2020-01-01 RX ADMIN — LORATADINE 5 MG: 10 TABLET ORAL at 10:10

## 2020-01-01 RX ADMIN — HEPARIN SODIUM 5000 UNITS: 5000 INJECTION INTRAVENOUS; SUBCUTANEOUS at 13:56

## 2020-01-01 RX ADMIN — FUROSEMIDE 20 MG: 10 INJECTION, SOLUTION INTRAMUSCULAR; INTRAVENOUS at 17:04

## 2020-01-01 RX ADMIN — FUROSEMIDE 20 MG: 10 INJECTION, SOLUTION INTRAMUSCULAR; INTRAVENOUS at 18:11

## 2020-01-01 RX ADMIN — HEPARIN SODIUM 5000 UNITS: 5000 INJECTION INTRAVENOUS; SUBCUTANEOUS at 22:35

## 2020-01-01 RX ADMIN — HEPARIN SODIUM 5000 UNITS: 5000 INJECTION INTRAVENOUS; SUBCUTANEOUS at 06:27

## 2020-01-01 RX ADMIN — ACETAMINOPHEN 650 MG: 325 TABLET, FILM COATED ORAL at 03:30

## 2020-01-01 RX ADMIN — NEBIVOLOL HYDROCHLORIDE 5 MG: 5 TABLET ORAL at 11:12

## 2020-01-27 ENCOUNTER — TELEPHONE (OUTPATIENT)
Dept: HEMATOLOGY ONCOLOGY | Facility: CLINIC | Age: 70
End: 2020-01-27

## 2020-01-27 ENCOUNTER — APPOINTMENT (OUTPATIENT)
Dept: LAB | Facility: CLINIC | Age: 70
DRG: 683 | End: 2020-01-27
Payer: MEDICARE

## 2020-01-27 NOTE — TELEPHONE ENCOUNTER
Please call patient and let her know hemoglobin improved to 11 2, platelets 012 -- which are both improved  Repeat labs prior to follow up visit

## 2020-01-30 ENCOUNTER — HOSPITAL ENCOUNTER (INPATIENT)
Facility: HOSPITAL | Age: 70
LOS: 2 days | Discharge: HOME | DRG: 683 | End: 2020-02-01
Attending: EMERGENCY MEDICINE | Admitting: INTERNAL MEDICINE
Payer: MEDICARE

## 2020-01-30 ENCOUNTER — NURSE TRIAGE (OUTPATIENT)
Dept: OTHER | Facility: OTHER | Age: 70
End: 2020-01-30

## 2020-01-30 ENCOUNTER — APPOINTMENT (EMERGENCY)
Dept: CT IMAGING | Facility: HOSPITAL | Age: 70
DRG: 683 | End: 2020-01-30
Payer: MEDICARE

## 2020-01-30 DIAGNOSIS — I47.1 PAROXYSMAL SUPRAVENTRICULAR TACHYCARDIA (HCC): ICD-10-CM

## 2020-01-30 DIAGNOSIS — R11.0 NAUSEA: ICD-10-CM

## 2020-01-30 DIAGNOSIS — N17.9 AKI (ACUTE KIDNEY INJURY) (HCC): ICD-10-CM

## 2020-01-30 DIAGNOSIS — N20.0 BILATERAL KIDNEY STONES: ICD-10-CM

## 2020-01-30 DIAGNOSIS — N12 PYELONEPHRITIS OF RIGHT KIDNEY: Primary | ICD-10-CM

## 2020-01-30 DIAGNOSIS — Z85.71 HISTORY OF HODGKIN'S DISEASE: ICD-10-CM

## 2020-01-30 DIAGNOSIS — R31.9 HEMATURIA: ICD-10-CM

## 2020-01-30 DIAGNOSIS — I27.20 PULMONARY HYPERTENSION (HCC): ICD-10-CM

## 2020-01-30 LAB
ALBUMIN SERPL BCP-MCNC: 2.8 G/DL (ref 3.5–5)
ALP SERPL-CCNC: 142 U/L (ref 46–116)
ALT SERPL W P-5'-P-CCNC: 11 U/L (ref 12–78)
ANION GAP SERPL CALCULATED.3IONS-SCNC: 9 MMOL/L (ref 4–13)
AST SERPL W P-5'-P-CCNC: 16 U/L (ref 5–45)
BACTERIA UR QL AUTO: ABNORMAL /HPF
BASOPHILS # BLD AUTO: 0.05 THOUSANDS/ΜL (ref 0–0.1)
BASOPHILS NFR BLD AUTO: 1 % (ref 0–1)
BILIRUB SERPL-MCNC: 0.21 MG/DL (ref 0.2–1)
BILIRUB UR QL STRIP: NEGATIVE
BUN SERPL-MCNC: 29 MG/DL (ref 5–25)
CALCIUM SERPL-MCNC: 9.6 MG/DL (ref 8.3–10.1)
CHLORIDE SERPL-SCNC: 101 MMOL/L (ref 100–108)
CLARITY UR: ABNORMAL
CO2 SERPL-SCNC: 24 MMOL/L (ref 21–32)
COLOR UR: ABNORMAL
CREAT SERPL-MCNC: 1.82 MG/DL (ref 0.6–1.3)
EOSINOPHIL # BLD AUTO: 0.35 THOUSAND/ΜL (ref 0–0.61)
EOSINOPHIL NFR BLD AUTO: 3 % (ref 0–6)
ERYTHROCYTE [DISTWIDTH] IN BLOOD BY AUTOMATED COUNT: 15.5 % (ref 11.6–15.1)
GFR SERPL CREATININE-BSD FRML MDRD: 27 ML/MIN/1.73SQ M
GLUCOSE SERPL-MCNC: 98 MG/DL (ref 65–140)
GLUCOSE UR STRIP-MCNC: NEGATIVE MG/DL
HCT VFR BLD AUTO: 36.2 % (ref 34.8–46.1)
HGB BLD-MCNC: 11.2 G/DL (ref 11.5–15.4)
HGB UR QL STRIP.AUTO: ABNORMAL
HOLD SPECIMEN: NORMAL
IMM GRANULOCYTES # BLD AUTO: 0.05 THOUSAND/UL (ref 0–0.2)
IMM GRANULOCYTES NFR BLD AUTO: 1 % (ref 0–2)
KETONES UR STRIP-MCNC: NEGATIVE MG/DL
LEUKOCYTE ESTERASE UR QL STRIP: ABNORMAL
LYMPHOCYTES # BLD AUTO: 0.77 THOUSANDS/ΜL (ref 0.6–4.47)
LYMPHOCYTES NFR BLD AUTO: 8 % (ref 14–44)
MCH RBC QN AUTO: 27.5 PG (ref 26.8–34.3)
MCHC RBC AUTO-ENTMCNC: 30.9 G/DL (ref 31.4–37.4)
MCV RBC AUTO: 89 FL (ref 82–98)
MONOCYTES # BLD AUTO: 0.55 THOUSAND/ΜL (ref 0.17–1.22)
MONOCYTES NFR BLD AUTO: 5 % (ref 4–12)
NEUTROPHILS # BLD AUTO: 8.42 THOUSANDS/ΜL (ref 1.85–7.62)
NEUTS SEG NFR BLD AUTO: 82 % (ref 43–75)
NITRITE UR QL STRIP: NEGATIVE
NON-SQ EPI CELLS URNS QL MICRO: ABNORMAL /HPF
NRBC BLD AUTO-RTO: 0 /100 WBCS
PH UR STRIP.AUTO: 6.5 [PH]
PLATELET # BLD AUTO: 502 THOUSANDS/UL (ref 149–390)
PMV BLD AUTO: 9.3 FL (ref 8.9–12.7)
POTASSIUM SERPL-SCNC: 5.3 MMOL/L (ref 3.5–5.3)
PROT SERPL-MCNC: 9.3 G/DL (ref 6.4–8.2)
PROT UR STRIP-MCNC: ABNORMAL MG/DL
RBC # BLD AUTO: 4.07 MILLION/UL (ref 3.81–5.12)
RBC #/AREA URNS AUTO: ABNORMAL /HPF
SODIUM SERPL-SCNC: 134 MMOL/L (ref 136–145)
SP GR UR STRIP.AUTO: 1.02 (ref 1–1.03)
UROBILINOGEN UR QL STRIP.AUTO: 0.2 E.U./DL
WBC # BLD AUTO: 10.19 THOUSAND/UL (ref 4.31–10.16)
WBC #/AREA URNS AUTO: ABNORMAL /HPF

## 2020-01-30 PROCEDURE — 99285 EMERGENCY DEPT VISIT HI MDM: CPT

## 2020-01-30 PROCEDURE — 81001 URINALYSIS AUTO W/SCOPE: CPT | Performed by: EMERGENCY MEDICINE

## 2020-01-30 PROCEDURE — 36415 COLL VENOUS BLD VENIPUNCTURE: CPT | Performed by: EMERGENCY MEDICINE

## 2020-01-30 PROCEDURE — 74176 CT ABD & PELVIS W/O CONTRAST: CPT

## 2020-01-30 PROCEDURE — 80053 COMPREHEN METABOLIC PANEL: CPT | Performed by: EMERGENCY MEDICINE

## 2020-01-30 PROCEDURE — 87086 URINE CULTURE/COLONY COUNT: CPT | Performed by: EMERGENCY MEDICINE

## 2020-01-30 PROCEDURE — 85025 COMPLETE CBC W/AUTO DIFF WBC: CPT | Performed by: EMERGENCY MEDICINE

## 2020-01-30 PROCEDURE — 87040 BLOOD CULTURE FOR BACTERIA: CPT | Performed by: EMERGENCY MEDICINE

## 2020-01-30 PROCEDURE — 99285 EMERGENCY DEPT VISIT HI MDM: CPT | Performed by: EMERGENCY MEDICINE

## 2020-01-30 PROCEDURE — 1124F ACP DISCUSS-NO DSCNMKR DOCD: CPT | Performed by: UROLOGY

## 2020-01-30 NOTE — TELEPHONE ENCOUNTER
Reason for Disposition   [1] Taking antibiotic > 24 hours for UTI AND [2] flank or lower back pain worsening    Answer Assessment - Initial Assessment Questions  1  ANTIBIOTIC: "What antibiotic are you taking?" "How many times per day?"      Bactrim one tab every 12 hours  2  DURATION: "When was the antibiotic started?"      1/27/20  3  MAIN SYMPTOM: "What is the main symptom you are concerned about?"      Pain in lower back, blood in urine  4  FEVER: "Do you have a fever?" If so, ask: "What is it, how was it measured, and when did it start?"      97 0 orally 2 hours ago  5   OTHER SYMPTOMS: "Do you have any other symptoms?" (e g , flank pain, vaginal discharge, blood in urine)       Pain in lower back, blood in urine    Protocols used: URINARY TRACT INFECTION ON ANTIBIOTIC FOLLOW-UP CALL [facility]

## 2020-01-30 NOTE — TELEPHONE ENCOUNTER
Regarding: abx for UTI not working  ----- Message from An Schofield sent at 1/30/2020  5:26 PM EST -----  "I am on abx for a UTI which does not have the mg on the bottle, and they are not working "

## 2020-01-30 NOTE — TELEPHONE ENCOUNTER
Patient was seen at [facility] on Monday 1/27 and DX with UTI  Was placed on Bactrim, but is not feeling or getting any better  Blood in urine and lower back pain worsening  Patient to seen in 4 hours  Will go to [facility] ED

## 2020-01-31 LAB
ANION GAP SERPL CALCULATED.3IONS-SCNC: 7 MMOL/L (ref 4–13)
BACTERIA UR CULT: NORMAL
BUN SERPL-MCNC: 30 MG/DL (ref 5–25)
CALCIUM SERPL-MCNC: 8.5 MG/DL (ref 8.3–10.1)
CHLORIDE SERPL-SCNC: 103 MMOL/L (ref 100–108)
CO2 SERPL-SCNC: 24 MMOL/L (ref 21–32)
CREAT SERPL-MCNC: 1.89 MG/DL (ref 0.6–1.3)
ERYTHROCYTE [DISTWIDTH] IN BLOOD BY AUTOMATED COUNT: 15.4 % (ref 11.6–15.1)
GFR SERPL CREATININE-BSD FRML MDRD: 26 ML/MIN/1.73SQ M
GLUCOSE SERPL-MCNC: 94 MG/DL (ref 65–140)
HCT VFR BLD AUTO: 31.7 % (ref 34.8–46.1)
HGB BLD-MCNC: 9.9 G/DL (ref 11.5–15.4)
MCH RBC QN AUTO: 27.8 PG (ref 26.8–34.3)
MCHC RBC AUTO-ENTMCNC: 31.2 G/DL (ref 31.4–37.4)
MCV RBC AUTO: 89 FL (ref 82–98)
PLATELET # BLD AUTO: 436 THOUSANDS/UL (ref 149–390)
PMV BLD AUTO: 9.5 FL (ref 8.9–12.7)
POTASSIUM SERPL-SCNC: 5.5 MMOL/L (ref 3.5–5.3)
RBC # BLD AUTO: 3.56 MILLION/UL (ref 3.81–5.12)
SODIUM SERPL-SCNC: 134 MMOL/L (ref 136–145)
WBC # BLD AUTO: 8.87 THOUSAND/UL (ref 4.31–10.16)

## 2020-01-31 PROCEDURE — 85027 COMPLETE CBC AUTOMATED: CPT | Performed by: PHYSICIAN ASSISTANT

## 2020-01-31 PROCEDURE — 99223 1ST HOSP IP/OBS HIGH 75: CPT | Performed by: INTERNAL MEDICINE

## 2020-01-31 PROCEDURE — 80048 BASIC METABOLIC PNL TOTAL CA: CPT | Performed by: PHYSICIAN ASSISTANT

## 2020-01-31 PROCEDURE — 99223 1ST HOSP IP/OBS HIGH 75: CPT | Performed by: UROLOGY

## 2020-01-31 RX ORDER — LEVOTHYROXINE SODIUM 0.07 MG/1
75 TABLET ORAL
Status: DISCONTINUED | OUTPATIENT
Start: 2020-01-31 | End: 2020-02-01 | Stop reason: HOSPADM

## 2020-01-31 RX ORDER — ONDANSETRON 2 MG/ML
4 INJECTION INTRAMUSCULAR; INTRAVENOUS EVERY 6 HOURS PRN
Status: DISCONTINUED | OUTPATIENT
Start: 2020-01-31 | End: 2020-02-01 | Stop reason: HOSPADM

## 2020-01-31 RX ORDER — NEBIVOLOL 5 MG/1
2.5 TABLET ORAL DAILY
Status: DISCONTINUED | OUTPATIENT
Start: 2020-01-31 | End: 2020-02-01 | Stop reason: HOSPADM

## 2020-01-31 RX ORDER — HEPARIN SODIUM 5000 [USP'U]/ML
5000 INJECTION, SOLUTION INTRAVENOUS; SUBCUTANEOUS EVERY 8 HOURS SCHEDULED
Status: DISCONTINUED | OUTPATIENT
Start: 2020-01-31 | End: 2020-01-31

## 2020-01-31 RX ORDER — OXYCODONE HYDROCHLORIDE 5 MG/1
2.5 TABLET ORAL EVERY 4 HOURS PRN
Status: DISCONTINUED | OUTPATIENT
Start: 2020-01-31 | End: 2020-02-01 | Stop reason: HOSPADM

## 2020-01-31 RX ORDER — LANOLIN ALCOHOL/MO/W.PET/CERES
3 CREAM (GRAM) TOPICAL
Status: DISCONTINUED | OUTPATIENT
Start: 2020-01-31 | End: 2020-02-01 | Stop reason: HOSPADM

## 2020-01-31 RX ORDER — PRAVASTATIN SODIUM 40 MG
40 TABLET ORAL
Status: DISCONTINUED | OUTPATIENT
Start: 2020-01-31 | End: 2020-02-01 | Stop reason: HOSPADM

## 2020-01-31 RX ORDER — LIDOCAINE 50 MG/G
1 PATCH TOPICAL DAILY
Status: DISCONTINUED | OUTPATIENT
Start: 2020-01-31 | End: 2020-02-01 | Stop reason: HOSPADM

## 2020-01-31 RX ORDER — SODIUM CHLORIDE 9 MG/ML
75 INJECTION, SOLUTION INTRAVENOUS CONTINUOUS
Status: DISCONTINUED | OUTPATIENT
Start: 2020-01-31 | End: 2020-02-01 | Stop reason: HOSPADM

## 2020-01-31 RX ORDER — OXYCODONE HYDROCHLORIDE 5 MG/1
5 TABLET ORAL EVERY 4 HOURS PRN
Status: DISCONTINUED | OUTPATIENT
Start: 2020-01-31 | End: 2020-02-01 | Stop reason: HOSPADM

## 2020-01-31 RX ORDER — ACETAMINOPHEN 325 MG/1
650 TABLET ORAL EVERY 6 HOURS PRN
Status: DISCONTINUED | OUTPATIENT
Start: 2020-01-31 | End: 2020-02-01 | Stop reason: HOSPADM

## 2020-01-31 RX ADMIN — LIDOCAINE 1 PATCH: 50 PATCH TOPICAL at 09:16

## 2020-01-31 RX ADMIN — OXYCODONE HYDROCHLORIDE 5 MG: 5 TABLET ORAL at 14:48

## 2020-01-31 RX ADMIN — LEVOTHYROXINE SODIUM 75 MCG: 75 TABLET ORAL at 06:04

## 2020-01-31 RX ADMIN — MELATONIN 3 MG: at 01:12

## 2020-01-31 RX ADMIN — HEPARIN SODIUM 5000 UNITS: 5000 INJECTION INTRAVENOUS; SUBCUTANEOUS at 06:04

## 2020-01-31 RX ADMIN — NEBIVOLOL HYDROCHLORIDE 2.5 MG: 5 TABLET ORAL at 09:16

## 2020-01-31 RX ADMIN — SODIUM CHLORIDE 75 ML/HR: 0.9 INJECTION, SOLUTION INTRAVENOUS at 13:14

## 2020-01-31 RX ADMIN — CEFTRIAXONE SODIUM 1000 MG: 10 INJECTION, POWDER, FOR SOLUTION INTRAVENOUS at 01:08

## 2020-01-31 RX ADMIN — ACETAMINOPHEN 650 MG: 325 TABLET, FILM COATED ORAL at 09:16

## 2020-01-31 RX ADMIN — SODIUM CHLORIDE 75 ML/HR: 0.9 INJECTION, SOLUTION INTRAVENOUS at 00:53

## 2020-01-31 RX ADMIN — OXYCODONE HYDROCHLORIDE 5 MG: 5 TABLET ORAL at 19:42

## 2020-01-31 RX ADMIN — HEPARIN SODIUM 5000 UNITS: 5000 INJECTION INTRAVENOUS; SUBCUTANEOUS at 13:42

## 2020-01-31 RX ADMIN — ACETAMINOPHEN 650 MG: 325 TABLET, FILM COATED ORAL at 01:11

## 2020-01-31 NOTE — ED PROVIDER NOTES
History  Chief Complaint   Patient presents with    Blood in Urine     Pt was at urgent care monday for a UTI  Pt has been taking sulfamethoxaz-ole-tmp ds tablets for the uti  Pt reports she is not feeling better and is still having pain in her lower back  Pt reports nausea  Pt reports blood in her urine  History provided by:  Patient and spouse  Flank Pain   Pain location:  R flank  Pain quality: aching    Pain radiates to:  Does not radiate  Pain severity:  Moderate  Onset quality:  Gradual  Duration:  3 days  Timing:  Constant  Progression:  Worsening  Chronicity:  New  Context comment:  Been treated for UTI with Bactrim, now with flank pain, feeling generally ill  Relieved by:  None tried  Worsened by:  Nothing  Ineffective treatments:  None tried  Associated symptoms: nausea    Associated symptoms: no chest pain, no chills, no constipation, no cough, no diarrhea, no dysuria, no fever, no hematemesis, no hematochezia, no shortness of breath, no sore throat and no vomiting        Prior to Admission Medications   Prescriptions Last Dose Informant Patient Reported? Taking?    Loratadine (CLARITIN) 10 MG CAPS  Self Yes No   Sig: Take by mouth as needed    apixaban (ELIQUIS) 2 5 mg  Self No No   Sig: Take 1 tablet (2 5 mg total) by mouth 2 (two) times a day   Patient taking differently: Take 2 5 mg by mouth daily    cholecalciferol (VITAMIN D3) 1,000 units tablet  Self Yes No   Sig: Take 1,000 Units by mouth daily     ferrous sulfate 325 (65 Fe) mg tablet  Self Yes No   Sig: Take 325 mg by mouth daily with breakfast   levothyroxine 75 mcg tablet  Self No No   Sig: Take 1 tablet (75 mcg total) by mouth daily   nebivolol (BYSTOLIC) 5 mg tablet  Self No No   Sig: Take 1 tablet (5 mg total) by mouth daily for 183 days   Patient taking differently: Take 2 5 mg by mouth daily    simvastatin (ZOCOR) 20 mg tablet  Self No No   Sig: Take 1 tablet (20 mg total) by mouth daily at bedtime      Facility-Administered Medications: None       Past Medical History:   Diagnosis Date    ENEDELIA (acute kidney injury) ([facility] ) 1/12/2019    Anemia     BCC (basal cell carcinoma of skin)     facial    Breast cancer ([facility] ) 2006    s/p mastectomy     Calculus, kidney 2013    Cervical cancer ([facility] ) 1983    Disease of thyroid gland     Ganglion     Last Assessed:7/9/13    Heart murmur     mitral valve regurgitation    Hodgkin disease ([place] [address] )     Hydronephrosis 2013    Hyperlipidemia     Hypertension     NSTEMI (non-ST elevated myocardial infarction) ([facility] ) 1/12/2019    Osteopenia     Last Assessed:7/9/13    Osteoporosis     Paroxysmal supraventricular tachycardia ([place] [address] )     Renal calculi     Last Assessed:3/19/14    Renal cyst     Shoulder impingement     Last Assessed:1/18/13    Syncope 7/28/2019    Last Assessment & Plan:  Pre-syncopal symptoms leading to short syncopal episode without post-ictal period  Suspect dehydration/hypovolemia  Infectious, cardiopulm or neurologic cause less likely  No arrhythmias or AV block on ECG or telemetry thus far  No PE on CTA  No clinical signs of seizure   No acute pathology or mass on CTH  - s/p IVF; encourage PO intake - continue on telemetry - TTE c/f ne    UTI (urinary tract infection) 2013    Vasculitis ([place] [address] ) 1/12/2019       Past Surgical History:   Procedure Laterality Date    APPENDECTOMY      BREAST BIOPSY Left 09/29/2006    BREAST RECONSTRUCTION      COMPLEX WOUND CLOSURE TO EXTREMITY Left 10/9/2018    Procedure: COMPLEX CLOSURE RECONSTRUCTION;  Surgeon: Fely Ledezma MD;  Location: [facility];  Service: Plastics    CT BONE MARROW BIOPSY AND ASPIRATION  11/15/2019    CYSTOSCOPY W/ RETROGRADES  2009    CYSTOSCOPY W/ URETEROSCOPY  2002    EXTRACORPOREAL SHOCK WAVE LITHOTRIPSY Right 2005    FLAP LOCAL HEAD / NECK Left 10/9/2018    Procedure: FLAP RECONSTRUCTION;  Surgeon: Fely Ledezma MD;  [facility]: [facility];  Service: Plastics    FULL THICKNESS SKIN GRAFT Left 10/9/2018    Procedure: FULL THICKNESS SKIN GRAFT RECONSTRUCTION;  Surgeon: Beni Verdin MD;  [facility];  Service: Plastics    HYSTERECTOMY      -cervical cancer    INTRAOPERATIVE RADIATION THERAPY (I[facility])      Radiation Therapy    KIDNEY SURGERY      MASTECTOMY Left 2006    OOPHORECTOMY Left     OTHER SURGICAL HISTORY      Chemotherapeutics    REDUCTION MAMMAPLASTY Right 2006    SENTINEL LYMPH NODE BIOPSY      SPLENECTOMY      1985-Hodgkin's Disease    SQUAMOUS CELL CARCINOMA EXCISION Left 10/9/2018    Procedure: NOSE/CHEEK BCC EXCISION; FROZEN SECTION;  Surgeon: Beni Verdin MD;  [facility]: [facility];  Service: Plastics       Family History   Problem Relation Age of Onset    Leukemia Mother     Colon cancer Paternal Grandmother 80    Heart disease Family     Heart attack Father     Coronary artery disease Father         stent- five    Hypertension Father     Colon cancer Father 80    No Known Problems Maternal Grandmother     No Known Problems Maternal Grandfather     No Known Problems Paternal Grandfather     No Known Problems Paternal Aunt     Stroke Neg Hx     Anuerysm Neg Hx     Clotting disorder Neg Hx     Arrhythmia Neg Hx     Heart failure Neg Hx     Hyperlipidemia Neg Hx      I have reviewed and agree with the history as documented  Social History     Tobacco Use    Smoking status: Former Smoker     Last attempt to quit: 2011     Years since quittin 0    Smokeless tobacco: Never Used   Substance Use Topics    Alcohol use: Not Currently     Comment: caffeine use; social drinker-1 beer every 2/3 months    Drug use: Never        Review of Systems   Constitutional: Negative for activity change, chills, diaphoresis and fever  HENT: Negative for congestion, sinus pressure and sore throat  Eyes: Negative for pain and visual disturbance  Respiratory: Negative for cough, chest tightness, shortness of breath, wheezing and stridor  Cardiovascular: Negative for chest pain and palpitations  Gastrointestinal: Positive for nausea  Negative for abdominal distention, abdominal pain, constipation, diarrhea, hematemesis, hematochezia and vomiting  Genitourinary: Positive for flank pain  Negative for dysuria and frequency  Musculoskeletal: Negative for neck pain and neck stiffness  Skin: Negative for rash  Neurological: Negative for dizziness, speech difficulty, light-headedness, numbness and headaches  Physical Exam  Physical Exam   Constitutional: She is oriented to person, place, and time  She appears well-developed  No distress  HENT:   Head: Normocephalic and atraumatic  Eyes: Pupils are equal, round, and reactive to light  Neck: Normal range of motion  Neck supple  No tracheal deviation present  Cardiovascular: Normal rate, regular rhythm, normal heart sounds and intact distal pulses  No murmur heard  Pulmonary/Chest: Effort normal and breath sounds normal  No stridor  No respiratory distress  Abdominal: Soft  She exhibits no distension  There is tenderness (right CVA)  There is no rebound and no guarding  Musculoskeletal: Normal range of motion  Neurological: She is alert and oriented to person, place, and time  Skin: Skin is warm and dry  She is not diaphoretic  No erythema  No pallor  Psychiatric: She has a normal mood and affect  Vitals reviewed        Vital Signs  ED Triage Vitals [01/30/20 2015]   Temperature Pulse Respirations Blood Pressure SpO2   98 4 °F (36 9 °C) 95 18 121/59 97 %      Temp Source Heart Rate Source Patient Position - Orthostatic VS BP Location FiO2 (%)   Oral Monitor Sitting Right arm --      Pain Score       Worst Possible Pain           Vitals:    01/30/20 2015   BP: 121/59   Pulse: 95   Patient Position - Orthostatic VS: Sitting         Visual Acuity      ED Medications  Medications   ceftriaxone (ROCEPHIN) 1 g/50 mL in dextrose IVPB (has no administration in time range) Diagnostic Studies  Results Reviewed     Procedure Component Value Units Date/Time    Blood culture #1 [[id number]]     Lab Status:  No result Specimen:  Blood     Blood culture #2 [[id number]]     Lab Status:  No result Specimen:  Blood     Comprehensive metabolic panel [[id number]]  (Abnormal) Collected:  01/30/20 2104    Lab Status:  Final result Specimen:  Blood from Arm, Right Updated:  01/30/20 2155     Sodium 134 mmol/L      Potassium 5 3 mmol/L      Chloride 101 mmol/L      CO2 24 mmol/L      ANION GAP 9 mmol/L      BUN 29 mg/dL      Creatinine 1 82 mg/dL      Glucose 98 mg/dL      Calcium 9 6 mg/dL      AST 16 U/L      ALT 11 U/L      Alkaline Phosphatase 142 U/L      Total Protein 9 3 g/dL      Albumin 2 8 g/dL      Total Bilirubin 0 21 mg/dL      eGFR 27 ml/min/1 73sq m     Narrative:       National Kidney Disease Foundation guidelines for Chronic Kidney Disease (CKD):     Stage 1 with normal or high GFR (GFR > 90 mL/min/1 73 square meters)    Stage 2 Mild CKD (GFR = 60-89 mL/min/1 73 square meters)    Stage 3A Moderate CKD (GFR = 45-59 mL/min/1 73 square meters)    Stage 3B Moderate CKD (GFR = 30-44 mL/min/1 73 square meters)    Stage 4 Severe CKD (GFR = 15-29 mL/min/1 73 square meters)    Stage 5 End Stage CKD (GFR <15 mL/min/1 73 square meters)  Note: GFR calculation is accurate only with a steady state creatinine    CBC and differential [[id number]]  (Abnormal) Collected:  01/30/20 2104    Lab Status:  Final result Specimen:  Blood from Arm, Right Updated:  01/30/20 2127     WBC 10 19 Thousand/uL      RBC 4 07 Million/uL      Hemoglobin 11 2 g/dL      Hematocrit 36 2 %      MCV 89 fL      MCH 27 5 pg      MCHC 30 9 g/dL      RDW 15 5 %      MPV 9 3 fL      Platelets 984 Thousands/uL      nRBC 0 /100 WBCs      Neutrophils Relative 82 %      Immat GRANS % 1 %      Lymphocytes Relative 8 %      Monocytes Relative 5 %      Eosinophils Relative 3 %      Basophils Relative 1 %      Neutrophils Absolute 8 42 Thousands/µL      Immature Grans Absolute 0 05 Thousand/uL      Lymphocytes Absolute 0 77 Thousands/µL      Monocytes Absolute 0 55 Thousand/µL      Eosinophils Absolute 0 35 Thousand/µL      Basophils Absolute 0 05 Thousands/µL     Urine Microscopic [[id number]]  (Abnormal) Collected:  01/30/20 2029    Lab Status:  Final result Specimen:  Urine, Clean Catch Updated:  01/30/20 2052     RBC, UA Innumerable /hpf      WBC, UA Innumerable /hpf      Epithelial Cells Innumerable /hpf      Bacteria, UA Innumerable /hpf     Urine culture [[id number]] Collected:  01/30/20 2029    Lab Status: In process Specimen:  Urine, Clean Catch Updated:  01/30/20 2052    UA w Reflex to Microscopic w Reflex to Culture [[id number]]  (Abnormal) Collected:  01/30/20 2029    Lab Status:  Final result Specimen:  Urine, Clean Catch Updated:  01/30/20 2050     Color, UA Bloody     Clarity, UA Turbid     Specific [place], UA 1 020     pH, UA 6 5     Leukocytes, UA Large     Nitrite, UA Negative     Protein,  (3+) mg/dl      Glucose, UA Negative mg/dl      Ketones, UA Negative mg/dl      Urobilinogen, UA 0 2 E U /dl      Bilirubin, UA Negative     Blood, UA Large    Narrative:       Urine dipstick performed on spun supernatant due to gross blood                 CT renal stone study abdomen pelvis without contrast   Final Result by Kassie Cabrera DO (01/30 2215)   No CT evidence of renal colic  Please see above discussion  Workstation performed: [id number]                    Procedures  Procedures         ED Course  ED Course as of Jan 30 2246   Thu Jan 30, 2020 2240 Repeat evaluation still large amount right-sided CVA tenderness, will treat as an inpatient for failed antibiotic therapy of right pyelonephritis                      Initial Sepsis Screening     Row Name 01/30/20 2242                Is the patient's history suggestive of a new or worsening infection?   (!) Yes (Proceed)  -DA        Suspected source of infection  urinary tract infection  -DA        Are two or more of the following signs & symptoms of infection both present and new to the patient? No  -DA        Indicate SIRS criteria  Tachycardia > 90 bpm  -DA        If the answer is yes to both questions, suspicion of sepsis is present          If severe sepsis is present AND tissue hypoperfusion perists in the hour after fluid resuscitation or lactate > 4, the patient meets criteria for SEPTIC SHOCK          Are any of the following organ dysfunction criteria present within 6 hours of suspected infection and SIRS criteria that are NOT considered to be chronic conditions?         Organ dysfunction          Date of presentation of severe sepsis          Time of presentation of severe sepsis          Tissue hypoperfusion persists in the hour after crystalloid fluid administration, evidenced, by either:          Was hypotension present within one hour of the conclusion of crystalloid fluid administration?           Date of presentation of septic shock          Time of presentation of septic shock            User Key  (r) = Recorded By, (t) = Taken By, (c) = Cosigned By    Initials Name Provider Type    MARIBETH Morrow DO Physician                  MDM  Number of Diagnoses or Management Options  Diagnosis management comments:       Initial ED assessment:  70-year-old female right flank pain currently being treated with Bactrim for UTI    Initial DDx includes but is not limited to:   Pyelonephritis, nephrolithiasis with infection, doubt serious intra-abdominal pathology such as a colitis diverticulitis or appendicitis due to nontender abdomen    Initial ED plan:   Blood work CT scan disposition pending ED workup        Final ED summary/disposition:   After evaluation and workup in the emergency department, found have pyelonephritis, admitted to hospital due to failing outpatient antibiotic therapy and symptomatic worsening        Amount and/or Complexity of Data Reviewed  Clinical lab tests: ordered and reviewed  Tests in the radiology section of CPT®: ordered and reviewed  Decide to obtain previous medical records or to obtain history from someone other than the patient: yes  Obtain history from someone other than the patient: yes  Review and summarize past medical records: yes  Discuss the patient with other providers: yes  Independent visualization of images, tracings, or specimens: yes          Disposition  Final diagnoses:   Pyelonephritis of right kidney     Time reflects when diagnosis was documented in both MDM as applicable and the Disposition within this note     Time User Action Codes Description Comment    1/30/2020 10:45 PM Conchita Block Add [N12] Pyelonephritis of right kidney       ED Disposition     ED Disposition Condition Date/Time Comment    Admit Stable Thu Jan 30, 2020 10:45 PM Case was discussed with [facility] PA and the patient's admission status was agreed to be  in-patient admission to the service of Dr Pilo Sabillon   Follow-up Information    None         Patient's Medications   Discharge Prescriptions    No medications on file     No discharge procedures on file      ED Provider  Electronically Signed by           Jhonny Cervantes DO  01/30/20 1485

## 2020-01-31 NOTE — CONSULTS
CONSULT    Patient Name: Emmanuel Robles  Patient MRN: [id number]  Date of Service: 2020   Date / Time Note Created: 2020 3:52 PM   Referring Provider: Leena Golden MD    Provider Creating Note: ARACELY Preston    PCP: Hilda Aguero  Attending Provider:  Leena Golden MD    Reason for Consult: Flank Pain    HPI:  Emmanuel Robles is a 66-year-old female known previously to Dr Beatrice Hearn () and more recently Dr Cecilia Calderón for significant bilateral nephrolithiasis with right staghorn calculus and recurrent UTI  Most recent recommendation was for source control nephrectomy  Patient was awaiting nuclear medicine study to evaluate split/differential function  Patient was treated with Bactrim per urgent care for positive urinalysis and symptoms of flank pain and dysuria; without fever, chills, nausea/vomiting  She presents [facility] room with worsening symptoms  Patient states that she receive known directions for antibiotic administration  She remains afebrile, hemodynamically stable and minimally symptomatic  On ceftriaxone per primary admitting team   She is without leukocytosis  Mild acute kidney injury with creatinine of 1 89 from baseline of 1 2  CT of the abdomen and pelvis demonstrates of multi lobular cystic mass of the right upper pole, smaller from previous 14 cm to 11 3  Multiple interpolar and lower pole collecting system calculi  Large staghorn in the renal pelvis measuring 2 3 cm without hydronephrosis stone; as well as stable nonobstructing collecting system calculi involving left kidney  Urologic consultation requested for further management recommendations         Active Problems:    Patient Active Problem List   Diagnosis    Bilateral kidney stones    Hypertension    Hyperlipidemia    Melanoma of skin of trunk (HCC)    Mitral regurgitation    Pulmonary hypertension (HCC)    Acquired complex renal cyst    Elevated serum protein level    Hypothyroidism    Osteoporosis    Vitamin D deficiency    Basal cell carcinoma (BCC) of left side of nose    BCC (basal cell carcinoma), face    History of breast cancer    History of Hodgkin's disease    History of basal cell cancer    Normocytic anemia    Hyponatremia    Elevated liver enzymes    Cystitis    Paroxysmal supraventricular tachycardia (HCC)    ENEDELAI (acute kidney injury) (HCC)    CKD (chronic kidney disease), stage III (HCC)    History of pulmonary embolus (PE)    History of DVT of lower extremity    Non-rheumatic tricuspid valve insufficiency    Thrombocytosis (HCC)    Nonrheumatic aortic valve insufficiency    Cervical cancer (HCC)    Iron deficiency anemia    Pyelonephritis            Impressions  Bilateral nephrolithiasis with right staghorn calculi significant stone burden  Recurrent UTI/complicated pyelonephritis    Recommendations  Continue medical optimization, symptom management and antibiosis  No  intervention indicated in the absence of hydronephrosis  Contingent on length of stay, patient remains hospitalized through Monday, will request nuclear medicine Lasix renogram to evaluate split renal function  Surgical plan to be determined by Dr Quincy Barrett, primary urologist  Will follow intermittently          Past Medical History:   Diagnosis Date    ENEDELIA (acute kidney injury) ([facility] ) 1/12/2019    Anemia     BCC (basal cell carcinoma of skin)     facial    Breast cancer ([facility] [address] [facility] ) 2006    s/p mastectomy     Calculus, kidney 2013    Cervical cancer ([address] ) 1983    Disease of thyroid gland     Ganglion     Last Assessed:7/9/13    Heart murmur     mitral valve regurgitation    Hodgkin disease ([address] )     Hydronephrosis 2013    Hyperlipidemia     Hypertension     NSTEMI (non-ST elevated myocardial infarction) ([address] ) 1/12/2019    Osteopenia     Last Assessed:7/9/13    Osteoporosis     Paroxysmal supraventricular tachycardia ([address] )     Renal calculi     Last Assessed:3/19/14    Renal cyst     Shoulder impingement     Last Assessed:1/18/13    Syncope 7/28/2019    Last Assessment & Plan:  Pre-syncopal symptoms leading to short syncopal episode without post-ictal period  Suspect dehydration/hypovolemia  Infectious, cardiopulm or neurologic cause less likely  No arrhythmias or AV block on ECG or telemetry thus far  No PE on CTA  No clinical signs of seizure   No acute pathology or mass on CTH  - s/p IVF; encourage PO intake - continue on telemetry - TTE c/f ne    UTI (urinary tract infection) 2013    Vasculitis ([address] ) 1/12/2019       Past Surgical History:   Procedure Laterality Date    APPENDECTOMY      BREAST BIOPSY Left 09/29/2006    BREAST RECONSTRUCTION      COMPLEX WOUND CLOSURE TO EXTREMITY Left 10/9/2018    Procedure: COMPLEX CLOSURE RECONSTRUCTION;  Surgeon: Ana Maria Farley MD;  [facility]: [facility];  Service: Plastics    CT BONE MARROW BIOPSY AND ASPIRATION  11/15/2019    CYSTOSCOPY W/ RETROGRADES  2009    CYSTOSCOPY W/ URETEROSCOPY  2002    EXTRACORPOREAL SHOCK WAVE LITHOTRIPSY Right 2005    FLAP LOCAL HEAD / NECK Left 10/9/2018    Procedure: FLAP RECONSTRUCTION;  Surgeon: Ana Maria Farley MD;  [facility];  Service: Plastics    FULL THICKNESS SKIN GRAFT Left 10/9/2018    Procedure: FULL THICKNESS SKIN GRAFT RECONSTRUCTION;  Surgeon: Ana Maria Farley MD;  [facility];  Service: Plastics    HYSTERECTOMY      1983-cervical cancer    INTRAOPERATIVE RADIATION THERAPY (IORT)      Radiation Therapy    KIDNEY SURGERY      MASTECTOMY Left 11/09/2006    OOPHORECTOMY Left     OTHER SURGICAL HISTORY      Chemotherapeutics    REDUCTION MAMMAPLASTY Right 2006    SENTINEL LYMPH NODE BIOPSY      SPLENECTOMY      1985-Hodgkin's Disease    SQUAMOUS CELL CARCINOMA EXCISION Left 10/9/2018    Procedure: NOSE/CHEEK [address] EXCISION; FROZEN SECTION;  Surgeon: Ana Maria Farley MD;  [facility];  Service: Plastics Family History   Problem Relation Age of Onset    Leukemia Mother     Colon cancer Paternal Grandmother 80    Heart disease Family     Heart attack Father     Coronary artery disease Father         stent- five    Hypertension Father     Colon cancer Father 80    No Known Problems Maternal Grandmother     No Known Problems Maternal Grandfather     No Known Problems Paternal Grandfather     No Known Problems Paternal Aunt     Stroke Neg Hx     Anuerysm Neg Hx     Clotting disorder Neg Hx     Arrhythmia Neg Hx     Heart failure Neg Hx     Hyperlipidemia Neg Hx        Social History     Socioeconomic History    Marital status: /Civil Union     Spouse name: Not on file    Number of children: Not on file    Years of education: Not on file    Highest education level: Not on file   Occupational History    Not on file   Social Needs    Financial resource strain: Not on file    Food insecurity:     Worry: Not on file     Inability: Not on file    Transportation needs:     Medical: Not on file     Non-medical: Not on file   Tobacco Use    Smoking status: Former Smoker     Last attempt to quit: 2011     Years since quittin 0    Smokeless tobacco: Never Used   Substance and Sexual Activity    Alcohol use: Not Currently     Comment: caffeine use; social drinker-1 beer every 2/3 months    Drug use: Never    Sexual activity: Not on file   Lifestyle    Physical activity:     Days per week: Not on file     Minutes per session: Not on file    Stress: Not on file   Relationships    Social connections:     Talks on phone: Not on file     Gets together: Not on file     Attends Protestant service: Not on file     Active member of club or organization: Not on file     Attends meetings of clubs or organizations: Not on file     Relationship status: Not on file    Intimate partner violence:     Fear of current or ex partner: Not on file     Emotionally abused: Not on file     Physically abused: Not on file     Forced sexual activity: Not on file   Other Topics Concern    Not on file   Social History Narrative    Advance directive declined by patient    Drinks coffee    Exercise habits       Allergies   Allergen Reactions    Ciprofloxacin Other (See Comments)     Reaction Date: 24Jun2011;    Hives/Uticaria    Sulfamethoxazole-Trimethoprim Hives     Patient does not remember rx       Review of Systems  10 point review of systems negative except as noted in HPI  Constitutional:   negative for - chills or fever  Cardiovascular:   no chest pain or dyspnea on exertion  Gastrointestinal:   no abdominal pain, change in bowel habits, or black or bloody stools  Genito-Urinary:   positive for - dysuria and hematuria  Neurological:   no TIA or stroke symptoms     Chart Review   Allergies, current medications, history, problem list    Vital Signs  /56 (BP Location: Right arm)   Pulse 93   Temp 98 4 °F (36 9 °C) (Oral)   Resp 16   Ht 5' 1 5" (1 562 m)   LMP  (LMP Unknown)   SpO2 96%   BMI 26 21 kg/m²     Physical Exam  General appearance: alert and oriented, in no acute distress, appears stated age, cooperative and no distress  Head: Normocephalic, without obvious abnormality, atraumatic  Neck: no adenopathy, no carotid bruit, no JVD, supple, symmetrical, trachea midline and thyroid not enlarged, symmetric, no tenderness/mass/nodules  Lungs: diminished breath sounds  Heart: regular rate and rhythm, S1, S2 normal, no murmur, click, rub or gallop  Abdomen: soft, non-tender; bowel sounds normal; no masses,  no organomegaly  Extremities: extremities normal, warm and well-perfused; no cyanosis, clubbing, or edema  Pulses: 2+ and symmetric  Neurologic: Grossly normal  No urinary drains     Laboratory Studies  Lab Results   Component Value Date    K 5 5 (H) 01/31/2020     01/31/2020    CO2 24 01/31/2020    CREATININE 1 89 (H) 01/31/2020    BUN 30 (H) 01/31/2020    MG 1 8 01/15/2019     Lab Results Component Value Date    WBC 8 87 01/31/2020    RBC 3 56 (L) 01/31/2020    HGB 9 9 (L) 01/31/2020    HCT 31 7 (L) 01/31/2020    MCV 89 01/31/2020    MCH 27 8 01/31/2020    RDW 15 4 (H) 01/31/2020     (H) 01/31/2020         Imaging and Other Studies  )  Ct Renal Stone Study Abdomen Pelvis Without Contrast    Result Date: 1/30/2020  Narrative: CT ABDOMEN AND PELVIS WITHOUT IV CONTRAST - LOW DOSE RENAL STONE INDICATION:   Right CVA tenderness, history of nephrolithiasis, symptomatic UTI  COMPARISON:  January 12, 2019 TECHNIQUE:  Low dose thin section CT examination of the abdomen and pelvis was performed without intravenous or oral contrast according to a protocol specifically designed to evaluate for urinary tract calculus  Axial, sagittal, and coronal 2D reformatted images were created from the source data and submitted for interpretation  Evaluation for pathology in the abdomen and pelvis that is unrelated to urinary tract calculi is limited  Radiation dose length product (DLP) for this visit:  636 mGy-cm   This examination, like all CT scans performed in the [facility], was performed utilizing techniques to minimize radiation dose exposure, including the use of iterative reconstruction and automated exposure control  FINDINGS: RIGHT KIDNEY AND URETER: Previous multilobular cystic mass in the upper pole of right kidney with scattered wall calcifications currently measures up to 11 3 cm, previously measuring up to 14 cm  There are again large parenchymal and collecting system calculi in the interpolar and lower pole collecting system and cortices  There is again a large staghorn calculus in the renal pelvis measuring up to 2 3 cm  The interpolar and lower pole collecting system does not appear dilated on today's exam   Right ureter is partially obscured by metallic artifact from retroperitoneal surgical clips overlies appears average caliber without evidence of renal colic   The previous lower pole heterogeneous lesion posteriorly is not well seen on today's exam without IV contrast, appearing grossly similar in size at 2 8 cm LEFT KIDNEY AND URETER: Similar cortical scarring left kidney  Stable nonobstructing collecting system calculi  No renal colic  URINARY BLADDER: Urinary bladder wall thickening which may represent cystitis, this is similar to previous exam   No bladder stones  Atelectatic changes both lung bases  Left breast prosthesis  Limited low radiation dose noncontrast CT evaluation demonstrates no clinically significant abnormality of liver, or pancreas  Spleen has been removed  Neither adrenal is definitively identified  Cholelithiasis without CT evidence of cholecystitis No ascites or bulky lymphadenopathy on this limited noncontrast study  Numerous surgical clips in the pelvis and retroperitoneum  Small sliding hiatal hernia  Stomach unremarkable  Formed stool the colon suggesting constipation  No bowel obstruction  Appendix was removed  Partial compression deformity of T11, new from prior exam   No surrounding inflammatory changes to suggest this is acute  Impression: No CT evidence of renal colic  Please see above discussion   Workstation performed: [id number]       Medications     Current Facility-Administered Medications:  acetaminophen 650 mg Oral Q6H PRN Sam Sawyer PA-C    [START ON 2/1/2020] cefTRIAXone 1,000 mg Intravenous Q24H Sam Sawyer PA-C    levothyroxine 75 mcg Oral Early Morning Sam Sawyer PA-C    lidocaine 1 patch Topical Daily Sam Sawyer PA-C    melatonin 3 mg Oral HS Sam Sawyer PA-C    nebivolol 2 5 mg Oral Daily Sam Sawyer PA-C    oxyCODONE 2 5 mg Oral Q4H PRN Rogerio St MD    oxyCODONE 5 mg Oral Q4H PRN Rogerio St MD    pravastatin 40 [facility]    sodium chloride 75 mL/hr Intravenous Continuous Sam Sawyer PA-C Last Rate: 75 mL/hr (01/31/20 1314)                 Sierra Goncalves Ligia Guzman

## 2020-01-31 NOTE — ASSESSMENT & PLAN NOTE
· History of Hodgkins disease diagnosed in 1995   · S/p laparotomy, splenectomy, and extended field radiation  · Follows up with outpatient heme onc Dr Disla Kylee

## 2020-01-31 NOTE — ASSESSMENT & PLAN NOTE
· ENEDELIA present on admission with creatinine 1 82  · Baseline appears to be around 1 1  · This could be related to poor oral intake and recent use of Bactrim  · Will give gentle IV fluid hydration  · Avoid nephrotoxins and hypotension  · Recheck renal function in the morning

## 2020-01-31 NOTE — ASSESSMENT & PLAN NOTE
· Was seen at urgent care for UTI on [place] and was prescribed bactrim DS for a UTI   · Recently developed lower back pain   · CVA positive on my exam   · Urine showed: large leuks, innum RBC, WBC, and cata   · Wbc slightly elevated at 10   · Afebrile here   · CT showed no evidence of pyelo   · Given a dose of ceftriazxone in the ED   · Given clinical presentation will treat for pyelo   · Will continue with ceftriaxone for now   · Monitor wbc and fever

## 2020-01-31 NOTE — ASSESSMENT & PLAN NOTE
· Patient with history of PE and DVT which was provoked secondary to infection  · She is placed on anticoagulation for greater than 6 months  · Currently on Eliquis low dose 2 5 mg due to bleeding symptoms  · Follows up with Hematology-Oncology outpatient  · Will continue Eliquis for now  · No signs of DVT or PE currently    · Monitor for bleeding

## 2020-01-31 NOTE — ASSESSMENT & PLAN NOTE
· Sodium 134 on admission  · Her baseline does appear to be on the lower side in 138-136 is  · She has been below normal before  · Believe this is her standard baseline  · Will continue to trend electrolytes  · Fluid hydration

## 2020-01-31 NOTE — H&P
H&P- Leopoldo Disla 1950, 65 y o  female MRN: [id number]    Unit/Bed#: S -01 Encounter: [id number]    Primary Care Provider: Lydia Hooks MD   Date and time admitted to hospital: 1/30/2020  8:37 PM        * Pyelonephritis  Assessment & Plan  · Was seen at urgent care for UTI on [place] and was prescribed bactrim DS for a UTI   · Recently developed lower back pain   · CVA positive on my exam   · Urine showed: large leuks, innum RBC, WBC, and cata   · Wbc slightly elevated at 10   · Afebrile here   · CT showed no evidence of pyelo   · Given a dose of ceftriazxone in the ED   · Given clinical presentation will treat for pyelo   · Will continue with ceftriaxone for now   · Monitor wbc and fever     ENEDELIA (acute kidney injury) ([facility] )  Assessment & Plan  · ENEDELIA present on admission with creatinine 1 82  · Baseline appears to be around 1 1  · This could be related to poor oral intake and recent use of Bactrim  · Will give gentle IV fluid hydration  · Avoid nephrotoxins and hypotension  · Recheck renal function in the morning  Hyponatremia  Assessment & Plan  · Sodium 134 on admission  · Her baseline does appear to be on the lower side in 138-136 is  · She has been below normal before  · Believe this is her standard baseline  · Will continue to trend electrolytes  · Fluid hydration  History of Hodgkin's disease  Assessment & Plan  · History of Hodgkins disease diagnosed in 1995   · S/p laparotomy, splenectomy, and extended field radiation  · Follows up with outpatient heme onc Dr Ambar Gordon     History of DVT of lower extremity  Assessment & Plan  · Patient with history of PE and DVT which was provoked secondary to infection  · She is placed on anticoagulation for greater than 6 months  · Is on ASA 81 mg but has not been taking for urinary bleeding  · Follows up with Hematology-Oncology outpatient  · No signs of DVT or PE currently    · Monitor for bleeding    Hypertension  Assessment & Plan  · Pressure stable at 437 systolic  · Continue bystolic   · Monitor bp     Paroxysmal supraventricular tachycardia (HCC)  Assessment & Plan  · Continue bystolic     Hyperlipidemia  Assessment & Plan  · Continue statin         VTE Prophylaxis: Heparin  / sequential compression device   Code Status: full   POLST: There is no POLST form on file for this patient (pre-hospital)  Discussion with family: patient     Anticipated Length of Stay:  Patient will be admitted on an Inpatient basis with an anticipated length of stay of  > 2 midnights  Justification for Hospital Stay: pyelonephritis  Patient will need IV antibiotics  Total Time for Visit, including Counseling / Coordination of Care: 45 minutes  Greater than 50% of this total time spent on direct patient counseling and coordination of care  Chief Complaint:   I have severe back pain     History of Present Illness:    Jessa Roger is a 65 y o  female who presents with pyelonephritis  Patient's past medical history significant for breast cancer, malignant melanoma, hypertension, supraventricular tachycardia, Hodgkin's disease, history of DVT and PE  Patient presents to the emergency department with ongoing back and flank pain which began on Monday  Patient was seen at urgent care on Monday for UTI was prescribed Bactrim  She states that she has been taking the Bactrim since Monday without relief of her symptoms  She denies any urinary symptoms for his exquisitely tender on CVA  She states that this back pain has progressively got worse to the point where every time she tries to sit or stand she is in pain  She denies any trauma to the area or any inciting events which would lead to believe this is a musculoskeletal injury  She states that she has been having some blood in her urine as well  UA in the ED showed innumerable bacteria, pulse, red blood cells  Showed large leukocytes    Patient denies any dysuria, frequency, or urgency at this time  She denies any fevers, but states that she has been having chills  States that she has took her temperature and she has not gone above 99  She denies any chest pain, or abdominal pain  Denies any radiation of her symptoms down her leg  She describes her back pain as a tight squeezing sensation rates that about a 8 to 9/10  Review of Systems:    Review of Systems   Constitutional: Positive for activity change, appetite change and chills  Negative for fatigue, fever and unexpected weight change  Respiratory: Negative for cough, chest tightness and shortness of breath  Cardiovascular: Negative for chest pain and palpitations  Gastrointestinal: Negative for abdominal pain, diarrhea, nausea and vomiting  Genitourinary: Positive for flank pain and hematuria  Negative for decreased urine volume, difficulty urinating, dysuria, frequency, pelvic pain and urgency  Musculoskeletal: Positive for back pain  Negative for arthralgias, gait problem and myalgias  Skin: Negative for color change, pallor and rash  Neurological: Negative for dizziness, syncope, light-headedness and headaches  All other systems reviewed and are negative        Past Medical and Surgical History:     Past Medical History:   Diagnosis Date    [facility]ELIA [facility] 1/12/2019    Anemia     BCC (basal cell carcinoma of skin)     facial    Breast cancer ([facility] ) 2006    s/p mastectomy     Calculus, kidney 2013    Cervical cancer ([facility] ) 1983    Disease of thyroid gland     Ganglion     Last Assessed:7/9/13    Heart murmur     mitral valve regurgitation    Hodgkin disease ([facility] )     Hydronephrosis 2013    Hyperlipidemia     Hypertension     NSTEMI (non-ST elevated myocardial infarction) ([facility] ) 1/12/2019    Osteopenia     Last Assessed:7/9/13    Osteoporosis     Paroxysmal supraventricular tachycardia ([facility] )     Renal calculi     Last Assessed:3/19/14    Renal cyst     Shoulder impingement     Last Assessed:1/18/13    Syncope 7/28/2019    Last Assessment & Plan:  Pre-syncopal symptoms leading to short syncopal episode without post-ictal period  Suspect dehydration/hypovolemia  Infectious, cardiopulm or neurologic cause less likely  No arrhythmias or AV block on ECG or telemetry thus far  No PE on CTA  No clinical signs of seizure   No acute pathology or mass on CTH  - s/p IVF; encourage PO intake - continue on telemetry - TTE c/f ne    UTI (urinary tract infection) 2013    Vasculitis ([facility] ) 1/12/2019       Past Surgical History:   Procedure Laterality Date    APPENDECTOMY      BREAST BIOPSY Left 09/29/2006    BREAST RECONSTRUCTION      COMPLEX WOUND CLOSURE TO EXTREMITY Left 10/9/2018    Procedure: COMPLEX CLOSURE RECONSTRUCTION;  Surgeon: Arjun Fernandez MD;  [facility];  Service: Plastics    CT BONE MARROW BIOPSY AND ASPIRATION  11/15/2019    CYSTOSCOPY W/ RETROGRADES  2009    CYSTOSCOPY W/ URETEROSCOPY  2002    EXTRACORPOREAL SHOCK WAVE LITHOTRIPSY Right 2005    FLAP LOCAL HEAD / NECK Left 10/9/2018    Procedure: FLAP RECONSTRUCTION;  Surgeon: Arjun Fernandez MD;  [facility];  Service: Plastics    FULL THICKNESS SKIN GRAFT Left 10/9/2018    Procedure: FULL THICKNESS SKIN GRAFT RECONSTRUCTION;  Surgeon: Arjun Fernandez MD;  [facility];  Service: Plastics    HYSTERECTOMY      1983-cervical cancer    INTRAOPERATIVE RADIATION THERAPY (IORT)      Radiation Therapy    KIDNEY SURGERY      MASTECTOMY Left 11/09/2006    OOPHORECTOMY Left     OTHER SURGICAL HISTORY      Chemotherapeutics    REDUCTION MAMMAPLASTY Right 2006    SENTINEL LYMPH NODE BIOPSY      SPLENECTOMY      1985-Hodgkin's Disease    SQUAMOUS CELL CARCINOMA EXCISION Left 10/9/2018    Procedure: NOSE/CHEEK [address] EXCISION; FROZEN SECTION;  Surgeon: Arjun Fernandez MD;  [facility]: [facility];  Service: Plastics       Meds/Allergies:    Prior to Admission medications    Medication Sig Start Date End Date Taking? Authorizing Provider   apixaban (ELIQUIS) 2 5 mg Take 1 tablet (2 5 mg total) by mouth 2 (two) times a day  Patient taking differently: Take 2 5 mg by mouth daily  19  Yes ARACELY Rothman   cholecalciferol (VITAMIN D3) 1,000 units tablet Take 1,000 Units by mouth daily     Yes Historical Provider, MD   ferrous sulfate 325 (65 Fe) mg tablet Take 325 mg by mouth daily with breakfast   Yes Historical Provider, MD   Loratadine (CLARITIN) 10 MG CAPS Take by mouth as needed    Yes Historical Provider, MD   nebivolol (BYSTOLIC) 5 mg tablet Take 1 tablet (5 mg total) by mouth daily for 183 days  Patient taking differently: Take 2 5 mg by mouth daily  19 Yes Wellington Izquierdo MD   simvastatin (ZOCOR) 20 mg tablet Take 1 tablet (20 mg total) by mouth daily at bedtime 10/18/19  Yes Wellington Izquierdo MD   levothyroxine 75 mcg tablet Take 1 tablet (75 mcg total) by mouth daily 10/18/19 1/16/20  Wellington Izquierdo MD     I have reviewed home medications with patient personally  Allergies: Allergies   Allergen Reactions    Ciprofloxacin Other (See Comments)     Reaction Date: 2011;    Hives/Uticaria    Sulfamethoxazole-Trimethoprim Hives     Patient does not remember rx       Social History:     Marital Status: /Civil Union   Occupation: retired   Patient Pre-hospital Living Situation: lives at home   Patient Pre-hospital Level of Mobility: ambulates   Patient Pre-hospital Diet Restrictions: none   Substance Use History:   Social History     Substance and Sexual Activity   Alcohol Use Not Currently    Comment: caffeine use; social drinker-1 beer every 2/3 months     Social History     Tobacco Use   Smoking Status Former Smoker    Last attempt to quit:     Years since quittin 0   Smokeless Tobacco Never Used     Social History     Substance and Sexual Activity   Drug Use Never       Family History:    Family History   Problem Relation Age of Onset    Leukemia Mother     Colon cancer Paternal Grandmother 80    Heart disease Family     Heart attack Father     Coronary artery disease Father         stent- five    Hypertension Father     Colon cancer Father 80    No Known Problems Maternal Grandmother     No Known Problems Maternal Grandfather     No Known Problems Paternal Grandfather     No Known Problems Paternal Aunt     Stroke Neg Hx     Anuerysm Neg Hx     Clotting disorder Neg Hx     Arrhythmia Neg Hx     Heart failure Neg Hx     Hyperlipidemia Neg Hx        Physical Exam:     Vitals:   Blood Pressure: 109/59 (01/30/20 2339)  Pulse: 99 (01/30/20 2339)  Temperature: 98 7 °F (37 1 °C) (01/30/20 2339)  Temp Source: Oral (01/30/20 2339)  Respirations: 18 (01/30/20 2339)  Height: 5' 1 5" (156 2 cm) (01/30/20 2015)  SpO2: 96 % (01/30/20 2339)    Physical Exam   Constitutional: She is oriented to person, place, and time  She appears well-developed and well-nourished  No distress  HENT:   Head: Normocephalic and atraumatic  Mouth/Throat: Oropharynx is clear and moist and mucous membranes are normal  Mucous membranes are not dry and not cyanotic  Eyes: Pupils are equal, round, and reactive to light  Conjunctivae and EOM are normal  Right eye exhibits no discharge  Left eye exhibits no discharge  No scleral icterus  Neck: Normal range of motion  Neck supple  No JVD present  No tracheal deviation present  Cardiovascular: Normal rate, regular rhythm and normal heart sounds  Exam reveals no gallop and no friction rub  No murmur heard  Pulmonary/Chest: Effort normal and breath sounds normal  She has no wheezes  She has no rales  Abdominal: Soft  Bowel sounds are normal  She exhibits no distension and no mass  There is no tenderness  There is CVA tenderness  There is no guarding and negative Turpin's sign  Exquisite CVA tenderness R>L   Musculoskeletal: Normal range of motion  She exhibits tenderness  She exhibits no edema or deformity  Tenderness along paraspinal muscles    Neurological: She is alert and oriented to person, place, and time  No cranial nerve deficit or sensory deficit  Skin: Skin is warm and dry  No rash noted  She is not diaphoretic  No erythema  Psychiatric: She has a normal mood and affect  Nursing note and vitals reviewed  Additional Data:     Lab Results: I have personally reviewed pertinent reports  Results from last 7 days   Lab Units 01/30/20  2104   WBC Thousand/uL 10 19*   HEMOGLOBIN g/dL 11 2*   HEMATOCRIT % 36 2   PLATELETS Thousands/uL 502*   NEUTROS PCT % 82*   LYMPHS PCT % 8*   MONOS PCT % 5   EOS PCT % 3     Results from last 7 days   Lab Units 01/30/20  2104   SODIUM mmol/L 134*   POTASSIUM mmol/L 5 3   CHLORIDE mmol/L 101   CO2 mmol/L 24   BUN mg/dL 29*   CREATININE mg/dL 1 82*   ANION GAP mmol/L 9   CALCIUM mg/dL 9 6   ALBUMIN g/dL 2 8*   TOTAL BILIRUBIN mg/dL 0 21   ALK PHOS U/L 142*   ALT U/L 11*   AST U/L 16   GLUCOSE RANDOM mg/dL 98                       Imaging: I have personally reviewed pertinent reports  CT renal stone study abdomen pelvis without contrast   Final Result by Emmanuel Hamilton DO (01/30 2215)   No CT evidence of renal colic  Please see above discussion  Workstation performed: [id number]             EKG, Pathology, and Other Studies Reviewed on Admission:   · CT scan: shows no obvious signs of renal stones on my read  Allscripts / Epic Records Reviewed: Yes     ** Please Note: This note has been constructed using a voice recognition system   **

## 2020-01-31 NOTE — PLAN OF CARE
Problem: Potential for Falls  Goal: Patient will remain free of falls  Description  INTERVENTIONS:  - Assess patient frequently for physical needs  -  Identify cognitive and physical deficits and behaviors that affect risk of falls    -  [place] fall precautions as indicated by assessment   - Educate patient/family on patient safety including physical limitations  - Instruct patient to call for assistance with activity based on assessment  - Modify environment to reduce risk of injury  - Consider OT/PT consult to assist with strengthening/mobility  Outcome: Progressing     Problem: PAIN - ADULT  Goal: Verbalizes/displays adequate comfort level or baseline comfort level  Description  Interventions:  - Encourage patient to monitor pain and request assistance  - Assess pain using appropriate pain scale  - Administer analgesics based on type and severity of pain and evaluate response  - Implement non-pharmacological measures as appropriate and evaluate response  - Consider cultural and social influences on pain and pain management  - Notify physician/advanced practitioner if interventions unsuccessful or patient reports new pain  Outcome: Progressing     Problem: INFECTION - ADULT  Goal: Absence or prevention of progression during hospitalization  Description  INTERVENTIONS:  - Assess and monitor for signs and symptoms of infection  - Monitor lab/diagnostic results  - Monitor all insertion sites, i e  indwelling lines, tubes, and drains  - Monitor endotracheal if appropriate and nasal secretions for changes in amount and color  - [place] appropriate cooling/warming therapies per order  - Administer medications as ordered  - Instruct and encourage patient and family to use good hand hygiene technique  - Identify and instruct in appropriate isolation precautions for identified infection/condition  Outcome: Progressing  Goal: Absence of fever/infection during neutropenic period  Description  INTERVENTIONS:  - Monitor WBC    Outcome: Progressing     Problem: SAFETY ADULT  Goal: Maintain or return to baseline ADL function  Description  INTERVENTIONS:  -  Assess patient's ability to carry out ADLs; assess patient's baseline for ADL function and identify physical deficits which impact ability to perform ADLs (bathing, care of mouth/teeth, toileting, grooming, dressing, etc )  - Assess/evaluate cause of self-care deficits   - Assess range of motion  - Assess patient's mobility; develop plan if impaired  - Assess patient's need for assistive devices and provide as appropriate  - Encourage maximum independence but intervene and supervise when necessary  - Involve family in performance of ADLs  - Assess for home care needs following discharge   - Consider OT consult to assist with ADL evaluation and planning for discharge  - Provide patient education as appropriate  Outcome: Progressing  Goal: Maintain or return mobility status to optimal level  Description  INTERVENTIONS:  - Assess patient's baseline mobility status (ambulation, transfers, stairs, etc )    - Identify cognitive and physical deficits and behaviors that affect mobility  - Identify mobility aids required to assist with transfers and/or ambulation (gait belt, sit-to-stand, lift, walker, cane, etc )  - [place] fall precautions as indicated by assessment  - Record patient progress and toleration of activity level on Mobility SBAR; progress patient to next Phase/Stage  - Instruct patient to call for assistance with activity based on assessment  - Consider rehabilitation consult to assist with strengthening/weightbearing, etc   Outcome: Progressing     Problem: DISCHARGE PLANNING  Goal: Discharge to home or other facility with appropriate resources  Description  INTERVENTIONS:  - Identify barriers to discharge w/patient and caregiver  - Arrange for needed discharge resources and transportation as appropriate  - Identify discharge learning needs (meds, wound care, etc )  - Arrange for interpretive services to assist at discharge as needed  - Refer to Case Management Department for coordinating discharge planning if the patient needs post-hospital services based on physician/advanced practitioner order or complex needs related to functional status, cognitive ability, or social support system  Outcome: Progressing     Problem: Knowledge Deficit  Goal: Patient/family/caregiver demonstrates understanding of disease process, treatment plan, medications, and discharge instructions  Description  Complete learning assessment and assess knowledge base    Interventions:  - Provide teaching at level of understanding  - Provide teaching via preferred learning methods  Outcome: Progressing

## 2020-02-01 VITALS
TEMPERATURE: 98.8 F | RESPIRATION RATE: 16 BRPM | HEIGHT: 62 IN | BODY MASS INDEX: 26.21 KG/M2 | DIASTOLIC BLOOD PRESSURE: 60 MMHG | OXYGEN SATURATION: 94 % | HEART RATE: 98 BPM | SYSTOLIC BLOOD PRESSURE: 124 MMHG

## 2020-02-01 PROBLEM — N12 PYELONEPHRITIS: Status: RESOLVED | Noted: 2020-01-30 | Resolved: 2020-02-01

## 2020-02-01 LAB
ANION GAP SERPL CALCULATED.3IONS-SCNC: 11 MMOL/L (ref 4–13)
BASOPHILS # BLD AUTO: 0.09 THOUSANDS/ΜL (ref 0–0.1)
BASOPHILS NFR BLD AUTO: 1 % (ref 0–1)
BUN SERPL-MCNC: 26 MG/DL (ref 5–25)
CALCIUM SERPL-MCNC: 8.7 MG/DL (ref 8.3–10.1)
CHLORIDE SERPL-SCNC: 103 MMOL/L (ref 100–108)
CO2 SERPL-SCNC: 20 MMOL/L (ref 21–32)
CREAT SERPL-MCNC: 1.51 MG/DL (ref 0.6–1.3)
EOSINOPHIL # BLD AUTO: 0.14 THOUSAND/ΜL (ref 0–0.61)
EOSINOPHIL NFR BLD AUTO: 1 % (ref 0–6)
ERYTHROCYTE [DISTWIDTH] IN BLOOD BY AUTOMATED COUNT: 15.3 % (ref 11.6–15.1)
GFR SERPL CREATININE-BSD FRML MDRD: 34 ML/MIN/1.73SQ M
GLUCOSE SERPL-MCNC: 132 MG/DL (ref 65–140)
HCT VFR BLD AUTO: 33.9 % (ref 34.8–46.1)
HGB BLD-MCNC: 10.5 G/DL (ref 11.5–15.4)
IMM GRANULOCYTES # BLD AUTO: 0.05 THOUSAND/UL (ref 0–0.2)
IMM GRANULOCYTES NFR BLD AUTO: 1 % (ref 0–2)
LYMPHOCYTES # BLD AUTO: 0.39 THOUSANDS/ΜL (ref 0.6–4.47)
LYMPHOCYTES NFR BLD AUTO: 4 % (ref 14–44)
MCH RBC QN AUTO: 27.8 PG (ref 26.8–34.3)
MCHC RBC AUTO-ENTMCNC: 31 G/DL (ref 31.4–37.4)
MCV RBC AUTO: 90 FL (ref 82–98)
MONOCYTES # BLD AUTO: 0.53 THOUSAND/ΜL (ref 0.17–1.22)
MONOCYTES NFR BLD AUTO: 5 % (ref 4–12)
NEUTROPHILS # BLD AUTO: 8.77 THOUSANDS/ΜL (ref 1.85–7.62)
NEUTS SEG NFR BLD AUTO: 88 % (ref 43–75)
NRBC BLD AUTO-RTO: 0 /100 WBCS
PLATELET # BLD AUTO: 439 THOUSANDS/UL (ref 149–390)
PMV BLD AUTO: 9.4 FL (ref 8.9–12.7)
POTASSIUM SERPL-SCNC: 5 MMOL/L (ref 3.5–5.3)
RBC # BLD AUTO: 3.78 MILLION/UL (ref 3.81–5.12)
SODIUM SERPL-SCNC: 134 MMOL/L (ref 136–145)
WBC # BLD AUTO: 9.97 THOUSAND/UL (ref 4.31–10.16)

## 2020-02-01 PROCEDURE — 99239 HOSP IP/OBS DSCHRG MGMT >30: CPT | Performed by: INTERNAL MEDICINE

## 2020-02-01 PROCEDURE — 80048 BASIC METABOLIC PNL TOTAL CA: CPT | Performed by: INTERNAL MEDICINE

## 2020-02-01 PROCEDURE — 85025 COMPLETE CBC W/AUTO DIFF WBC: CPT | Performed by: INTERNAL MEDICINE

## 2020-02-01 RX ORDER — ONDANSETRON 4 MG/1
4 TABLET, ORALLY DISINTEGRATING ORAL EVERY 6 HOURS PRN
Qty: 30 TABLET | Refills: 0 | Status: SHIPPED | OUTPATIENT
Start: 2020-02-01 | End: 2020-01-01 | Stop reason: ALTCHOICE

## 2020-02-01 RX ORDER — OXYCODONE HYDROCHLORIDE 5 MG/1
2.5 TABLET ORAL EVERY 4 HOURS PRN
Qty: 30 TABLET | Refills: 0 | Status: SHIPPED | OUTPATIENT
Start: 2020-02-01 | End: 2020-01-01 | Stop reason: ALTCHOICE

## 2020-02-01 RX ORDER — OXYCODONE HYDROCHLORIDE 5 MG/1
2.5 TABLET ORAL EVERY 4 HOURS PRN
Qty: 30 TABLET | Refills: 0 | Status: SHIPPED | OUTPATIENT
Start: 2020-02-01 | End: 2020-02-01

## 2020-02-01 RX ADMIN — SODIUM CHLORIDE 75 ML/HR: 0.9 INJECTION, SOLUTION INTRAVENOUS at 03:40

## 2020-02-01 RX ADMIN — CEFTRIAXONE SODIUM 1000 MG: 10 INJECTION, POWDER, FOR SOLUTION INTRAVENOUS at 01:31

## 2020-02-01 RX ADMIN — LIDOCAINE 1 PATCH: 50 PATCH TOPICAL at 09:15

## 2020-02-01 RX ADMIN — NEBIVOLOL HYDROCHLORIDE 2.5 MG: 5 TABLET ORAL at 09:14

## 2020-02-01 RX ADMIN — LEVOTHYROXINE SODIUM 75 MCG: 75 TABLET ORAL at 05:29

## 2020-02-01 NOTE — DISCHARGE SUMMARY
Discharge Summary - [facility] Internal Medicine    Patient Information: Dany Diaz 65 y o  female MRN: [id number]  Unit/Bed#: S -01 Encounter: [id number]    Discharging Physician / Practitioner: Rogerio St MD  PCP: Flor Tse MD  Admission Date: 1/30/2020  Discharge Date: 02/01/20    Reason for Admission:  Acute kidney injury    Discharge Diagnoses:     Principal Problem:    Pyelonephritis  Active Problems:    Hypertension    Hyperlipidemia    History of Hodgkin's disease    Hyponatremia    Paroxysmal supraventricular tachycardia ([facility] )    ENEDELIA (acute kidney injury) ([facility] )    History of DVT of lower extremity      Consultations During Hospital Stay:  · Urology    Procedures Performed:   · None    Significant findings:  · CT A/P  RIGHT KIDNEY AND URETER:  Previous multilobular cystic mass in the upper pole of right kidney with scattered wall calcifications currently measures up to 11 3 cm, previously measuring up to 14 cm  There are again large parenchymal and collecting system calculi in the interpolar   and lower pole collecting system and cortices  There is again a large staghorn calculus in the renal pelvis measuring up to 2 3 cm  The interpolar and lower pole collecting system does not appear dilated on today's exam   Right ureter is partially   obscured by metallic artifact from retroperitoneal surgical clips overlies appears average caliber without evidence of renal colic  The previous lower pole heterogeneous lesion posteriorly is not well seen on today's exam without IV contrast, appearing grossly similar in size at 2 8 cm      LEFT KIDNEY AND URETER:  Similar cortical scarring left kidney  Stable nonobstructing collecting system calculi  No renal colic      URINARY BLADDER:   Urinary bladder wall thickening which may represent cystitis, this is similar to previous exam   No bladder stones      Atelectatic changes both lung bases    Left breast prosthesis    Limited low radiation dose noncontrast CT evaluation demonstrates no clinically significant abnormality of liver, or pancreas    Spleen has been removed    Neither adrenal is definitively identified    Cholelithiasis without CT evidence of cholecystitis  No ascites or bulky lymphadenopathy on this limited noncontrast study  Numerous surgical clips in the pelvis and retroperitoneum  Small sliding hiatal hernia  Stomach unremarkable  Formed stool the colon suggesting constipation  No bowel obstruction  Appendix was removed  Partial compression deformity of T11, new from prior exam   No surrounding inflammatory changes to suggest this is acute  Hospital Course:   Nithya Lennon is a 65 y o  female patient who originally presented to the hospital on 1/30/2020 due to hematuria  The patient was recently started antibiotics with Bactrim for UTI few days prior to presentation  On initial admission, she also was noted with ENEDELIA  Bactrim was discontinued and she was kept on IV fluids  Due to hematuria, Urology was consulted  No acute interventions were recommended and she is to follow outpatient with her urologist Dr Jodie Harris for her scheduled renal scan  Renal function improved with IV hydration  Creatinine came down to 1 5 from 1 8  She is instructed to maintain adequate hydration and repeat renal function studies next week  No further inpatient needs were identified and the patient was cleared for discharge home on 8/21/20  Patient was initially kept on IV ceftriaxone for presumed UTI  Urine cultures however came back for mixed contaminant  Antibiotics were discontinued  Condition at Discharge: stable     Discharge Day Visit / Exam:     Subjective:  Feels better today    Pain is improved    Vitals: Blood Pressure: 124/60 (02/01/20 1423)  Pulse: 98 (02/01/20 1423)  Temperature: 98 8 °F (37 1 °C) (02/01/20 1423)  Temp Source: Oral (02/01/20 1423)  Respirations: 16 (02/01/20 1423)  Height: 5' 1 5" (156 2 cm) (01/30/20 2015)  SpO2: 94 % (02/01/20 1423)    General Appearance:    Alert, cooperative, no distress, appropriately responsive    Head:    Normocephalic, without obvious abnormality, atraumatic, mucous membranes moist    Eyes:    Conjunctiva/corneas clear, EOM's intact   Neck:   Supple   Lungs:     Clear to auscultation bilaterally, respirations unlabored, no crackles or wheeze     Heart:    Regular rate and rhythm, S1 and S2    Abdomen:     Soft, non-tender, no CVA tenderness   Extremities:   Extremities normal, atraumatic, no cyanosis or edema   Neurologic:  nonfocal      Discharge instructions/Information to patient and family:   See after visit summary for information provided to patient and family  Provisions for Follow-Up Care:  See after visit summary for information related to follow-up care and any pertinent home health orders  Disposition: Home    Discharge Statement:  I spent >30 minutes discharging the patient  This time was spent on the day of discharge  I had direct contact with the patient on the day of discharge  Greater than 50% of the total time was spent examining patient, answering all patient questions, arranging and discussing plan of care with patient as well as directly providing post-discharge instructions  Additional time then spent on discharge activities  Discharge Medications:  See after visit summary for reconciled discharge medications provided to patient and family  ** Please Note: Dragon 360 Dictation voice to text software may have been used in the creation of this document   **

## 2020-02-01 NOTE — DISCHARGE INSTR - AVS FIRST PAGE
Dear Gabriella Levin,     It was our pleasure to care for you here at [facility], [employer]  It is our hope that we were always able to exceed the expected standards for your care during your stay  You were hospitalized due to acute kidney injury back pain  You were cared for on the [facility] 4th floor under the service of Britney Covington MD with the [facility] who covers for your primary care physician (PCP), Tatum Tomas MD, while you were hospitalized  If you have any questions or concerns related to this hospitalization, you may contact us at [phone]  For follow up as well as medication refills, we recommend that you follow up with your primary care physician  A registered nurse will reach out to you by phone within a few days after your discharge to answer any additional questions that you may have after going home  However, at this time we provide for you here, the most important instructions / recommendations at discharge:       · Notable Medication Adjustments -   · None    · Testing Required after Discharge -   · Follow-up with your urologist for renal scan as scheduled    · Important follow up information -   · Follow-up with your urologist    · Other Instructions -   · Stay well hydrated with at least 6-8 cups of water daily  · Follow-up with your primary care physician or urologist for a repeat renal function study in 1 week    · Please review this entire after visit summary as additional general instructions including medication list, appointments, activity, diet, any pertinent wound care, and other additional recommendations from your care team that may be provided for you        Sincerely,     Britney Covington MD

## 2020-02-01 NOTE — PLAN OF CARE
Problem: Potential for Falls  Goal: Patient will remain free of falls  Description  INTERVENTIONS:  - Assess patient frequently for physical needs  -  Identify cognitive and physical deficits and behaviors that affect risk of falls    -  [place] fall precautions as indicated by assessment   - Educate patient/family on patient safety including physical limitations  - Instruct patient to call for assistance with activity based on assessment  - Modify environment to reduce risk of injury  - Consider OT/PT consult to assist with strengthening/mobility  Outcome: Progressing     Problem: PAIN - ADULT  Goal: Verbalizes/displays adequate comfort level or baseline comfort level  Description  Interventions:  - Encourage patient to monitor pain and request assistance  - Assess pain using appropriate pain scale  - Administer analgesics based on type and severity of pain and evaluate response  - Implement non-pharmacological measures as appropriate and evaluate response  - Consider cultural and social influences on pain and pain management  - Notify physician/advanced practitioner if interventions unsuccessful or patient reports new pain  Outcome: Progressing     Problem: INFECTION - ADULT  Goal: Absence or prevention of progression during hospitalization  Description  INTERVENTIONS:  - Assess and monitor for signs and symptoms of infection  - Monitor lab/diagnostic results  - Monitor all insertion sites, i e  indwelling lines, tubes, and drains  - Monitor endotracheal if appropriate and nasal secretions for changes in amount and color  - [place] appropriate cooling/warming therapies per order  - Administer medications as ordered  - Instruct and encourage patient and family to use good hand hygiene technique  - Identify and instruct in appropriate isolation precautions for identified infection/condition  Outcome: Progressing  Goal: Absence of fever/infection during neutropenic period  Description  INTERVENTIONS:  - Monitor WBC    Outcome: Progressing     Problem: SAFETY ADULT  Goal: Maintain or return to baseline ADL function  Description  INTERVENTIONS:  -  Assess patient's ability to carry out ADLs; assess patient's baseline for ADL function and identify physical deficits which impact ability to perform ADLs (bathing, care of mouth/teeth, toileting, grooming, dressing, etc )  - Assess/evaluate cause of self-care deficits   - Assess range of motion  - Assess patient's mobility; develop plan if impaired  - Assess patient's need for assistive devices and provide as appropriate  - Encourage maximum independence but intervene and supervise when necessary  - Involve family in performance of ADLs  - Assess for home care needs following discharge   - Consider OT consult to assist with ADL evaluation and planning for discharge  - Provide patient education as appropriate  Outcome: Progressing  Goal: Maintain or return mobility status to optimal level  Description  INTERVENTIONS:  - Assess patient's baseline mobility status (ambulation, transfers, stairs, etc )    - Identify cognitive and physical deficits and behaviors that affect mobility  - Identify mobility aids required to assist with transfers and/or ambulation (gait belt, sit-to-stand, lift, walker, cane, etc )  - [place] fall precautions as indicated by assessment  - Record patient progress and toleration of activity level on Mobility SBAR; progress patient to next Phase/Stage  - Instruct patient to call for assistance with activity based on assessment  - Consider rehabilitation consult to assist with strengthening/weightbearing, etc   Outcome: Progressing     Problem: DISCHARGE PLANNING  Goal: Discharge to home or other facility with appropriate resources  Description  INTERVENTIONS:  - Identify barriers to discharge w/patient and caregiver  - Arrange for needed discharge resources and transportation as appropriate  - Identify discharge learning needs (meds, wound care, etc )  - Arrange for interpretive services to assist at discharge as needed  - Refer to Case Management Department for coordinating discharge planning if the patient needs post-hospital services based on physician/advanced practitioner order or complex needs related to functional status, cognitive ability, or social support system  Outcome: Progressing     Problem: Knowledge Deficit  Goal: Patient/family/caregiver demonstrates understanding of disease process, treatment plan, medications, and discharge instructions  Description  Complete learning assessment and assess knowledge base    Interventions:  - Provide teaching at level of understanding  - Provide teaching via preferred learning methods  Outcome: Progressing No

## 2020-02-03 ENCOUNTER — TRANSITIONAL CARE MANAGEMENT (OUTPATIENT)
Dept: FAMILY MEDICINE CLINIC | Facility: CLINIC | Age: 70
End: 2020-02-03

## 2020-02-05 LAB
BACTERIA BLD CULT: NORMAL
BACTERIA BLD CULT: NORMAL

## 2020-02-06 ENCOUNTER — APPOINTMENT (OUTPATIENT)
Dept: LAB | Facility: CLINIC | Age: 70
End: 2020-02-06
Payer: MEDICARE

## 2020-02-06 DIAGNOSIS — I27.20 PULMONARY HYPERTENSION (HCC): ICD-10-CM

## 2020-02-06 DIAGNOSIS — Z85.71 HISTORY OF HODGKIN'S DISEASE: ICD-10-CM

## 2020-02-06 DIAGNOSIS — N17.9 AKI (ACUTE KIDNEY INJURY) (HCC): ICD-10-CM

## 2020-02-06 DIAGNOSIS — N20.0 BILATERAL KIDNEY STONES: ICD-10-CM

## 2020-02-06 LAB
ANION GAP SERPL CALCULATED.3IONS-SCNC: 9 MMOL/L (ref 4–13)
BUN SERPL-MCNC: 20 MG/DL (ref 5–25)
CALCIUM SERPL-MCNC: 9.9 MG/DL (ref 8.3–10.1)
CHLORIDE SERPL-SCNC: 103 MMOL/L (ref 100–108)
CO2 SERPL-SCNC: 26 MMOL/L (ref 21–32)
CREAT SERPL-MCNC: 1.45 MG/DL (ref 0.6–1.3)
GFR SERPL CREATININE-BSD FRML MDRD: 36 ML/MIN/1.73SQ M
GLUCOSE P FAST SERPL-MCNC: 88 MG/DL (ref 65–99)
POTASSIUM SERPL-SCNC: 5 MMOL/L (ref 3.5–5.3)
SODIUM SERPL-SCNC: 138 MMOL/L (ref 136–145)

## 2020-02-06 PROCEDURE — 36415 COLL VENOUS BLD VENIPUNCTURE: CPT

## 2020-02-06 PROCEDURE — 80048 BASIC METABOLIC PNL TOTAL CA: CPT

## 2020-02-11 ENCOUNTER — TELEPHONE (OUTPATIENT)
Dept: HEMATOLOGY ONCOLOGY | Facility: CLINIC | Age: 70
End: 2020-02-11

## 2020-02-11 NOTE — TELEPHONE ENCOUNTER
I called the patient and left a message to inform her that there is a change in the schedule for Dr Dion Lemos and Curt Pablo for the [facility] starting at the end of February Alex and Dr Dion Lemos will be seeing patients on Fridays instead of Tuesdays  Patient was rescheduled for  03/27/20 @ 9 am  Patient verbalized understanding and agreement

## 2020-02-12 ENCOUNTER — OFFICE VISIT (OUTPATIENT)
Dept: FAMILY MEDICINE CLINIC | Facility: CLINIC | Age: 70
End: 2020-02-12
Payer: MEDICARE

## 2020-02-12 VITALS
TEMPERATURE: 96.2 F | HEIGHT: 62 IN | SYSTOLIC BLOOD PRESSURE: 122 MMHG | DIASTOLIC BLOOD PRESSURE: 66 MMHG | WEIGHT: 138 LBS | HEART RATE: 88 BPM | BODY MASS INDEX: 25.4 KG/M2 | RESPIRATION RATE: 16 BRPM

## 2020-02-12 DIAGNOSIS — E78.00 HYPERCHOLESTEREMIA: ICD-10-CM

## 2020-02-12 DIAGNOSIS — N18.30 CKD (CHRONIC KIDNEY DISEASE), STAGE III (HCC): ICD-10-CM

## 2020-02-12 DIAGNOSIS — N18.9 ACUTE KIDNEY INJURY SUPERIMPOSED ON CHRONIC KIDNEY DISEASE (HCC): ICD-10-CM

## 2020-02-12 DIAGNOSIS — I36.1 NON-RHEUMATIC TRICUSPID VALVE INSUFFICIENCY: ICD-10-CM

## 2020-02-12 DIAGNOSIS — I35.1 NONRHEUMATIC AORTIC VALVE INSUFFICIENCY: ICD-10-CM

## 2020-02-12 DIAGNOSIS — N17.9 ACUTE KIDNEY INJURY SUPERIMPOSED ON CHRONIC KIDNEY DISEASE (HCC): ICD-10-CM

## 2020-02-12 DIAGNOSIS — I34.0 NONRHEUMATIC MITRAL VALVE REGURGITATION: ICD-10-CM

## 2020-02-12 DIAGNOSIS — I10 ESSENTIAL HYPERTENSION: ICD-10-CM

## 2020-02-12 DIAGNOSIS — S22.080A COMPRESSION FRACTURE OF T11 VERTEBRA, INITIAL ENCOUNTER (HCC): ICD-10-CM

## 2020-02-12 DIAGNOSIS — Z86.718 HISTORY OF DVT OF LOWER EXTREMITY: ICD-10-CM

## 2020-02-12 DIAGNOSIS — Z86.711 HISTORY OF PULMONARY EMBOLUS (PE): ICD-10-CM

## 2020-02-12 DIAGNOSIS — E03.9 ACQUIRED HYPOTHYROIDISM: ICD-10-CM

## 2020-02-12 DIAGNOSIS — D64.9 NORMOCYTIC ANEMIA: ICD-10-CM

## 2020-02-12 DIAGNOSIS — I27.20 PULMONARY HYPERTENSION (HCC): ICD-10-CM

## 2020-02-12 DIAGNOSIS — D75.839 THROMBOCYTOSIS: ICD-10-CM

## 2020-02-12 DIAGNOSIS — N12 PYELONEPHRITIS: Primary | ICD-10-CM

## 2020-02-12 DIAGNOSIS — N20.0 BILATERAL KIDNEY STONES: ICD-10-CM

## 2020-02-12 PROBLEM — R74.8 ELEVATED LIVER ENZYMES: Status: RESOLVED | Noted: 2018-12-27 | Resolved: 2020-02-12

## 2020-02-12 PROBLEM — I42.9 CARDIOMYOPATHY (HCC): Status: ACTIVE | Noted: 2020-02-12

## 2020-02-12 PROBLEM — I26.99 PULMONARY EMBOLUS (HCC): Status: ACTIVE | Noted: 2020-02-12

## 2020-02-12 PROBLEM — I26.99 PULMONARY EMBOLUS (HCC): Status: RESOLVED | Noted: 2020-02-12 | Resolved: 2020-02-12

## 2020-02-12 PROBLEM — E46 PROTEIN-CALORIE MALNUTRITION (HCC): Status: ACTIVE | Noted: 2020-02-12

## 2020-02-12 PROBLEM — N30.90 CYSTITIS: Status: RESOLVED | Noted: 2018-12-28 | Resolved: 2020-02-12

## 2020-02-12 PROCEDURE — 99495 TRANSJ CARE MGMT MOD F2F 14D: CPT | Performed by: FAMILY MEDICINE

## 2020-02-12 RX ORDER — ASPIRIN 81 MG/1
81 TABLET ORAL DAILY
COMMUNITY
End: 2021-04-28 | Stop reason: HOSPADM

## 2020-02-12 NOTE — PROGRESS NOTES
TCM Call (since 1/12/2020)     Date and time call was made  2/3/2020 12:08 PM    Hospital care reviewed  Records reviewed    Patient was hospitialized at  [address] [place] [address]    Date of Admission  01/30/20    Date of discharge  02/01/20    Diagnosis  Pyelonephritis    Disposition  Home    Were the patients medications reviewed and updated  No    Current Symptoms  Back pain - right side; Back pain - left side    Back pain, left side, severity  Moderate    Back pain, left side, onset  Ongoing    Back pain, right side, severity  Moderate    Back pain, right side, onset  Ongoing      TCM Call (since 1/12/2020)     Post hospital issues  Reduced activity    Should patient be enrolled in anticoag monitoring? No    Scheduled for follow up? Yes    Did you obtain your prescribed medications  Yes    Do you need help managing your prescriptions or medications  No    Is transportation to your appointment needed  Yes    Specify why  Patient not currently driving  I have advised the patient to call PCP with any new or worsening symptoms  Missy Avelar, Medical Assistant     Living Arrangements  Spouse or Significiant other    Support System  Spouse;  Family    The type of support provided  Emotional; Physical    Do you have social support  Yes, as much as I need    Are you recieving any outpatient services  No    Are you recieving home care services  No    Are you using any community resources  No    Current waiver services  No    Have you fallen in the last 12 months  No    Interperter language line needed  No    Counseling  Patient          Assessment/Plan:     Diagnoses and all orders for this visit:    Pyelonephritis    Acute kidney injury superimposed on chronic kidney disease ([facility] )    CKD (chronic kidney disease), stage III ([facility] )  -     Comprehensive metabolic panel    Bilateral kidney stones    Compression fracture of T11 vertebra, initial encounter ([facility] )    Essential hypertension    Hypercholesteremia  -     Lipid panel    Acquired hypothyroidism  -     TSH, 3rd generation with Free T4 reflex    Nonrheumatic mitral valve regurgitation    Non-rheumatic tricuspid valve insufficiency    Nonrheumatic aortic valve insufficiency    Normocytic anemia    Thrombocytosis (HCC)    Pulmonary hypertension (HCC)    History of pulmonary embolus (PE)    History of DVT of lower extremity    Other orders  -     aspirin (ECOTRIN LOW STRENGTH) 81 mg EC tablet; Take 81 mg by mouth daily          Continue with current medications  I recommended additional imaging such as MRI or CT scan for lower back pain given new finding at T 11  She declined for now  She has a follow up visit with urology and hematology  Repeat labs with next CBC  OV 04/2020 advised to call if any changes  Falls Plan of Care: Balance, strength, and gait training instructions were provided  Patient ID: Armin Harris is a 65 y o  female  Follow-up visit  TCM s/p admission for pyelonephritis 01/30/2020 to 02/01/2020  Patient presented with gross hematuria and back pain  No fevers  She was on Bactrim DS at the time of admission  Known history of nephrolithiasis/staghorn calculi  CT renal stone study previous multilobular cystic mass in the right kidney upper pole with scattered wall calcifications currently measuring up to 11 3 cm- previously measuring 14 cm  Large parenchymal and collecting system calculi in the interpolar and lower pole collecting system and cortices  Large staghorn calculus in the renal pelvis measuring up to 2 3 cm  Interpolar and lower pole collecting systems does not appear dilated on today's exam   Right ureter partially obscured by metallic artifact from retroperitoneal surgical clips  Left kidney cortical scarring left kidney stable  nonobstructing collecting system calculi  Urinary bladder wall thickening atelectasis both lung bases  Cholelithiasis without evidence of cholecystitis  No ascites    No lymphadenopathy small hiatal hernia  Partial compression deformity T11 new from prior exam 01/2019  No surrounding inflammatory changes to suggest this is acute  Admission lab CBC WBC 10,190  Hemoglobin 11 2  MCV 89  Platelet count 536,345  Chemistry profile random blood glucose 98  Electrolytes normal except for sodium 134  Creatinine 1 82 decreased to 1 51 with IV fluids  LFTs normal except for alkaline phosphatase 142  Elevated total protein 9 3  Albumin 2 8 ( SPEP 09/2019 no monoclonal bands)  Blood culture x 2 negative  Urine culture > 100,000 mixed contaminants  Patient completed antibiotics  She is using prn Oxycodone for lower back pain and wearing a back brace  No further gross hematuria  No dysuria  Repeat out patient labs 02/06/2020 creatinine 1 45  Hgb 10 5  Hypertension blood pressures have been stable on Bystolic 5 mg 1/2 tablet daily  09/2019 creatinine 1 19  GFR 46  Electrolytes normal except for Na+ 135  Hgb 9 5  Hyperlipidemia on Simvastatin 40 mg daily  Lipid profile 07/2019 Cholesterol 112  Triglycerides 72  HDL 37  LDL 61  The following portions of the patient's history were reviewed and updated as appropriate: allergies, current medications, past family history, past medical history, past social history, past surgical history and problem list     Review of Systems   Constitutional: Negative for appetite change, chills, fatigue, fever and unexpected weight change  HENT: Negative for congestion, ear pain, rhinorrhea, sore throat, trouble swallowing and voice change  Eyes: Negative for visual disturbance  Respiratory: Negative for cough, shortness of breath and wheezing  See HPI  Admission 01/2019 for PE/DVT  CXR no active disease  EKG sinus tachycardia with non specific T wave changes inferolaterally  Troponin 0 06  Elevated D dimer at 10,000  Acute bibasilar pulmonary emboli    Mild diffuse patchy ground-glass alveolar opacity, trace pleural effusions and smooth septal thickening suggestive of pulmonary vascular congestion/fluid overload  Trace bibasilar and lingular subsegmental atelectasis  No cardiomegaly  No pericardial effusion  Aortic and coronary artery calcification  Calcified prevascular lymph nodes  Shotty bilateral hilar, left paratracheal and subcarinal lymph nodes  Small sliding hiatal hernia  Venous Dopplers normal except for left leg acute nonocclusive deep vein thrombosis in the popliteal and 1 of the paired gastrocnemius veins  Admission labs CBC WBC 9,450  Hemoglobin 10 3  MCV 85  Platelet count 391,490 Chemistry profile  blood glucose 106  Electrolytes normal except for sodium 134 and chloride 98  creatinine 1 46  LFTs normal except for AST 50 and alkaline phosphatase 218  Total protein elevated 9 6  Albumin 2 3  NT pro BNP 3,376  Repeat CBC WBC 7,740  Hgb 8 8  MCV 86  Platelets 881,274  No longer on Eliquis  On ASA 81 mg daily  Cardiovascular: Negative for chest pain, palpitations and leg swelling         [facility] [place] [facility]  Patient was sitting down  She had an episode of vomiting  Subsequent syncopal episode with transient LOC  Admission labs random blood glucose 101  Creatinine 1 5  Electrolytes normal   CBC WBC 11,500  Hemoglobin 9 7  MCV 88  Platelet count 538,000  Troponin negative  C-reactive protein 7 0  NT proBNP 4916  D-dimer elevated at 1,001  EKG sinus tachycardia  Probable left atrial abnormality  Repolarization abnormality suggest ischemia, diffuse leads  Chest x-ray no consolidation or edema  Small right pleural effusion  No pneumothorax  Multiple calcified lymph nodes projecting over the mediastinum  CT scan chest with contrast no evidence of pulmonary embolus  Staghorn calculi throughout the right kidney resulting in marked obstruction of the upper pole calices  Additional nonobstructing 1 5 cm left upper pole calculus    Incompletely characterized 2 9 cm solid renal right mass  Bilateral pleural effusions with associated atelectasis  Mosaic attenuation of lung parenchyma suggesting air trapping  Cholelithiasis  Surgical changes of splenectomy with left lateral abdominal wall herniation containing nonobstructed small large bowel  Echocardiogram normal left ventricular chamber size  Mildly decreased left ventricular systolic function with segmental wall abnormalities  Basal to mid inferolateral akinesis with thinning  Basal inferior thinning and akinesis  Low normal contraction of the remaining wall  Ejection fraction 45%  Left atrium mildly dilated  Mild mitral regurgitation  Mild aortic regurgitation  Mild to moderate tricuspid regurgitation  Severe pulmonary hypertension present  Lower extremity edema resolved no longer on Furosemide  01/2019 echocardiogram normal left ventricular systolic function  EF 55%  Right ventricle normal   Mild MR  Moderate to severe AR  Moderate TR with finding suggesting severe pulmonary hypertension  No pericardial effusion  Normal aortic root  Repeat echocardiogram 04/2019 normal left ventricular systolic function  EF 55%  No regional wall motion abnormalities  Left atrium mildly dilated  No atrial septal defect or PFO  Mildly dilated right atrium  Mild to moderate MR  Moderate AR  Mild to moderate tricuspid regurgitation  No pericardial effusion  Aortic root normal size  Gastrointestinal: Positive for constipation  Negative for abdominal pain, blood in stool, diarrhea, nausea and vomiting  She is using prn stool softener while on Oxcodone  no prior colonoscopy  CT scan abdomen 12/2016 + cholelithiasis asymptomatic  Hernia left flank containing fat and nonobstructive bowel  Small subscapular area of enhancement right hepatic lobe  Stable retroperitoneal nodule  hiatal hernia  Stable nephrolithiasis and renal scarring/complex renal cystic mass on the right     Endocrine:        Hypothyroidism on Levothyroxine 75 mcg daily  09/2019 TSH 0 751  Osteoporosis on vitamin D 1,000 IU daily  Genitourinary: Negative for difficulty urinating, dysuria and hematuria  See HPI  12/2018 abdominal ultrasound markedly enlarged incompletely evaluated right kidney with complex masses  Staghorn calculus  Cholelithiasis  01/2019 CT scan renal protocol no solid enhancing renal or urothelial mass  Multifocal right renal staghorn calculi including a 2 cm pelvic calculus  Interval progressive cystic hydronephrosis of the upper pole  Stable cystic caliectasis  posterior lower pole  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  Left nephrolithiasis 1 cm and less no obstructive uropathy  No acute intra-abdominal or intrapelvic process  Cholelithiasis  history of nephrolithiasis including staghorn calculi followed by urology  03/2018 renal ultrasound large right kidney with upper pole complex cystic mass and lower pole complex solid mass  Large right-sided staghorn calculus  No obvious hydronephrosis  Nonobstructing left renal collecting system calculus  Lower pole cortical scarring  08/2017 nuclear renal flow scan abnormal delayed perfusion and diminished functioning within the right kidney with a split function measuring only 17% on the right  83% on the left  Musculoskeletal: Positive for back pain  Negative for arthralgias and myalgias  See HPI  CT renal study 01/2020 showed partial compression T11 new from 01/2020  No findings to suggest a new fracture  Skin: Negative for rash  Non pruritic rash on lower extremities at that time of admission 01/2019  Diagnosis leukocytoclastic vasculitis  history of melanoma right flank  biopsy left arm 11/2016 SCC in situ  06/2018 biopsy lesion left side of nose BCC  Allergic/Immunologic: Positive for environmental allergies  On Loratadine  Neurological: Negative for dizziness and headaches  Hematological: Negative for adenopathy  Does not bruise/bleed easily  Anemia and thrombocytopenia followed by Hematology  02/2020 CBC WBC 9970  Hemoglobin 10 5  MCV 90  Platelet count 611,526   11/2019 bone marrow biopsy hypercellular bone marrow with trilineage hyperplasia, mild atypia, mixed lymphocytosis and polytypic plasmacytosis favor reactive cannot exclude low-grade MDS  Negative for morphologic or immunophenotypic evidence of involvement by Hodgkin's lymphoma, metastatic carcinoma or melanoma  Adequate stainable storage iron  Mildly increased reticulin fibers without overt fibrosis  Negative for collagen fibrosis, granulomata, vasculitis or necrosis 10/2019 hemoccult negative  09/2019 CBC WBC 9,990  Hgb 9 5  MCV 91  Platelets 143,893  Reticulocyte count 1 45  Folate 7 1 decreased from 9 7  Vitamin 435 decreased from 656    SPEP no monoclonal bands  TESS mutation negative   Normocytic anemia dating back to 12/2018  Hgb 10 2   02/2019 anemia studies vitamin B12 656  Folic acid 9 7  Iron low at 20  TIBC 151  % saturation 13  Ferritin 260  Reticulocyte count 1 45  SPEP/immunofixation no monoclonal gammopathy  No rectal bleeding or melena  No prior colonoscopy  Patient refused GI work up  Intolerance to oral iron  She received 3 infusions of IV iron 06/2019 08/2018 CBC WBC 8,510  Hgb 12 8  MCV 89  Platelets 081,099  08/1874 CBC WBC 12,950  Hgb 10 2  MCV 85  Platelets 276,249  remote history of Hodgkin's disease  breast CA s/p left mastectomy with chemotherapy  she completed a course of Arimidex in 2011  followed by oncology  Psychiatric/Behavioral: Negative for dysphoric mood and sleep disturbance  Objective:      /66   Pulse 88   Temp (!) 96 2 °F (35 7 °C)   Resp 16   Ht 5' 1 5" (1 562 m)   Wt 62 6 kg (138 lb)   LMP  (LMP Unknown)   BMI 25 65 kg/m²          Physical Exam   Constitutional: She is oriented to person, place, and time   She appears well-developed and well-nourished  No distress  HENT:   Mouth/Throat: Oropharynx is clear and moist and mucous membranes are normal  No oral lesions  Normal dentition  Eyes: Pupils are equal, round, and reactive to light  Conjunctivae and EOM are normal  No scleral icterus  Neck: No JVD present  Carotid bruit is not present  No tracheal deviation present  No thyroid mass and no thyromegaly present  Cardiovascular: Normal rate, regular rhythm and normal heart sounds  Exam reveals no gallop  No murmur heard  Pulmonary/Chest: Effort normal and breath sounds normal  No respiratory distress  She has no wheezes  She has no rales  Abdominal: Soft  Bowel sounds are normal  She exhibits no distension, no abdominal bruit and no mass  There is no hepatosplenomegaly  There is no tenderness  There is no rebound and no guarding  Musculoskeletal: Normal range of motion  She exhibits no edema, tenderness or deformity  Tenderness palpating along the thoracic or lumbar spine  No paraspinal muscle tenderness   Lymphadenopathy:     She has no cervical adenopathy  Right: No supraclavicular adenopathy present  Left: No supraclavicular adenopathy present  Neurological: She is alert and oriented to person, place, and time  She has normal strength  She displays normal reflexes  No cranial nerve deficit  Skin: No rash noted  No cyanosis  Nails show no clubbing  Psychiatric: She has a normal mood and affect  Nursing note and vitals reviewed          Lab Results   Component Value Date    WBC 9 97 02/01/2020    HGB 10 5 (L) 02/01/2020    HCT 33 9 (L) 02/01/2020    MCV 90 02/01/2020     (H) 02/01/2020       Lab Results   Component Value Date    SODIUM 138 02/06/2020    K 5 0 02/06/2020     02/06/2020    CO2 26 02/06/2020    AGAP 9 02/06/2020    BUN 20 02/06/2020    CREATININE 1 45 (H) 02/06/2020    GLUC 132 02/01/2020    GLUF 88 02/06/2020    CALCIUM 9 9 02/06/2020    AST 16 01/30/2020    ALT 11 (L) 01/30/2020    ALKPHOS 142 (H) 01/30/2020    TP 9 3 (H) 01/30/2020    TBILI 0 21 01/30/2020    EGFR 36 02/06/2020       Lab Results   Component Value Date    [id number] 0 751 09/24/2019         Creatinine (mg/dL)   Date Value   02/06/2020 1 45 (H)   02/01/2020 1 51 (H)   01/31/2020 1 89 (H)     Hemoglobin (g/dL)   Date Value   02/01/2020 10 5 (L)   01/31/2020 9 9 (L)   01/30/2020 11 2 (L)

## 2020-02-19 ENCOUNTER — TELEPHONE (OUTPATIENT)
Dept: FAMILY MEDICINE CLINIC | Facility: CLINIC | Age: 70
End: 2020-02-19

## 2020-02-24 ENCOUNTER — OFFICE VISIT (OUTPATIENT)
Dept: FAMILY MEDICINE CLINIC | Facility: CLINIC | Age: 70
End: 2020-02-24
Payer: MEDICARE

## 2020-02-24 VITALS
BODY MASS INDEX: 25.4 KG/M2 | WEIGHT: 138 LBS | TEMPERATURE: 98.3 F | HEIGHT: 62 IN | DIASTOLIC BLOOD PRESSURE: 72 MMHG | SYSTOLIC BLOOD PRESSURE: 124 MMHG | HEART RATE: 70 BPM

## 2020-02-24 DIAGNOSIS — N30.01 ACUTE CYSTITIS WITH HEMATURIA: Primary | ICD-10-CM

## 2020-02-24 LAB
SL AMB  POCT GLUCOSE, UA: ABNORMAL
SL AMB LEUKOCYTE ESTERASE,UA: ABNORMAL
SL AMB POCT BILIRUBIN,UA: ABNORMAL
SL AMB POCT BLOOD,UA: ABNORMAL
SL AMB POCT CLARITY,UA: ABNORMAL
SL AMB POCT COLOR,UA: ABNORMAL
SL AMB POCT KETONES,UA: ABNORMAL
SL AMB POCT NITRITE,UA: ABNORMAL
SL AMB POCT PH,UA: 5
SL AMB POCT SPECIFIC GRAVITY,UA: 1.01
SL AMB POCT URINE PROTEIN: 100
SL AMB POCT UROBILINOGEN: 0.2

## 2020-02-24 PROCEDURE — 1111F DSCHRG MED/CURRENT MED MERGE: CPT | Performed by: PHYSICIAN ASSISTANT

## 2020-02-24 PROCEDURE — 81002 URINALYSIS NONAUTO W/O SCOPE: CPT | Performed by: PHYSICIAN ASSISTANT

## 2020-02-24 PROCEDURE — 4040F PNEUMOC VAC/ADMIN/RCVD: CPT | Performed by: PHYSICIAN ASSISTANT

## 2020-02-24 PROCEDURE — 99214 OFFICE O/P EST MOD 30 MIN: CPT | Performed by: PHYSICIAN ASSISTANT

## 2020-02-24 PROCEDURE — 3078F DIAST BP <80 MM HG: CPT | Performed by: PHYSICIAN ASSISTANT

## 2020-02-24 PROCEDURE — 87086 URINE CULTURE/COLONY COUNT: CPT | Performed by: PHYSICIAN ASSISTANT

## 2020-02-24 PROCEDURE — 3074F SYST BP LT 130 MM HG: CPT | Performed by: PHYSICIAN ASSISTANT

## 2020-02-24 PROCEDURE — 1160F RVW MEDS BY RX/DR IN RCRD: CPT | Performed by: PHYSICIAN ASSISTANT

## 2020-02-24 PROCEDURE — 1036F TOBACCO NON-USER: CPT | Performed by: PHYSICIAN ASSISTANT

## 2020-02-24 PROCEDURE — 3008F BODY MASS INDEX DOCD: CPT | Performed by: PHYSICIAN ASSISTANT

## 2020-02-24 RX ORDER — CEFDINIR 300 MG/1
300 CAPSULE ORAL EVERY 12 HOURS SCHEDULED
Qty: 14 CAPSULE | Refills: 0 | Status: SHIPPED | OUTPATIENT
Start: 2020-02-24 | End: 2020-03-02

## 2020-02-24 NOTE — PROGRESS NOTES
Assessment/Plan:     Diagnoses and all orders for this visit:    Acute cystitis with hematuria  Increased level of concern due to patients recent hospitalization for right pyelonephritis, current bilateral staghorn calculi and poor kidney function  She has FU scheduled with Urologist in 3 weeks  Directed to keep  - Ordered Omnicef 7 days, tolerated well in the past  Allergic to Bactrim and Cipro  - POCT UA positive for Leuks, blood  - ordered Urine culture and microscopy  Will call if needing to change antibiotic  - Directed to return if fever, chills, flank pain, continued hematuria despite antibiotics  - Discussed low threshold of returning to ED with development of fever  -     POCT urine dip  -     cefdinir (OMNICEF) 300 mg capsule; Take 1 capsule (300 mg total) by mouth every 12 (twelve) hours for 7 days  -     Urine culture; Future  -     Urine culture          Subjective:    Patient ID: Alyssia Houston is a 65 y o  female  Pt is presenting today for Left sided back pain and nausea for 2 days and hematuria starting this morning  She was hospitalized last month for pyelonephritis   Urinary Tract Infection    This is a new problem  The pain is mild  There has been no fever  She is not sexually active  There is a history of pyelonephritis  Associated symptoms include flank pain, hematuria and nausea  Pertinent negatives include no chills, frequency, hesitancy, possible pregnancy, urgency or vomiting  She has tried nothing for the symptoms  The following portions of the patient's history were reviewed and updated as appropriate: allergies, current medications and problem list     Review of Systems   Constitutional: Negative for chills, fatigue, fever and unexpected weight change  HENT: Negative for rhinorrhea and sore throat  Respiratory: Negative for cough, shortness of breath and wheezing  Cardiovascular: Negative for chest pain, palpitations and leg swelling     Gastrointestinal: Positive for nausea  Negative for constipation, diarrhea and vomiting  Genitourinary: Positive for flank pain and hematuria  Negative for frequency, hesitancy and urgency  Musculoskeletal: Negative for arthralgias and myalgias  Objective:  /72 (BP Location: Right arm, Patient Position: Sitting, Cuff Size: Standard)   Pulse 70   Temp 98 3 °F (36 8 °C)   Ht 5' 1 6" (1 565 m)   Wt 62 6 kg (138 lb)   LMP  (LMP Unknown)   BMI 25 57 kg/m²      Physical Exam   Constitutional: She is oriented to person, place, and time  She appears well-developed and well-nourished  No distress  Eyes: Pupils are equal, round, and reactive to light  Neck: Normal range of motion  Neck supple  Cardiovascular: Normal rate, regular rhythm, normal heart sounds and intact distal pulses  Exam reveals no gallop and no friction rub  No murmur heard  Pulmonary/Chest: Effort normal and breath sounds normal  No respiratory distress  She has no wheezes  She has no rales  Abdominal: Normal appearance  She exhibits no distension  There is tenderness in the suprapubic area  There is no CVA tenderness  Neurological: She is alert and oriented to person, place, and time  Skin: She is not diaphoretic

## 2020-02-25 LAB — BACTERIA UR CULT: NORMAL

## 2020-03-24 ENCOUNTER — TELEPHONE (OUTPATIENT)
Dept: FAMILY MEDICINE CLINIC | Facility: CLINIC | Age: 70
End: 2020-03-24

## 2020-03-24 ENCOUNTER — APPOINTMENT (OUTPATIENT)
Dept: LAB | Facility: CLINIC | Age: 70
End: 2020-03-24
Payer: MEDICARE

## 2020-03-24 LAB
ALBUMIN SERPL BCP-MCNC: 2.7 G/DL (ref 3.5–5)
ALP SERPL-CCNC: 157 U/L (ref 46–116)
ALT SERPL W P-5'-P-CCNC: 16 U/L (ref 12–78)
ANION GAP SERPL CALCULATED.3IONS-SCNC: 6 MMOL/L (ref 4–13)
AST SERPL W P-5'-P-CCNC: 20 U/L (ref 5–45)
BILIRUB SERPL-MCNC: 0.49 MG/DL (ref 0.2–1)
BUN SERPL-MCNC: 16 MG/DL (ref 5–25)
CALCIUM SERPL-MCNC: 8.9 MG/DL (ref 8.3–10.1)
CHLORIDE SERPL-SCNC: 103 MMOL/L (ref 100–108)
CHOLEST SERPL-MCNC: 118 MG/DL (ref 50–200)
CO2 SERPL-SCNC: 27 MMOL/L (ref 21–32)
CREAT SERPL-MCNC: 1.24 MG/DL (ref 0.6–1.3)
GFR SERPL CREATININE-BSD FRML MDRD: 43 ML/MIN/1.73SQ M
GLUCOSE P FAST SERPL-MCNC: 86 MG/DL (ref 65–99)
HDLC SERPL-MCNC: 49 MG/DL
LDLC SERPL CALC-MCNC: 57 MG/DL (ref 0–100)
NONHDLC SERPL-MCNC: 69 MG/DL
POTASSIUM SERPL-SCNC: 3.8 MMOL/L (ref 3.5–5.3)
PROT SERPL-MCNC: 8.9 G/DL (ref 6.4–8.2)
SODIUM SERPL-SCNC: 136 MMOL/L (ref 136–145)
TRIGL SERPL-MCNC: 58 MG/DL
TSH SERPL DL<=0.05 MIU/L-ACNC: 3.58 UIU/ML (ref 0.36–3.74)

## 2020-03-24 PROCEDURE — 84443 ASSAY THYROID STIM HORMONE: CPT | Performed by: FAMILY MEDICINE

## 2020-03-24 PROCEDURE — 36415 COLL VENOUS BLD VENIPUNCTURE: CPT | Performed by: FAMILY MEDICINE

## 2020-03-24 PROCEDURE — 80053 COMPREHEN METABOLIC PANEL: CPT | Performed by: FAMILY MEDICINE

## 2020-03-24 PROCEDURE — 80061 LIPID PANEL: CPT | Performed by: FAMILY MEDICINE

## 2020-03-24 NOTE — TELEPHONE ENCOUNTER
Call re labs repeat creatinine or kidney function now normal at 1 24 ( 0 6 to 1 30)  TSH or thyroid test normal  Cholesterol 118 <200  Hgb or red cell count normal at 11 8  She has a listed appt today with Dr Zully Celeste this week  Continue with current medications         Creatinine (mg/dL)   Date Value   03/24/2020 1 24   02/06/2020 1 45 (H)   02/01/2020 1 51 (H)         Recent Results (from the past 336 hour(s))   Comprehensive metabolic panel    Collection Time: 03/24/20  8:48 AM   Result Value Ref Range    Sodium 136 136 - 145 mmol/L    Potassium 3 8 3 5 - 5 3 mmol/L    Chloride 103 100 - 108 mmol/L    CO2 27 21 - 32 mmol/L    ANION GAP 6 4 - 13 mmol/L    BUN 16 5 - 25 mg/dL    Creatinine 1 24 0 60 - 1 30 mg/dL    Glucose, Fasting 86 65 - 99 mg/dL    Calcium 8 9 8 3 - 10 1 mg/dL    AST 20 5 - 45 U/L    ALT 16 12 - 78 U/L    Alkaline Phosphatase 157 (H) 46 - 116 U/L    Total Protein 8 9 (H) 6 4 - 8 2 g/dL    Albumin 2 7 (L) 3 5 - 5 0 g/dL    Total Bilirubin 0 49 0 20 - 1 00 mg/dL    eGFR 43 ml/min/1 73sq m   Lipid panel    Collection Time: 03/24/20  8:48 AM   Result Value Ref Range    Cholesterol 118 50 - 200 mg/dL    Triglycerides 58 <=150 mg/dL    HDL, Direct 49 >=40 mg/dL    LDL Calculated 57 0 - 100 mg/dL    Non-HDL-Chol (CHOL-HDL) 69 mg/dl   TSH, 3rd generation with Free T4 reflex    Collection Time: 03/24/20  8:48 AM   Result Value Ref Range    TSH 3RD GENERATON 3 576 0 358 - 3 740 uIU/mL   CBC and differential    Collection Time: 03/24/20  8:48 AM   Result Value Ref Range    WBC 6 92 4 31 - 10 16 Thousand/uL    RBC 4 25 3 81 - 5 12 Million/uL    Hemoglobin 11 8 11 5 - 15 4 g/dL    Hematocrit 38 2 34 8 - 46 1 %    MCV 90 82 - 98 fL    MCH 27 8 26 8 - 34 3 pg    MCHC 30 9 (L) 31 4 - 37 4 g/dL    RDW 15 5 (H) 11 6 - 15 1 %    MPV 9 6 8 9 - 12 7 fL    Platelets 846 (H) 178 - 390 Thousands/uL    nRBC 0 /100 WBCs    Neutrophils Relative 71 43 - 75 %    Immat GRANS % 0 0 - 2 %    Lymphocytes Relative 13 (L) 14 - 44 % Monocytes Relative 11 4 - 12 %    Eosinophils Relative 4 0 - 6 %    Basophils Relative 1 0 - 1 %    Neutrophils Absolute 4 85 1 85 - 7 62 Thousands/µL    Immature Grans Absolute 0 03 0 00 - 0 20 Thousand/uL    Lymphocytes Absolute 0 93 0 60 - 4 47 Thousands/µL    Monocytes Absolute 0 78 0 17 - 1 22 Thousand/µL    Eosinophils Absolute 0 27 0 00 - 0 61 Thousand/µL    Basophils Absolute 0 06 0 00 - 0 10 Thousands/µL

## 2020-03-26 ENCOUNTER — TELEPHONE (OUTPATIENT)
Dept: HEMATOLOGY ONCOLOGY | Facility: CLINIC | Age: 70
End: 2020-03-26

## 2020-03-26 NOTE — TELEPHONE ENCOUNTER
Inbound Calls to Reschedule/Cancel Appointments   Patient Details:  Roger Manuel  1950  [id number]     Who is calling? Taz García   Date of original appointment 03/27   Visit Type Follow Up   Who is the Provider and Location? Kary Castillo / [place]   Is the patient on active treatment? Chemo? Radiation? No   Was the patient Rescheduled or Canceled? Rescheduled   Reason for rescheduling or cancelation? Patient would like to angel appt due to COVID-19, I have offered a Virtual Visit but she declined and would like an appt sometime in May    Next Steps:    [facility]: After completing the above information, please route to the appropriate Care Team for review  Care Team: Please review and reach out to the patient if you have any changes

## 2020-03-26 NOTE — TELEPHONE ENCOUNTER
Spoke to patient and r/s her for 5/29/20 at 1:20 PM with Dr Dion Lemos  Patient was happy with appointment date and time

## 2020-04-22 ENCOUNTER — TELEPHONE (OUTPATIENT)
Dept: UROLOGY | Facility: MEDICAL CENTER | Age: 70
End: 2020-04-22

## 2020-06-16 PROBLEM — E87.1 HYPONATREMIA: Status: RESOLVED | Noted: 2018-12-27 | Resolved: 2020-01-01

## 2020-06-16 PROBLEM — N17.9 AKI (ACUTE KIDNEY INJURY) (HCC): Status: RESOLVED | Noted: 2019-01-12 | Resolved: 2020-01-01

## 2020-06-16 PROBLEM — Z85.41 HISTORY OF CERVICAL CANCER: Status: ACTIVE | Noted: 2020-01-01

## 2020-06-16 PROBLEM — E46 PROTEIN-CALORIE MALNUTRITION (HCC): Status: RESOLVED | Noted: 2020-02-12 | Resolved: 2020-01-01

## 2020-09-11 NOTE — PROGRESS NOTES
COVID-19 Virtual Visit     Assessment/Plan:    Problem List Items Addressed This Visit     None      Visit Diagnoses     Encounter for screening for other viral diseases    -  Primary    Relevant Orders    Novel Coronavirus (COVID-19), PCR [facility] - Collected at   [facility] or [facility]       Will call patient on Monday to get update possible results    This virtual check-in was done via [employer] and patient was informed that this is not a secure, HIPAA-complaint platform  She agrees to proceed     Disposition:      I referred Kiet Perrin to one of our centralized sites for a COVID-19 swab    I spent 8 minutes directly with the patient during this visit    Encounter provider Yonathan Hart PA-C    Provider located at [place] [address]  [phone]    Recent Visits  No visits were found meeting these conditions  Showing recent visits within past 7 days and meeting all other requirements     Today's Visits  Date Type Provider Dept   09/11/20 Telemedicine Yonathan Hart PA-C [facility]   Showing today's visits and meeting all other requirements     Future Appointments  No visits were found meeting these conditions  Showing future appointments within next 150 days and meeting all other requirements        Patient agrees to participate in a virtual check in via telephone or video visit instead of presenting to the office to address urgent/immediate medical needs  Patient is aware this is a billable service  After connecting through [email or web address], the patient was identified by name and date of birth  Odalis Laws was informed that this was a telemedicine visit and that the exam was being conducted confidentially over secure lines  My office door was closed  No one else was in the room  Odalis Laws acknowledged consent and understanding of privacy and security of the telemedicine visit    I informed the patient that I have reviewed her record in [address] and presented the opportunity for her to ask any questions regarding the visit today  The patient agreed to participate  Subjective  Brianna Bradford is a 65 y o  female who is concerned about COVID-19  Increased fatigue for prior 2 weeks  Chest tightness, subjective fever and body aches  Temp of 97 5 this morning  Took Tylenol this morning, and resolved body aches  Spiting up clear sputum  Has seasonal allergies, no improvement with Claritin  Switched to [place]   is feeling fine  She reports fever, myalgias and chest tightness  She has not experienced chills, cough, sore throat, fatigue, anosmia, diarrhea, nausea and vomiting She has not had contact with a person who is under investigation for or who is positive for COVID-19 within the last 14 days  She has not been hospitalized recently for fever and/or lower respiratory symptoms  Past Medical History:   Diagnosis Date    ENEDELIA (acute kidney injury) ([facility] ) 1/12/2019    Anemia     BCC (basal cell carcinoma of skin)     facial    Breast cancer ([facility] [facility] ) 2006    s/p mastectomy     Calculus, kidney 2013    Cervical cancer ([facility] [facility] ) 1983    Disease of thyroid gland     Ganglion     Last Assessed:7/9/13    Heart murmur     mitral valve regurgitation    Hodgkin disease ([facility] [address] )     Hydronephrosis 2013    Hyperlipidemia     Hypertension     NSTEMI (non-ST elevated myocardial infarction) ([address] ) 1/12/2019    Osteopenia     Last Assessed:7/9/13    Osteoporosis     Paroxysmal supraventricular tachycardia (HCC)     Protein-calorie malnutrition ([facility] [facility] ) 2/12/2020    Renal calculi     Last Assessed:3/19/14    Renal cyst     Shoulder impingement     Last Assessed:1/18/13    Syncope 7/28/2019    Last Assessment & Plan:  Pre-syncopal symptoms leading to short syncopal episode without post-ictal period  Suspect dehydration/hypovolemia  Infectious, cardiopulm or neurologic cause less likely   No arrhythmias or AV block on ECG or telemetry thus far  No PE on CTA  No clinical signs of seizure   No acute pathology or mass on CTH  - s/p IVF; encourage PO intake - continue on telemetry - TTE c/f ne    UTI (urinary tract infection) 2013    Vasculitis ([facility] [address] ) 1/12/2019       Past Surgical History:   Procedure Laterality Date    APPENDECTOMY      BREAST BIOPSY Left 09/29/2006    BREAST RECONSTRUCTION      COMPLEX WOUND CLOSURE TO EXTREMITY Left 10/9/2018    Procedure: COMPLEX CLOSURE RECONSTRUCTION;  Surgeon: Kelin Alexander MD;  [facility]: [facility];  Service: Plastics    CT BONE MARROW BIOPSY AND ASPIRATION  11/15/2019    CYSTOSCOPY W/ RETROGRADES  2009    CYSTOSCOPY W/ URETEROSCOPY  2002    EXTRACORPOREAL SHOCK WAVE LITHOTRIPSY Right 2005    FLAP LOCAL HEAD / NECK Left 10/9/2018    Procedure: FLAP RECONSTRUCTION;  Surgeon: Kelin Alexander MD;  [facility]: [facility];  Service: Plastics    FULL THICKNESS SKIN GRAFT Left 10/9/2018    Procedure: FULL THICKNESS SKIN GRAFT RECONSTRUCTION;  Surgeon: Kelin Alexander MD;  [facility];  Service: Plastics    HYSTERECTOMY      1983-cervical cancer    INTRAOPERATIVE RADIATION THERAPY ([facility])      Radiation Therapy    KIDNEY SURGERY      MASTECTOMY Left 11/09/2006    OOPHORECTOMY Left     OTHER SURGICAL HISTORY      Chemotherapeutics    REDUCTION MAMMAPLASTY Right 2006    SENTINEL LYMPH NODE BIOPSY      SPLENECTOMY      1985-Hodgkin's Disease    SQUAMOUS CELL CARCINOMA EXCISION Left 10/9/2018    Procedure: NOSE/CHEEK BCC EXCISION; FROZEN SECTION;  Surgeon: Kelin Alexander MD;  [facility]: [facility];  Service: Plastics       Current Outpatient Medications   Medication Sig Dispense Refill    aspirin (ECOTRIN LOW STRENGTH) 81 mg EC tablet Take 81 mg by mouth daily      cholecalciferol (VITAMIN D3) 1,000 units tablet Take 1,000 Units by mouth daily        levothyroxine 75 mcg tablet Take 1 tablet (75 mcg total) by mouth daily 90 tablet 3    Loratadine (CLARITIN) 10 MG CAPS Take by mouth as needed       nebivolol (Bystolic) 5 mg tablet Take 1 tablet (5 mg total) by mouth daily 90 tablet 3    simvastatin (ZOCOR) 20 mg tablet Take 1 tablet (20 mg total) by mouth daily at bedtime (Patient not taking: Reported on 6/16/2020) 90 tablet 3     No current facility-administered medications for this visit  Allergies   Allergen Reactions    Ciprofloxacin Other (See Comments)     Reaction Date: [id number]; Hives/Uticaria    Sulfamethoxazole-Trimethoprim Hives     Patient does not remember rx       Review of Systems   Constitutional: Positive for fever  Negative for fatigue and unexpected weight change  HENT: Negative for rhinorrhea and sore throat  Respiratory: Positive for chest tightness  Negative for cough, shortness of breath and wheezing  Cardiovascular: Negative for chest pain, palpitations and leg swelling  Gastrointestinal: Negative for constipation, diarrhea and nausea  Musculoskeletal: Positive for myalgias  Negative for arthralgias  Video Exam    There were no vitals filed for this visit  Fidel Quintana appears healthy  Physical Exam  Constitutional:       General: She is not in acute distress  Appearance: She is well-developed  She is not diaphoretic  HENT:      Head: Normocephalic and atraumatic  Pulmonary:      Effort: Pulmonary effort is normal  No respiratory distress  Breath sounds: No wheezing  Neurological:      Mental Status: She is alert and oriented to person, place, and time  Psychiatric:         Behavior: Behavior normal          Thought Content: Thought content normal           VIRTUAL VISIT DISCLAIMER    Josy Hill acknowledges that she has consented to an online visit or consultation   She understands that the online visit is based solely on information provided by her, and that, in the absence of a face-to-face physical evaluation by the physician, the diagnosis she receives is both limited and provisional in terms of accuracy and completeness  This is not intended to replace a full medical face-to-face evaluation by the physician  Brianna Bradford understands and accepts these terms

## 2020-09-14 NOTE — TELEPHONE ENCOUNTER
----- Message from Rene Lopez PA-C sent at 9/14/2020  7:55 AM EDT -----  Results are normal  Please call patient

## 2020-09-18 PROBLEM — D47.1 MYELOPROLIFERATIVE DISORDER (HCC): Status: ACTIVE | Noted: 2020-01-01

## 2020-09-18 NOTE — PROGRESS NOTES
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  Also she has anemia  Blood counts are being monitored  She has history of provoked DVT and pulmonary embolism and that happened after immobilization  She she was on Eliquis and presently she is on 81 milligram aspirin daily with food and has instructions to report to the emergency room in case of blood clot and bleeding  She she has history of multiple malignancies      In 1995 patient was diagnosed to have stage II Hodgkin's disease above the diaphragm   She had staging laparotomy, splenectomy, extended field radiation therapy  She knows to get pneumococcal vaccine once every 5 years because of splenectomy   In 2006 she was diagnosed to have left-sided breast cancer, hormone receptor positive, HER2 negative, T1, N1, stage IIA   She underwent left mastectomy, reconstruction surgery   She then had 4 cycles of adjuvant Taxotere plus Cytoxan followed by adjuvant Arimidex for 5 years which she completed in 2011  She did not receive any adjuvant radiation therapy secondary to previous radiation for Hodgkin's disease as above  In March 2016 she had melanoma surgery for T1a, 0 6 mm melanoma of the right flank area   She follows with Dr Alexia Pruett every 6 months  she is up to date with office visit and exam   Oct/Nov 2018 she got an infection, strep; started in her urine  She was sick and feeling ill for over 2 months  She was laying down not doing anything from Oct-Dec She then presented to the hospital on 1/12/19 and was found to have acute bibasilar pulmonary emboli   Venous Doppler was then also ordered and this showed an acute nonocclusive DVT in the popliteal and paired gastrocnemius veins of the left lower extremity   Right lower extremity was negative for DVT    She was placed on Eliquis 5 mg BID  She then was at a [employer] and had a syncopal event  She was brought to [facility] in July 2019  Work up was negative and she was thought to have vasovagal event   She states during that admission she was found to have compression fracture of T11 and she has back brace and she ambulates with a cane  She will go for  DEXA scan            Current Outpatient Medications:     aspirin (ECOTRIN LOW STRENGTH) 81 mg EC tablet, Take 81 mg by mouth daily, Disp: , Rfl:     cholecalciferol (VITAMIN D3) 1,000 units tablet, Take 1,000 Units by mouth daily  , Disp: , Rfl:     Fexofenadine HCl (ALLEGRA ALLERGY PO), Take by mouth, Disp: , Rfl:     levothyroxine 75 mcg tablet, Take 1 tablet (75 mcg total) by mouth daily, Disp: 90 tablet, Rfl: 3    nebivolol (Bystolic) 5 mg tablet, Take 1 tablet (5 mg total) by mouth daily, Disp: 90 tablet, Rfl: 3    Loratadine (CLARITIN) 10 MG CAPS, Take by mouth as needed , Disp: , Rfl:     simvastatin (ZOCOR) 20 mg tablet, Take 1 tablet (20 mg total) by mouth daily at bedtime (Patient not taking: Reported on 6/16/2020), Disp: 90 tablet, Rfl: 3    Allergies   Allergen Reactions    Ciprofloxacin Other (See Comments)     Reaction Date: [id number]; Hives/Uticaria    Sulfamethoxazole-Trimethoprim Hives     Patient does not remember rx       Oncology History    No history exists  ROS:  09/18/20 Reviewed 13 systems: See symptoms in HPI:  Tiredness  Arthritic symptoms  Back pain  Gait problem  Presently no headaches, seizures, no more dizziness, diplopia, dysphagia, hoarseness, chest pain, palpitations, shortness of breath, cough, hemoptysis, abdominal pain, nausea, vomiting, change in bowel habits, melena, hematuria, fever, chills, bleeding,  skin rash, weight loss,   weakness, numbness,  claudication   No frequent infections  Not unusually sensitive to heat or cold  No swelling of the ankles  No swollen glands  Patient is anxious         /72 (BP Location: Right arm, Patient Position: Sitting, Cuff Size: Adult)   Pulse 94   Temp 98 1 °F (36 7 °C)   Resp 16   Ht 5' 1 6" (1 565 m)   Wt 64 4 kg (142 lb)   LMP  (LMP Unknown)   SpO2 100%   BMI 26 31 kg/m²     Physical Exam:  Alert, oriented, not in distress, no icterus, no oral thrush, no palpable neck mass, clear lung fields, regular heart rate, abdomen  soft and non tender, no palpable abdominal mass, no ascites, no edema of ankles, no calf tenderness, no focal neurological deficit, no skin rash, no palpable lymphadenopathy in the neck and axillary areas, good arterial pulses, no clubbing  Patient is anxious  Performance status 2  Back brace  Cane       IMAGING:      LABS:  Results for orders placed or performed in visit on 09/11/20   Novel Coronavirus (COVID-19), PCR [facility] - Collected at [facility] or [facility]    Specimen: Nose; Nares   Result Value Ref Range    SARS-CoV-2  Not Detected Not Detected     Labs, Imaging, & Other studies:   All pertinent labs and imaging studies were personally reviewed    Lab Results   Component Value Date    K 3 8 03/24/2020     03/24/2020    CO2 27 03/24/2020    BUN 16 03/24/2020    CREATININE 1 24 03/24/2020    GLUF 86 03/24/2020    CALCIUM 8 9 03/24/2020    AST 20 03/24/2020    ALT 16 03/24/2020    ALKPHOS 157 (H) 03/24/2020    EGFR 43 03/24/2020     Lab Results   Component Value Date    WBC 6 92 03/24/2020    HGB 11 8 03/24/2020    HCT 38 2 03/24/2020    MCV 90 03/24/2020     (H) 03/24/2020       Reviewed and discussed with patient  Assessment and plan:    Patient is here with her family member  Follow-up visit for 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  Also she has anemia  Blood counts are being monitored  She has history of provoked DVT and pulmonary embolism and that happened after immobilization  She she was on Eliquis and presently she is on 81 milligram aspirin daily with food and has instructions to report to the emergency room in case of blood clot and bleeding    She she has history of multiple malignancies      In 1995 patient was diagnosed to have stage II Hodgkin's disease above the diaphragm   She had staging laparotomy, splenectomy, extended field radiation therapy  She knows to get pneumococcal vaccine once every 5 years because of splenectomy   In 2006 she was diagnosed to have left-sided breast cancer, hormone receptor positive, HER2 negative, T1, N1, stage IIA   She underwent left mastectomy, reconstruction surgery   She then had 4 cycles of adjuvant Taxotere plus Cytoxan followed by adjuvant Arimidex for 5 years which she completed in 2011  She did not receive any adjuvant radiation therapy secondary to previous radiation for Hodgkin's disease as above  In March 2016 she had melanoma surgery for T1a, 0 6 mm melanoma of the right flank area   She follows with Dr Armin Rowe every 6 months  she is up to date with office visit and exam   Oct/Nov 2018 she got an infection, strep; started in her urine  She was sick and feeling ill for over 2 months  She was laying down not doing anything from Oct-Dec She then presented to the hospital on 1/12/19 and was found to have acute bibasilar pulmonary emboli   Venous Doppler was then also ordered and this showed an acute nonocclusive DVT in the popliteal and paired gastrocnemius veins of the left lower extremity   Right lower extremity was negative for DVT    She was placed on Eliquis 5 mg BID  She then was at a [employer] game and had a syncopal event  She was brought to [facility] in July 2019  Work up was negative and she was thought to have vasovagal event   She states during that admission she was found to have compression fracture of T11 and she has back brace and she ambulates with a cane  She will go for  DEXA scan       Physical examination and test results are as recorded and discussed  There is improvement in anemia  Thrombocytosis could be secondary to absent spleen    Patient has history of multiple cancers and she will have oncologic genetic testing and counseling  See diagnoses and orders below  Also discussed the importance of self-breast examination, eating healthy foods, activities as tolerated and health screening tests  Patient is capable of self-care at this time  Discussed precautions against coronavirus  Goal is to prevent bleeding and blood clot and cure from multiple malignancies and prevention of infections  All discussed in detail  Questions answered  1  Myeloproliferative disorder (HCC)    - CBC and differential; Future  - Comprehensive metabolic panel; Future    2  History of breast cancer    - XR chest pa & lateral; Future  - Ambulatory Referral to Oncology Genetics; Future    3  History of Hodgkin's disease    - Ambulatory Referral to Oncology Genetics; Future    4  History of cervical cancer    - Ambulatory Referral to Oncology Genetics; Future    5  Melanoma of skin of trunk ([facility] )    - Ambulatory Referral to Oncology Genetics; Future    6  Compression fracture of T11 vertebra, initial encounter ([facility] )    - DXA bone density spine hip and pelvis; Future    7  History of pulmonary embolus (PE)    - D-dimer, quantitative; Future    8  History of DVT of lower extremity    - D-dimer, quantitative; Future    9  Age-related osteoporosis with current pathological fracture, initial encounter    - DXA bone density spine hip and pelvis; Future            Patient voiced understanding and agreement in the discussion  Counseling / Coordination of Care   Greater than 50% of total time was spent with the patient and / or family counseling and / or coordination of care

## 2020-09-21 NOTE — TELEPHONE ENCOUNTER
Genetics New Patient Intake Form   Patient Details:     Meño Strong    1950    [id number]    Background Information:    "On Hold (Urgent Slot)" is set aside for Pancreatic, Ovarian, and Scheduled Surgery Patients  If it is not scheduled by the previous Friday, any patient can be scheduled in it  Who is calling to schedule? If not self, relationship to patient? self   Referring Provider Elke   Is this a personal or family history? personal and family   If personal history, what age were you at diagnosis? na   What is the type of tumor? Self- cervical, breast, melanoma, hodgkins  Family- colon    Pancreatic and Ovarian needs to be scheduled in the "On Hold (Urgent Slot)"   Do you have a pending surgery for your cancer diagnosis? NA    If yes: needs to be scheduled in the "On Hold (Urgent Slot)"   Referring Provider Department Med onc   Did the patient schedule an appointment? Yes   List Appointment Date and Provider Name  Maria Esther 11/11   Scheduling Information:   Preferred [place]: [facility]   Are there any dates/time the patient cannot be seen?  na   Miscellaneous: na   After completing the above information, please route to Oncology Genetics

## 2020-09-21 NOTE — PROGRESS NOTES
Cardiology Follow Up    Luis Helton  1950  [id number]  [facility] [address]  [phone]    1  Paroxysmal supraventricular tachycardia (HCC)  POCT ECG       Interval History: Followup aortic regurgitation PSVT    She has had some mild LE edema  She has no chest pain, rare fluttering and no significant exertional dyspnea       Problem List     Hypertension    Hyperlipidemia    Hypothyroidism    Basal cell carcinoma (BCC) of left side of nose    History of Hodgkin's disease    Paroxysmal supraventricular tachycardia (HCC)    CKD (chronic kidney disease), stage III (HCC)    History of pulmonary embolus (PE)    History of DVT of lower extremity    Nonrheumatic aortic valve insufficiency    Bilateral kidney stones    Melanoma of skin of trunk (HCC)    Mitral regurgitation    Pulmonary hypertension ([address] )    Acquired complex renal cyst    Elevated serum protein level    Osteoporosis    Vitamin D deficiency    BCC (basal cell carcinoma), face    Overview Signed 8/13/2018  3:38 PM by Donaldo Tsang MA     Added automatically from request for surgery [id number]         History of breast cancer    History of basal cell cancer    Non-rheumatic tricuspid valve insufficiency    Thrombocytosis (HCC)    Iron deficiency anemia    Cardiomyopathy ([address] )    Compression fracture of T11 vertebra ([address] )    History of cervical cancer    Myeloproliferative disorder ([address] )        Past Medical History:   Diagnosis Date    ENEDELIA (acute kidney injury) ([address] ) 1/12/2019    Anemia     BCC (basal cell carcinoma of skin)     facial    Breast cancer ([address] ) 2006    s/p mastectomy     Calculus, kidney 2013    Cervical cancer ([address] ) 1983    Disease of thyroid gland     Ganglion     Last Assessed:7/9/13    Heart murmur     mitral valve regurgitation    Hodgkin disease ([address] )     Hydronephrosis 2013    Hyperlipidemia     Hypertension     NSTEMI (non-ST elevated myocardial infarction) ([facility] ) 2019    Osteopenia     Last Assessed:13    Osteoporosis     Paroxysmal supraventricular tachycardia (HCC)     Protein-calorie malnutrition ([facility] ) 2020    Renal calculi     Last Assessed:3/19/14    Renal cyst     Shoulder impingement     Last Assessed:13    Syncope 2019    Last Assessment & Plan:  Pre-syncopal symptoms leading to short syncopal episode without post-ictal period  Suspect dehydration/hypovolemia  Infectious, cardiopulm or neurologic cause less likely  No arrhythmias or AV block on ECG or telemetry thus far  No PE on CTA  No clinical signs of seizure   No acute pathology or mass on CTH  - s/p IVF; encourage PO intake - continue on telemetry - TTE c/f ne    UTI (urinary tract infection) 2013    Vasculitis ([place] [id number] ) 2019     Social History     Socioeconomic History    Marital status: /Civil Union     Spouse name: Not on file    Number of children: Not on file    Years of education: Not on file    Highest education level: Not on file   Occupational History    Not on file   Social Needs    Financial resource strain: Not on file    Food insecurity     Worry: Not on file     Inability: Not on file    Transportation needs     Medical: Not on file     Non-medical: Not on file   Tobacco Use    Smoking status: Former Smoker     Last attempt to quit: 2011     Years since quittin 7    Smokeless tobacco: Never Used   Substance and Sexual Activity    Alcohol use: Not Currently     Comment: caffeine use; social drinker-1 beer every 2/3 months    Drug use: Never    Sexual activity: Not on file   Lifestyle    Physical activity     Days per week: Not on file     Minutes per session: Not on file    Stress: Not on file   Relationships    Social connections     Talks on phone: Not on file     Gets together: Not on file     Attends Shinto service: Not on file     Active member of club or organization: Not on file     Attends meetings of clubs or organizations: Not on file     Relationship status: Not on file    Intimate partner violence     Fear of current or ex partner: Not on file     Emotionally abused: Not on file     Physically abused: Not on file     Forced sexual activity: Not on file   Other Topics Concern    Not on file   Social History Narrative    Advance directive declined by patient    Drinks coffee    Exercise habits      Family History   Problem Relation Age of Onset    Leukemia Mother     Colon cancer Paternal Grandmother 80    Heart disease Family     Heart attack Father     Coronary artery disease Father         stent- five    Hypertension Father     Colon cancer Father 80    No Known Problems Maternal Grandmother     No Known Problems Maternal Grandfather     No Known Problems Paternal Grandfather     No Known Problems Paternal Aunt     Stroke Neg Hx     Anuerysm Neg Hx     Clotting disorder Neg Hx     Arrhythmia Neg Hx     Heart failure Neg Hx     Hyperlipidemia Neg Hx      Past Surgical History:   Procedure Laterality Date    APPENDECTOMY      BREAST BIOPSY Left 09/29/2006    BREAST RECONSTRUCTION      COMPLEX WOUND CLOSURE TO EXTREMITY Left 10/9/2018    Procedure: COMPLEX CLOSURE RECONSTRUCTION;  Surgeon: Marlena Jules MD;  [facility];  Service: Plastics    CT BONE MARROW BIOPSY AND ASPIRATION  11/15/2019    CYSTOSCOPY W/ RETROGRADES  2009    CYSTOSCOPY W/ URETEROSCOPY  2002    EXTRACORPOREAL SHOCK WAVE LITHOTRIPSY Right 2005    FLAP LOCAL HEAD / NECK Left 10/9/2018    Procedure: FLAP RECONSTRUCTION;  Surgeon: Marlena Jules MD;  [facility];  Service: Plastics    FULL THICKNESS SKIN GRAFT Left 10/9/2018    Procedure: FULL THICKNESS SKIN GRAFT RECONSTRUCTION;  Surgeon: Marlena Jules MD;  [facility];  Service: Plastics    HYSTERECTOMY      1983-cervical cancer    INTRAOPERATIVE RADIATION THERAPY ([facility])      Radiation Therapy    KIDNEY SURGERY      MASTECTOMY Left 11/09/2006    OOPHORECTOMY Left     OTHER SURGICAL HISTORY      Chemotherapeutics    REDUCTION MAMMAPLASTY Right 2006    SENTINEL LYMPH NODE BIOPSY      SPLENECTOMY      1985-Hodgkin's Disease    SQUAMOUS CELL CARCINOMA EXCISION Left 10/9/2018    Procedure: NOSE/CHEEK BCC EXCISION; FROZEN SECTION;  Surgeon: Areli Riggs MD;  [facility];  Service: Plastics       Current Outpatient Medications:     aspirin (ECOTRIN LOW STRENGTH) 81 mg EC tablet, Take 81 mg by mouth daily, Disp: , Rfl:     cholecalciferol (VITAMIN D3) 1,000 units tablet, Take 1,000 Units by mouth daily  , Disp: , Rfl:     Fexofenadine HCl (ALLEGRA ALLERGY PO), Take by mouth, Disp: , Rfl:     levothyroxine 75 mcg tablet, Take 1 tablet (75 mcg total) by mouth daily, Disp: 90 tablet, Rfl: 3    nebivolol (Bystolic) 5 mg tablet, Take 1 tablet (5 mg total) by mouth daily, Disp: 90 tablet, Rfl: 3    Loratadine (CLARITIN) 10 MG CAPS, Take by mouth as needed , Disp: , Rfl:     simvastatin (ZOCOR) 20 mg tablet, Take 1 tablet (20 mg total) by mouth daily at bedtime (Patient not taking: Reported on 6/16/2020), Disp: 90 tablet, Rfl: 3  Allergies   Allergen Reactions    Ciprofloxacin Other (See Comments)     Reaction Date: [id number];    Hives/Uticaria    Sulfamethoxazole-Trimethoprim Hives     Patient does not remember rx       Labs:     Chemistry        Component Value Date/Time    K 3 8 03/24/2020 0848     03/24/2020 0848    CO2 27 03/24/2020 0848    BUN 16 03/24/2020 0848    CREATININE 1 24 03/24/2020 0848        Component Value Date/Time    CALCIUM 8 9 03/24/2020 0848    ALKPHOS 157 (H) 03/24/2020 0848    AST 20 03/24/2020 0848    ALT 16 03/24/2020 0848            No results found for: CHOL  Lab Results   Component Value Date    HDL 49 03/24/2020    HDL 37 (L) 07/24/2019    HDL 34 (L) 01/12/2019     Lab Results   Component Value Date    LDLCALC 57 03/24/2020    LDLCALC 61 07/24/2019    LDLCALC 61 01/12/2019     Lab Results   Component Value Date    TRIG 58 03/24/2020    TRIG 72 07/24/2019    TRIG 99 01/12/2019     No results found for: CHOLHDL    Imaging: No results found       Review of Systems   Constitution: Negative  HENT: Negative  Eyes: Negative  Cardiovascular: Negative  Respiratory: Negative  Endocrine: Negative  Hematologic/Lymphatic: Negative  Gastrointestinal: Negative  Genitourinary: Negative  Neurological: Negative  Vitals:    09/21/20 0952   BP: 120/70   Pulse: 97   Temp: (!) 96 7 °F (35 9 °C)           Physical Exam  Vitals signs reviewed  Constitutional:       General: She is not in acute distress  Appearance: Normal appearance  She is not toxic-appearing  HENT:      Head: Normocephalic  Nose: No congestion  Mouth/Throat:      Mouth: Mucous membranes are moist       Pharynx: No oropharyngeal exudate or posterior oropharyngeal erythema  Eyes:      General: No scleral icterus  Conjunctiva/sclera: Conjunctivae normal    Cardiovascular:      Rate and Rhythm: Normal rate and regular rhythm  Heart sounds: No murmur  No friction rub  No gallop  Abdominal:      General: Abdomen is flat  Bowel sounds are normal  There is no distension  Palpations: Abdomen is soft  There is no mass  Tenderness: There is no abdominal tenderness  Hernia: No hernia is present  Musculoskeletal:         General: No swelling, tenderness, deformity or signs of injury  Right lower leg: No edema  Left lower leg: No edema  Skin:     General: Skin is warm and dry  Neurological:      General: No focal deficit present  Mental Status: She is alert and oriented to person, place, and time  Psychiatric:         Mood and Affect: Mood normal          Behavior: Behavior normal          Discussion/Summary:     Moderate aortic regurgitation: Update echocardiogram  She is relatively asymptomatic  PSVT: Continue bystolic  She has been doing well      The patient was counseled regarding diagnostic results, instructions for management, risk factor reductions, impressions  total time of encounter was 25 minutes and 15 minutes was spent counseling

## 2020-09-22 NOTE — PROGRESS NOTES
I called patient to discuss high D-dimer with her  I left my name and office phone number on her answering machine/voicemail and requested a return call

## 2020-09-23 NOTE — TELEPHONE ENCOUNTER
Patient's duplex and lung scan were scheduled and I called the patient to give her the appointment information on both appointments  Lung scan is scheduled for 9/24 @ 1 pm 2 [place]  Venous duplex is scheduled for 9/29 @ 2:30 pm      Patient verbalized agreement & understanding

## 2020-09-23 NOTE — PROGRESS NOTES
Patient called back  We discussed high D-dimer  Ordering venous Doppler both legs and V/Q lung scan  She has high creatinine and that is why V/Q lung scan  Previous history of DVT and pulmonary embolism

## 2020-09-28 NOTE — TELEPHONE ENCOUNTER
I called patient to discuss DEXA scan result and I left my name and office phone number on her answering machine/voicemail

## 2020-09-29 NOTE — PROGRESS NOTES
I gave patient results of Doppler study, V/Q scan and DEXA scan and she is being referred to an endocrinologist for osteoporosis

## 2020-09-29 NOTE — TELEPHONE ENCOUNTER
Patient returned Dr Deion Arzola call, states he can give her a call back at his convenience she will be home all day  Best call back [phone]

## 2020-09-29 NOTE — TELEPHONE ENCOUNTER
I called the Endocrinology Office to schedule a consult for the patient  I spoke with Stephanie, who informed me she will reach out to the patient and schedule the consult with the patient according to the patient's schedule

## 2020-10-16 PROBLEM — K82.9 GALLBLADDER DISEASE: Status: ACTIVE | Noted: 2020-01-01

## 2020-11-08 PROBLEM — I50.9 ACUTE CHF (HCC): Status: ACTIVE | Noted: 2020-01-01

## 2020-11-08 PROBLEM — R21 RASH: Status: ACTIVE | Noted: 2020-01-01

## 2020-11-09 PROBLEM — I50.31 DIASTOLIC CHF, ACUTE (HCC): Status: ACTIVE | Noted: 2020-01-01

## 2020-11-10 PROBLEM — N17.9 ACUTE KIDNEY INJURY SUPERIMPOSED ON CHRONIC KIDNEY DISEASE (HCC): Status: ACTIVE | Noted: 2019-01-12

## 2020-11-10 PROBLEM — E87.6 HYPOKALEMIA: Status: ACTIVE | Noted: 2020-01-01

## 2020-11-10 PROBLEM — N18.9 ACUTE KIDNEY INJURY SUPERIMPOSED ON CHRONIC KIDNEY DISEASE (HCC): Status: ACTIVE | Noted: 2019-01-12

## 2020-11-13 PROBLEM — I77.6 SMALL VESSEL VASCULITIS (HCC): Status: ACTIVE | Noted: 2020-01-01

## 2021-01-01 ENCOUNTER — TELEPHONE (OUTPATIENT)
Dept: FAMILY MEDICINE CLINIC | Facility: CLINIC | Age: 71
End: 2021-01-01

## 2021-01-01 ENCOUNTER — TELEPHONE (OUTPATIENT)
Dept: HEMATOLOGY ONCOLOGY | Facility: CLINIC | Age: 71
End: 2021-01-01

## 2021-01-01 ENCOUNTER — OFFICE VISIT (OUTPATIENT)
Dept: CARDIOLOGY CLINIC | Facility: CLINIC | Age: 71
End: 2021-01-01
Payer: MEDICARE

## 2021-01-01 ENCOUNTER — APPOINTMENT (INPATIENT)
Dept: RADIOLOGY | Facility: HOSPITAL | Age: 71
DRG: 871 | End: 2021-01-01
Payer: MEDICARE

## 2021-01-01 ENCOUNTER — TRANSCRIBE ORDERS (OUTPATIENT)
Dept: ADMINISTRATIVE | Age: 71
End: 2021-01-01

## 2021-01-01 ENCOUNTER — APPOINTMENT (INPATIENT)
Dept: VASCULAR ULTRASOUND | Facility: HOSPITAL | Age: 71
DRG: 871 | End: 2021-01-01
Payer: MEDICARE

## 2021-01-01 ENCOUNTER — PATIENT OUTREACH (OUTPATIENT)
Dept: CASE MANAGEMENT | Facility: HOSPITAL | Age: 71
End: 2021-01-01

## 2021-01-01 ENCOUNTER — TRANSITIONAL CARE MANAGEMENT (OUTPATIENT)
Dept: FAMILY MEDICINE CLINIC | Facility: CLINIC | Age: 71
End: 2021-01-01

## 2021-01-01 ENCOUNTER — TELEPHONE (OUTPATIENT)
Dept: CARDIOLOGY CLINIC | Facility: CLINIC | Age: 71
End: 2021-01-01

## 2021-01-01 ENCOUNTER — OFFICE VISIT (OUTPATIENT)
Dept: FAMILY MEDICINE CLINIC | Facility: CLINIC | Age: 71
End: 2021-01-01
Payer: MEDICARE

## 2021-01-01 ENCOUNTER — TELEPHONE (OUTPATIENT)
Dept: ENDOCRINOLOGY | Facility: CLINIC | Age: 71
End: 2021-01-01

## 2021-01-01 ENCOUNTER — APPOINTMENT (EMERGENCY)
Dept: RADIOLOGY | Facility: HOSPITAL | Age: 71
DRG: 291 | End: 2021-01-01
Payer: MEDICARE

## 2021-01-01 ENCOUNTER — TELEMEDICINE (OUTPATIENT)
Dept: FAMILY MEDICINE CLINIC | Facility: CLINIC | Age: 71
End: 2021-01-01
Payer: MEDICARE

## 2021-01-01 ENCOUNTER — IMMUNIZATIONS (OUTPATIENT)
Dept: FAMILY MEDICINE CLINIC | Facility: HOSPITAL | Age: 71
End: 2021-01-01

## 2021-01-01 ENCOUNTER — APPOINTMENT (INPATIENT)
Dept: CT IMAGING | Facility: HOSPITAL | Age: 71
DRG: 871 | End: 2021-01-01
Payer: MEDICARE

## 2021-01-01 ENCOUNTER — APPOINTMENT (INPATIENT)
Dept: NON INVASIVE DIAGNOSTICS | Facility: HOSPITAL | Age: 71
DRG: 871 | End: 2021-01-01
Payer: MEDICARE

## 2021-01-01 ENCOUNTER — APPOINTMENT (EMERGENCY)
Dept: CT IMAGING | Facility: HOSPITAL | Age: 71
DRG: 871 | End: 2021-01-01
Payer: MEDICARE

## 2021-01-01 ENCOUNTER — APPOINTMENT (OUTPATIENT)
Dept: RADIOLOGY | Age: 71
End: 2021-01-01
Payer: MEDICARE

## 2021-01-01 ENCOUNTER — HOSPITAL ENCOUNTER (INPATIENT)
Facility: HOSPITAL | Age: 71
LOS: 1 days | DRG: 871 | End: 2021-04-27
Attending: EMERGENCY MEDICINE | Admitting: INTERNAL MEDICINE
Payer: MEDICARE

## 2021-01-01 ENCOUNTER — APPOINTMENT (OUTPATIENT)
Dept: LAB | Facility: CLINIC | Age: 71
End: 2021-01-01
Payer: MEDICARE

## 2021-01-01 ENCOUNTER — HOSPITAL ENCOUNTER (INPATIENT)
Facility: HOSPITAL | Age: 71
LOS: 8 days | Discharge: HOME | DRG: 291 | End: 2021-02-26
Attending: EMERGENCY MEDICINE | Admitting: INTERNAL MEDICINE
Payer: MEDICARE

## 2021-01-01 ENCOUNTER — NURSE TRIAGE (OUTPATIENT)
Dept: OTHER | Facility: OTHER | Age: 71
End: 2021-01-01

## 2021-01-01 ENCOUNTER — APPOINTMENT (EMERGENCY)
Dept: RADIOLOGY | Facility: HOSPITAL | Age: 71
DRG: 871 | End: 2021-01-01
Payer: MEDICARE

## 2021-01-01 VITALS
OXYGEN SATURATION: 90 % | HEART RATE: 97 BPM | TEMPERATURE: 95.5 F | DIASTOLIC BLOOD PRESSURE: 44 MMHG | RESPIRATION RATE: 21 BRPM | SYSTOLIC BLOOD PRESSURE: 100 MMHG | HEIGHT: 62 IN | BODY MASS INDEX: 27.63 KG/M2 | WEIGHT: 150.13 LBS

## 2021-01-01 VITALS
WEIGHT: 146 LBS | SYSTOLIC BLOOD PRESSURE: 112 MMHG | RESPIRATION RATE: 16 BRPM | BODY MASS INDEX: 27.56 KG/M2 | TEMPERATURE: 96.6 F | DIASTOLIC BLOOD PRESSURE: 64 MMHG | HEIGHT: 61 IN | HEART RATE: 88 BPM

## 2021-01-01 VITALS
DIASTOLIC BLOOD PRESSURE: 53 MMHG | BODY MASS INDEX: 25.13 KG/M2 | SYSTOLIC BLOOD PRESSURE: 112 MMHG | WEIGHT: 133 LBS | OXYGEN SATURATION: 97 %

## 2021-01-01 VITALS
SYSTOLIC BLOOD PRESSURE: 108 MMHG | WEIGHT: 146 LBS | BODY MASS INDEX: 27.56 KG/M2 | HEIGHT: 61 IN | HEART RATE: 78 BPM | OXYGEN SATURATION: 91 % | DIASTOLIC BLOOD PRESSURE: 66 MMHG

## 2021-01-01 VITALS
RESPIRATION RATE: 18 BRPM | BODY MASS INDEX: 24.56 KG/M2 | HEART RATE: 84 BPM | OXYGEN SATURATION: 100 % | DIASTOLIC BLOOD PRESSURE: 53 MMHG | TEMPERATURE: 98.2 F | SYSTOLIC BLOOD PRESSURE: 101 MMHG | WEIGHT: 130 LBS

## 2021-01-01 DIAGNOSIS — I36.1 NON-RHEUMATIC TRICUSPID VALVE INSUFFICIENCY: ICD-10-CM

## 2021-01-01 DIAGNOSIS — B34.9 VIRAL INFECTION, UNSPECIFIED: ICD-10-CM

## 2021-01-01 DIAGNOSIS — R05.8 RECURRENT COUGH: ICD-10-CM

## 2021-01-01 DIAGNOSIS — E03.9 ACQUIRED HYPOTHYROIDISM: ICD-10-CM

## 2021-01-01 DIAGNOSIS — Z85.3 HISTORY OF BREAST CANCER: ICD-10-CM

## 2021-01-01 DIAGNOSIS — I47.1 PAROXYSMAL SUPRAVENTRICULAR TACHYCARDIA (HCC): ICD-10-CM

## 2021-01-01 DIAGNOSIS — I50.33 ACUTE ON CHRONIC DIASTOLIC (CONGESTIVE) HEART FAILURE (HCC): Primary | ICD-10-CM

## 2021-01-01 DIAGNOSIS — N18.32 STAGE 3B CHRONIC KIDNEY DISEASE (HCC): ICD-10-CM

## 2021-01-01 DIAGNOSIS — I50.33 ACUTE ON CHRONIC DIASTOLIC (CONGESTIVE) HEART FAILURE (HCC): ICD-10-CM

## 2021-01-01 DIAGNOSIS — S22.000A THORACIC COMPRESSION FRACTURE (HCC): ICD-10-CM

## 2021-01-01 DIAGNOSIS — A41.9 SEPSIS (HCC): Primary | ICD-10-CM

## 2021-01-01 DIAGNOSIS — D64.9 NORMOCYTIC ANEMIA: ICD-10-CM

## 2021-01-01 DIAGNOSIS — Z85.41 HISTORY OF CERVICAL CANCER: ICD-10-CM

## 2021-01-01 DIAGNOSIS — N20.0 BILATERAL KIDNEY STONES: ICD-10-CM

## 2021-01-01 DIAGNOSIS — I34.0 NONRHEUMATIC MITRAL VALVE REGURGITATION: ICD-10-CM

## 2021-01-01 DIAGNOSIS — I27.20 PULMONARY HYPERTENSION (HCC): ICD-10-CM

## 2021-01-01 DIAGNOSIS — K80.20 CALCULUS OF GALLBLADDER WITHOUT CHOLECYSTITIS WITHOUT OBSTRUCTION: ICD-10-CM

## 2021-01-01 DIAGNOSIS — Z85.71 HISTORY OF HODGKIN'S DISEASE: Primary | ICD-10-CM

## 2021-01-01 DIAGNOSIS — I77.6 SMALL VESSEL VASCULITIS (HCC): ICD-10-CM

## 2021-01-01 DIAGNOSIS — D47.1 MYELOPROLIFERATIVE DISORDER (HCC): ICD-10-CM

## 2021-01-01 DIAGNOSIS — I35.1 NONRHEUMATIC AORTIC VALVE INSUFFICIENCY: ICD-10-CM

## 2021-01-01 DIAGNOSIS — Z86.718 HISTORY OF DVT OF LOWER EXTREMITY: ICD-10-CM

## 2021-01-01 DIAGNOSIS — C43.59 MELANOMA OF SKIN OF TRUNK (HCC): ICD-10-CM

## 2021-01-01 DIAGNOSIS — Z12.31 ENCOUNTER FOR SCREENING MAMMOGRAM FOR MALIGNANT NEOPLASM OF BREAST: ICD-10-CM

## 2021-01-01 DIAGNOSIS — N17.9 AKI (ACUTE KIDNEY INJURY) (HCC): ICD-10-CM

## 2021-01-01 DIAGNOSIS — N17.9 ACUTE-ON-CHRONIC KIDNEY INJURY (HCC): ICD-10-CM

## 2021-01-01 DIAGNOSIS — I10 ESSENTIAL HYPERTENSION: ICD-10-CM

## 2021-01-01 DIAGNOSIS — R05.8 RECURRENT COUGH: Primary | ICD-10-CM

## 2021-01-01 DIAGNOSIS — I34.0 MITRAL VALVE INSUFFICIENCY, UNSPECIFIED ETIOLOGY: Primary | ICD-10-CM

## 2021-01-01 DIAGNOSIS — Z23 ENCOUNTER FOR IMMUNIZATION: Primary | ICD-10-CM

## 2021-01-01 DIAGNOSIS — Z86.711 HISTORY OF PULMONARY EMBOLUS (PE): ICD-10-CM

## 2021-01-01 DIAGNOSIS — D64.9 ANEMIA: ICD-10-CM

## 2021-01-01 DIAGNOSIS — E03.9 ACQUIRED HYPOTHYROIDISM: Primary | ICD-10-CM

## 2021-01-01 DIAGNOSIS — N18.9 ACUTE-ON-CHRONIC KIDNEY INJURY (HCC): ICD-10-CM

## 2021-01-01 DIAGNOSIS — Z85.71 HISTORY OF HODGKIN'S DISEASE: ICD-10-CM

## 2021-01-01 DIAGNOSIS — I50.32 CHRONIC DIASTOLIC CONGESTIVE HEART FAILURE (HCC): Primary | ICD-10-CM

## 2021-01-01 DIAGNOSIS — M31.0 HYPERSENSITIVITY ANGIITIS (HCC): ICD-10-CM

## 2021-01-01 DIAGNOSIS — I50.32 CHRONIC DIASTOLIC HEART FAILURE (HCC): Primary | ICD-10-CM

## 2021-01-01 DIAGNOSIS — I50.32 CHRONIC DIASTOLIC CONGESTIVE HEART FAILURE (HCC): ICD-10-CM

## 2021-01-01 DIAGNOSIS — M81.0 AGE-RELATED OSTEOPOROSIS WITHOUT CURRENT PATHOLOGICAL FRACTURE: ICD-10-CM

## 2021-01-01 DIAGNOSIS — N12 PYELONEPHRITIS OF RIGHT KIDNEY: ICD-10-CM

## 2021-01-01 DIAGNOSIS — D75.839 THROMBOCYTOSIS: ICD-10-CM

## 2021-01-01 DIAGNOSIS — I50.32 CHRONIC DIASTOLIC HEART FAILURE (HCC): ICD-10-CM

## 2021-01-01 DIAGNOSIS — I42.9 CARDIOMYOPATHY, UNSPECIFIED TYPE (HCC): ICD-10-CM

## 2021-01-01 DIAGNOSIS — R06.02 SOB (SHORTNESS OF BREATH): ICD-10-CM

## 2021-01-01 DIAGNOSIS — I95.9 HYPOTENSION: ICD-10-CM

## 2021-01-01 LAB
ABO GROUP BLD: NORMAL
ABO GROUP BLD: NORMAL
ALBUMIN SERPL BCP-MCNC: 1.7 G/DL (ref 3.5–5)
ALBUMIN SERPL BCP-MCNC: 1.8 G/DL (ref 3.5–5)
ALBUMIN SERPL BCP-MCNC: 1.9 G/DL (ref 3.5–5)
ALBUMIN SERPL BCP-MCNC: 2.1 G/DL (ref 3.5–5)
ALBUMIN SERPL BCP-MCNC: 2.2 G/DL (ref 3.5–5)
ALBUMIN SERPL ELPH-MCNC: 2.28 G/DL (ref 3.5–5)
ALBUMIN SERPL ELPH-MCNC: 28.2 % (ref 52–65)
ALP SERPL-CCNC: 242 U/L (ref 46–116)
ALP SERPL-CCNC: 289 U/L (ref 46–116)
ALP SERPL-CCNC: 350 U/L (ref 46–116)
ALP SERPL-CCNC: 364 U/L (ref 46–116)
ALP SERPL-CCNC: 445 U/L (ref 46–116)
ALPHA1 GLOB SERPL ELPH-MCNC: 0.59 G/DL (ref 0.1–0.4)
ALPHA1 GLOB SERPL ELPH-MCNC: 7.3 % (ref 2.5–5)
ALPHA2 GLOB SERPL ELPH-MCNC: 0.82 G/DL (ref 0.4–1.2)
ALPHA2 GLOB SERPL ELPH-MCNC: 10.1 % (ref 7–13)
ALT SERPL W P-5'-P-CCNC: 11 U/L (ref 12–78)
ALT SERPL W P-5'-P-CCNC: 15 U/L (ref 12–78)
ALT SERPL W P-5'-P-CCNC: 15 U/L (ref 12–78)
ALT SERPL W P-5'-P-CCNC: 18 U/L (ref 12–78)
ALT SERPL W P-5'-P-CCNC: 20 U/L (ref 12–78)
ANION GAP SERPL CALCULATED.3IONS-SCNC: 13 MMOL/L (ref 4–13)
ANION GAP SERPL CALCULATED.3IONS-SCNC: 15 MMOL/L (ref 4–13)
ANION GAP SERPL CALCULATED.3IONS-SCNC: 18 MMOL/L (ref 4–13)
ANION GAP SERPL CALCULATED.3IONS-SCNC: 24 MMOL/L (ref 4–13)
ANION GAP SERPL CALCULATED.3IONS-SCNC: 25 MMOL/L (ref 4–13)
ANION GAP SERPL CALCULATED.3IONS-SCNC: 26 MMOL/L (ref 4–13)
ANION GAP SERPL CALCULATED.3IONS-SCNC: 26 MMOL/L (ref 4–13)
ANION GAP SERPL CALCULATED.3IONS-SCNC: 3 MMOL/L (ref 4–13)
ANION GAP SERPL CALCULATED.3IONS-SCNC: 5 MMOL/L (ref 4–13)
ANION GAP SERPL CALCULATED.3IONS-SCNC: 5 MMOL/L (ref 4–13)
ANION GAP SERPL CALCULATED.3IONS-SCNC: 6 MMOL/L (ref 4–13)
ANION GAP SERPL CALCULATED.3IONS-SCNC: 7 MMOL/L (ref 4–13)
ANION GAP SERPL CALCULATED.3IONS-SCNC: 7 MMOL/L (ref 4–13)
ANION GAP SERPL CALCULATED.3IONS-SCNC: 9 MMOL/L (ref 4–13)
APTT PPP: 110 SECONDS (ref 23–37)
APTT PPP: 172 SECONDS (ref 23–37)
APTT PPP: 30 SECONDS (ref 23–37)
APTT PPP: 32 SECONDS (ref 23–37)
APTT PPP: 33 SECONDS (ref 23–37)
APTT PPP: 63 SECONDS (ref 23–37)
APTT PPP: 84 SECONDS (ref 23–37)
APTT PPP: 99 SECONDS (ref 23–37)
ARTERIAL PATENCY WRIST A: 11
AST SERPL W P-5'-P-CCNC: 23 U/L (ref 5–45)
AST SERPL W P-5'-P-CCNC: 23 U/L (ref 5–45)
AST SERPL W P-5'-P-CCNC: 34 U/L (ref 5–45)
AST SERPL W P-5'-P-CCNC: 38 U/L (ref 5–45)
AST SERPL W P-5'-P-CCNC: 41 U/L (ref 5–45)
ATRIAL RATE: 84 BPM
ATRIAL RATE: 84 BPM
BACTERIA UR QL AUTO: ABNORMAL /HPF
BASE EXCESS BLDA CALC-SCNC: -12 MMOL/L (ref -2–3)
BASOPHILS # BLD AUTO: 0.02 THOUSANDS/ΜL (ref 0–0.1)
BASOPHILS # BLD AUTO: 0.03 THOUSANDS/ΜL (ref 0–0.1)
BASOPHILS # BLD AUTO: 0.04 THOUSANDS/ΜL (ref 0–0.1)
BASOPHILS # BLD AUTO: 0.05 THOUSANDS/ΜL (ref 0–0.1)
BASOPHILS # BLD AUTO: 0.05 THOUSANDS/ΜL (ref 0–0.1)
BASOPHILS NFR BLD AUTO: 0 % (ref 0–1)
BASOPHILS NFR BLD AUTO: 1 % (ref 0–1)
BETA GLOB ABNORMAL SERPL ELPH-MCNC: 0.49 G/DL (ref 0.4–0.8)
BETA1 GLOB SERPL ELPH-MCNC: 6 % (ref 5–13)
BETA2 GLOB SERPL ELPH-MCNC: 7.1 % (ref 2–8)
BETA2+GAMMA GLOB SERPL ELPH-MCNC: 0.58 G/DL (ref 0.2–0.5)
BILIRUB SERPL-MCNC: 0.41 MG/DL (ref 0.2–1)
BILIRUB SERPL-MCNC: 0.43 MG/DL (ref 0.2–1)
BILIRUB SERPL-MCNC: 0.44 MG/DL (ref 0.2–1)
BILIRUB SERPL-MCNC: 0.67 MG/DL (ref 0.2–1)
BILIRUB SERPL-MCNC: 0.87 MG/DL (ref 0.2–1)
BILIRUB UR QL STRIP: NEGATIVE
BLD GP AB SCN SERPL QL: NEGATIVE
BUN SERPL-MCNC: 100 MG/DL (ref 5–25)
BUN SERPL-MCNC: 101 MG/DL (ref 5–25)
BUN SERPL-MCNC: 102 MG/DL (ref 5–25)
BUN SERPL-MCNC: 102 MG/DL (ref 5–25)
BUN SERPL-MCNC: 38 MG/DL (ref 5–25)
BUN SERPL-MCNC: 40 MG/DL (ref 5–25)
BUN SERPL-MCNC: 40 MG/DL (ref 5–25)
BUN SERPL-MCNC: 41 MG/DL (ref 5–25)
BUN SERPL-MCNC: 42 MG/DL (ref 5–25)
BUN SERPL-MCNC: 43 MG/DL (ref 5–25)
BUN SERPL-MCNC: 43 MG/DL (ref 5–25)
BUN SERPL-MCNC: 45 MG/DL (ref 5–25)
BUN SERPL-MCNC: 49 MG/DL (ref 5–25)
BUN SERPL-MCNC: 51 MG/DL (ref 5–25)
BUN SERPL-MCNC: 90 MG/DL (ref 5–25)
BUN SERPL-MCNC: 92 MG/DL (ref 5–25)
BUN SERPL-MCNC: 97 MG/DL (ref 5–25)
BUN SERPL-MCNC: 98 MG/DL (ref 5–25)
C3 SERPL-MCNC: 114 MG/DL (ref 90–180)
C4 SERPL-MCNC: 26 MG/DL (ref 10–40)
CA-I BLD-SCNC: 1.04 MMOL/L (ref 1.12–1.32)
CALCIUM ALBUM COR SERPL-MCNC: 10 MG/DL (ref 8.3–10.1)
CALCIUM ALBUM COR SERPL-MCNC: 10.1 MG/DL (ref 8.3–10.1)
CALCIUM ALBUM COR SERPL-MCNC: 10.6 MG/DL (ref 8.3–10.1)
CALCIUM ALBUM COR SERPL-MCNC: 9.7 MG/DL (ref 8.3–10.1)
CALCIUM ALBUM COR SERPL-MCNC: 9.8 MG/DL (ref 8.3–10.1)
CALCIUM SERPL-MCNC: 8 MG/DL (ref 8.3–10.1)
CALCIUM SERPL-MCNC: 8.1 MG/DL (ref 8.3–10.1)
CALCIUM SERPL-MCNC: 8.1 MG/DL (ref 8.3–10.1)
CALCIUM SERPL-MCNC: 8.2 MG/DL (ref 8.3–10.1)
CALCIUM SERPL-MCNC: 8.2 MG/DL (ref 8.3–10.1)
CALCIUM SERPL-MCNC: 8.3 MG/DL (ref 8.3–10.1)
CALCIUM SERPL-MCNC: 8.4 MG/DL (ref 8.3–10.1)
CALCIUM SERPL-MCNC: 8.7 MG/DL (ref 8.3–10.1)
CALCIUM SERPL-MCNC: 8.7 MG/DL (ref 8.3–10.1)
CALCIUM SERPL-MCNC: 8.8 MG/DL (ref 8.3–10.1)
CHLORIDE SERPL-SCNC: 100 MMOL/L (ref 100–108)
CHLORIDE SERPL-SCNC: 101 MMOL/L (ref 100–108)
CHLORIDE SERPL-SCNC: 102 MMOL/L (ref 100–108)
CHLORIDE SERPL-SCNC: 103 MMOL/L (ref 100–108)
CHLORIDE SERPL-SCNC: 105 MMOL/L (ref 100–108)
CHLORIDE SERPL-SCNC: 106 MMOL/L (ref 100–108)
CHLORIDE SERPL-SCNC: 96 MMOL/L (ref 100–108)
CHLORIDE SERPL-SCNC: 97 MMOL/L (ref 100–108)
CHLORIDE SERPL-SCNC: 98 MMOL/L (ref 100–108)
CHLORIDE SERPL-SCNC: 98 MMOL/L (ref 100–108)
CHLORIDE SERPL-SCNC: 99 MMOL/L (ref 100–108)
CK SERPL-CCNC: 60 U/L (ref 26–192)
CLARITY UR: ABNORMAL
CO2 SERPL-SCNC: 11 MMOL/L (ref 21–32)
CO2 SERPL-SCNC: 12 MMOL/L (ref 21–32)
CO2 SERPL-SCNC: 13 MMOL/L (ref 21–32)
CO2 SERPL-SCNC: 14 MMOL/L (ref 21–32)
CO2 SERPL-SCNC: 18 MMOL/L (ref 21–32)
CO2 SERPL-SCNC: 19 MMOL/L (ref 21–32)
CO2 SERPL-SCNC: 21 MMOL/L (ref 21–32)
CO2 SERPL-SCNC: 26 MMOL/L (ref 21–32)
CO2 SERPL-SCNC: 27 MMOL/L (ref 21–32)
CO2 SERPL-SCNC: 28 MMOL/L (ref 21–32)
CO2 SERPL-SCNC: 29 MMOL/L (ref 21–32)
CO2 SERPL-SCNC: 30 MMOL/L (ref 21–32)
CO2 SERPL-SCNC: 32 MMOL/L (ref 21–32)
CO2 SERPL-SCNC: 33 MMOL/L (ref 21–32)
COLOR UR: YELLOW
CORTIS SERPL-MCNC: 22.1 UG/DL
CREAT SERPL-MCNC: 1.61 MG/DL (ref 0.6–1.3)
CREAT SERPL-MCNC: 1.64 MG/DL (ref 0.6–1.3)
CREAT SERPL-MCNC: 1.66 MG/DL (ref 0.6–1.3)
CREAT SERPL-MCNC: 1.74 MG/DL (ref 0.6–1.3)
CREAT SERPL-MCNC: 1.77 MG/DL (ref 0.6–1.3)
CREAT SERPL-MCNC: 1.77 MG/DL (ref 0.6–1.3)
CREAT SERPL-MCNC: 1.81 MG/DL (ref 0.6–1.3)
CREAT SERPL-MCNC: 1.82 MG/DL (ref 0.6–1.3)
CREAT SERPL-MCNC: 1.82 MG/DL (ref 0.6–1.3)
CREAT SERPL-MCNC: 1.84 MG/DL (ref 0.6–1.3)
CREAT SERPL-MCNC: 3.21 MG/DL (ref 0.6–1.3)
CREAT SERPL-MCNC: 3.29 MG/DL (ref 0.6–1.3)
CREAT SERPL-MCNC: 3.65 MG/DL (ref 0.6–1.3)
CREAT SERPL-MCNC: 3.89 MG/DL (ref 0.6–1.3)
CREAT SERPL-MCNC: 3.98 MG/DL (ref 0.6–1.3)
CREAT SERPL-MCNC: 4.11 MG/DL (ref 0.6–1.3)
CREAT SERPL-MCNC: 4.17 MG/DL (ref 0.6–1.3)
CREAT SERPL-MCNC: 4.34 MG/DL (ref 0.6–1.3)
CREAT UR-MCNC: 32 MG/DL
D DIMER PPP FEU-MCNC: 9.2 UG/ML FEU
EOSINOPHIL # BLD AUTO: 0 THOUSAND/ΜL (ref 0–0.61)
EOSINOPHIL # BLD AUTO: 0.01 THOUSAND/ΜL (ref 0–0.61)
EOSINOPHIL # BLD AUTO: 0.09 THOUSAND/ΜL (ref 0–0.61)
EOSINOPHIL # BLD AUTO: 0.15 THOUSAND/ΜL (ref 0–0.61)
EOSINOPHIL # BLD AUTO: 0.17 THOUSAND/ΜL (ref 0–0.61)
EOSINOPHIL # BLD AUTO: 0.18 THOUSAND/ΜL (ref 0–0.61)
EOSINOPHIL # BLD AUTO: 0.19 THOUSAND/ΜL (ref 0–0.61)
EOSINOPHIL NFR BLD AUTO: 0 % (ref 0–6)
EOSINOPHIL NFR BLD AUTO: 1 % (ref 0–6)
EOSINOPHIL NFR BLD AUTO: 2 % (ref 0–6)
EOSINOPHIL NFR BLD AUTO: 2 % (ref 0–6)
EOSINOPHIL NFR BLD AUTO: 3 % (ref 0–6)
EOSINOPHIL NFR BLD AUTO: 4 % (ref 0–6)
ERYTHROCYTE [DISTWIDTH] IN BLOOD BY AUTOMATED COUNT: 15.8 % (ref 11.6–15.1)
ERYTHROCYTE [DISTWIDTH] IN BLOOD BY AUTOMATED COUNT: 16 % (ref 11.6–15.1)
ERYTHROCYTE [DISTWIDTH] IN BLOOD BY AUTOMATED COUNT: 16.1 % (ref 11.6–15.1)
ERYTHROCYTE [DISTWIDTH] IN BLOOD BY AUTOMATED COUNT: 16.4 % (ref 11.6–15.1)
ERYTHROCYTE [DISTWIDTH] IN BLOOD BY AUTOMATED COUNT: 16.4 % (ref 11.6–15.1)
ERYTHROCYTE [DISTWIDTH] IN BLOOD BY AUTOMATED COUNT: 16.6 % (ref 11.6–15.1)
ERYTHROCYTE [DISTWIDTH] IN BLOOD BY AUTOMATED COUNT: 16.6 % (ref 11.6–15.1)
ERYTHROCYTE [DISTWIDTH] IN BLOOD BY AUTOMATED COUNT: 16.8 % (ref 11.6–15.1)
ERYTHROCYTE [DISTWIDTH] IN BLOOD BY AUTOMATED COUNT: 17.1 % (ref 11.6–15.1)
ERYTHROCYTE [DISTWIDTH] IN BLOOD BY AUTOMATED COUNT: 17.2 % (ref 11.6–15.1)
ERYTHROCYTE [DISTWIDTH] IN BLOOD BY AUTOMATED COUNT: 17.2 % (ref 11.6–15.1)
FIO2 GAS DIL.REBREATH: 21 L
FLUAV RNA RESP QL NAA+PROBE: NEGATIVE
FLUBV RNA RESP QL NAA+PROBE: NEGATIVE
GAMMA GLOB ABNORMAL SERPL ELPH-MCNC: 3.35 G/DL (ref 0.5–1.6)
GAMMA GLOB SERPL ELPH-MCNC: 41.3 % (ref 12–22)
GFR SERPL CREATININE-BSD FRML MDRD: 10 ML/MIN/1.73SQ M
GFR SERPL CREATININE-BSD FRML MDRD: 10 ML/MIN/1.73SQ M
GFR SERPL CREATININE-BSD FRML MDRD: 11 ML/MIN/1.73SQ M
GFR SERPL CREATININE-BSD FRML MDRD: 11 ML/MIN/1.73SQ M
GFR SERPL CREATININE-BSD FRML MDRD: 12 ML/MIN/1.73SQ M
GFR SERPL CREATININE-BSD FRML MDRD: 13 ML/MIN/1.73SQ M
GFR SERPL CREATININE-BSD FRML MDRD: 14 ML/MIN/1.73SQ M
GFR SERPL CREATININE-BSD FRML MDRD: 27 ML/MIN/1.73SQ M
GFR SERPL CREATININE-BSD FRML MDRD: 28 ML/MIN/1.73SQ M
GFR SERPL CREATININE-BSD FRML MDRD: 28 ML/MIN/1.73SQ M
GFR SERPL CREATININE-BSD FRML MDRD: 29 ML/MIN/1.73SQ M
GFR SERPL CREATININE-BSD FRML MDRD: 30 ML/MIN/1.73SQ M
GFR SERPL CREATININE-BSD FRML MDRD: 31 ML/MIN/1.73SQ M
GFR SERPL CREATININE-BSD FRML MDRD: 32 ML/MIN/1.73SQ M
GFR SERPL CREATININE-BSD FRML MDRD: 9 ML/MIN/1.73SQ M
GLUCOSE P FAST SERPL-MCNC: 82 MG/DL (ref 65–99)
GLUCOSE P FAST SERPL-MCNC: 89 MG/DL (ref 65–99)
GLUCOSE SERPL-MCNC: 100 MG/DL (ref 65–140)
GLUCOSE SERPL-MCNC: 103 MG/DL (ref 65–140)
GLUCOSE SERPL-MCNC: 111 MG/DL (ref 65–140)
GLUCOSE SERPL-MCNC: 115 MG/DL (ref 65–140)
GLUCOSE SERPL-MCNC: 115 MG/DL (ref 65–140)
GLUCOSE SERPL-MCNC: 127 MG/DL (ref 65–140)
GLUCOSE SERPL-MCNC: 133 MG/DL (ref 65–140)
GLUCOSE SERPL-MCNC: 135 MG/DL (ref 65–140)
GLUCOSE SERPL-MCNC: 138 MG/DL (ref 65–140)
GLUCOSE SERPL-MCNC: 143 MG/DL (ref 65–140)
GLUCOSE SERPL-MCNC: 150 MG/DL (ref 65–140)
GLUCOSE SERPL-MCNC: 153 MG/DL (ref 65–140)
GLUCOSE SERPL-MCNC: 156 MG/DL (ref 65–140)
GLUCOSE SERPL-MCNC: 165 MG/DL (ref 65–140)
GLUCOSE SERPL-MCNC: 168 MG/DL (ref 65–140)
GLUCOSE SERPL-MCNC: 20 MG/DL (ref 65–140)
GLUCOSE SERPL-MCNC: 245 MG/DL (ref 65–140)
GLUCOSE SERPL-MCNC: 42 MG/DL (ref 65–140)
GLUCOSE SERPL-MCNC: 57 MG/DL (ref 65–140)
GLUCOSE SERPL-MCNC: 61 MG/DL (ref 65–140)
GLUCOSE SERPL-MCNC: 63 MG/DL (ref 65–140)
GLUCOSE SERPL-MCNC: 72 MG/DL (ref 65–140)
GLUCOSE SERPL-MCNC: 72 MG/DL (ref 65–140)
GLUCOSE SERPL-MCNC: 76 MG/DL (ref 65–140)
GLUCOSE SERPL-MCNC: 76 MG/DL (ref 65–140)
GLUCOSE SERPL-MCNC: 77 MG/DL (ref 65–140)
GLUCOSE SERPL-MCNC: 80 MG/DL (ref 65–140)
GLUCOSE SERPL-MCNC: 81 MG/DL (ref 65–140)
GLUCOSE SERPL-MCNC: 81 MG/DL (ref 65–140)
GLUCOSE SERPL-MCNC: 83 MG/DL (ref 65–140)
GLUCOSE SERPL-MCNC: 85 MG/DL (ref 65–140)
GLUCOSE SERPL-MCNC: 85 MG/DL (ref 65–140)
GLUCOSE SERPL-MCNC: 87 MG/DL (ref 65–140)
GLUCOSE SERPL-MCNC: 89 MG/DL (ref 65–140)
GLUCOSE SERPL-MCNC: 92 MG/DL (ref 65–140)
GLUCOSE SERPL-MCNC: 96 MG/DL (ref 65–140)
GLUCOSE SERPL-MCNC: 98 MG/DL (ref 65–140)
GLUCOSE SERPL-MCNC: >500 MG/DL (ref 65–140)
GLUCOSE UR STRIP-MCNC: NEGATIVE MG/DL
HCO3 BLDA-SCNC: 13.4 MMOL/L (ref 22–28)
HCT VFR BLD AUTO: 24.2 % (ref 34.8–46.1)
HCT VFR BLD AUTO: 26 % (ref 34.8–46.1)
HCT VFR BLD AUTO: 26.1 % (ref 34.8–46.1)
HCT VFR BLD AUTO: 26.1 % (ref 34.8–46.1)
HCT VFR BLD AUTO: 26.7 % (ref 34.8–46.1)
HCT VFR BLD AUTO: 28.9 % (ref 34.8–46.1)
HCT VFR BLD AUTO: 29.5 % (ref 34.8–46.1)
HCT VFR BLD AUTO: 29.5 % (ref 34.8–46.1)
HCT VFR BLD AUTO: 30.4 % (ref 34.8–46.1)
HCT VFR BLD AUTO: 32.3 % (ref 34.8–46.1)
HCT VFR BLD AUTO: 32.3 % (ref 34.8–46.1)
HCT VFR BLD CALC: 26 % (ref 34.8–46.1)
HGB BLD-MCNC: 10.1 G/DL (ref 11.5–15.4)
HGB BLD-MCNC: 6.9 G/DL (ref 11.5–15.4)
HGB BLD-MCNC: 7.7 G/DL (ref 11.5–15.4)
HGB BLD-MCNC: 8.2 G/DL (ref 11.5–15.4)
HGB BLD-MCNC: 8.4 G/DL (ref 11.5–15.4)
HGB BLD-MCNC: 8.4 G/DL (ref 11.5–15.4)
HGB BLD-MCNC: 8.5 G/DL (ref 11.5–15.4)
HGB BLD-MCNC: 9.1 G/DL (ref 11.5–15.4)
HGB BLD-MCNC: 9.2 G/DL (ref 11.5–15.4)
HGB BLD-MCNC: 9.3 G/DL (ref 11.5–15.4)
HGB BLD-MCNC: 9.3 G/DL (ref 11.5–15.4)
HGB BLD-MCNC: 9.9 G/DL (ref 11.5–15.4)
HGB BLDA-MCNC: 8.8 G/DL (ref 11.5–15.4)
HGB UR QL STRIP.AUTO: ABNORMAL
IGG/ALB SER: 0.39 {RATIO} (ref 1.1–1.8)
IMM GRANULOCYTES # BLD AUTO: 0.02 THOUSAND/UL (ref 0–0.2)
IMM GRANULOCYTES # BLD AUTO: 0.02 THOUSAND/UL (ref 0–0.2)
IMM GRANULOCYTES # BLD AUTO: 0.06 THOUSAND/UL (ref 0–0.2)
IMM GRANULOCYTES # BLD AUTO: 0.07 THOUSAND/UL (ref 0–0.2)
IMM GRANULOCYTES # BLD AUTO: 0.08 THOUSAND/UL (ref 0–0.2)
IMM GRANULOCYTES # BLD AUTO: 0.09 THOUSAND/UL (ref 0–0.2)
IMM GRANULOCYTES # BLD AUTO: 0.17 THOUSAND/UL (ref 0–0.2)
IMM GRANULOCYTES # BLD AUTO: 0.22 THOUSAND/UL (ref 0–0.2)
IMM GRANULOCYTES # BLD AUTO: 0.25 THOUSAND/UL (ref 0–0.2)
IMM GRANULOCYTES NFR BLD AUTO: 0 % (ref 0–2)
IMM GRANULOCYTES NFR BLD AUTO: 0 % (ref 0–2)
IMM GRANULOCYTES NFR BLD AUTO: 1 % (ref 0–2)
IMM GRANULOCYTES NFR BLD AUTO: 2 % (ref 0–2)
INR PPP: 1.17 (ref 0.84–1.19)
INR PPP: 1.18 (ref 0.84–1.19)
INR PPP: 2.05 (ref 0.84–1.19)
INTERPRETATION UR IFE-IMP: NORMAL
KETONES UR STRIP-MCNC: NEGATIVE MG/DL
LACTATE SERPL-SCNC: 10.3 MMOL/L (ref 0.5–2)
LACTATE SERPL-SCNC: 10.7 MMOL/L (ref 0.5–2)
LACTATE SERPL-SCNC: 11.1 MMOL/L (ref 0.5–2)
LACTATE SERPL-SCNC: 11.6 MMOL/L (ref 0.5–2)
LACTATE SERPL-SCNC: 2 MMOL/L (ref 0.5–2)
LEUKOCYTE ESTERASE UR QL STRIP: ABNORMAL
LYMPHOCYTES # BLD AUTO: 0.31 THOUSANDS/ΜL (ref 0.6–4.47)
LYMPHOCYTES # BLD AUTO: 0.33 THOUSANDS/ΜL (ref 0.6–4.47)
LYMPHOCYTES # BLD AUTO: 0.34 THOUSANDS/ΜL (ref 0.6–4.47)
LYMPHOCYTES # BLD AUTO: 0.35 THOUSANDS/ΜL (ref 0.6–4.47)
LYMPHOCYTES # BLD AUTO: 0.36 THOUSANDS/ΜL (ref 0.6–4.47)
LYMPHOCYTES # BLD AUTO: 0.4 THOUSANDS/ΜL (ref 0.6–4.47)
LYMPHOCYTES # BLD AUTO: 0.41 THOUSANDS/ΜL (ref 0.6–4.47)
LYMPHOCYTES # BLD AUTO: 0.44 THOUSANDS/ΜL (ref 0.6–4.47)
LYMPHOCYTES # BLD AUTO: 0.49 THOUSANDS/ΜL (ref 0.6–4.47)
LYMPHOCYTES NFR BLD AUTO: 2 % (ref 14–44)
LYMPHOCYTES NFR BLD AUTO: 3 % (ref 14–44)
LYMPHOCYTES NFR BLD AUTO: 4 % (ref 14–44)
LYMPHOCYTES NFR BLD AUTO: 8 % (ref 14–44)
LYMPHOCYTES NFR BLD AUTO: 9 % (ref 14–44)
MAGNESIUM SERPL-MCNC: 2.2 MG/DL (ref 1.6–2.6)
MAGNESIUM SERPL-MCNC: 2.3 MG/DL (ref 1.6–2.6)
MAGNESIUM SERPL-MCNC: 2.3 MG/DL (ref 1.6–2.6)
MAGNESIUM SERPL-MCNC: 2.4 MG/DL (ref 1.6–2.6)
MAGNESIUM SERPL-MCNC: 2.6 MG/DL (ref 1.6–2.6)
MAGNESIUM SERPL-MCNC: 2.6 MG/DL (ref 1.6–2.6)
MAGNESIUM SERPL-MCNC: 2.7 MG/DL (ref 1.6–2.6)
MAGNESIUM SERPL-MCNC: 2.8 MG/DL (ref 1.6–2.6)
MCH RBC QN AUTO: 27.4 PG (ref 26.8–34.3)
MCH RBC QN AUTO: 27.7 PG (ref 26.8–34.3)
MCH RBC QN AUTO: 27.7 PG (ref 26.8–34.3)
MCH RBC QN AUTO: 27.8 PG (ref 26.8–34.3)
MCH RBC QN AUTO: 27.8 PG (ref 26.8–34.3)
MCH RBC QN AUTO: 27.9 PG (ref 26.8–34.3)
MCH RBC QN AUTO: 28 PG (ref 26.8–34.3)
MCH RBC QN AUTO: 28.1 PG (ref 26.8–34.3)
MCH RBC QN AUTO: 28.1 PG (ref 26.8–34.3)
MCHC RBC AUTO-ENTMCNC: 30.6 G/DL (ref 31.4–37.4)
MCHC RBC AUTO-ENTMCNC: 30.7 G/DL (ref 31.4–37.4)
MCHC RBC AUTO-ENTMCNC: 31.2 G/DL (ref 31.4–37.4)
MCHC RBC AUTO-ENTMCNC: 31.3 G/DL (ref 31.4–37.4)
MCHC RBC AUTO-ENTMCNC: 31.4 G/DL (ref 31.4–37.4)
MCHC RBC AUTO-ENTMCNC: 31.5 G/DL (ref 31.4–37.4)
MCHC RBC AUTO-ENTMCNC: 31.5 G/DL (ref 31.4–37.4)
MCHC RBC AUTO-ENTMCNC: 31.8 G/DL (ref 31.4–37.4)
MCHC RBC AUTO-ENTMCNC: 31.8 G/DL (ref 31.4–37.4)
MCHC RBC AUTO-ENTMCNC: 32.2 G/DL (ref 31.4–37.4)
MCHC RBC AUTO-ENTMCNC: 32.3 G/DL (ref 31.4–37.4)
MCV RBC AUTO: 86 FL (ref 82–98)
MCV RBC AUTO: 86 FL (ref 82–98)
MCV RBC AUTO: 87 FL (ref 82–98)
MCV RBC AUTO: 88 FL (ref 82–98)
MCV RBC AUTO: 88 FL (ref 82–98)
MCV RBC AUTO: 89 FL (ref 82–98)
MCV RBC AUTO: 90 FL (ref 82–98)
MCV RBC AUTO: 91 FL (ref 82–98)
MONOCYTES # BLD AUTO: 0.37 THOUSAND/ΜL (ref 0.17–1.22)
MONOCYTES # BLD AUTO: 0.55 THOUSAND/ΜL (ref 0.17–1.22)
MONOCYTES # BLD AUTO: 0.58 THOUSAND/ΜL (ref 0.17–1.22)
MONOCYTES # BLD AUTO: 0.62 THOUSAND/ΜL (ref 0.17–1.22)
MONOCYTES # BLD AUTO: 0.67 THOUSAND/ΜL (ref 0.17–1.22)
MONOCYTES # BLD AUTO: 0.83 THOUSAND/ΜL (ref 0.17–1.22)
MONOCYTES # BLD AUTO: 0.83 THOUSAND/ΜL (ref 0.17–1.22)
MONOCYTES # BLD AUTO: 0.9 THOUSAND/ΜL (ref 0.17–1.22)
MONOCYTES # BLD AUTO: 0.92 THOUSAND/ΜL (ref 0.17–1.22)
MONOCYTES NFR BLD AUTO: 10 % (ref 4–12)
MONOCYTES NFR BLD AUTO: 6 % (ref 4–12)
MONOCYTES NFR BLD AUTO: 6 % (ref 4–12)
MONOCYTES NFR BLD AUTO: 7 % (ref 4–12)
MONOCYTES NFR BLD AUTO: 8 % (ref 4–12)
MONOCYTES NFR BLD AUTO: 8 % (ref 4–12)
NEUTROPHILS # BLD AUTO: 10.15 THOUSANDS/ΜL (ref 1.85–7.62)
NEUTROPHILS # BLD AUTO: 10.38 THOUSANDS/ΜL (ref 1.85–7.62)
NEUTROPHILS # BLD AUTO: 10.55 THOUSANDS/ΜL (ref 1.85–7.62)
NEUTROPHILS # BLD AUTO: 12.12 THOUSANDS/ΜL (ref 1.85–7.62)
NEUTROPHILS # BLD AUTO: 3.56 THOUSANDS/ΜL (ref 1.85–7.62)
NEUTROPHILS # BLD AUTO: 4.28 THOUSANDS/ΜL (ref 1.85–7.62)
NEUTROPHILS # BLD AUTO: 7.26 THOUSANDS/ΜL (ref 1.85–7.62)
NEUTROPHILS # BLD AUTO: 9 THOUSANDS/ΜL (ref 1.85–7.62)
NEUTROPHILS # BLD AUTO: 9.74 THOUSANDS/ΜL (ref 1.85–7.62)
NEUTS SEG NFR BLD AUTO: 77 % (ref 43–75)
NEUTS SEG NFR BLD AUTO: 79 % (ref 43–75)
NEUTS SEG NFR BLD AUTO: 85 % (ref 43–75)
NEUTS SEG NFR BLD AUTO: 86 % (ref 43–75)
NEUTS SEG NFR BLD AUTO: 87 % (ref 43–75)
NEUTS SEG NFR BLD AUTO: 88 % (ref 43–75)
NEUTS SEG NFR BLD AUTO: 89 % (ref 43–75)
NITRITE UR QL STRIP: POSITIVE
NON-SQ EPI CELLS URNS QL MICRO: ABNORMAL /HPF
NRBC BLD AUTO-RTO: 0 /100 WBCS
NT-PROBNP SERPL-MCNC: ABNORMAL PG/ML
NT-PROBNP SERPL-MCNC: ABNORMAL PG/ML
OSMOLALITY UR: 346 MMOL/KG
P AXIS: 62 DEGREES
P AXIS: 83 DEGREES
PCO2 BLD: 14 MMOL/L (ref 21–32)
PCO2 BLD: 28.4 MM HG (ref 36–44)
PH BLD: 7.28 [PH] (ref 7.35–7.45)
PH UR STRIP.AUTO: 5.5 [PH]
PHOSPHATE SERPL-MCNC: 6.8 MG/DL (ref 2.3–4.1)
PHOSPHATE SERPL-MCNC: 9.1 MG/DL (ref 2.3–4.1)
PHOSPHATE SERPL-MCNC: 9.4 MG/DL (ref 2.3–4.1)
PLATELET # BLD AUTO: 425 THOUSANDS/UL (ref 149–390)
PLATELET # BLD AUTO: 458 THOUSANDS/UL (ref 149–390)
PLATELET # BLD AUTO: 459 THOUSANDS/UL (ref 149–390)
PLATELET # BLD AUTO: 468 THOUSANDS/UL (ref 149–390)
PLATELET # BLD AUTO: 470 THOUSANDS/UL (ref 149–390)
PLATELET # BLD AUTO: 483 THOUSANDS/UL (ref 149–390)
PLATELET # BLD AUTO: 498 THOUSANDS/UL (ref 149–390)
PLATELET # BLD AUTO: 501 THOUSANDS/UL (ref 149–390)
PLATELET # BLD AUTO: 534 THOUSANDS/UL (ref 149–390)
PLATELET # BLD AUTO: 557 THOUSANDS/UL (ref 149–390)
PLATELET # BLD AUTO: 615 THOUSANDS/UL (ref 149–390)
PMV BLD AUTO: 10 FL (ref 8.9–12.7)
PMV BLD AUTO: 9 FL (ref 8.9–12.7)
PMV BLD AUTO: 9.1 FL (ref 8.9–12.7)
PMV BLD AUTO: 9.3 FL (ref 8.9–12.7)
PMV BLD AUTO: 9.4 FL (ref 8.9–12.7)
PMV BLD AUTO: 9.6 FL (ref 8.9–12.7)
PMV BLD AUTO: 9.6 FL (ref 8.9–12.7)
PMV BLD AUTO: 9.7 FL (ref 8.9–12.7)
PMV BLD AUTO: 9.7 FL (ref 8.9–12.7)
PMV BLD AUTO: 9.8 FL (ref 8.9–12.7)
PMV BLD AUTO: 9.9 FL (ref 8.9–12.7)
PO2 BLD: 83 MM HG (ref 75–129)
POTASSIUM BLD-SCNC: 5.9 MMOL/L (ref 3.5–5.3)
POTASSIUM SERPL-SCNC: 4.1 MMOL/L (ref 3.5–5.3)
POTASSIUM SERPL-SCNC: 4.1 MMOL/L (ref 3.5–5.3)
POTASSIUM SERPL-SCNC: 4.3 MMOL/L (ref 3.5–5.3)
POTASSIUM SERPL-SCNC: 4.3 MMOL/L (ref 3.5–5.3)
POTASSIUM SERPL-SCNC: 4.4 MMOL/L (ref 3.5–5.3)
POTASSIUM SERPL-SCNC: 4.5 MMOL/L (ref 3.5–5.3)
POTASSIUM SERPL-SCNC: 4.7 MMOL/L (ref 3.5–5.3)
POTASSIUM SERPL-SCNC: 4.7 MMOL/L (ref 3.5–5.3)
POTASSIUM SERPL-SCNC: 4.8 MMOL/L (ref 3.5–5.3)
POTASSIUM SERPL-SCNC: 5.2 MMOL/L (ref 3.5–5.3)
POTASSIUM SERPL-SCNC: 5.2 MMOL/L (ref 3.5–5.3)
POTASSIUM SERPL-SCNC: 5.5 MMOL/L (ref 3.5–5.3)
POTASSIUM SERPL-SCNC: 5.7 MMOL/L (ref 3.5–5.3)
POTASSIUM SERPL-SCNC: 5.9 MMOL/L (ref 3.5–5.3)
POTASSIUM SERPL-SCNC: 6 MMOL/L (ref 3.5–5.3)
POTASSIUM SERPL-SCNC: 6 MMOL/L (ref 3.5–5.3)
PR INTERVAL: 134 MS
PR INTERVAL: 136 MS
PROCALCITONIN SERPL-MCNC: 0.28 NG/ML
PROCALCITONIN SERPL-MCNC: 3.19 NG/ML
PROT PATTERN SERPL ELPH-IMP: ABNORMAL
PROT SERPL-MCNC: 8.1 G/DL (ref 6.4–8.2)
PROT SERPL-MCNC: 8.3 G/DL (ref 6.4–8.2)
PROT SERPL-MCNC: 8.4 G/DL (ref 6.4–8.2)
PROT SERPL-MCNC: 8.6 G/DL (ref 6.4–8.2)
PROT SERPL-MCNC: 8.7 G/DL (ref 6.4–8.2)
PROT SERPL-MCNC: 9.3 G/DL (ref 6.4–8.2)
PROT UR STRIP-MCNC: ABNORMAL MG/DL
PROTHROMBIN TIME: 15.1 SECONDS (ref 11.6–14.5)
PROTHROMBIN TIME: 15.1 SECONDS (ref 11.6–14.5)
PROTHROMBIN TIME: 23.2 SECONDS (ref 11.6–14.5)
QRS AXIS: 30 DEGREES
QRS AXIS: 56 DEGREES
QRSD INTERVAL: 80 MS
QRSD INTERVAL: 80 MS
QT INTERVAL: 340 MS
QT INTERVAL: 340 MS
QTC INTERVAL: 401 MS
QTC INTERVAL: 401 MS
RBC # BLD AUTO: 2.77 MILLION/UL (ref 3.81–5.12)
RBC # BLD AUTO: 2.95 MILLION/UL (ref 3.81–5.12)
RBC # BLD AUTO: 3.01 MILLION/UL (ref 3.81–5.12)
RBC # BLD AUTO: 3.03 MILLION/UL (ref 3.81–5.12)
RBC # BLD AUTO: 3.03 MILLION/UL (ref 3.81–5.12)
RBC # BLD AUTO: 3.28 MILLION/UL (ref 3.81–5.12)
RBC # BLD AUTO: 3.29 MILLION/UL (ref 3.81–5.12)
RBC # BLD AUTO: 3.31 MILLION/UL (ref 3.81–5.12)
RBC # BLD AUTO: 3.4 MILLION/UL (ref 3.81–5.12)
RBC # BLD AUTO: 3.55 MILLION/UL (ref 3.81–5.12)
RBC # BLD AUTO: 3.62 MILLION/UL (ref 3.81–5.12)
RBC #/AREA URNS AUTO: ABNORMAL /HPF
RH BLD: NEGATIVE
RH BLD: NEGATIVE
RSV RNA RESP QL NAA+PROBE: NEGATIVE
SAO2 % BLD FROM PO2: 95 % (ref 60–85)
SARS-COV-2 RNA RESP QL NAA+PROBE: NEGATIVE
SODIUM BLD-SCNC: 135 MMOL/L (ref 136–145)
SODIUM SERPL-SCNC: 132 MMOL/L (ref 136–145)
SODIUM SERPL-SCNC: 133 MMOL/L (ref 136–145)
SODIUM SERPL-SCNC: 133 MMOL/L (ref 136–145)
SODIUM SERPL-SCNC: 134 MMOL/L (ref 136–145)
SODIUM SERPL-SCNC: 135 MMOL/L (ref 136–145)
SODIUM SERPL-SCNC: 136 MMOL/L (ref 136–145)
SODIUM SERPL-SCNC: 137 MMOL/L (ref 136–145)
SODIUM SERPL-SCNC: 138 MMOL/L (ref 136–145)
SODIUM SERPL-SCNC: 139 MMOL/L (ref 136–145)
SODIUM SERPL-SCNC: 140 MMOL/L (ref 136–145)
SODIUM SERPL-SCNC: 140 MMOL/L (ref 136–145)
SP GR UR STRIP.AUTO: 1.02 (ref 1–1.03)
SPECIMEN EXPIRATION DATE: NORMAL
SPECIMEN SOURCE: ABNORMAL
T WAVE AXIS: 246 DEGREES
T WAVE AXIS: 264 DEGREES
T3FREE SERPL-MCNC: <0.5 PG/ML (ref 2.3–4.2)
T4 FREE SERPL-MCNC: 1.1 NG/DL (ref 0.76–1.46)
T4 FREE SERPL-MCNC: 1.41 NG/DL (ref 0.76–1.46)
T4 FREE SERPL-MCNC: 1.42 NG/DL (ref 0.76–1.46)
TROPONIN I SERPL-MCNC: <0.02 NG/ML
TROPONIN I SERPL-MCNC: <0.02 NG/ML
TSH SERPL DL<=0.05 MIU/L-ACNC: 5.93 UIU/ML (ref 0.36–3.74)
TSH SERPL DL<=0.05 MIU/L-ACNC: 6.49 UIU/ML (ref 0.36–3.74)
TSH SERPL DL<=0.05 MIU/L-ACNC: 8.26 UIU/ML (ref 0.36–3.74)
URATE SERPL-MCNC: 14 MG/DL (ref 2–6.8)
UROBILINOGEN UR QL STRIP.AUTO: 0.2 E.U./DL
UUN 24H UR-MCNC: 294 MG/DL
VENTRICULAR RATE: 84 BPM
VENTRICULAR RATE: 84 BPM
WBC # BLD AUTO: 10.18 THOUSAND/UL (ref 4.31–10.16)
WBC # BLD AUTO: 11.3 THOUSAND/UL (ref 4.31–10.16)
WBC # BLD AUTO: 11.69 THOUSAND/UL (ref 4.31–10.16)
WBC # BLD AUTO: 11.7 THOUSAND/UL (ref 4.31–10.16)
WBC # BLD AUTO: 11.8 THOUSAND/UL (ref 4.31–10.16)
WBC # BLD AUTO: 13.58 THOUSAND/UL (ref 4.31–10.16)
WBC # BLD AUTO: 4 THOUSAND/UL (ref 4.31–10.16)
WBC # BLD AUTO: 4.51 THOUSAND/UL (ref 4.31–10.16)
WBC # BLD AUTO: 4.67 THOUSAND/UL (ref 4.31–10.16)
WBC # BLD AUTO: 5.56 THOUSAND/UL (ref 4.31–10.16)
WBC # BLD AUTO: 8.52 THOUSAND/UL (ref 4.31–10.16)
WBC #/AREA URNS AUTO: ABNORMAL /HPF

## 2021-01-01 PROCEDURE — 80053 COMPREHEN METABOLIC PANEL: CPT | Performed by: PHYSICIAN ASSISTANT

## 2021-01-01 PROCEDURE — 99292 CRITICAL CARE ADDL 30 MIN: CPT | Performed by: INTERNAL MEDICINE

## 2021-01-01 PROCEDURE — 82948 REAGENT STRIP/BLOOD GLUCOSE: CPT

## 2021-01-01 PROCEDURE — G1004 CDSM NDSC: HCPCS

## 2021-01-01 PROCEDURE — 82533 TOTAL CORTISOL: CPT | Performed by: PHYSICIAN ASSISTANT

## 2021-01-01 PROCEDURE — 99232 SBSQ HOSP IP/OBS MODERATE 35: CPT | Performed by: INTERNAL MEDICINE

## 2021-01-01 PROCEDURE — 84484 ASSAY OF TROPONIN QUANT: CPT | Performed by: PHYSICIAN ASSISTANT

## 2021-01-01 PROCEDURE — 96366 THER/PROPH/DIAG IV INF ADDON: CPT

## 2021-01-01 PROCEDURE — 84100 ASSAY OF PHOSPHORUS: CPT | Performed by: PHYSICIAN ASSISTANT

## 2021-01-01 PROCEDURE — 4A133J1 MONITORING OF ARTERIAL PULSE, PERIPHERAL, PERCUTANEOUS APPROACH: ICD-10-PCS | Performed by: INTERNAL MEDICINE

## 2021-01-01 PROCEDURE — 86038 ANTINUCLEAR ANTIBODIES: CPT | Performed by: INTERNAL MEDICINE

## 2021-01-01 PROCEDURE — 36415 COLL VENOUS BLD VENIPUNCTURE: CPT | Performed by: PHYSICIAN ASSISTANT

## 2021-01-01 PROCEDURE — 83735 ASSAY OF MAGNESIUM: CPT | Performed by: PHYSICIAN ASSISTANT

## 2021-01-01 PROCEDURE — 80048 BASIC METABOLIC PNL TOTAL CA: CPT | Performed by: INTERNAL MEDICINE

## 2021-01-01 PROCEDURE — NC001 PR NO CHARGE: Performed by: RADIOLOGY

## 2021-01-01 PROCEDURE — 93005 ELECTROCARDIOGRAM TRACING: CPT

## 2021-01-01 PROCEDURE — 84484 ASSAY OF TROPONIN QUANT: CPT | Performed by: EMERGENCY MEDICINE

## 2021-01-01 PROCEDURE — 96374 THER/PROPH/DIAG INJ IV PUSH: CPT

## 2021-01-01 PROCEDURE — 85730 THROMBOPLASTIN TIME PARTIAL: CPT | Performed by: INTERNAL MEDICINE

## 2021-01-01 PROCEDURE — 72128 CT CHEST SPINE W/O DYE: CPT

## 2021-01-01 PROCEDURE — 83880 ASSAY OF NATRIURETIC PEPTIDE: CPT | Performed by: PHYSICIAN ASSISTANT

## 2021-01-01 PROCEDURE — 83605 ASSAY OF LACTIC ACID: CPT | Performed by: PHYSICIAN ASSISTANT

## 2021-01-01 PROCEDURE — 36415 COLL VENOUS BLD VENIPUNCTURE: CPT | Performed by: EMERGENCY MEDICINE

## 2021-01-01 PROCEDURE — 91300 SARS-COV-2 / COVID-19 MRNA VACCINE (PFIZER-BIONTECH) 30 MCG: CPT

## 2021-01-01 PROCEDURE — C1729 CATH, DRAINAGE: HCPCS

## 2021-01-01 PROCEDURE — 84481 FREE ASSAY (FT-3): CPT | Performed by: PHYSICIAN ASSISTANT

## 2021-01-01 PROCEDURE — 71046 X-RAY EXAM CHEST 2 VIEWS: CPT

## 2021-01-01 PROCEDURE — 83520 IMMUNOASSAY QUANT NOS NONAB: CPT | Performed by: INTERNAL MEDICINE

## 2021-01-01 PROCEDURE — 99232 SBSQ HOSP IP/OBS MODERATE 35: CPT | Performed by: SURGERY

## 2021-01-01 PROCEDURE — 90945 DIALYSIS ONE EVALUATION: CPT

## 2021-01-01 PROCEDURE — 84100 ASSAY OF PHOSPHORUS: CPT | Performed by: NURSE PRACTITIONER

## 2021-01-01 PROCEDURE — 74176 CT ABD & PELVIS W/O CONTRAST: CPT

## 2021-01-01 PROCEDURE — 4A133B1 MONITORING OF ARTERIAL PRESSURE, PERIPHERAL, PERCUTANEOUS APPROACH: ICD-10-PCS | Performed by: INTERNAL MEDICINE

## 2021-01-01 PROCEDURE — U0003 INFECTIOUS AGENT DETECTION BY NUCLEIC ACID (DNA OR RNA); SEVERE ACUTE RESPIRATORY SYNDROME CORONAVIRUS 2 (SARS-COV-2) (CORONAVIRUS DISEASE [COVID-19]), AMPLIFIED PROBE TECHNIQUE, MAKING USE OF HIGH THROUGHPUT TECHNOLOGIES AS DESCRIBED BY CMS-2020-01-R: HCPCS | Performed by: EMERGENCY MEDICINE

## 2021-01-01 PROCEDURE — 99285 EMERGENCY DEPT VISIT HI MDM: CPT

## 2021-01-01 PROCEDURE — 93010 ELECTROCARDIOGRAM REPORT: CPT | Performed by: INTERNAL MEDICINE

## 2021-01-01 PROCEDURE — 71045 X-RAY EXAM CHEST 1 VIEW: CPT

## 2021-01-01 PROCEDURE — 06HY33Z INSERTION OF INFUSION DEVICE INTO LOWER VEIN, PERCUTANEOUS APPROACH: ICD-10-PCS | Performed by: INTERNAL MEDICINE

## 2021-01-01 PROCEDURE — 83735 ASSAY OF MAGNESIUM: CPT | Performed by: INTERNAL MEDICINE

## 2021-01-01 PROCEDURE — 85027 COMPLETE CBC AUTOMATED: CPT | Performed by: INTERNAL MEDICINE

## 2021-01-01 PROCEDURE — 76937 US GUIDE VASCULAR ACCESS: CPT | Performed by: PHYSICIAN ASSISTANT

## 2021-01-01 PROCEDURE — 99238 HOSP IP/OBS DSCHRG MGMT 30/<: CPT | Performed by: INTERNAL MEDICINE

## 2021-01-01 PROCEDURE — 87070 CULTURE OTHR SPECIMN AEROBIC: CPT | Performed by: INTERNAL MEDICINE

## 2021-01-01 PROCEDURE — 99291 CRITICAL CARE FIRST HOUR: CPT | Performed by: INTERNAL MEDICINE

## 2021-01-01 PROCEDURE — 84443 ASSAY THYROID STIM HORMONE: CPT | Performed by: FAMILY MEDICINE

## 2021-01-01 PROCEDURE — 99222 1ST HOSP IP/OBS MODERATE 55: CPT | Performed by: INTERNAL MEDICINE

## 2021-01-01 PROCEDURE — 85025 COMPLETE CBC W/AUTO DIFF WBC: CPT | Performed by: PHYSICIAN ASSISTANT

## 2021-01-01 PROCEDURE — 99223 1ST HOSP IP/OBS HIGH 75: CPT | Performed by: INTERNAL MEDICINE

## 2021-01-01 PROCEDURE — NC001 PR NO CHARGE: Performed by: PHYSICIAN ASSISTANT

## 2021-01-01 PROCEDURE — 85730 THROMBOPLASTIN TIME PARTIAL: CPT | Performed by: NURSE PRACTITIONER

## 2021-01-01 PROCEDURE — 36556 INSERT NON-TUNNEL CV CATH: CPT | Performed by: PHYSICIAN ASSISTANT

## 2021-01-01 PROCEDURE — 82330 ASSAY OF CALCIUM: CPT | Performed by: INTERNAL MEDICINE

## 2021-01-01 PROCEDURE — 80053 COMPREHEN METABOLIC PANEL: CPT | Performed by: INTERNAL MEDICINE

## 2021-01-01 PROCEDURE — 85025 COMPLETE CBC W/AUTO DIFF WBC: CPT

## 2021-01-01 PROCEDURE — 93970 EXTREMITY STUDY: CPT | Performed by: SURGERY

## 2021-01-01 PROCEDURE — 83880 ASSAY OF NATRIURETIC PEPTIDE: CPT | Performed by: EMERGENCY MEDICINE

## 2021-01-01 PROCEDURE — 83735 ASSAY OF MAGNESIUM: CPT | Performed by: FAMILY MEDICINE

## 2021-01-01 PROCEDURE — 86334 IMMUNOFIX E-PHORESIS SERUM: CPT | Performed by: PATHOLOGY

## 2021-01-01 PROCEDURE — 84443 ASSAY THYROID STIM HORMONE: CPT | Performed by: PHYSICIAN ASSISTANT

## 2021-01-01 PROCEDURE — 99233 SBSQ HOSP IP/OBS HIGH 50: CPT | Performed by: INTERNAL MEDICINE

## 2021-01-01 PROCEDURE — NC001 PR NO CHARGE: Performed by: INTERNAL MEDICINE

## 2021-01-01 PROCEDURE — 84295 ASSAY OF SERUM SODIUM: CPT

## 2021-01-01 PROCEDURE — U0005 INFEC AGEN DETEC AMPLI PROBE: HCPCS | Performed by: EMERGENCY MEDICINE

## 2021-01-01 PROCEDURE — 72070 X-RAY EXAM THORAC SPINE 2VWS: CPT

## 2021-01-01 PROCEDURE — 99441 PR PHYS/QHP TELEPHONE EVALUATION 5-10 MIN: CPT | Performed by: FAMILY MEDICINE

## 2021-01-01 PROCEDURE — 87186 SC STD MICRODIL/AGAR DIL: CPT | Performed by: NURSE PRACTITIONER

## 2021-01-01 PROCEDURE — 87077 CULTURE AEROBIC IDENTIFY: CPT | Performed by: INTERNAL MEDICINE

## 2021-01-01 PROCEDURE — 93308 TTE F-UP OR LMTD: CPT

## 2021-01-01 PROCEDURE — 80048 BASIC METABOLIC PNL TOTAL CA: CPT

## 2021-01-01 PROCEDURE — 82947 ASSAY GLUCOSE BLOOD QUANT: CPT

## 2021-01-01 PROCEDURE — 80053 COMPREHEN METABOLIC PANEL: CPT | Performed by: EMERGENCY MEDICINE

## 2021-01-01 PROCEDURE — 80048 BASIC METABOLIC PNL TOTAL CA: CPT | Performed by: PHYSICIAN ASSISTANT

## 2021-01-01 PROCEDURE — 99285 EMERGENCY DEPT VISIT HI MDM: CPT | Performed by: PHYSICIAN ASSISTANT

## 2021-01-01 PROCEDURE — 83935 ASSAY OF URINE OSMOLALITY: CPT | Performed by: NURSE PRACTITIONER

## 2021-01-01 PROCEDURE — 93321 DOPPLER ECHO F-UP/LMTD STD: CPT | Performed by: INTERNAL MEDICINE

## 2021-01-01 PROCEDURE — 85610 PROTHROMBIN TIME: CPT | Performed by: PHYSICIAN ASSISTANT

## 2021-01-01 PROCEDURE — 81001 URINALYSIS AUTO W/SCOPE: CPT | Performed by: NURSE PRACTITIONER

## 2021-01-01 PROCEDURE — 85014 HEMATOCRIT: CPT

## 2021-01-01 PROCEDURE — 36415 COLL VENOUS BLD VENIPUNCTURE: CPT | Performed by: FAMILY MEDICINE

## 2021-01-01 PROCEDURE — 0241U HB NFCT DS VIR RESP RNA 4 TRGT: CPT | Performed by: PHYSICIAN ASSISTANT

## 2021-01-01 PROCEDURE — 84439 ASSAY OF FREE THYROXINE: CPT | Performed by: PHYSICIAN ASSISTANT

## 2021-01-01 PROCEDURE — NC001 PR NO CHARGE: Performed by: NURSE PRACTITIONER

## 2021-01-01 PROCEDURE — 84439 ASSAY OF FREE THYROXINE: CPT | Performed by: FAMILY MEDICINE

## 2021-01-01 PROCEDURE — 99214 OFFICE O/P EST MOD 30 MIN: CPT | Performed by: FAMILY MEDICINE

## 2021-01-01 PROCEDURE — 93308 TTE F-UP OR LMTD: CPT | Performed by: INTERNAL MEDICINE

## 2021-01-01 PROCEDURE — 86901 BLOOD TYPING SEROLOGIC RH(D): CPT | Performed by: FAMILY MEDICINE

## 2021-01-01 PROCEDURE — 84165 PROTEIN E-PHORESIS SERUM: CPT | Performed by: INTERNAL MEDICINE

## 2021-01-01 PROCEDURE — 84145 PROCALCITONIN (PCT): CPT | Performed by: INTERNAL MEDICINE

## 2021-01-01 PROCEDURE — 85025 COMPLETE CBC W/AUTO DIFF WBC: CPT | Performed by: FAMILY MEDICINE

## 2021-01-01 PROCEDURE — 86900 BLOOD TYPING SEROLOGIC ABO: CPT | Performed by: FAMILY MEDICINE

## 2021-01-01 PROCEDURE — 99222 1ST HOSP IP/OBS MODERATE 55: CPT | Performed by: SURGERY

## 2021-01-01 PROCEDURE — 36415 COLL VENOUS BLD VENIPUNCTURE: CPT | Performed by: NURSE PRACTITIONER

## 2021-01-01 PROCEDURE — 0W9F30Z DRAINAGE OF ABDOMINAL WALL WITH DRAINAGE DEVICE, PERCUTANEOUS APPROACH: ICD-10-PCS | Performed by: INTERNAL MEDICINE

## 2021-01-01 PROCEDURE — 85730 THROMBOPLASTIN TIME PARTIAL: CPT | Performed by: PHYSICIAN ASSISTANT

## 2021-01-01 PROCEDURE — 99223 1ST HOSP IP/OBS HIGH 75: CPT | Performed by: NEUROLOGICAL SURGERY

## 2021-01-01 PROCEDURE — 83735 ASSAY OF MAGNESIUM: CPT | Performed by: NURSE PRACTITIONER

## 2021-01-01 PROCEDURE — 85025 COMPLETE CBC W/AUTO DIFF WBC: CPT | Performed by: INTERNAL MEDICINE

## 2021-01-01 PROCEDURE — 85025 COMPLETE CBC W/AUTO DIFF WBC: CPT | Performed by: NURSE PRACTITIONER

## 2021-01-01 PROCEDURE — 93325 DOPPLER ECHO COLOR FLOW MAPG: CPT | Performed by: INTERNAL MEDICINE

## 2021-01-01 PROCEDURE — 36620 INSERTION CATHETER ARTERY: CPT | Performed by: PHYSICIAN ASSISTANT

## 2021-01-01 PROCEDURE — 87077 CULTURE AEROBIC IDENTIFY: CPT | Performed by: NURSE PRACTITIONER

## 2021-01-01 PROCEDURE — 99232 SBSQ HOSP IP/OBS MODERATE 35: CPT | Performed by: NURSE PRACTITIONER

## 2021-01-01 PROCEDURE — 86160 COMPLEMENT ANTIGEN: CPT | Performed by: INTERNAL MEDICINE

## 2021-01-01 PROCEDURE — 85018 HEMOGLOBIN: CPT | Performed by: PHYSICIAN ASSISTANT

## 2021-01-01 PROCEDURE — 82550 ASSAY OF CK (CPK): CPT | Performed by: PHYSICIAN ASSISTANT

## 2021-01-01 PROCEDURE — 99291 CRITICAL CARE FIRST HOUR: CPT | Performed by: NURSE PRACTITIONER

## 2021-01-01 PROCEDURE — 87040 BLOOD CULTURE FOR BACTERIA: CPT | Performed by: NURSE PRACTITIONER

## 2021-01-01 PROCEDURE — 84145 PROCALCITONIN (PCT): CPT | Performed by: NURSE PRACTITIONER

## 2021-01-01 PROCEDURE — 99285 EMERGENCY DEPT VISIT HI MDM: CPT | Performed by: EMERGENCY MEDICINE

## 2021-01-01 PROCEDURE — 84540 ASSAY OF URINE/UREA-N: CPT | Performed by: NURSE PRACTITIONER

## 2021-01-01 PROCEDURE — 86255 FLUORESCENT ANTIBODY SCREEN: CPT | Performed by: INTERNAL MEDICINE

## 2021-01-01 PROCEDURE — 96365 THER/PROPH/DIAG IV INF INIT: CPT

## 2021-01-01 PROCEDURE — 85730 THROMBOPLASTIN TIME PARTIAL: CPT | Performed by: EMERGENCY MEDICINE

## 2021-01-01 PROCEDURE — 84550 ASSAY OF BLOOD/URIC ACID: CPT | Performed by: INTERNAL MEDICINE

## 2021-01-01 PROCEDURE — 87186 SC STD MICRODIL/AGAR DIL: CPT | Performed by: INTERNAL MEDICINE

## 2021-01-01 PROCEDURE — 80048 BASIC METABOLIC PNL TOTAL CA: CPT | Performed by: NURSE PRACTITIONER

## 2021-01-01 PROCEDURE — 87205 SMEAR GRAM STAIN: CPT | Performed by: INTERNAL MEDICINE

## 2021-01-01 PROCEDURE — 84165 PROTEIN E-PHORESIS SERUM: CPT | Performed by: PATHOLOGY

## 2021-01-01 PROCEDURE — 99214 OFFICE O/P EST MOD 30 MIN: CPT | Performed by: INTERNAL MEDICINE

## 2021-01-01 PROCEDURE — 99222 1ST HOSP IP/OBS MODERATE 55: CPT | Performed by: UROLOGY

## 2021-01-01 PROCEDURE — 49406 IMAGE CATH FLUID PERI/RETRO: CPT | Performed by: RADIOLOGY

## 2021-01-01 PROCEDURE — 85025 COMPLETE CBC W/AUTO DIFF WBC: CPT | Performed by: EMERGENCY MEDICINE

## 2021-01-01 PROCEDURE — 85379 FIBRIN DEGRADATION QUANT: CPT | Performed by: EMERGENCY MEDICINE

## 2021-01-01 PROCEDURE — 10030 IMG GID FLU COLL DRG SFT TIS: CPT

## 2021-01-01 PROCEDURE — 96375 TX/PRO/DX INJ NEW DRUG ADDON: CPT

## 2021-01-01 PROCEDURE — 82803 BLOOD GASES ANY COMBINATION: CPT

## 2021-01-01 PROCEDURE — 83605 ASSAY OF LACTIC ACID: CPT | Performed by: NURSE PRACTITIONER

## 2021-01-01 PROCEDURE — 99442 PR PHYS/QHP TELEPHONE EVALUATION 11-20 MIN: CPT | Performed by: FAMILY MEDICINE

## 2021-01-01 PROCEDURE — 82595 ASSAY OF CRYOGLOBULIN: CPT | Performed by: INTERNAL MEDICINE

## 2021-01-01 PROCEDURE — 85610 PROTHROMBIN TIME: CPT | Performed by: EMERGENCY MEDICINE

## 2021-01-01 PROCEDURE — 83883 ASSAY NEPHELOMETRY NOT SPEC: CPT | Performed by: INTERNAL MEDICINE

## 2021-01-01 PROCEDURE — 87635 SARS-COV-2 COVID-19 AMP PRB: CPT

## 2021-01-01 PROCEDURE — 86334 IMMUNOFIX E-PHORESIS SERUM: CPT | Performed by: INTERNAL MEDICINE

## 2021-01-01 PROCEDURE — 86850 RBC ANTIBODY SCREEN: CPT | Performed by: FAMILY MEDICINE

## 2021-01-01 PROCEDURE — 84100 ASSAY OF PHOSPHORUS: CPT | Performed by: INTERNAL MEDICINE

## 2021-01-01 PROCEDURE — 82570 ASSAY OF URINE CREATININE: CPT | Performed by: NURSE PRACTITIONER

## 2021-01-01 PROCEDURE — 0001A SARS-COV-2 / COVID-19 MRNA VACCINE (PFIZER-BIONTECH) 30 MCG: CPT

## 2021-01-01 PROCEDURE — 85027 COMPLETE CBC AUTOMATED: CPT | Performed by: PHYSICIAN ASSISTANT

## 2021-01-01 PROCEDURE — 84132 ASSAY OF SERUM POTASSIUM: CPT

## 2021-01-01 PROCEDURE — 93970 EXTREMITY STUDY: CPT

## 2021-01-01 PROCEDURE — 87086 URINE CULTURE/COLONY COUNT: CPT | Performed by: NURSE PRACTITIONER

## 2021-01-01 RX ORDER — LIDOCAINE HYDROCHLORIDE 10 MG/ML
INJECTION, SOLUTION EPIDURAL; INFILTRATION; INTRACAUDAL; PERINEURAL
Status: COMPLETED
Start: 2021-01-01 | End: 2021-01-01

## 2021-01-01 RX ORDER — MELATONIN
1000 DAILY
Status: DISCONTINUED | OUTPATIENT
Start: 2021-01-01 | End: 2021-01-01 | Stop reason: HOSPADM

## 2021-01-01 RX ORDER — CALCIUM GLUCONATE 20 MG/ML
1 INJECTION, SOLUTION INTRAVENOUS ONCE
Status: COMPLETED | OUTPATIENT
Start: 2021-01-01 | End: 2021-01-01

## 2021-01-01 RX ORDER — HEPARIN SODIUM 1000 [USP'U]/ML
5200 INJECTION, SOLUTION INTRAVENOUS; SUBCUTANEOUS
Status: DISCONTINUED | OUTPATIENT
Start: 2021-01-01 | End: 2021-01-01

## 2021-01-01 RX ORDER — BUMETANIDE 0.25 MG/ML
2 INJECTION, SOLUTION INTRAMUSCULAR; INTRAVENOUS ONCE
Status: COMPLETED | OUTPATIENT
Start: 2021-01-01 | End: 2021-01-01

## 2021-01-01 RX ORDER — ASPIRIN 81 MG/1
81 TABLET ORAL DAILY
Status: DISCONTINUED | OUTPATIENT
Start: 2021-01-01 | End: 2021-04-28 | Stop reason: HOSPADM

## 2021-01-01 RX ORDER — BUMETANIDE 0.25 MG/ML
2 INJECTION, SOLUTION INTRAMUSCULAR; INTRAVENOUS
Status: DISCONTINUED | OUTPATIENT
Start: 2021-01-01 | End: 2021-01-01

## 2021-01-01 RX ORDER — DEXTROSE MONOHYDRATE 25 G/50ML
INJECTION, SOLUTION INTRAVENOUS
Status: COMPLETED
Start: 2021-01-01 | End: 2021-01-01

## 2021-01-01 RX ORDER — DEXTROSE MONOHYDRATE 25 G/50ML
50 INJECTION, SOLUTION INTRAVENOUS ONCE
Status: DISCONTINUED | OUTPATIENT
Start: 2021-01-01 | End: 2021-04-28 | Stop reason: HOSPADM

## 2021-01-01 RX ORDER — TORSEMIDE 10 MG/1
10 TABLET ORAL 2 TIMES DAILY
Qty: 60 TABLET | Refills: 5 | Status: SHIPPED | OUTPATIENT
Start: 2021-01-01 | End: 2021-01-01 | Stop reason: HOSPADM

## 2021-01-01 RX ORDER — METOLAZONE 2.5 MG/1
2.5 TABLET ORAL DAILY
Qty: 10 TABLET | Refills: 0 | Status: SHIPPED | OUTPATIENT
Start: 2021-01-01 | End: 2021-04-28 | Stop reason: HOSPADM

## 2021-01-01 RX ORDER — POTASSIUM CHLORIDE 750 MG/1
10 TABLET, EXTENDED RELEASE ORAL 2 TIMES DAILY
Qty: 60 TABLET | Refills: 0 | Status: SHIPPED | OUTPATIENT
Start: 2021-01-01 | End: 2021-04-28 | Stop reason: HOSPADM

## 2021-01-01 RX ORDER — TORSEMIDE 20 MG/1
20 TABLET ORAL
Status: DISCONTINUED | OUTPATIENT
Start: 2021-01-01 | End: 2021-01-01 | Stop reason: HOSPADM

## 2021-01-01 RX ORDER — POTASSIUM CHLORIDE 750 MG/1
10 TABLET, EXTENDED RELEASE ORAL 2 TIMES DAILY
Status: DISCONTINUED | OUTPATIENT
Start: 2021-01-01 | End: 2021-01-01 | Stop reason: HOSPADM

## 2021-01-01 RX ORDER — SODIUM CHLORIDE, SODIUM GLUCONATE, SODIUM ACETATE, POTASSIUM CHLORIDE, MAGNESIUM CHLORIDE, SODIUM PHOSPHATE, DIBASIC, AND POTASSIUM PHOSPHATE .53; .5; .37; .037; .03; .012; .00082 G/100ML; G/100ML; G/100ML; G/100ML; G/100ML; G/100ML; G/100ML
500 INJECTION, SOLUTION INTRAVENOUS ONCE
Status: COMPLETED | OUTPATIENT
Start: 2021-01-01 | End: 2021-01-01

## 2021-01-01 RX ORDER — HEPARIN SODIUM 10000 [USP'U]/100ML
3-30 INJECTION, SOLUTION INTRAVENOUS
Status: DISCONTINUED | OUTPATIENT
Start: 2021-01-01 | End: 2021-01-01

## 2021-01-01 RX ORDER — BUMETANIDE 1 MG/1
2 TABLET ORAL 2 TIMES DAILY
Status: DISCONTINUED | OUTPATIENT
Start: 2021-01-01 | End: 2021-01-01

## 2021-01-01 RX ORDER — HEPARIN SODIUM 5000 [USP'U]/ML
5000 INJECTION, SOLUTION INTRAVENOUS; SUBCUTANEOUS EVERY 8 HOURS SCHEDULED
Status: DISCONTINUED | OUTPATIENT
Start: 2021-01-01 | End: 2021-01-01 | Stop reason: HOSPADM

## 2021-01-01 RX ORDER — NEBIVOLOL 2.5 MG/1
2.5 TABLET ORAL DAILY
Qty: 90 TABLET | Refills: 3
Start: 2021-01-01 | End: 2021-04-28 | Stop reason: HOSPADM

## 2021-01-01 RX ORDER — NEBIVOLOL 5 MG/1
5 TABLET ORAL DAILY
Status: DISCONTINUED | OUTPATIENT
Start: 2021-01-01 | End: 2021-01-01

## 2021-01-01 RX ORDER — ONDANSETRON 2 MG/ML
4 INJECTION INTRAMUSCULAR; INTRAVENOUS EVERY 6 HOURS PRN
Status: DISCONTINUED | OUTPATIENT
Start: 2021-01-01 | End: 2021-01-01 | Stop reason: HOSPADM

## 2021-01-01 RX ORDER — LIDOCAINE HYDROCHLORIDE 10 MG/ML
INJECTION, SOLUTION EPIDURAL; INFILTRATION; INTRACAUDAL; PERINEURAL
Status: DISPENSED
Start: 2021-01-01 | End: 2021-01-01

## 2021-01-01 RX ORDER — LIDOCAINE 50 MG/G
1 PATCH TOPICAL DAILY
Status: DISCONTINUED | OUTPATIENT
Start: 2021-01-01 | End: 2021-04-28 | Stop reason: HOSPADM

## 2021-01-01 RX ORDER — SODIUM CHLORIDE 9 MG/ML
10 INJECTION INTRAVENOUS DAILY
Qty: 30 SYRINGE | Refills: 0 | Status: SHIPPED | OUTPATIENT
Start: 2021-01-01

## 2021-01-01 RX ORDER — DEXTROSE MONOHYDRATE 25 G/50ML
50 INJECTION, SOLUTION INTRAVENOUS ONCE
Status: COMPLETED | OUTPATIENT
Start: 2021-01-01 | End: 2021-01-01

## 2021-01-01 RX ORDER — ALBUMIN, HUMAN INJ 5% 5 %
12.5 SOLUTION INTRAVENOUS ONCE
Status: COMPLETED | OUTPATIENT
Start: 2021-01-01 | End: 2021-01-01

## 2021-01-01 RX ORDER — BUMETANIDE 0.25 MG/ML
2 INJECTION, SOLUTION INTRAMUSCULAR; INTRAVENOUS 2 TIMES DAILY
Status: DISCONTINUED | OUTPATIENT
Start: 2021-01-01 | End: 2021-01-01

## 2021-01-01 RX ORDER — FUROSEMIDE 10 MG/ML
40 INJECTION INTRAMUSCULAR; INTRAVENOUS 2 TIMES DAILY
Status: DISCONTINUED | OUTPATIENT
Start: 2021-01-01 | End: 2021-01-01

## 2021-01-01 RX ORDER — MAGNESIUM HYDROXIDE/ALUMINUM HYDROXICE/SIMETHICONE 120; 1200; 1200 MG/30ML; MG/30ML; MG/30ML
30 SUSPENSION ORAL EVERY 4 HOURS PRN
Status: DISCONTINUED | OUTPATIENT
Start: 2021-01-01 | End: 2021-04-28 | Stop reason: HOSPADM

## 2021-01-01 RX ORDER — TORSEMIDE 20 MG/1
20 TABLET ORAL
Status: DISCONTINUED | OUTPATIENT
Start: 2021-01-01 | End: 2021-01-01

## 2021-01-01 RX ORDER — FUROSEMIDE 40 MG/1
TABLET ORAL
Qty: 90 TABLET | Refills: 1 | Status: SHIPPED | OUTPATIENT
Start: 2021-01-01 | End: 2021-01-01 | Stop reason: ALTCHOICE

## 2021-01-01 RX ORDER — HEPARIN SODIUM 1000 [USP'U]/ML
2600 INJECTION, SOLUTION INTRAVENOUS; SUBCUTANEOUS
Status: DISCONTINUED | OUTPATIENT
Start: 2021-01-01 | End: 2021-01-01

## 2021-01-01 RX ORDER — LEVOTHYROXINE SODIUM 88 UG/1
88 TABLET ORAL DAILY
Status: DISCONTINUED | OUTPATIENT
Start: 2021-01-01 | End: 2021-01-01 | Stop reason: HOSPADM

## 2021-01-01 RX ORDER — HYDROMORPHONE HCL/PF 1 MG/ML
0.5 SYRINGE (ML) INJECTION ONCE
Status: COMPLETED | OUTPATIENT
Start: 2021-01-01 | End: 2021-01-01

## 2021-01-01 RX ORDER — MAGNESIUM HYDROXIDE/ALUMINUM HYDROXICE/SIMETHICONE 120; 1200; 1200 MG/30ML; MG/30ML; MG/30ML
30 SUSPENSION ORAL EVERY 4 HOURS PRN
Status: DISCONTINUED | OUTPATIENT
Start: 2021-01-01 | End: 2021-01-01 | Stop reason: HOSPADM

## 2021-01-01 RX ORDER — TORSEMIDE 20 MG/1
20 TABLET ORAL 2 TIMES DAILY
Qty: 60 TABLET | Refills: 0 | Status: SHIPPED | OUTPATIENT
Start: 2021-01-01 | End: 2021-01-01 | Stop reason: SDUPTHER

## 2021-01-01 RX ORDER — FUROSEMIDE 10 MG/ML
80 INJECTION INTRAMUSCULAR; INTRAVENOUS 2 TIMES DAILY
Status: DISCONTINUED | OUTPATIENT
Start: 2021-01-01 | End: 2021-01-01

## 2021-01-01 RX ORDER — ALBUTEROL SULFATE 90 UG/1
2 AEROSOL, METERED RESPIRATORY (INHALATION) EVERY 6 HOURS PRN
Status: DISCONTINUED | OUTPATIENT
Start: 2021-01-01 | End: 2021-01-01 | Stop reason: HOSPADM

## 2021-01-01 RX ORDER — MILRINONE LACTATE 0.2 MG/ML
0.25 INJECTION, SOLUTION INTRAVENOUS CONTINUOUS
Status: DISCONTINUED | OUTPATIENT
Start: 2021-01-01 | End: 2021-01-01

## 2021-01-01 RX ORDER — ASPIRIN 81 MG/1
81 TABLET ORAL DAILY
Status: DISCONTINUED | OUTPATIENT
Start: 2021-01-01 | End: 2021-01-01 | Stop reason: HOSPADM

## 2021-01-01 RX ORDER — BUMETANIDE 0.25 MG/ML
2 INJECTION, SOLUTION INTRAMUSCULAR; INTRAVENOUS ONCE
Status: DISCONTINUED | OUTPATIENT
Start: 2021-01-01 | End: 2021-01-01

## 2021-01-01 RX ORDER — FENTANYL CITRATE 50 UG/ML
50 INJECTION, SOLUTION INTRAMUSCULAR; INTRAVENOUS EVERY 2 HOUR PRN
Status: DISCONTINUED | OUTPATIENT
Start: 2021-01-01 | End: 2021-04-28 | Stop reason: HOSPADM

## 2021-01-01 RX ORDER — LEVOTHYROXINE SODIUM 0.1 MG/1
100 TABLET ORAL
Status: DISCONTINUED | OUTPATIENT
Start: 2021-01-01 | End: 2021-04-28 | Stop reason: HOSPADM

## 2021-01-01 RX ORDER — FUROSEMIDE 10 MG/ML
40 INJECTION INTRAMUSCULAR; INTRAVENOUS ONCE
Status: COMPLETED | OUTPATIENT
Start: 2021-01-01 | End: 2021-01-01

## 2021-01-01 RX ORDER — ALBUMIN, HUMAN INJ 5% 5 %
12.5 SOLUTION INTRAVENOUS ONCE
Status: DISCONTINUED | OUTPATIENT
Start: 2021-01-01 | End: 2021-04-28 | Stop reason: HOSPADM

## 2021-01-01 RX ORDER — DEXTROSE MONOHYDRATE 25 G/50ML
50 INJECTION, SOLUTION INTRAVENOUS DAILY PRN
Status: DISCONTINUED | OUTPATIENT
Start: 2021-01-01 | End: 2021-04-28 | Stop reason: HOSPADM

## 2021-01-01 RX ORDER — NEBIVOLOL 5 MG/1
2.5 TABLET ORAL DAILY
Status: DISCONTINUED | OUTPATIENT
Start: 2021-01-01 | End: 2021-01-01 | Stop reason: HOSPADM

## 2021-01-01 RX ORDER — ALBUTEROL SULFATE 90 UG/1
2 AEROSOL, METERED RESPIRATORY (INHALATION) EVERY 6 HOURS PRN
Status: DISCONTINUED | OUTPATIENT
Start: 2021-01-01 | End: 2021-04-28 | Stop reason: HOSPADM

## 2021-01-01 RX ORDER — LEVOTHYROXINE SODIUM 0.1 MG/1
100 TABLET ORAL DAILY
Qty: 90 TABLET | Refills: 1 | Status: SHIPPED | OUTPATIENT
Start: 2021-01-01 | End: 2021-04-28 | Stop reason: HOSPADM

## 2021-01-01 RX ORDER — AZITHROMYCIN 250 MG/1
TABLET, FILM COATED ORAL
Qty: 6 TABLET | Refills: 0 | Status: SHIPPED | OUTPATIENT
Start: 2021-01-01 | End: 2021-01-01

## 2021-01-01 RX ORDER — POTASSIUM CHLORIDE 20 MEQ/1
20 TABLET, EXTENDED RELEASE ORAL DAILY
Qty: 30 TABLET | Refills: 5 | Status: SHIPPED | OUTPATIENT
Start: 2021-01-01 | End: 2021-01-01 | Stop reason: HOSPADM

## 2021-01-01 RX ORDER — FENTANYL CITRATE 50 UG/ML
INJECTION, SOLUTION INTRAMUSCULAR; INTRAVENOUS
Status: DISCONTINUED
Start: 2021-01-01 | End: 2021-04-28 | Stop reason: HOSPADM

## 2021-01-01 RX ORDER — DIPHENHYDRAMINE HYDROCHLORIDE 50 MG/ML
25 INJECTION INTRAMUSCULAR; INTRAVENOUS ONCE
Status: COMPLETED | OUTPATIENT
Start: 2021-01-01 | End: 2021-01-01

## 2021-01-01 RX ORDER — FUROSEMIDE 10 MG/ML
80 INJECTION INTRAMUSCULAR; INTRAVENOUS ONCE
Status: COMPLETED | OUTPATIENT
Start: 2021-01-01 | End: 2021-01-01

## 2021-01-01 RX ORDER — ALBUTEROL SULFATE 90 UG/1
2 AEROSOL, METERED RESPIRATORY (INHALATION) EVERY 6 HOURS PRN
Qty: 1 INHALER | Refills: 5 | Status: SHIPPED | OUTPATIENT
Start: 2021-01-01 | End: 2021-04-28 | Stop reason: HOSPADM

## 2021-01-01 RX ORDER — FENTANYL CITRATE 50 UG/ML
INJECTION, SOLUTION INTRAMUSCULAR; INTRAVENOUS CODE/TRAUMA/SEDATION MEDICATION
Status: COMPLETED | OUTPATIENT
Start: 2021-01-01 | End: 2021-01-01

## 2021-01-01 RX ORDER — POTASSIUM CHLORIDE 20 MEQ/1
20 TABLET, EXTENDED RELEASE ORAL DAILY
Status: DISCONTINUED | OUTPATIENT
Start: 2021-01-01 | End: 2021-01-01

## 2021-01-01 RX ORDER — ACETAMINOPHEN 325 MG/1
650 TABLET ORAL EVERY 6 HOURS PRN
Status: DISCONTINUED | OUTPATIENT
Start: 2021-01-01 | End: 2021-04-28 | Stop reason: HOSPADM

## 2021-01-01 RX ORDER — CALCIUM GLUCONATE 20 MG/ML
2 INJECTION, SOLUTION INTRAVENOUS ONCE
Status: DISCONTINUED | OUTPATIENT
Start: 2021-01-01 | End: 2021-04-28 | Stop reason: HOSPADM

## 2021-01-01 RX ORDER — ALPRAZOLAM 0.25 MG/1
0.25 TABLET ORAL 2 TIMES DAILY PRN
Status: DISCONTINUED | OUTPATIENT
Start: 2021-01-01 | End: 2021-04-28 | Stop reason: HOSPADM

## 2021-01-01 RX ORDER — DEXTROSE MONOHYDRATE 100 MG/ML
30 INJECTION, SOLUTION INTRAVENOUS CONTINUOUS
Status: DISCONTINUED | OUTPATIENT
Start: 2021-01-01 | End: 2021-04-28 | Stop reason: HOSPADM

## 2021-01-01 RX ORDER — BUMETANIDE 2 MG/1
2 TABLET ORAL 2 TIMES DAILY
Qty: 60 TABLET | Refills: 11 | Status: SHIPPED | OUTPATIENT
Start: 2021-01-01 | End: 2021-04-28 | Stop reason: HOSPADM

## 2021-01-01 RX ORDER — SODIUM BICARBONATE 650 MG/1
650 TABLET ORAL
Status: DISCONTINUED | OUTPATIENT
Start: 2021-01-01 | End: 2021-01-01

## 2021-01-01 RX ORDER — TORSEMIDE 20 MG/1
20 TABLET ORAL 2 TIMES DAILY
Qty: 60 TABLET | Refills: 3 | Status: SHIPPED | OUTPATIENT
Start: 2021-01-01 | End: 2021-01-01 | Stop reason: ALTCHOICE

## 2021-01-01 RX ORDER — CALCIUM GLUCONATE 20 MG/ML
1 INJECTION, SOLUTION INTRAVENOUS ONCE
Status: DISCONTINUED | OUTPATIENT
Start: 2021-01-01 | End: 2021-01-01

## 2021-01-01 RX ORDER — HEPARIN SODIUM 1000 [USP'U]/ML
5200 INJECTION, SOLUTION INTRAVENOUS; SUBCUTANEOUS ONCE
Status: COMPLETED | OUTPATIENT
Start: 2021-01-01 | End: 2021-01-01

## 2021-01-01 RX ADMIN — SODIUM BICARBONATE 50 MEQ: 84 INJECTION, SOLUTION INTRAVENOUS at 16:49

## 2021-01-01 RX ADMIN — LEVOTHYROXINE SODIUM 88 MCG: 88 TABLET ORAL at 08:31

## 2021-01-01 RX ADMIN — ASPIRIN 81 MG: 81 TABLET ORAL at 08:13

## 2021-01-01 RX ADMIN — NOREPINEPHRINE BITARTRATE 29 MCG/MIN: 1 INJECTION, SOLUTION, CONCENTRATE INTRAVENOUS at 21:11

## 2021-01-01 RX ADMIN — HEPARIN SODIUM 5000 UNITS: 5000 INJECTION INTRAVENOUS; SUBCUTANEOUS at 05:00

## 2021-01-01 RX ADMIN — Medication 1000 UNITS: at 08:13

## 2021-01-01 RX ADMIN — HEPARIN SODIUM 5000 UNITS: 5000 INJECTION INTRAVENOUS; SUBCUTANEOUS at 21:09

## 2021-01-01 RX ADMIN — HEPARIN SODIUM 5000 UNITS: 5000 INJECTION INTRAVENOUS; SUBCUTANEOUS at 12:59

## 2021-01-01 RX ADMIN — POTASSIUM CHLORIDE 20 MEQ: 1500 TABLET, EXTENDED RELEASE ORAL at 08:12

## 2021-01-01 RX ADMIN — TORSEMIDE 20 MG: 20 TABLET ORAL at 08:52

## 2021-01-01 RX ADMIN — ALUMINA, MAGNESIA, AND SIMETHICONE ORAL SUSPENSION REGULAR STRENGTH 30 ML: 1200; 1200; 120 SUSPENSION ORAL at 19:38

## 2021-01-01 RX ADMIN — LEVOTHYROXINE SODIUM 88 MCG: 88 TABLET ORAL at 08:49

## 2021-01-01 RX ADMIN — FUROSEMIDE 80 MG: 10 INJECTION, SOLUTION INTRAMUSCULAR; INTRAVENOUS at 08:05

## 2021-01-01 RX ADMIN — Medication 20000 ML: at 18:23

## 2021-01-01 RX ADMIN — BUMETANIDE 2 MG: 0.25 INJECTION INTRAMUSCULAR; INTRAVENOUS at 17:08

## 2021-01-01 RX ADMIN — BUMETANIDE 2 MG: 0.25 INJECTION INTRAMUSCULAR; INTRAVENOUS at 14:49

## 2021-01-01 RX ADMIN — MILRINONE LACTATE IN DEXTROSE 0.25 MCG/KG/MIN: 200 INJECTION, SOLUTION INTRAVENOUS at 10:11

## 2021-01-01 RX ADMIN — DEXTROSE MONOHYDRATE 50 ML: 500 INJECTION PARENTERAL at 16:35

## 2021-01-01 RX ADMIN — SODIUM CHLORIDE 500 ML: 0.9 INJECTION, SOLUTION INTRAVENOUS at 22:31

## 2021-01-01 RX ADMIN — HEPARIN SODIUM 5000 UNITS: 5000 INJECTION INTRAVENOUS; SUBCUTANEOUS at 05:31

## 2021-01-01 RX ADMIN — POTASSIUM CHLORIDE 10 MEQ: 750 TABLET, EXTENDED RELEASE ORAL at 08:53

## 2021-01-01 RX ADMIN — LEVOTHYROXINE SODIUM 88 MCG: 88 TABLET ORAL at 10:11

## 2021-01-01 RX ADMIN — FUROSEMIDE 80 MG: 10 INJECTION, SOLUTION INTRAMUSCULAR; INTRAVENOUS at 17:28

## 2021-01-01 RX ADMIN — LIDOCAINE HYDROCHLORIDE 50 MG: 10 INJECTION, SOLUTION EPIDURAL; INFILTRATION; INTRACAUDAL; PERINEURAL at 19:30

## 2021-01-01 RX ADMIN — LEVOTHYROXINE SODIUM 88 MCG: 88 TABLET ORAL at 08:13

## 2021-01-01 RX ADMIN — BUMETANIDE 2 MG: 0.25 INJECTION INTRAMUSCULAR; INTRAVENOUS at 18:28

## 2021-01-01 RX ADMIN — POTASSIUM CHLORIDE 20 MEQ: 1500 TABLET, EXTENDED RELEASE ORAL at 08:48

## 2021-01-01 RX ADMIN — HEPARIN SODIUM 5000 UNITS: 5000 INJECTION INTRAVENOUS; SUBCUTANEOUS at 05:54

## 2021-01-01 RX ADMIN — NOREPINEPHRINE BITARTRATE 27 MCG/MIN: 1 INJECTION, SOLUTION, CONCENTRATE INTRAVENOUS at 15:55

## 2021-01-01 RX ADMIN — ALUMINA, MAGNESIA, AND SIMETHICONE ORAL SUSPENSION REGULAR STRENGTH 30 ML: 1200; 1200; 120 SUSPENSION ORAL at 20:09

## 2021-01-01 RX ADMIN — LEVOTHYROXINE SODIUM 88 MCG: 88 TABLET ORAL at 08:05

## 2021-01-01 RX ADMIN — NEBIVOLOL HYDROCHLORIDE 2.5 MG: 5 TABLET ORAL at 08:52

## 2021-01-01 RX ADMIN — HEPARIN SODIUM 5000 UNITS: 5000 INJECTION INTRAVENOUS; SUBCUTANEOUS at 06:41

## 2021-01-01 RX ADMIN — HEPARIN SODIUM 5200 UNITS: 1000 INJECTION INTRAVENOUS; SUBCUTANEOUS at 05:12

## 2021-01-01 RX ADMIN — LEVOTHYROXINE SODIUM 100 MCG: 100 TABLET ORAL at 06:01

## 2021-01-01 RX ADMIN — DIPHENHYDRAMINE HYDROCHLORIDE 25 MG: 50 INJECTION, SOLUTION INTRAMUSCULAR; INTRAVENOUS at 03:52

## 2021-01-01 RX ADMIN — VASOPRESSIN 0.04 UNITS/MIN: 20 INJECTION INTRAVENOUS at 21:12

## 2021-01-01 RX ADMIN — NEBIVOLOL HYDROCHLORIDE 5 MG: 5 TABLET ORAL at 08:31

## 2021-01-01 RX ADMIN — NOREPINEPHRINE BITARTRATE 10 MCG/MIN: 1 INJECTION, SOLUTION, CONCENTRATE INTRAVENOUS at 08:02

## 2021-01-01 RX ADMIN — LEVOTHYROXINE SODIUM 100 MCG: 100 TABLET ORAL at 05:17

## 2021-01-01 RX ADMIN — FENTANYL CITRATE 25 MCG: 50 INJECTION, SOLUTION INTRAMUSCULAR; INTRAVENOUS at 14:16

## 2021-01-01 RX ADMIN — VASOPRESSIN 0.04 UNITS/MIN: 20 INJECTION INTRAVENOUS at 02:32

## 2021-01-01 RX ADMIN — BUMETANIDE 2 MG: 0.25 INJECTION INTRAMUSCULAR; INTRAVENOUS at 05:32

## 2021-01-01 RX ADMIN — HEPARIN SODIUM 5000 UNITS: 5000 INJECTION INTRAVENOUS; SUBCUTANEOUS at 22:28

## 2021-01-01 RX ADMIN — BUMETANIDE 2 MG: 0.25 INJECTION INTRAMUSCULAR; INTRAVENOUS at 12:59

## 2021-01-01 RX ADMIN — HEPARIN SODIUM 5000 UNITS: 5000 INJECTION INTRAVENOUS; SUBCUTANEOUS at 21:23

## 2021-01-01 RX ADMIN — HEPARIN SODIUM 5200 UNITS: 1000 INJECTION INTRAVENOUS; SUBCUTANEOUS at 21:14

## 2021-01-01 RX ADMIN — DEXTROSE 30 ML/HR: 10 SOLUTION INTRAVENOUS at 16:42

## 2021-01-01 RX ADMIN — VASOPRESSIN 0.04 UNITS/MIN: 20 INJECTION INTRAVENOUS at 12:07

## 2021-01-01 RX ADMIN — HYDROCORTISONE SODIUM SUCCINATE 100 MG: 100 INJECTION, POWDER, FOR SOLUTION INTRAMUSCULAR; INTRAVENOUS at 15:07

## 2021-01-01 RX ADMIN — SODIUM BICARBONATE 50 ML/HR: 84 INJECTION, SOLUTION INTRAVENOUS at 15:58

## 2021-01-01 RX ADMIN — DEXTROSE MONOHYDRATE 50 ML: 25 INJECTION, SOLUTION INTRAVENOUS at 22:39

## 2021-01-01 RX ADMIN — HEPARIN SODIUM 18 UNITS/KG/HR: 10000 INJECTION, SOLUTION INTRAVENOUS at 21:10

## 2021-01-01 RX ADMIN — ALBUMIN (HUMAN) 12.5 G: 12.5 INJECTION, SOLUTION INTRAVENOUS at 02:32

## 2021-01-01 RX ADMIN — HYDROMORPHONE HYDROCHLORIDE 0.5 MG: 1 INJECTION, SOLUTION INTRAMUSCULAR; INTRAVENOUS; SUBCUTANEOUS at 19:47

## 2021-01-01 RX ADMIN — NEBIVOLOL HYDROCHLORIDE 5 MG: 5 TABLET ORAL at 08:05

## 2021-01-01 RX ADMIN — CALCIUM GLUCONATE 1 G: 20 INJECTION, SOLUTION INTRAVENOUS at 17:11

## 2021-01-01 RX ADMIN — FUROSEMIDE 20 MG: 10 INJECTION, SOLUTION INTRAVENOUS at 15:17

## 2021-01-01 RX ADMIN — FUROSEMIDE 40 MG: 10 INJECTION, SOLUTION INTRAVENOUS at 12:21

## 2021-01-01 RX ADMIN — LEVOTHYROXINE SODIUM 88 MCG: 88 TABLET ORAL at 08:53

## 2021-01-01 RX ADMIN — SODIUM BICARBONATE 50 MEQ: 84 INJECTION, SOLUTION INTRAVENOUS at 10:13

## 2021-01-01 RX ADMIN — NEBIVOLOL HYDROCHLORIDE 5 MG: 5 TABLET ORAL at 08:47

## 2021-01-01 RX ADMIN — HEPARIN SODIUM 5000 UNITS: 5000 INJECTION INTRAVENOUS; SUBCUTANEOUS at 13:21

## 2021-01-01 RX ADMIN — NOREPINEPHRINE BITARTRATE 2 MCG/MIN: 1 INJECTION, SOLUTION, CONCENTRATE INTRAVENOUS at 00:58

## 2021-01-01 RX ADMIN — DEXTROSE MONOHYDRATE 50 ML: 500 INJECTION PARENTERAL at 23:06

## 2021-01-01 RX ADMIN — HEPARIN SODIUM 5000 UNITS: 5000 INJECTION INTRAVENOUS; SUBCUTANEOUS at 13:53

## 2021-01-01 RX ADMIN — HEPARIN SODIUM 5000 UNITS: 5000 INJECTION INTRAVENOUS; SUBCUTANEOUS at 05:44

## 2021-01-01 RX ADMIN — BUMETANIDE 2 MG: 0.25 INJECTION INTRAMUSCULAR; INTRAVENOUS at 19:00

## 2021-01-01 RX ADMIN — POTASSIUM CHLORIDE 20 MEQ: 1500 TABLET, EXTENDED RELEASE ORAL at 08:49

## 2021-01-01 RX ADMIN — LEVOTHYROXINE SODIUM 88 MCG: 88 TABLET ORAL at 08:47

## 2021-01-01 RX ADMIN — LEVOTHYROXINE SODIUM 88 MCG: 88 TABLET ORAL at 08:12

## 2021-01-01 RX ADMIN — Medication 1000 UNITS: at 08:05

## 2021-01-01 RX ADMIN — BUMETANIDE 2 MG: 0.25 INJECTION INTRAMUSCULAR; INTRAVENOUS at 17:19

## 2021-01-01 RX ADMIN — SODIUM BICARBONATE 50 MEQ: 84 INJECTION, SOLUTION INTRAVENOUS at 23:06

## 2021-01-01 RX ADMIN — BUMETANIDE 2 MG: 0.25 INJECTION INTRAMUSCULAR; INTRAVENOUS at 14:37

## 2021-01-01 RX ADMIN — ASPIRIN 81 MG: 81 TABLET ORAL at 08:51

## 2021-01-01 RX ADMIN — DEXTROSE MONOHYDRATE 50 ML: 500 INJECTION PARENTERAL at 09:17

## 2021-01-01 RX ADMIN — HEPARIN SODIUM 5000 UNITS: 5000 INJECTION INTRAVENOUS; SUBCUTANEOUS at 06:46

## 2021-01-01 RX ADMIN — POTASSIUM CHLORIDE 20 MEQ: 1500 TABLET, EXTENDED RELEASE ORAL at 08:31

## 2021-01-01 RX ADMIN — NOREPINEPHRINE BITARTRATE 15 MCG/MIN: 1 INJECTION, SOLUTION, CONCENTRATE INTRAVENOUS at 10:20

## 2021-01-01 RX ADMIN — BUMETANIDE 2 MG: 0.25 INJECTION INTRAMUSCULAR; INTRAVENOUS at 08:31

## 2021-01-01 RX ADMIN — ASPIRIN 81 MG: 81 TABLET ORAL at 08:05

## 2021-01-01 RX ADMIN — Medication 1000 UNITS: at 10:11

## 2021-01-01 RX ADMIN — BUMETANIDE 2 MG: 0.25 INJECTION INTRAMUSCULAR; INTRAVENOUS at 14:56

## 2021-01-01 RX ADMIN — HEPARIN SODIUM 5000 UNITS: 5000 INJECTION INTRAVENOUS; SUBCUTANEOUS at 00:35

## 2021-01-01 RX ADMIN — ASPIRIN 81 MG: 81 TABLET, COATED ORAL at 10:28

## 2021-01-01 RX ADMIN — BUMETANIDE 2 MG: 0.25 INJECTION INTRAMUSCULAR; INTRAVENOUS at 08:27

## 2021-01-01 RX ADMIN — Medication 1000 UNITS: at 08:12

## 2021-01-01 RX ADMIN — Medication 1000 UNITS: at 08:50

## 2021-01-01 RX ADMIN — SODIUM CHLORIDE, SODIUM GLUCONATE, SODIUM ACETATE, POTASSIUM CHLORIDE, MAGNESIUM CHLORIDE, SODIUM PHOSPHATE, DIBASIC, AND POTASSIUM PHOSPHATE 500 ML: .53; .5; .37; .037; .03; .012; .00082 INJECTION, SOLUTION INTRAVENOUS at 04:56

## 2021-01-01 RX ADMIN — ALPRAZOLAM 0.25 MG: 0.25 TABLET ORAL at 10:28

## 2021-01-01 RX ADMIN — CEFTRIAXONE 2000 MG: 2 INJECTION, POWDER, FOR SOLUTION INTRAMUSCULAR; INTRAVENOUS at 02:32

## 2021-01-01 RX ADMIN — NOREPINEPHRINE BITARTRATE 12 MCG/MIN: 1 INJECTION, SOLUTION, CONCENTRATE INTRAVENOUS at 23:48

## 2021-01-01 RX ADMIN — Medication 1000 UNITS: at 08:52

## 2021-01-01 RX ADMIN — HEPARIN SODIUM 5000 UNITS: 5000 INJECTION INTRAVENOUS; SUBCUTANEOUS at 04:49

## 2021-01-01 RX ADMIN — HEPARIN SODIUM 5000 UNITS: 5000 INJECTION INTRAVENOUS; SUBCUTANEOUS at 21:43

## 2021-01-01 RX ADMIN — FENTANYL CITRATE 50 MCG: 50 INJECTION INTRAMUSCULAR; INTRAVENOUS at 19:32

## 2021-01-01 RX ADMIN — ASPIRIN 81 MG: 81 TABLET ORAL at 08:31

## 2021-01-01 RX ADMIN — DEXTROSE MONOHYDRATE 50 ML: 500 INJECTION PARENTERAL at 22:39

## 2021-01-01 RX ADMIN — ASPIRIN 81 MG: 81 TABLET ORAL at 10:10

## 2021-01-01 RX ADMIN — HEPARIN SODIUM 5000 UNITS: 5000 INJECTION INTRAVENOUS; SUBCUTANEOUS at 22:18

## 2021-01-01 RX ADMIN — SODIUM BICARBONATE 650 MG: 650 TABLET ORAL at 12:17

## 2021-01-01 RX ADMIN — HEPARIN SODIUM 5000 UNITS: 5000 INJECTION INTRAVENOUS; SUBCUTANEOUS at 22:41

## 2021-01-01 RX ADMIN — POTASSIUM CHLORIDE 20 MEQ: 1500 TABLET, EXTENDED RELEASE ORAL at 10:11

## 2021-01-01 RX ADMIN — HEPARIN SODIUM 5000 UNITS: 5000 INJECTION INTRAVENOUS; SUBCUTANEOUS at 14:49

## 2021-01-01 RX ADMIN — HEPARIN SODIUM 20 UNITS/KG/HR: 10000 INJECTION, SOLUTION INTRAVENOUS at 16:51

## 2021-01-01 RX ADMIN — POTASSIUM CHLORIDE 20 MEQ: 1500 TABLET, EXTENDED RELEASE ORAL at 08:05

## 2021-01-01 RX ADMIN — NOREPINEPHRINE BITARTRATE 29 MCG/MIN: 1 INJECTION, SOLUTION, CONCENTRATE INTRAVENOUS at 16:11

## 2021-01-01 RX ADMIN — ALUMINA, MAGNESIA, AND SIMETHICONE ORAL SUSPENSION REGULAR STRENGTH 30 ML: 1200; 1200; 120 SUSPENSION ORAL at 01:06

## 2021-01-01 RX ADMIN — ASPIRIN 81 MG: 81 TABLET ORAL at 08:12

## 2021-01-01 RX ADMIN — ASPIRIN 81 MG: 81 TABLET ORAL at 08:47

## 2021-01-01 RX ADMIN — SODIUM CHLORIDE 500 ML: 0.9 INJECTION, SOLUTION INTRAVENOUS at 21:38

## 2021-01-01 RX ADMIN — BUMETANIDE 2 MG: 0.25 INJECTION INTRAMUSCULAR; INTRAVENOUS at 08:49

## 2021-01-01 RX ADMIN — HEPARIN SODIUM 5000 UNITS: 5000 INJECTION INTRAVENOUS; SUBCUTANEOUS at 13:47

## 2021-01-01 RX ADMIN — INSULIN HUMAN 10 UNITS: 100 INJECTION, SOLUTION PARENTERAL at 23:06

## 2021-01-01 RX ADMIN — Medication 1000 UNITS: at 08:48

## 2021-01-01 RX ADMIN — EPINEPHRINE 1 MCG/MIN: 1 INJECTION, SOLUTION, CONCENTRATE INTRAVENOUS at 16:43

## 2021-01-01 RX ADMIN — ACETAMINOPHEN 650 MG: 325 TABLET, FILM COATED ORAL at 16:50

## 2021-01-01 RX ADMIN — HYDROMORPHONE HYDROCHLORIDE 0.5 MG: 1 INJECTION, SOLUTION INTRAMUSCULAR; INTRAVENOUS; SUBCUTANEOUS at 06:04

## 2021-01-01 RX ADMIN — ASPIRIN 81 MG: 81 TABLET ORAL at 08:49

## 2021-01-01 RX ADMIN — VASOPRESSIN 0.04 UNITS/MIN: 20 INJECTION INTRAVENOUS at 11:50

## 2021-01-01 RX ADMIN — Medication 1000 UNITS: at 08:31

## 2021-01-01 RX ADMIN — FUROSEMIDE 40 MG: 10 INJECTION, SOLUTION INTRAVENOUS at 10:17

## 2021-01-01 RX ADMIN — HEPARIN SODIUM 5000 UNITS: 5000 INJECTION INTRAVENOUS; SUBCUTANEOUS at 14:37

## 2021-01-06 PROBLEM — I50.31 DIASTOLIC CHF, ACUTE (HCC): Status: RESOLVED | Noted: 2020-01-01 | Resolved: 2021-01-01

## 2021-01-06 NOTE — PROGRESS NOTES
Virtual Brief Visit    Assessment/Plan:    Problem List Items Addressed This Visit        Cardiovascular and Mediastinum    Mitral regurgitation    Pulmonary hypertension ([address] )      Other Visit Diagnoses     Recurrent cough    -  Primary    Relevant Medications    albuterol (PROVENTIL HFA,VENTOLIN HFA) 90 mcg/act inhaler    Other Relevant Orders    XR chest pa & lateral    Viral infection, unspecified        Relevant Orders    Novel Coronavirus (COVID-19), PCR [facility] - Collected at [facility] or [facility]        Symptom treatment for cough  Chest x-ray  COVID-19 testing  Advised to call if any changes Follow up once results are back        Reason for visit is   Chief Complaint   Patient presents with   [address] Visit        Encounter provider Melonie Luo MD    Provider located at [address]  [phone]    Recent Visits  No visits were found meeting these conditions  Showing recent visits within past 7 days and meeting all other requirements     Today's Visits  Date Type Provider Dept   01/06/21 Telemedicine Melonie Luo MD [facility]   Showing today's visits and meeting all other requirements     Future Appointments  No visits were found meeting these conditions  Showing future appointments within next 150 days and meeting all other requirements        After connecting through telephone, the patient was identified by name and date of birth  Dany Diaz was informed that this is a telemedicine visit and that the visit is being conducted through telephone  My office door was closed  No one else was in the room  She acknowledged consent and understanding of privacy and security of the platform  The patient has agreed to participate and understands she can discontinue the visit at any time  Patient is aware this is a billable service       Subjective    Dany Diaz is a 65 y o  female It was my intent to perform this visit via video technology but the patient was not able to do a video connection so the visit was completed via audio telephone only  Telemedicine visit  Persistent cough over the last week at times productive clear phlegm  No hemoptysis  No shortness of breath or wheezing  No fevers or chills  No nasal congestion or postnasal drainage  No loss of taste or smell  No COVID-19 exposure  Status post admission 11/2020 for acute diastolic CHF  Chest x-ray at that time showed left basilar opacity and possible effusion  ProBNP 81378  Hemoglobin 11 3  EKG normal sinus rhythm with ST T-wave abnormalities  Echocardiogram systolic function lower limits of normal   EF 50%  Mild diffuse hypokinesis  Grade 2 diastolic dysfunction  Right ventricle mildly dilated  Right ventricular systolic function mildly reduced  Moderate MR  Moderate TR  Moderate pulmonary hypertension no pericardial effusion  Aortic root normal size  Aortic valve no reported abnormality  Echo 01/2019 moderate to severe AR  Current medications reviewed  Patient is on Furosemide 40 mg daily, Bystolic 5 mg daily    No ACE-inhibitor       Lab Results   Component Value Date    WBC 5 04 11/12/2020    HGB 10 2 (L) 11/12/2020    HCT 32 9 (L) 11/12/2020    MCV 88 11/12/2020     11/12/2020     Lab Results   Component Value Date    SODIUM 134 (L) 11/23/2020    K 4 0 11/23/2020    CL 99 (L) 11/23/2020    CO2 29 11/23/2020    BUN 24 11/23/2020    CREATININE 1 41 (H) 11/23/2020    GLUC 87 11/23/2020    CALCIUM 8 9 11/23/2020         Past Medical History:   Diagnosis Date    Acute CHF ([facility] ) 11/8/2020    ENEDELIA (acute kidney injury) ([facility] ) 1/12/2019    Anemia     BCC (basal cell carcinoma of skin)     facial    Breast cancer ([facility] ) 2006    s/p mastectomy     Calculus, kidney 2013    Cancer [facility])     Breast Cancer    Cancer [facility])     Cervical Cancer    Cervical cancer ([facility] ) 0031    Diastolic CHF, acute ([facility] ) 11/8/2020    Disease of thyroid gland     Ganglion     Last Assessed:7/9/13    Heart murmur     mitral valve regurgitation    Hodgkin disease ([place] [address] )     Hydronephrosis 2013    Hyperlipidemia     Hypertension     Melanoma ([place] [address] )     NSTEMI (non-ST elevated myocardial infarction) ([place] [address] ) 1/12/2019    Osteopenia     Last Assessed:7/9/13    Osteoporosis     Paroxysmal supraventricular tachycardia (HCC)     Protein-calorie malnutrition ([place] [address] ) 2/12/2020    Renal calculi     Last Assessed:3/19/14    Renal cyst     Shoulder impingement     Last Assessed:1/18/13    Syncope 7/28/2019    Last Assessment & Plan:  Pre-syncopal symptoms leading to short syncopal episode without post-ictal period  Suspect dehydration/hypovolemia  Infectious, cardiopulm or neurologic cause less likely  No arrhythmias or AV block on ECG or telemetry thus far  No PE on CTA  No clinical signs of seizure   No acute pathology or mass on CTH  - s/p IVF; encourage PO intake - continue on telemetry - TTE c/f ne    UTI (urinary tract infection) 2013    Vasculitis ([place] [address] ) 1/12/2019       Past Surgical History:   Procedure Laterality Date    APPENDECTOMY      BREAST BIOPSY Left 09/29/2006    BREAST RECONSTRUCTION      COMPLEX WOUND CLOSURE TO EXTREMITY Left 10/9/2018    Procedure: COMPLEX CLOSURE RECONSTRUCTION;  Surgeon: Paulina Pickens MD;  [facility];  Service: Plastics    CT BONE MARROW BIOPSY AND ASPIRATION  11/15/2019    CYSTOSCOPY W/ RETROGRADES  2009    CYSTOSCOPY W/ URETEROSCOPY  2002    EXTRACORPOREAL SHOCK WAVE LITHOTRIPSY Right 2005    FLAP LOCAL HEAD / NECK Left 10/9/2018    Procedure: FLAP RECONSTRUCTION;  Surgeon: Paulina Pickens MD;  [facility];  Service: Plastics    FULL THICKNESS SKIN GRAFT Left 10/9/2018    Procedure: FULL THICKNESS SKIN GRAFT RECONSTRUCTION;  Surgeon: Paulina Pickens MD;  [facility];  Service: Plastics    HYSTERECTOMY      1983-cervical cancer    INTRAOPERATIVE RADIATION THERAPY ([facility])      Radiation Therapy    KIDNEY SURGERY      MASTECTOMY Left 11/09/2006    OOPHORECTOMY Left     OTHER SURGICAL HISTORY      Chemotherapeutics    REDUCTION MAMMAPLASTY Right 2006    SENTINEL LYMPH NODE BIOPSY      SPLENECTOMY      1985-Hodgkin's Disease    SQUAMOUS CELL CARCINOMA EXCISION Left 10/9/2018    Procedure: NOSE/CHEEK BCC EXCISION; FROZEN SECTION;  Surgeon: Carolee Lea MD;  [facility];  Service: Plastics       Current Outpatient Medications   Medication Sig Dispense Refill    albuterol (PROVENTIL HFA,VENTOLIN HFA) 90 mcg/act inhaler Inhale 2 puffs every 6 (six) hours as needed for wheezing or shortness of breath 1 Inhaler 5    aspirin (ECOTRIN LOW STRENGTH) 81 mg EC tablet Take 81 mg by mouth daily      cholecalciferol (VITAMIN D3) 1,000 units tablet Take 1,000 Units by mouth daily        clobetasol (TEMOVATE) 0 05 % cream Apply topically twice daily to legs for 14 days only, DO NOT apply to face or groin  60 g 0    Fexofenadine HCl (ALLEGRA ALLERGY PO) Take by mouth      furosemide (LASIX) 40 mg tablet One tablet ( 40 mg) every M,W,F 40 tablet 1    levothyroxine 88 mcg tablet Take 1 tablet (88 mcg total) by mouth daily 90 tablet 3    nebivolol (Bystolic) 5 mg tablet Take 1 tablet (5 mg total) by mouth daily 90 tablet 3     No current facility-administered medications for this visit  Allergies   Allergen Reactions    Ciprofloxacin Other (See Comments)     Reaction Date: 24Jun2011; Hives/Uticaria    Sulfamethoxazole-Trimethoprim Hives     Patient does not remember rx    Pantoprazole Rash     vasculitis       Review of Systems   Constitutional: Positive for fatigue  Negative for appetite change, chills, fever and unexpected weight change  HENT: Negative for congestion, rhinorrhea and sore throat  Respiratory: Positive for cough and shortness of breath  Negative for wheezing            No orthopnea or PND   Cardiovascular: Negative for chest pain, palpitations and leg swelling  Gastrointestinal: Negative for diarrhea, nausea and vomiting  Musculoskeletal: Negative for arthralgias and myalgias  Skin: Positive for rash  11/2026 patient was diagnosed with small-vessel vasculitis   Neurological: Negative for dizziness and headaches  Hematological: Negative for adenopathy  There were no vitals filed for this visit  I spent 8 minutes directly with the patient during this visit    Terrance MARES  16  acknowledges that she has consented to an online visit or consultation  She understands that the online visit is based solely on information provided by her, and that, in the absence of a face-to-face physical evaluation by the physician, the diagnosis she receives is both limited and provisional in terms of accuracy and completeness  This is not intended to replace a full medical face-to-face evaluation by the physician  Reza Waller understands and accepts these terms

## 2021-01-06 NOTE — PROGRESS NOTES
Heart Failure Outpatient Care Manager: Brief conversation as the patient is not feeling well and is working with her PCP  She was tested today for COVID and will be getting a chest x-ray also  I will follow up in one week

## 2021-01-07 NOTE — TELEPHONE ENCOUNTER
Call re CXR   Abnormality right lung base this could be chronic from elevated right-sided diaphragm can not rule out pneumonia  I would recommend starting antibiotics  Her COVID-19 test was negative  If agreeable let me know and I will send in a prescription    Procedure: Xr Chest Pa & Lateral    Result Date: 1/7/2021  Narrative: CHEST INDICATION:   R05: Cough  Covid negative on 1/6/2021  History of Hodgkin's lymphoma and cervical cancer and breast cancer  COMPARISON:  Chest radiograph from 11/8/2020  Abdomen CT from 1/30/2020  Chest CT from 1/12/2019  EXAM PERFORMED/VIEWS:  XR CHEST PA & LATERAL  DUAL ENERGY SUBTRACTION  FINDINGS: Cardiomediastinal silhouette normal  Calcified mediastinal nodes, likely due to treated Hodgkin's lymphoma  Mild groundglass opacity in the right base with chronic elevation of the right hemidiaphragm  No effusion or pneumothorax  Left breast implant  Left axillary clips  Multiple clips in the upper abdomen  Chronic compression deformity of T11  Impression: Mild groundglass opacity in the right base  It cannot be determined if this is due to atelectasis from the chronically elevated right hemidiaphragm versus pneumonia  The study was marked in EPIC for significant notification    Workstation performed: [id number]       Recent Results (from the past 168 hour(s))   Novel Coronavirus (Covid-19),PCR [facility]    Collection Time: 01/06/21  1:14 PM    Specimen: Nasopharyngeal Swab; Nares   Result Value Ref Range    SARS-CoV-2 Negative Negative

## 2021-01-07 NOTE — TELEPHONE ENCOUNTER
Patient notified of message verbatim  Would like to start antibiotic  Please send to [facility]

## 2021-01-13 NOTE — PROGRESS NOTES
Heart Failure Outpatient Care Manager: Patient continues on track with medical regimen  She did note an increase in weight of 4 pounds over a weekend when she isn't scheduled to take any Lasix  The weight came down when she started her Lasix again  She did feel bloated with the weight gain  She is without signs or symptoms of heart failure at present  We discussed requesting an as needed order from one of her healthcare providers if this should happen again  The patient is very engaged in self-care and support was given  She welcomes continued outreach

## 2021-01-28 PROBLEM — M31.0 HYPERSENSITIVITY ANGIITIS (HCC): Status: ACTIVE | Noted: 2019-01-12

## 2021-01-28 NOTE — PROGRESS NOTES
Assessment/Plan:     Diagnoses and all orders for this visit:    Chronic diastolic congestive heart failure (HCC)  -     furosemide (LASIX) 40 mg tablet; Once a day  -     Basic metabolic panel; Future    Nonrheumatic mitral valve regurgitation    Nonrheumatic aortic valve insufficiency    Pulmonary hypertension (HCC)    Non-rheumatic tricuspid valve insufficiency    Essential hypertension    Stage 3b chronic kidney disease    Paroxysmal supraventricular tachycardia (HCC)    Acquired hypothyroidism    Small vessel vasculitis (HCC)    Normocytic anemia    Thrombocytosis (HCC)    Myeloproliferative disorder (HCC)  -     CBC and differential    Melanoma of skin of trunk ([address] )    History of pulmonary embolus (PE)    History of Hodgkin's disease    History of DVT of lower extremity    History of cervical cancer    History of breast cancer    Encounter for screening mammogram for malignant neoplasm of breast  -     Mammo screening right w 3d & cad; Future        Change Furosemide from 40 mg M-W-F to 40 mg daily  Repeat BMP in 7 to 10 days  Monitor weight at home She has a follow up OV with Cardiology in February  Patient ID: Jaqui Frost is a 66 y o  female  Follow up visit  Hypertension and CKD blood pressures have been stable on Bystolic 5 mg tablet daily and Furosemide 40 mg/day  11/2020 creatinine 1 41  GFR 37  Electrolytes normal except for sodium 134 and chloride 99  Hemoglobin 10 2  03/2020 creatinine 1 24  GFR 43  Electrolytes normal  Hgb 11 8  09/2019 creatinine 1 19  GFR 46  03/2018 renal ultrasound large right kidney with upper pole complex cystic mass and lower pole complex solid mass  Large right-sided staghorn calculus  No obvious hydronephrosis  Nonobstructing left renal collecting system calculus  Lower pole cortical scarring   08/2017 nuclear renal flow scan abnormal delayed perfusion and diminished functioning within the right kidney with a split function measuring only 17% on the right  83% on the left  Hyperlipidemia no longer on Simvastatin 40 mg daily  Patient stopped due to muscle pain and cramps in her fingers  Lipid profile 03/2020 cholesterol 118  HDL 58  LDL 49  LDL 57  07/2019 Cholesterol 112  Triglycerides 72  HDL 37  LDL 61  LFTs normal except for alkaline phosphatase 157  Admission 11/2020 for acute diastolic CHF  Chest x-ray at that time showed left basilar opacity and possible effusion  NT proBNP 18,716  Hemoglobin 11 3  EKG normal sinus rhythm with ST T-wave abnormalities  Echocardiogram systolic function lower limits of normal   EF 50%  Mild diffuse hypokinesis  Grade 2 diastolic dysfunction  Right ventricle mildly dilated  Right ventricular systolic function mildly reduced  Moderate MR  Moderate TR  Moderate pulmonary hypertension  No pericardial effusion  Aortic root normal size  Aortic valve no reported abnormality  Echo 01/2019 moderate to severe AR  Lab Results   Component Value Date    SODIUM 134 (L) 11/23/2020    K 4 0 11/23/2020    CL 99 (L) 11/23/2020    CO2 29 11/23/2020    BUN 24 11/23/2020    CREATININE 1 41 (H) 11/23/2020    GLUC 87 11/23/2020    CALCIUM 8 9 11/23/2020     Lab Results   Component Value Date    WBC 5 04 11/12/2020    HGB 10 2 (L) 11/12/2020    HCT 32 9 (L) 11/12/2020    MCV 88 11/12/2020     11/12/2020     Lab Results   Component Value Date    CHOLESTEROL 142 09/21/2020    CHOLESTEROL 118 03/24/2020    CHOLESTEROL 112 07/24/2019     Lab Results   Component Value Date    HDL 37 (L) 09/21/2020    HDL 49 03/24/2020    HDL 37 (L) 07/24/2019     Lab Results   Component Value Date    TRIG 57 09/21/2020    TRIG 58 03/24/2020    TRIG 72 07/24/2019     Lab Results   Component Value Date    LDLCALC 94 09/21/2020       Procedure: Xr Chest Pa & Lateral    Result Date: 1/7/2021  Narrative: CHEST INDICATION:   R05: Cough  Covid negative on 1/6/2021  History of Hodgkin's lymphoma and cervical cancer and breast cancer   COMPARISON: Chest radiograph from 11/8/2020  Abdomen CT from 1/30/2020  Chest CT from 1/12/2019  EXAM PERFORMED/VIEWS:  XR CHEST PA & LATERAL  DUAL ENERGY SUBTRACTION  FINDINGS: Cardiomediastinal silhouette normal  Calcified mediastinal nodes, likely due to treated Hodgkin's lymphoma  Mild groundglass opacity in the right base with chronic elevation of the right hemidiaphragm  No effusion or pneumothorax  Left breast implant  Left axillary clips  Multiple clips in the upper abdomen  Chronic compression deformity of T11  Impression: Mild groundglass opacity in the right base  It cannot be determined if this is due to atelectasis from the chronically elevated right hemidiaphragm versus pneumonia  The study was marked in EPIC for significant notification    Workstation performed: [id number]       Current Outpatient Medications:     albuterol (PROVENTIL HFA,VENTOLIN HFA) 90 mcg/act inhaler, Inhale 2 puffs every 6 (six) hours as needed for wheezing or shortness of breath, Disp: 1 Inhaler, Rfl: 5    aspirin (ECOTRIN LOW STRENGTH) 81 mg EC tablet, Take 81 mg by mouth daily, Disp: , Rfl:     cholecalciferol (VITAMIN D3) 1,000 units tablet, Take 1,000 Units by mouth daily  , Disp: , Rfl:     clobetasol (TEMOVATE) 0 05 % cream, Apply topically twice daily to legs for 14 days only, DO NOT apply to face or groin , Disp: 60 g, Rfl: 0    Fexofenadine HCl (ALLEGRA ALLERGY PO), Take by mouth, Disp: , Rfl:     furosemide (LASIX) 40 mg tablet, Once a day, Disp: 90 tablet, Rfl: 1    levothyroxine 88 mcg tablet, Take 1 tablet (88 mcg total) by mouth daily, Disp: 90 tablet, Rfl: 3    nebivolol (Bystolic) 5 mg tablet, Take 1 tablet (5 mg total) by mouth daily, Disp: 90 tablet, Rfl: 3      The following portions of the patient's history were reviewed and updated as appropriate: allergies, current medications, past family history, past medical history, past social history, past surgical history and problem list     Review of Systems Constitutional: Positive for fatigue and unexpected weight change (17 lb weight gain from 12/2020 )  Negative for appetite change, chills and fever  HENT: Negative for congestion, ear pain, rhinorrhea, sore throat, trouble swallowing and voice change  Eyes: Negative for visual disturbance  Respiratory: Positive for cough  Negative for shortness of breath and wheezing  Telemedicine visit 01/2021 for persistent cough at times productive clear phlegm  No hemoptysis  No shortness of breath or wheezing  No fevers or chills  No nasal congestion or postnasal drainage  No loss of taste or smell  COVID-19 test negative  CXR 01/2021 Glass opacity in the right base -cannot be determined if this is due to atelectasis from chronically elevated right hemidiaphragm versus pneumonia  Patient was treated with antibiotics  Cough has diminished  Admission 01/2019 for PE/DVT  CXR no active disease  EKG sinus tachycardia with non specific T wave changes inferolaterally  Troponin 0 06  Elevated D dimer at 10,000  Acute bibasilar pulmonary emboli  Mild diffuse patchy ground-glass alveolar opacity, trace pleural effusions and smooth septal thickening suggestive of pulmonary vascular congestion/fluid overload  Trace bibasilar and lingular subsegmental atelectasis  No cardiomegaly  No pericardial effusion  Aortic and coronary artery calcification  Calcified prevascular lymph nodes  Shotty bilateral hilar, left paratracheal and subcarinal lymph nodes  Small sliding hiatal hernia  Venous Dopplers normal except for left leg acute nonocclusive deep vein thrombosis in the popliteal and 1 of the paired gastrocnemius veins  Admission labs CBC WBC 9,450  Hemoglobin 10 3  MCV 85  Platelet count 446,011 Chemistry profile  blood glucose 106  Electrolytes normal except for sodium 134 and chloride 98  creatinine 1 46  LFTs normal except for AST 50 and alkaline phosphatase 218  Total protein elevated 9 6  Albumin 2 3   NT pro BNP 3,376  Repeat CBC WBC 7,740  Hgb 8 8  MCV 86  Platelets 308,974  No longer on Eliquis  On ASA 81 mg daily  Cardiovascular: Negative for chest pain, palpitations and leg swelling         [facility] [place] [facility]  Patient was sitting down  She had an episode of vomiting  Subsequent syncopal episode with transient LOC  Admission labs random blood glucose 101  Creatinine 1 5  Electrolytes normal   CBC WBC 11,500  Hemoglobin 9 7  MCV 88  Platelet count 453,853  Troponin negative  C-reactive protein 7 0  NT proBNP 4,916  D-dimer elevated at 1,001  EKG sinus tachycardia  Probable left atrial abnormality  Repolarization abnormality suggest ischemia, diffuse leads  Chest x-ray no consolidation or edema  Small right pleural effusion  No pneumothorax  Multiple calcified lymph nodes projecting over the mediastinum  CT scan chest with contrast no evidence of pulmonary embolus  Staghorn calculi throughout the right kidney resulting in marked obstruction of the upper pole calices  Additional nonobstructing 1 5 cm left upper pole calculus  Incompletely characterized 2 9 cm solid renal right mass  Bilateral pleural effusions with associated atelectasis  Mosaic attenuation of lung parenchyma suggesting air trapping  Cholelithiasis  Surgical changes of splenectomy with left lateral abdominal wall herniation containing nonobstructed small large bowel  Echocardiogram normal left ventricular chamber size  Mildly decreased left ventricular systolic function with segmental wall abnormalities  Basal to mid inferolateral akinesis with thinning  Basal inferior thinning and akinesis  Low normal contraction of the remaining wall  Ejection fraction 45%  Left atrium mildly dilated  Mild mitral regurgitation  Mild aortic regurgitation  Mild to moderate tricuspid regurgitation  Severe pulmonary hypertension present    01/2019 echocardiogram normal left ventricular systolic function  EF 55%  Right ventricle normal   Mild MR  Moderate to severe AR  Moderate TR with finding suggesting severe pulmonary hypertension  No pericardial effusion  Normal aortic root  Repeat echocardiogram 04/2019 normal left ventricular systolic function  EF 55%  No regional wall motion abnormalities  Left atrium mildly dilated  No atrial septal defect or PFO  Mildly dilated right atrium  Mild to moderate MR  Moderate AR  Mild to moderate tricuspid regurgitation  No pericardial effusion  Aortic root normal size  Gastrointestinal: Negative for abdominal pain, blood in stool, constipation, diarrhea, nausea and vomiting  No prior colonoscopy  12/2018 abdomen u/s liver normal  + cholelithiasis  CBD 4 mm  No choledocholithiasis  CT scan abdomen 12/2016 + cholelithiasis asymptomatic  Hernia left flank containing fat and nonobstructive bowel  Small subscapular area of enhancement right hepatic lobe  Stable retroperitoneal nodule  hiatal hernia  Stable nephrolithiasis and renal scarring/complex renal cystic mass on the right  Lab Results       Component                Value               Date                       ALT                      19                  11/08/2020                 AST                      20                  11/08/2020                 ALKPHOS                  255 (H)             11/08/2020                         Alkaline Phosphatase       Date                     Value               Ref Range           Status                03/24/2020               157 (H)             46 - 116 U/L        Final                 01/30/2020               142 (H)             46 - 116 U/L        Final                 09/24/2019               133 (H)             46 - 116 U/L        Final                 Endocrine:        Hypothyroidism on Levothyroxine 75 mcg daily  Osteoporosis on vitamin D 1,000 IU daily         Lab Results       Component                Value Date                       [id number]             3 675               11/09/2020               Genitourinary: Negative for difficulty urinating, dysuria, flank pain and hematuria  Admission for pyelonephritis 01/30/2020 to 02/01/2020  Patient presented with gross hematuria and back pain  No fevers  She was on Bactrim DS at the time of admission  Known history of nephrolithiasis/staghorn calculi  CT renal stone study previous multilobular cystic mass in the right kidney upper pole with scattered wall calcifications currently measuring up to 11 3 cm- previously measuring 14 cm  Large parenchymal and collecting system calculi in the interpolar and lower pole collecting system and cortices  Large staghorn calculus in the renal pelvis measuring up to 2 3 cm  Interpolar and lower pole collecting systems does not appear dilated on today's exam   Right ureter partially obscured by metallic artifact from retroperitoneal surgical clips  Left kidney cortical scarring left kidney stable  nonobstructing collecting system calculi  Urinary bladder wall thickening atelectasis both lung bases  Cholelithiasis without evidence of cholecystitis  No ascites  No lymphadenopathy  Small hiatal hernia  Partial compression deformity T11 new from prior exam 01/2019  No surrounding inflammatory changes to suggest this is acute  Admission lab CBC WBC 10,190  Creatinine 1 82 decreased to 1 51 with IV fluids  LFTs normal except for alkaline phosphatase 142  Elevated total protein 9 3  Albumin 2 8 ( SPEP 09/2019 no monoclonal bands)  Blood culture x 2 negative  Urine culture > 100,000 mixed contaminants  Patient completed antibiotics  12/2018 abdominal ultrasound markedly enlarged incompletely evaluated right kidney with complex masses  Staghorn calculus  Cholelithiasis  01/2019 CT scan renal protocol no solid enhancing renal or urothelial mass  Multifocal right renal staghorn calculi including a 2 cm pelvic calculus  Interval progressive cystic hydronephrosis of the upper pole  Stable cystic caliectasis  posterior lower pole  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  Left nephrolithiasis 1 cm and less no obstructive uropathy  No acute intra-abdominal or intrapelvic process  Cholelithiasis  12/2018 abdominal ultrasound markedly enlarged incompletely evaluated right kidney with complex masses  Staghorn calculus  Cholelithiasis  Musculoskeletal: Positive for back pain  Negative for arthralgias and myalgias  See HPI  CT renal study 01/2020 showed partial compression T11 new from 01/2020  No findings to suggest a new fracture  Skin: Positive for rash  Non pruritic rash on lower extremities at that time of admission 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  Extensive lab testing by Dermatology  history of melanoma right flank  biopsy left arm 11/2016 SCC in situ  06/2018 biopsy lesion left side of nose BCC  Allergic/Immunologic: Positive for environmental allergies  On Loratadine  Neurological: Negative for dizziness and headaches  Hematological: Negative for adenopathy  Does not bruise/bleed easily  Anemia and thrombocytopenia followed by Hematology  02/2020 CBC WBC 9970  Hemoglobin 10 5  MCV 90  Platelet count 054,179   11/2019 bone marrow biopsy hypercellular bone marrow with trilineage hyperplasia, mild atypia, mixed lymphocytosis and polytypic plasmacytosis favor reactive cannot exclude low-grade MDS  Negative for morphologic or immunophenotypic evidence of involvement by Hodgkin's lymphoma, metastatic carcinoma or melanoma  Adequate stainable storage iron  Mildly increased reticulin fibers without overt fibrosis  Negative for collagen fibrosis, granulomata, vasculitis or necrosis 10/2019 hemoccult negative  09/2019 CBC WBC 9,990  Hgb 9 5  MCV 91  Platelets 618,242   Reticulocyte count 1 45  Folate 7 1 decreased from 9 7  Vitamin 435 decreased from 656    SPEP no monoclonal bands  TESS mutation negative   Normocytic anemia dating back to 12/2018  Hgb 10 2   02/2019 anemia studies vitamin B12 656  Folic acid 9 7  Iron low at 20  TIBC 151  % saturation 13  Ferritin 260  Reticulocyte count 1 45  SPEP/immunofixation no monoclonal gammopathy  No rectal bleeding or melena  No prior colonoscopy  Patient refused GI work up  Intolerance to oral iron  She received 3 infusions of IV iron 06/2019 08/2018 CBC WBC 8,510  Hgb 12 8  MCV 89  Platelets 642,365  67/1667 CBC WBC 12,950  Hgb 10 2  MCV 85  Platelets 914,651  remote history of Hodgkin's disease  breast CA s/p left mastectomy with chemotherapy  she completed a course of Arimidex in 2011  followed by oncology        Lab Results       Component                Value               Date                       WBC                      5 04                11/12/2020                 HGB                      10 2 (L)            11/12/2020                 HCT                      32 9 (L)            11/12/2020                 MCV                      88                  11/12/2020                 PLT                      374                 11/12/2020                         Hemoglobin       Date                     Value               Ref Range           Status                03/24/2020               11 8                11 5 - 15 4 g/*     Final                 02/01/2020               10 5 (L)            11 5 - 15 4 g/*     Final                 01/31/2020               9 9 (L)             11 5 - 15 4 g/*     Final              Platelets       Date                     Value               Ref Range           Status                03/24/2020               448 (H)             149 - 390 Thou*     Final                 02/01/2020               439 (H)             149 - 390 Thou*     Final                 01/31/2020               436 (H) 149 - 390 [facility]*              Psychiatric/Behavioral: Negative for dysphoric mood and sleep disturbance  Objective:      /64   Pulse 88   Temp (!) 96 6 °F (35 9 °C)   Resp 16   Ht 5' 1" (1 549 m)   Wt 66 2 kg (146 lb)   LMP  (LMP Unknown)   BMI 27 59 kg/m²     BP Readings from Last 3 Encounters:   01/28/21 112/64   12/02/20 130/74   11/17/20 122/72     Wt Readings from Last 3 Encounters:   01/28/21 66 2 kg (146 lb)   12/21/20 61 7 kg (136 lb 1 6 oz)   12/02/20 58 9 kg (129 lb 12 8 oz)        Physical Exam  Vitals signs and nursing note reviewed  Constitutional:       General: She is not in acute distress  Appearance: She is well-developed  HENT:      Right Ear: Tympanic membrane and ear canal normal       Left Ear: Tympanic membrane and ear canal normal    Eyes:      General: No scleral icterus  Extraocular Movements: Extraocular movements intact  Conjunctiva/sclera: Conjunctivae normal       Pupils: Pupils are equal, round, and reactive to light  Neck:      Thyroid: No thyroid mass or thyromegaly  Vascular: No carotid bruit or JVD  Trachea: No tracheal deviation  Cardiovascular:      Rate and Rhythm: Normal rate and regular rhythm  Heart sounds: Normal heart sounds  No murmur  No gallop  Pulmonary:      Effort: Pulmonary effort is normal  No respiratory distress  Breath sounds: Normal breath sounds  No wheezing or rales  Abdominal:      General: Bowel sounds are normal  There is no distension or abdominal bruit  Palpations: Abdomen is soft  There is no hepatomegaly, splenomegaly or mass  Tenderness: There is no abdominal tenderness  There is no guarding or rebound  Musculoskeletal:      Right lower leg: Edema (1-2+ pitting edema  ) present  Left lower leg: Edema (1-2+ pitting edema  ) present  Lymphadenopathy:      Cervical: No cervical adenopathy  Upper Body:      Right upper body: No supraclavicular adenopathy        Left upper body: No supraclavicular adenopathy  Skin:     Findings: Rash present  Nails: There is no clubbing  Comments: Resolving vasculitic rash  Neurological:      General: No focal deficit present  Mental Status: She is alert and oriented to person, place, and time     Psychiatric:         Mood and Affect: Mood normal

## 2021-01-29 NOTE — PROGRESS NOTES
Heart Failure Outpatient Care Manager: Patient stated she had gone to her PCP yesterday and had gained approximately 10 pounds on the office scale which was not as high on her scale  She also had symptoms of abdominal bloating and lower extremity edema  She state the provider has her now taking Furosemide daily and if her weight comes down he may again decrease her dose  She stated she knew an appointment was coming up and she didn't call when she started having symtpoms  I highly encouraged her not to brayan with symptoms begin she should call right away  The patient stated she was concerned she may be taking in more fluids than she should  We discussed ways to measure her intake and suggestions were given to attempt to alleviate her dry mouth  I will continue call every 2 weeks

## 2021-02-01 PROBLEM — E87.6 HYPOKALEMIA: Status: RESOLVED | Noted: 2020-01-01 | Resolved: 2021-01-01

## 2021-02-01 PROBLEM — N17.9 ACUTE KIDNEY INJURY SUPERIMPOSED ON CHRONIC KIDNEY DISEASE (HCC): Status: RESOLVED | Noted: 2019-01-12 | Resolved: 2021-01-01

## 2021-02-01 PROBLEM — N18.9 ACUTE KIDNEY INJURY SUPERIMPOSED ON CHRONIC KIDNEY DISEASE (HCC): Status: RESOLVED | Noted: 2019-01-12 | Resolved: 2021-01-01

## 2021-02-01 PROBLEM — K80.20 CALCULUS OF GALLBLADDER WITHOUT CHOLECYSTITIS WITHOUT OBSTRUCTION: Status: ACTIVE | Noted: 2020-01-01

## 2021-02-03 NOTE — TELEPHONE ENCOUNTER
Patient has not lost any weight since her Lasix dosage was increased  She has gained a pound  She wants to know if it should be increased again

## 2021-02-09 NOTE — PATIENT INSTRUCTIONS
1  Change furosemide to torsemide  2  Take potassium daily  3  Lab work in one week  4 Followup in one month

## 2021-02-09 NOTE — PROGRESS NOTES
Cardiology Follow Up    Grant Parker  1950  [id number]  [facility] [place]  [address]  [address]  [place] [address] [phone]    1  Chronic diastolic heart failure (HCC)  torsemide (DEMADEX) 10 mg tablet    potassium chloride (K-DUR,KLOR-CON) 20 mEq tablet    Basic metabolic panel       Interval History: Followup for heart failure  SHe was admitted for heart failure back in November  Her symptoms were abdominal discomfort at that time  She has substantial LE edema, dyspnea with mild exertion and orthopnea         Problem List     Hypertension    Hyperlipidemia    Hypothyroidism    Basal cell carcinoma (BCC) of left side of nose    History of Hodgkin's disease    Paroxysmal supraventricular tachycardia (HCC)    Hypersensitivity angiitis (HCC)    History of pulmonary embolus (PE)    History of DVT of lower extremity    Nonrheumatic aortic valve insufficiency    Bilateral kidney stones    Melanoma of skin of trunk (HCC)    Mitral regurgitation    Pulmonary hypertension ([address] )    Acquired complex renal cyst    Elevated serum protein level    Osteoporosis    Vitamin D deficiency    BCC (basal cell carcinoma), face    Overview Signed 8/13/2018  3:38 PM by Alba Urena MA     Added automatically from request for surgery [id number]         History of breast cancer    History of basal cell cancer    Non-rheumatic tricuspid valve insufficiency    Thrombocytosis (HCC)    Iron deficiency anemia    Cardiomyopathy ([address] )    Compression fracture of T11 vertebra ([address] )    History of cervical cancer    Myeloproliferative disorder ([address] )    Calculus of gallbladder without cholecystitis without obstruction    Small vessel vasculitis ([address] )        Past Medical History:   Diagnosis Date    Acute CHF ([address] ) 11/8/2020    Acute kidney injury superimposed on chronic kidney disease ([address] ) 1/12/2019    ENEDELIA (acute kidney injury) ([address] ) 1/12/2019    Anemia     BCC (basal cell carcinoma of skin)     facial    Breast cancer ([place] [address] ) 2006    s/p mastectomy     Calculus, kidney 2013    Cancer [facility])     Breast Cancer    Cancer [facility])     Cervical Cancer    Cervical cancer ([place] [address] ) 7236    Diastolic CHF, acute ([place] [address] ) 11/8/2020    Disease of thyroid gland     Ganglion     Last Assessed:7/9/13    Heart murmur     mitral valve regurgitation    Hodgkin disease ([place] [address] )     Hydronephrosis 2013    Hyperlipidemia     Hypertension     Melanoma ([place] [address] )     NSTEMI (non-ST elevated myocardial infarction) ([place] [address] ) 1/12/2019    Osteopenia     Last Assessed:7/9/13    Osteoporosis     Paroxysmal supraventricular tachycardia ([place] [address] )     Protein-calorie malnutrition ([place] [address] ) 2/12/2020    Renal calculi     Last Assessed:3/19/14    Renal cyst     Shoulder impingement     Last Assessed:1/18/13    Syncope 7/28/2019    Last Assessment & Plan:  Pre-syncopal symptoms leading to short syncopal episode without post-ictal period  Suspect dehydration/hypovolemia  Infectious, cardiopulm or neurologic cause less likely  No arrhythmias or AV block on ECG or telemetry thus far  No PE on CTA  No clinical signs of seizure   No acute pathology or mass on CTH  - s/p IVF; encourage PO intake - continue on telemetry - TTE c/f ne    UTI (urinary tract infection) 2013    Vasculitis ([place] [address] ) 1/12/2019     Social History     Socioeconomic History    Marital status: /Civil Union     Spouse name: Not on file    Number of children: Not on file    Years of education: Not on file    Highest education level: Not on file   Occupational History    Not on file   Social Needs    Financial resource strain: Not on file    Food insecurity     Worry: Not on file     Inability: Not on file    Transportation needs     Medical: Not on file     Non-medical: Not on file   Tobacco Use    Smoking status: Former Smoker     Quit date: 2011     Years since quitting: 10 1    Smokeless tobacco: Never Used Substance and Sexual Activity    Alcohol use: Not Currently     Frequency: Monthly or less     Comment: caffeine use; social drinker-1 beer every 2/3 months    Drug use: Never    Sexual activity: Yes     Partners: Male     Birth control/protection: None   Lifestyle    Physical activity     Days per week: Not on file     Minutes per session: Not on file    Stress: Not on file   Relationships    Social connections     Talks on phone: Not on file     Gets together: Not on file     Attends Sabianist service: Not on file     Active member of club or organization: Not on file     Attends meetings of clubs or organizations: Not on file     Relationship status: Not on file    Intimate partner violence     Fear of current or ex partner: Not on file     Emotionally abused: Not on file     Physically abused: Not on file     Forced sexual activity: Not on file   Other Topics Concern    Not on file   Social History Narrative    Advance directive declined by patient    Drinks coffee    Exercise habits      Family History   Problem Relation Age of Onset    Leukemia Mother     Colon cancer Paternal Grandmother 80    Heart disease Family     Heart attack Father     Coronary artery disease Father         stent- five    Hypertension Father     Colon cancer Father 80    No Known Problems Maternal Grandmother     No Known Problems Maternal Grandfather     No Known Problems Paternal Grandfather     No Known Problems Paternal Aunt     Stroke Neg Hx     Anuerysm Neg Hx     Clotting disorder Neg Hx     Arrhythmia Neg Hx     Heart failure Neg Hx     Hyperlipidemia Neg Hx      Past Surgical History:   Procedure Laterality Date    APPENDECTOMY      BREAST BIOPSY Left 09/29/2006    BREAST RECONSTRUCTION      COMPLEX WOUND CLOSURE TO EXTREMITY Left 10/9/2018    Procedure: COMPLEX CLOSURE RECONSTRUCTION;  Surgeon: Evangelina Huff MD;  [facility];  Service: Plastics    CT BONE MARROW BIOPSY AND ASPIRATION  11/15/2019    CYSTOSCOPY W/ RETROGRADES  2009    CYSTOSCOPY W/ URETEROSCOPY  2002    EXTRACORPOREAL SHOCK WAVE LITHOTRIPSY Right 2005    FLAP LOCAL HEAD / NECK Left 10/9/2018    Procedure: FLAP RECONSTRUCTION;  Surgeon: Juan Miguel Alfaro MD;  [facility];  Service: Plastics    FULL THICKNESS SKIN GRAFT Left 10/9/2018    Procedure: FULL THICKNESS SKIN GRAFT RECONSTRUCTION;  Surgeon: Juan Miguel Alfaro MD;  [facility];  Service: Plastics    HYSTERECTOMY      1983-cervical cancer    INTRAOPERATIVE RADIATION THERAPY ([facility])      Radiation Therapy    KIDNEY SURGERY      MASTECTOMY Left 11/09/2006    OOPHORECTOMY Left     OTHER SURGICAL HISTORY      Chemotherapeutics    REDUCTION MAMMAPLASTY Right 2006    SENTINEL LYMPH NODE BIOPSY      SPLENECTOMY      1985-Hodgkin's Disease    SQUAMOUS CELL CARCINOMA EXCISION Left 10/9/2018    Procedure: NOSE/CHEEK [address] EXCISION; FROZEN SECTION;  Surgeon: Juan Miguel Alfaro MD;  [facility];  Service: Plastics       Current Outpatient Medications:     albuterol (PROVENTIL HFA,VENTOLIN HFA) 90 mcg/act inhaler, Inhale 2 puffs every 6 (six) hours as needed for wheezing or shortness of breath, Disp: 1 Inhaler, Rfl: 5    aspirin (ECOTRIN LOW STRENGTH) 81 mg EC tablet, Take 81 mg by mouth daily, Disp: , Rfl:     cholecalciferol (VITAMIN D3) 1,000 units tablet, Take 1,000 Units by mouth daily  , Disp: , Rfl:     clobetasol (TEMOVATE) 0 05 % cream, Apply topically twice daily to legs for 14 days only, DO NOT apply to face or groin , Disp: 60 g, Rfl: 0    Fexofenadine HCl (ALLEGRA ALLERGY PO), Take by mouth, Disp: , Rfl:     levothyroxine 88 mcg tablet, Take 1 tablet (88 mcg total) by mouth daily, Disp: 90 tablet, Rfl: 3    nebivolol (Bystolic) 5 mg tablet, Take 1 tablet (5 mg total) by mouth daily, Disp: 90 tablet, Rfl: 3    potassium chloride (K-DUR,KLOR-CON) 20 mEq tablet, Take 1 tablet (20 mEq total) by mouth daily, Disp: 30 tablet, Rfl: 5    torsemide (DEMADEX) 10 mg tablet, Take 1 tablet (10 mg total) by mouth 2 (two) times a day, Disp: 60 tablet, Rfl: 5  Allergies   Allergen Reactions    Ciprofloxacin Other (See Comments)     Reaction Date: 24Jun2011; Hives/Uticaria    Sulfamethoxazole-Trimethoprim Hives     Patient does not remember rx    Pantoprazole Rash     vasculitis       Labs:     Chemistry        Component Value Date/Time    K 4 0 11/23/2020 1318    CL 99 (L) 11/23/2020 1318    CO2 29 11/23/2020 1318    BUN 24 11/23/2020 1318    CREATININE 1 41 (H) 11/23/2020 1318        Component Value Date/Time    CALCIUM 8 9 11/23/2020 1318    ALKPHOS 255 (H) 11/08/2020 1628    AST 20 11/08/2020 1628    ALT 19 11/08/2020 1628            No results found for: CHOL  Lab Results   Component Value Date    HDL 37 (L) 09/21/2020    HDL 49 03/24/2020    HDL 37 (L) 07/24/2019     Lab Results   Component Value Date    LDLCALC 94 09/21/2020    LDLCALC 57 03/24/2020    LDLCALC 61 07/24/2019     Lab Results   Component Value Date    TRIG 57 09/21/2020    TRIG 58 03/24/2020    TRIG 72 07/24/2019     No results found for: CHOLHDL    Imaging: No results found  Review of Systems   Constitution: Positive for malaise/fatigue  HENT: Negative  Eyes: Negative  Cardiovascular: Positive for dyspnea on exertion and leg swelling  Respiratory: Positive for cough and shortness of breath  Hematologic/Lymphatic: Negative  Skin: Negative  Musculoskeletal: Negative  Gastrointestinal: Negative  Genitourinary: Negative  Neurological: Negative  Psychiatric/Behavioral: Negative  Allergic/Immunologic: Negative  Vitals:    02/09/21 1406   BP: 108/66   Pulse: 78   SpO2: 91%           Physical Exam  Vitals signs and nursing note reviewed  Constitutional:       Appearance: Normal appearance  HENT:      Head: Normocephalic        Nose: Nose normal       Mouth/Throat:      Mouth: Mucous membranes are moist    Eyes: General: No scleral icterus  Conjunctiva/sclera: Conjunctivae normal    Neck:      Musculoskeletal: Normal range of motion and neck supple  Cardiovascular:      Rate and Rhythm: Normal rate and regular rhythm  Heart sounds: No murmur  No gallop  Pulmonary:      Effort: Pulmonary effort is normal  No respiratory distress  Breath sounds: Normal breath sounds  No wheezing or rales  Abdominal:      General: Abdomen is flat  Bowel sounds are normal  There is no distension  Palpations: Abdomen is soft  Tenderness: There is no abdominal tenderness  There is no guarding  Musculoskeletal:      Right lower leg: Edema present  Left lower leg: Edema present  Skin:     General: Skin is warm and dry  Neurological:      General: No focal deficit present  Mental Status: She is alert and oriented to person, place, and time  Psychiatric:         Mood and Affect: Mood normal          Behavior: Behavior normal          Discussion/Summary:    Chronic Diastolic Heart Failure: She has signifciant volume overload today  Change lasix to torsemide  Check labs in a week  If no improvement will give IV lasix in the office  She has at least ten pounds of volume if not more on board right now  Moderate aortic regurgitation: will see how she does and may repeat JUDY to evaluate her aortic regurgitation  Most recent TTE was technically difficult       PSVT: Continue bystolic  She has been doing well         The patient was counseled regarding diagnostic results, instructions for management, risk factor reductions, impressions  total time of encounter was 25 minutes and 15 minutes was spent counseling

## 2021-02-16 NOTE — PROGRESS NOTES
Heart Failure Outpatient Care Manager: Patient had gone for a Cardiology office visit and her diuretic was changed  She stated her edema in her legs is improved but her weight has not gone down  She is scheduled for blood work tomorrow and will call Dr Trenton Llanos to let him know this was done  We discussed alerting her healthcare provider or outpatient care manager with any concerns or symptoms  She welcomes continued outreach

## 2021-02-17 NOTE — TELEPHONE ENCOUNTER
Pt called to give you an update on her weight  When she was seen here last week her weight was 146 and it's now 150  Pt states her BP been running low 109/55 P: 92  Pt states she does have BL ankle swelling and SOB on exertion  Pt is taking Torsemide 10 mg  She is watching her salt intake  Pt did not go to get her labs done yet due to how she's been feeling  Please Advise

## 2021-02-18 PROBLEM — I50.33 ACUTE ON CHRONIC DIASTOLIC (CONGESTIVE) HEART FAILURE (HCC): Status: ACTIVE | Noted: 2020-01-01

## 2021-02-18 PROBLEM — N18.30 STAGE 3 CHRONIC KIDNEY DISEASE (HCC): Status: ACTIVE | Noted: 2021-01-01

## 2021-02-18 NOTE — ASSESSMENT & PLAN NOTE
Lab Results   Component Value Date    EGFR 27 02/18/2021    EGFR 37 11/23/2020    EGFR 37 11/16/2020    CREATININE 1 82 (H) 02/18/2021    CREATININE 1 41 (H) 11/23/2020    CREATININE 1 43 (H) 11/16/2020   · Patient appears to have a baseline creatinine of 1 2-1 6, slightly elevated at 1 8, not meeting ENEDELIA criteria   · Monitor BMP closely with diuresis   · Continue Lasix 40 mg IV BID

## 2021-02-18 NOTE — ED PROVIDER NOTES
History  Chief Complaint   Patient presents with    Weakness - Generalized     Pt presents to the ED referred to ED by cardiologist- dr Otto Hu for chf eval, reports increased fatigue and weight gain  This is a 75-year-old female with past medical history significant for hypertension, hyperlipidemia, pulmonary hypertension, pulmonary embolus, deep venous thrombosis, thrombocytosis, cardiomyopathy, and diastolic heart failure presenting to the emergency department today for increasing leg swelling and generalized weakness  Patient was diagnosed with diastolic heart failure in November of 2020  She was also diagnosed with a pulmonary embolus and deep venous thrombosis in November of 2020  Patient has been following up with Cardiology and has been on various strength of Lasix and torsemide without relief of lower extremity swelling  Patient's current regimen includes torsemide 10 mg  Patient has a recent admission for congestive heart failure in November of 2020  The patient currently denies chest pain, shortness of breath, fever, chills, palpitations, abdominal pain, nausea, vomiting, diarrhea, constipation, and dysuria  Patient notes leg swelling has been gradually worsening and is equal bilaterally  Patient's dry weight is approximately 133 lb  Patient is not on any blood thinners or antiplatelets  Patient also notes a rash to her right upper extremity  It is not itchy and not painful  She also notes excoriations to her left posterior shoulder  The patient denies other complaints at this time  History provided by:  Patient   used: No    Ankle Swelling  Location:  Leg  Time since incident: Since 11/2020  Injury: no    Leg location:  L leg and R leg  Pain details:     Quality:  Pressure    Radiates to:  Does not radiate    Severity:  Mild    Onset quality:  Gradual    Duration: Since 11/2020      Timing:  Constant    Progression:  Waxing and waning  Chronicity: Chronic  Dislocation: no    Foreign body present:  No foreign bodies  Prior injury to area:  No  Relieved by: Diuretics  Ineffective treatments:  None tried  Associated symptoms: fatigue and swelling    Associated symptoms: no back pain, no decreased ROM, no fever, no muscle weakness, no numbness, no stiffness and no tingling    Risk factors: no concern for non-accidental trauma, no known bone disorder and no obesity        Prior to Admission Medications   Prescriptions Last Dose Informant Patient Reported? Taking? Fexofenadine HCl (ALLEGRA ALLERGY PO)  Self Yes Yes   Sig: Take by mouth   albuterol (PROVENTIL HFA,VENTOLIN HFA) 90 mcg/act inhaler  Self No Yes   Sig: Inhale 2 puffs every 6 (six) hours as needed for wheezing or shortness of breath   aspirin (ECOTRIN LOW STRENGTH) 81 mg EC tablet  Self Yes Yes   Sig: Take 81 mg by mouth daily   cholecalciferol (VITAMIN D3) 1,000 units tablet  Self Yes Yes   Sig: Take 1,000 Units by mouth daily     clobetasol (TEMOVATE) 0 05 % cream  Self No Yes   Sig: Apply topically twice daily to legs for 14 days only, DO NOT apply to face or groin     levothyroxine 88 mcg tablet  Self No Yes   Sig: Take 1 tablet (88 mcg total) by mouth daily   nebivolol (Bystolic) 5 mg tablet  Self No Yes   Sig: Take 1 tablet (5 mg total) by mouth daily   potassium chloride (K-DUR,KLOR-CON) 20 mEq tablet   No Yes   Sig: Take 1 tablet (20 mEq total) by mouth daily   torsemide (DEMADEX) 10 mg tablet   No Yes   Sig: Take 1 tablet (10 mg total) by mouth 2 (two) times a day      Facility-Administered Medications: None       Past Medical History:   Diagnosis Date    Acute CHF ([facility] ) 11/8/2020    Acute kidney injury superimposed on chronic kidney disease ([facility] ) 1/12/2019    ENEDELIA (acute kidney [facility]) ([facility] ) 1/12/2019    Anemia     BCC (basal cell carcinoma of skin)     facial    Breast cancer ([place] [address] ) 2006    s/p mastectomy     Calculus, kidney 2013    Cancer [facility])     Breast Cancer    Cancer ([place] [address] ) Cervical Cancer    Cervical cancer ([place] [address] ) 8768    Diastolic CHF, acute ([place] [address] ) 11/8/2020    Disease of thyroid gland     Ganglion     Last Assessed:7/9/13    Heart murmur     mitral valve regurgitation    Hodgkin disease ([place] [address] )     Hydronephrosis 2013    Hyperlipidemia     Hypertension     Melanoma ([place] [address] )     NSTEMI (non-ST elevated myocardial infarction) ([place] [address] ) 1/12/2019    Osteopenia     Last Assessed:7/9/13    Osteoporosis     Paroxysmal supraventricular tachycardia ([place] [address] )     Protein-calorie malnutrition ([place] [address] ) 2/12/2020    Renal calculi     Last Assessed:3/19/14    Renal cyst     Shoulder impingement     Last Assessed:1/18/13    Syncope 7/28/2019    Last Assessment & Plan:  Pre-syncopal symptoms leading to short syncopal episode without post-ictal period  Suspect dehydration/hypovolemia  Infectious, cardiopulm or neurologic cause less likely  No arrhythmias or AV block on ECG or telemetry thus far  No PE on CTA  No clinical signs of seizure   No acute pathology or mass on CTH  - s/p IVF; encourage PO intake - continue on telemetry - TTE c/f ne    UTI (urinary tract infection) 2013    Vasculitis ([place] [address] ) 1/12/2019       Past Surgical History:   Procedure Laterality Date    APPENDECTOMY      BREAST BIOPSY Left 09/29/2006    BREAST RECONSTRUCTION      COMPLEX WOUND CLOSURE TO EXTREMITY Left 10/9/2018    Procedure: COMPLEX CLOSURE RECONSTRUCTION;  Surgeon: Lilli Clark MD;  [facility];  Service: Plastics    CT BONE MARROW BIOPSY AND ASPIRATION  11/15/2019    CYSTOSCOPY W/ RETROGRADES  2009    CYSTOSCOPY W/ URETEROSCOPY  2002    EXTRACORPOREAL SHOCK WAVE LITHOTRIPSY Right 2005    FLAP LOCAL HEAD / NECK Left 10/9/2018    Procedure: FLAP RECONSTRUCTION;  Surgeon: Lilli Clark MD;  [facility];  Service: Plastics    FULL THICKNESS SKIN GRAFT Left 10/9/2018    Procedure: FULL THICKNESS SKIN GRAFT RECONSTRUCTION;  Surgeon: Lilli Clark MD;  [facility];  Service: Plastics    HYSTERECTOMY      1983-cervical cancer    INTRAOPERATIVE RADIATION THERAPY ([facility])      Radiation Therapy    KIDNEY SURGERY      MASTECTOMY Left 11/09/2006    OOPHORECTOMY Left     OTHER SURGICAL HISTORY      Chemotherapeutics    REDUCTION MAMMAPLASTY Right 2006    SENTINEL LYMPH NODE BIOPSY      SPLENECTOMY      1985-Hodgkin's Disease    SQUAMOUS CELL CARCINOMA EXCISION Left 10/9/2018    Procedure: NOSE/CHEEK BCC EXCISION; FROZEN SECTION;  Surgeon: Ama Daniel MD;  [facility];  Service: Plastics       Family History   Problem Relation Age of Onset    Leukemia Mother     Colon cancer Paternal Grandmother 80    Heart disease Family     Heart attack Father     Coronary artery disease Father         stent- five    Hypertension Father     Colon cancer Father 80    No Known Problems Maternal Grandmother     No Known Problems Maternal Grandfather     No Known Problems Paternal Grandfather     No Known Problems Paternal Aunt     Stroke Neg Hx     Anuerysm Neg Hx     Clotting disorder Neg Hx     Arrhythmia Neg Hx     Heart failure Neg Hx     Hyperlipidemia Neg Hx      I have reviewed and agree with the history as documented  E-Cigarette/Vaping     E-Cigarette/Vaping Substances    Nicotine No     THC No     CBD No     Flavoring No     Other No     Unknown No      Social History     Tobacco Use    Smoking status: Former Smoker     Quit date: 2011     Years since quitting: 10 1    Smokeless tobacco: Never Used   Substance Use Topics    Alcohol use: Not Currently     Frequency: Monthly or less     Comment: caffeine use; social drinker-1 beer every 2/3 months    Drug use: Never       Review of Systems   Constitutional: Positive for fatigue  Negative for chills and fever  Eyes: Negative for visual disturbance  Respiratory: Negative for cough, chest tightness, shortness of breath and wheezing  Cardiovascular: Positive for leg swelling  Negative for chest pain and palpitations  Gastrointestinal: Negative for constipation, diarrhea, nausea and vomiting  Genitourinary: Negative for dysuria  Musculoskeletal: Negative for arthralgias, back pain, joint swelling, myalgias and stiffness  Skin: Positive for color change and rash  Negative for wound  Neurological: Negative for dizziness, syncope, weakness, light-headedness, numbness and headaches  Psychiatric/Behavioral: Negative for confusion  All other systems reviewed and are negative  Physical Exam  Physical Exam  Vitals signs and nursing note reviewed  Constitutional:       General: She is not in acute distress  Appearance: Normal appearance  She is normal weight  She is not ill-appearing, toxic-appearing or diaphoretic  HENT:      Head: Normocephalic and atraumatic  Nose: Nose normal       Mouth/Throat:      Mouth: Mucous membranes are moist    Eyes:      General: No scleral icterus  Right eye: No discharge  Left eye: No discharge  Extraocular Movements: Extraocular movements intact  Conjunctiva/sclera: Conjunctivae normal    Cardiovascular:      Rate and Rhythm: Normal rate and regular rhythm  Pulses: Normal pulses  Heart sounds: Normal heart sounds  No murmur  No friction rub  No gallop  Comments: 2+ radial pulses bilaterally  Pulmonary:      Effort: Pulmonary effort is normal  No respiratory distress  Breath sounds: Normal breath sounds  No stridor  No wheezing, rhonchi or rales  Abdominal:      General: Abdomen is flat  Tenderness: There is no right CVA tenderness, left CVA tenderness or guarding  Comments: No TTP in any of the four abdominal quadrants  Mildly protuberant; suspect this is baseline   Musculoskeletal:      Right lower leg: Edema present  Left lower leg: Edema present        Comments: 2+ pitting edema in bilateral lower extremities  Bilateral lower extremities with chronic reddish discoloration and overlying dry skin; no open wounds or drainage  2+ DP pulses bilaterally  No signs of infection or cellulitis   Skin:     General: Skin is warm and dry  Findings: Rash present  Comments: Notable petechial/purpural rash of the right upper extremity with excoriations from scratching on the patients left posterior back  Rash is not blanching and not scaling  No acute signs of infection   Neurological:      General: No focal deficit present  Mental Status: She is alert and oriented to person, place, and time  Mental status is at baseline        Comments: Sensation is equal and intact in bilateral upper and lower extremities   Psychiatric:         Mood and Affect: Mood normal          Behavior: Behavior normal          Vital Signs  ED Triage Vitals [02/18/21 1354]   Temperature Pulse Respirations Blood Pressure SpO2   98 1 °F (36 7 °C) 86 22 98/50 98 %      Temp Source Heart Rate Source Patient Position - Orthostatic VS BP Location FiO2 (%)   Oral Monitor Sitting Right arm --      Pain Score       --           Vitals:    02/18/21 1455 02/18/21 1500 02/18/21 1530 02/18/21 1630   BP: 99/52 (!) 93/47 112/55 99/52   Pulse: 84 84 80 82   Patient Position - Orthostatic VS: Lying Lying Lying Lying         Visual Acuity  Visual Acuity      Most Recent Value   L Pupil Size (mm)  3   R Pupil Size (mm)  3          ED Medications  Medications   albuterol (PROVENTIL HFA,VENTOLIN HFA) inhaler 2 puff (has no administration in time range)   aspirin (ECOTRIN LOW STRENGTH) EC tablet 81 mg (has no administration in time range)   cholecalciferol (VITAMIN D3) tablet 1,000 Units (has no administration in time range)   levothyroxine tablet 88 mcg (has no administration in time range)   nebivolol (BYSTOLIC) tablet 5 mg (has no administration in time range)   potassium chloride (K-DUR,KLOR-CON) CR tablet 20 mEq (has no administration in time range)   ondansetron (ZOFRAN) injection 4 mg (has no administration in time range)   furosemide (LASIX) injection 40 mg (has no administration in time range)   heparin (porcine) subcutaneous injection 5,000 Units (has no administration in time range)   furosemide (LASIX) injection 80 mg (20 mg Intravenous Given 2/18/21 1517)       Diagnostic Studies  Results Reviewed     Procedure Component Value Units Date/Time    Platelet count [[id number]]     Lab Status: No result Specimen: Blood     COVID19, Influenza A/B, RSV PCR, [facility] [[id number]]  (Normal) Collected: 02/18/21 1450    Lab Status: Final result Specimen: Nares from Nose Updated: 02/18/21 1536     SARS-CoV-2 Negative     INFLUENZA A PCR Negative     INFLUENZA B PCR Negative     RSV PCR Negative    Narrative: This test has been authorized by FDA under an EUA (Emergency Use Assay) for use by authorized laboratories  Clinical caution and judgement should be used with the interpretation of these results with consideration of the clinical impression and other laboratory testing  Testing reported as "Positive" or "Negative" has been proven to be accurate according to standard laboratory validation requirements  All testing is performed with control materials showing appropriate reactivity at standard intervals      Comprehensive metabolic panel [[id number]]  (Abnormal) Collected: 02/18/21 1450    Lab Status: Final result Specimen: Blood from Arm, Right Updated: 02/18/21 1522     Sodium 138 mmol/L      Potassium 4 1 mmol/L      Chloride 103 mmol/L      CO2 29 mmol/L      ANION GAP 6 mmol/L      BUN 49 mg/dL      Creatinine 1 82 mg/dL      Glucose 103 mg/dL      Calcium 8 3 mg/dL      Corrected Calcium 9 8 mg/dL      AST 23 U/L      ALT 15 U/L      Alkaline Phosphatase 289 U/L      Total Protein 8 6 g/dL      Albumin 2 1 g/dL      Total Bilirubin 0 41 mg/dL      eGFR 27 ml/min/1 73sq m     Narrative:      [place] guidelines for Chronic Kidney Disease (CKD):     Stage 1 with normal or high GFR (GFR > 90 mL/min/1 73 square meters)    Stage 2 Mild CKD (GFR = 60-89 mL/min/1 73 square meters)    Stage 3A Moderate CKD (GFR = 45-59 mL/min/1 73 square meters)    Stage 3B Moderate CKD (GFR = 30-44 mL/min/1 73 square meters)    Stage 4 Severe CKD (GFR = 15-29 mL/min/1 73 square meters)    Stage 5 End Stage CKD (GFR <15 mL/min/1 73 square meters)  Note: GFR calculation is accurate only with a steady state creatinine    NT-BNP PRO [[id number]]  (Abnormal) Collected: 02/18/21 1450    Lab Status: Final result Specimen: Blood from Arm, Right Updated: 02/18/21 1522     NT-proBNP 19,620 pg/mL     Magnesium [[id number]]  (Normal) Collected: 02/18/21 1450    Lab Status: Final result Specimen: Blood from Arm, Right Updated: 02/18/21 1522     Magnesium 2 4 mg/dL     Troponin I [[id number]]  (Normal) Collected: 02/18/21 1450    Lab Status: Final result Specimen: Blood from Arm, Right Updated: 02/18/21 1516     Troponin I <0 02 ng/mL     Protime-INR [[id number]]  (Abnormal) Collected: 02/18/21 1450    Lab Status: Final result Specimen: Blood from Arm, Right Updated: 02/18/21 1511     Protime 15 1 seconds      INR 1 18    APTT [[id number]]  (Normal) Collected: 02/18/21 1450    Lab Status: Final result Specimen: Blood from Arm, Right Updated: 02/18/21 1511     PTT 30 seconds     CBC and differential [[id number]]  (Abnormal) Collected: 02/18/21 1450    Lab Status: Final result Specimen: Blood from Arm, Right Updated: 02/18/21 1459     WBC 4 51 Thousand/uL      RBC 3 55 Million/uL      Hemoglobin 9 9 g/dL      Hematocrit 32 3 %      MCV 91 fL      MCH 27 9 pg      MCHC 30 7 g/dL      RDW 16 6 %      MPV 9 3 fL      Platelets 064 Thousands/uL      nRBC 0 /100 WBCs      Neutrophils Relative 79 %      Immat GRANS % 0 %      Lymphocytes Relative 8 %      Monocytes Relative 8 %      Eosinophils Relative 4 %      Basophils Relative 1 %      Neutrophils Absolute 3 56 Thousands/µL      Immature Grans Absolute 0 02 Thousand/uL      Lymphocytes Absolute 0 34 Thousands/µL      Monocytes Absolute 0 37 Thousand/µL      Eosinophils Absolute 0 19 Thousand/µL      Basophils Absolute 0 03 Thousands/µL                  XR chest 1 view portable   Final Result by Leo Otto DO (02/18 1524)      Calcified left hilar and paratracheal nodes  Clear lung fields  Workstation performed: [id number]                    Procedures  ECG 12 Lead Documentation Only    Date/Time: 2/18/2021 5:24 PM  Performed by: Carolina Carter PA-C  Authorized by: Carolina Carter PA-C     Indications / Diagnosis:  LE Swelling; Hx CHF  ECG reviewed by me, the ED Provider: yes    Patient location:  ED  Previous ECG:     Previous ECG:  Compared to current    Comparison ECG info:  11/8/2020    Similarity:  No change    Comparison to cardiac monitor: No    Interpretation:     Interpretation: normal    Rate:     ECG rate:  84    ECG rate assessment: normal    Rhythm:     Rhythm: sinus rhythm    Ectopy:     Ectopy: none    QRS:     QRS axis:  Normal  Conduction:     Conduction: normal    ST segments:     ST segments:  Normal  T waves:     T waves: normal    Comments:      NSR  No acute ST segment changes or other signs of ischemia  Overall, no explanation on this EKG to explain the patient's symptoms             ED Course                                           MDM  Number of Diagnoses or Management Options  Acute on chronic diastolic (congestive) heart failure [facility]): new and requires workup  Diagnosis management comments: This is a 68-year-old female with past medical history significant for hypertension, hyperlipidemia, pulmonary hypertension, pulmonary embolus, deep venous thrombosis, cardiomyopathy, and diastolic heart failure presenting to the emergency department today for worsening leg swelling  Patient has been on Lasix and torsemide intermittently per cardiology  EKG within normal limits  Chest x-ray unremarkable based upon patient's complaints    Lab workup significant for BNP 37920, creatinine 1 82 (baseline 1 4-1 5), and normal coags  Patient given 20 mg of Lasix here in the emergency department  Given the patient's history and extent of leg swelling and BNP, decision was made to have the patient admitted inpatient status for further testing and diuresis  The patient was admitted to the service of Dr Janett Gonzalez for inpatient admission  The patient is stable for admission at this time  All questions answered to the patient and her 's satisfaction  Amount and/or Complexity of Data Reviewed  Clinical lab tests: ordered and reviewed  Tests in the radiology section of CPT®: ordered and reviewed  Discuss the patient with other providers: yes    Patient Progress  Patient progress: stable      Disposition  Final diagnoses:   Acute on chronic diastolic (congestive) heart failure ([facility] [address] )     Time reflects when diagnosis was documented in both MDM as applicable and the Disposition within this note     Time User Action Codes Description Comment    2/18/2021  3:43 PM Flory Jimenez Add [I50 33] Acute on chronic diastolic (congestive) heart failure [facility])       ED Disposition     ED Disposition Condition Date/Time Comment    Admit Stable Thu Feb 18, 2021  3:44 PM Case was discussed with Bladimir Montes PA-C and the patient's admission status was agreed to be Admission Status: inpatient status to the service of Dr Aorldo Austin   Follow-up Information    None         Patient's Medications   Discharge Prescriptions    No medications on file     No discharge procedures on file      PDMP Review     None          ED Provider  Electronically Signed by           Stephan Ayers PA-C  02/18/21 5478

## 2021-02-18 NOTE — ASSESSMENT & PLAN NOTE
Wt Readings from Last 3 Encounters:   02/18/21 68 4 kg (150 lb 12 8 oz)   02/09/21 66 2 kg (146 lb)   01/28/21 66 2 kg (146 lb)     · POA, as per patient weight is elevated up to 150 despite increasing diuretics  Reports MARI, orthopnea, and leg swelling  Some dietary indiscretion with soup broth  No fluid indiscretions   Exam significant for coarse breath sound 2-3+ pitting edema, and JVD  · Admit patient to med/surg under inpatient status  · Cardiology consultation   · Lasix 80 mg IV given in ER   · Lasix 40 mg IV BID   · 4 gram DORA diet, 2 L fluid restriction   · I/O, daily weights   · Monitor BMP  · Likely no need to repeat echo

## 2021-02-18 NOTE — H&P
[address] [facility]  H&P- Crissy Browne 1950, 66 y o  female MRN: [id number]    Unit/Bed#: ED 16 Encounter: [id number]    Primary Care Provider: Frederick Boxer, MD   Date and time admitted to hospital: 2/18/2021  1:57 PM        * Acute on chronic diastolic (congestive) heart failure ([facility] )  Assessment & Plan  Wt Readings from Last 3 Encounters:   02/18/21 68 4 kg (150 lb 12 8 oz)   02/09/21 66 2 kg (146 lb)   01/28/21 66 2 kg (146 lb)     · POA, as per patient weight is elevated up to 150 despite increasing diuretics  Reports MARI, orthopnea, and leg swelling  Some dietary indiscretion with soup broth  No fluid indiscretions  Exam significant for coarse breath sound 2-3+ pitting edema, and JVD  · Admit patient to med/surg under inpatient status  · Cardiology consultation   · Lasix 80 mg IV given in ER   · Lasix 40 mg IV BID   · 4 gram DORA diet, 2 L fluid restriction   · I/O, daily weights   · Monitor BMP  · Likely no need to repeat echo         Pulmonary hypertension ([facility] )  Assessment & Plan  · Could be some element of pulmonary HTN driving overloaded status  Echo in 2020 showed moderate pulmonary HTN   · Diuretics as above     Paroxysmal supraventricular tachycardia (HCC)  Assessment & Plan  · Noted history, appears to be in NSR on exam   · Continue Bystolic     Stage 3 chronic kidney disease  Assessment & Plan  Lab Results   Component Value Date    EGFR 27 02/18/2021    EGFR 37 11/23/2020    EGFR 37 11/16/2020    CREATININE 1 82 (H) 02/18/2021    CREATININE 1 41 (H) 11/23/2020    CREATININE 1 43 (H) 11/16/2020   · Patient appears to have a baseline creatinine of 1 2-1 6, slightly elevated at 1 8, not meeting ENEDELIA criteria   · Monitor BMP closely with diuresis   · Continue Lasix 40 mg IV BID     History of pulmonary embolus (PE)  Assessment & Plan  · Not on anticoagulation   Not tachycardic  · DVT PPX ordered     Thrombocytosis (HCC)  Assessment & Plan  · Appears at baseline   · Monitor CBC      VTE Prophylaxis: Heparin  / sequential compression device   Code Status: Full code   POLST: POLST form is not discussed and not completed at this time  Discussion with family:  at bedside     Anticipated Length of Stay:  Patient will be admitted on an Inpatient basis with an anticipated length of stay of  Greater than 2 midnights  Justification for Hospital Stay: As per above assessment and plan     Total Time for Visit, including Counseling / Coordination of Care: 1 hour  Greater than 50% of this total time spent on direct patient counseling and coordination of care  Chief Complaint:   SOB    History of Present Illness:    Vesta Smith is a 66 y o  female with a history of dCHF, pulmonary HTN, CKD 3, HTN, and prior PE not on anticoagulation who presents with weight gain  She reports that over the last few weeks her weight has been increasing  She reports that today it was at its highest at 150 lbs  She reports that at the end of last year her baseline weight was around 133 lbs  She reports increased shortness of breath with walking around her home and activity  She reports that due to this her  has been doing most of the work  She states that she cannot lay flat at all without getting SOB and has been sleeping in a chair  She states that she is uncomfortable there and has not been sleeping well  She reports bilateral leg swelling that is increased from her normal  Denies chest pain, fevers, chills  Reports that her diuretics were increased to 10 mg BID from QD dosing, but reports no increase in urine output  She reports that she has been eating healthy, but did report that she made homemade soup with low sodium chicken broth  Reports not drinking much which  states is only about 2 average sized bottles of water  Review of Systems:    Review of Systems   Constitutional: Positive for unexpected weight change  Negative for appetite change, chills, diaphoresis, fatigue and fever     HENT: Negative for congestion, rhinorrhea and sore throat  Eyes: Negative for visual disturbance  Respiratory: Positive for shortness of breath  Negative for cough, chest tightness and wheezing  Cardiovascular: Positive for leg swelling  Negative for chest pain and palpitations  Gastrointestinal: Negative for abdominal pain, constipation, diarrhea, nausea and vomiting  Genitourinary: Negative for decreased urine volume, dysuria and frequency  Musculoskeletal: Negative for arthralgias and myalgias  Neurological: Negative for dizziness, syncope, weakness, light-headedness, numbness and headaches  All other systems reviewed and are negative  Past Medical and Surgical History:     Past Medical History:   Diagnosis Date    Acute CHF ([facility] ) 11/8/2020    Acute kidney injury superimposed on chronic kidney disease ([facility] ) 1/12/2019    ENEDELIA (acute kidney injury) ([facility] ) 1/12/2019    Anemia     BCC (basal cell carcinoma of skin)     facial    Breast cancer ([facility] ) 2006    s/p mastectomy     Calculus, kidney 2013    Cancer [facility])     Breast Cancer    Cancer [facility])     Cervical Cancer    Cervical cancer ([place] [address] ) 5200    Diastolic CHF, acute ([facility] ) 11/8/2020    Disease of thyroid gland     Ganglion     Last Assessed:7/9/13    Heart murmur     mitral valve regurgitation    Hod[facility]kin disease [facility]     Hydronephrosis 2013    Hyperlipidemia     Hypertension     Melanoma ([facility] )     NSTEMI (non-ST elevated myocardial infarction) ([facility] ) 1/12/2019    Osteopenia     Last Assessed:7/9/13    Osteoporosis     Paroxysmal supraventricular tachycardia ([facility] )     Protein-calorie malnutrition ([facility] ) 2/12/2020    Renal calculi     Last Assessed:3/19/14    Renal cyst     Shoulder impingement     Last Assessed:1/18/13    Syncope 7/28/2019    Last Assessment & Plan:  Pre-syncopal symptoms leading to short syncopal episode without post-ictal period  Suspect dehydration/hypovolemia   Infectious, cardiopulm or neurologic cause less likely  No arrhythmias or AV block on ECG or telemetry thus far  No PE on CTA  No clinical signs of seizure  No acute pathology or mass on CTH  - s/p IVF; encourage PO intake - continue on telemetry - TTE c/f ne    UTI (urinary tract infection) 2013    Vasculitis ([facility] ) 1/12/2019       Past Surgical History:   Procedure Laterality Date    APPENDECTOMY      BREAST BIOPSY Left 09/29/2006    BREAST RECONSTRUCTION      COMPLEX WOUND CLOSURE TO EXTREMITY Left 10/9/2018    Procedure: COMPLEX CLOSURE RECONSTRUCTION;  Surgeon: Damián Myers MD;  [facility];  Service: Plastics    CT BONE MARROW BIOPSY AND ASPIRATION  11/15/2019    CYSTOSCOPY W/ RETROGRADES  2009    CYSTOSCOPY W/ URETEROSCOPY  2002    EXTRACORPOREAL SHOCK WAVE LITHOTRIPSY Right 2005    FLAP LOCAL HEAD / NECK Left 10/9/2018    Procedure: FLAP RECONSTRUCTION;  Surgeon: Damián Myers MD;  [facility]: [facility];  Service: Plastics    FULL THICKNESS SKIN GRAFT Left 10/9/2018    Procedure: FULL THICKNESS SKIN GRAFT RECONSTRUCTION;  Surgeon: Damián Myers MD;  [facility];  Service: Plastics    HYSTERECTOMY      1983-cervical cancer    INTRAOPERATIVE RADIATION THERAPY (I[facility])      Radiation Therapy    KIDNEY SURGERY      MASTECTOMY Left 11/09/2006    OOPHORECTOMY Left     OTHER SURGICAL HISTORY      Chemotherapeutics    REDUCTION MAMMAPLASTY Right 2006    SENTINEL LYMPH NODE BIOPSY      SPLENECTOMY      1985-Hodgkin's Disease    SQUAMOUS CELL CARCINOMA EXCISION Left 10/9/2018    Procedure: NOSE/CHEEK [address] EXCISION; FROZEN SECTION;  Surgeon: Damián Myers MD;  Location: [facility];  Service: Plastics       Meds/Allergies:    Prior to Admission medications    Medication Sig Start Date End Date Taking?  Authorizing Provider   albuterol (PROVENTIL HFA,VENTOLIN HFA) 90 mcg/act inhaler Inhale 2 puffs every 6 (six) hours as needed for wheezing or shortness of breath 1/6/21  Yes Na Carmen MD aspirin (ECOTRIN LOW STRENGTH) 81 mg EC tablet Take 81 mg by mouth daily   Yes Historical Provider, MD   cholecalciferol (VITAMIN D3) 1,000 units tablet Take 1,000 Units by mouth daily     Yes Historical Provider, MD   clobetasol (TEMOVATE) 0 05 % cream Apply topically twice daily to legs for 14 days only, DO NOT apply to face or groin  12/21/20  Yes Florencia Pallas, MD   Fexofenadine HCl (ALLEGRA ALLERGY PO) Take by mouth   Yes Historical Provider, MD   levothyroxine 88 mcg tablet Take 1 tablet (88 mcg total) by mouth daily 10/7/20  Yes Malena Simon MD   nebivolol (Bystolic) 5 mg tablet Take 1 tablet (5 mg total) by mouth daily 12/2/20 11/27/21 Yes ARACELY Edgar   potassium chloride (K-DUR,KLOR-CON) 20 mEq tablet Take 1 tablet (20 mEq total) by mouth daily 2/9/21  Yes Wali Yo MD   torsemide (DEMADEX) 10 mg tablet Take 1 tablet (10 mg total) by mouth 2 (two) times a day 2/9/21  Yes Wali Yo MD     I have reviewed home medications with patient personally  Allergies: Allergies   Allergen Reactions    Ciprofloxacin Other (See Comments)     Reaction Date: 24Jun2011;    Hives/Uticaria    Sulfamethoxazole-Trimethoprim Hives     Patient does not remember rx    Pantoprazole Rash     vasculitis       Social History:     Marital Status: /Civil Union   Occupation: noncontributory   Patient Pre-hospital Living Situation: Home  Patient Pre-hospital Level of Mobility: Full  Patient Pre-hospital Diet Restrictions: none  Substance Use History:   Social History     Substance and Sexual Activity   Alcohol Use Not Currently    Frequency: Monthly or less    Comment: caffeine use; social drinker-1 beer every 2/3 months     Social History     Tobacco Use   Smoking Status Former Smoker    Quit date: 2011    Years since quitting: 10 1   Smokeless Tobacco Never Used     Social History     Substance and Sexual Activity   Drug Use Never       Family History:    Family History   Problem Relation Age of Onset    Leukemia Mother     Colon cancer Paternal Grandmother 80    Heart disease Family     Heart attack Father     Coronary artery disease Father         stent- five    Hypertension Father     Colon cancer Father 80    No Known Problems Maternal Grandmother     No Known Problems Maternal Grandfather     No Known Problems Paternal Grandfather     No Known Problems Paternal Aunt     Stroke Neg Hx     Anuerysm Neg Hx     Clotting disorder Neg Hx     Arrhythmia Neg Hx     Heart failure Neg Hx     Hyperlipidemia Neg Hx        Physical Exam:     Vitals:   Blood Pressure: 99/52 (02/18/21 1630)  Pulse: 82 (02/18/21 1630)  Temperature: 98 1 °F (36 7 °C) (02/18/21 1354)  Temp Source: Oral (02/18/21 1354)  Respirations: 16 (02/18/21 1630)  Weight - Scale: 68 4 kg (150 lb 12 8 oz) (02/18/21 1353)  SpO2: 100 % (02/18/21 1630)    Physical Exam  Constitutional:       General: She is not in acute distress  Appearance: Normal appearance  She is normal weight  She is not ill-appearing or diaphoretic  HENT:      Head: Normocephalic and atraumatic  Mouth/Throat:      Mouth: Mucous membranes are moist    Eyes:      General: No scleral icterus  Pupils: Pupils are equal, round, and reactive to light  Neck:      Vascular: Hepatojugular reflux and JVD present  Cardiovascular:      Rate and Rhythm: Normal rate and regular rhythm  Pulses: Normal pulses  Heart sounds: Normal heart sounds, S1 normal and S2 normal  No murmur  No systolic murmur  No diastolic murmur  No gallop  No S3 or S4 sounds  Pulmonary:      Effort: Pulmonary effort is normal  No accessory muscle usage or respiratory distress  Breath sounds: No stridor  Examination of the right-lower field reveals decreased breath sounds  Examination of the left-lower field reveals decreased breath sounds  Decreased breath sounds present  No wheezing, rhonchi or rales        Comments: Coarse breath sounds bilaterally   Chest:      Chest wall: No tenderness  Abdominal:      General: Bowel sounds are normal  There is no distension  Palpations: Abdomen is soft  Tenderness: There is no abdominal tenderness  There is no guarding  Musculoskeletal:      Right lower le+ Pitting Edema present  Left lower le+ Pitting Edema present  Skin:     General: Skin is warm and dry  Coloration: Skin is not jaundiced  Neurological:      General: No focal deficit present  Mental Status: She is alert  Mental status is at baseline  Motor: No tremor or seizure activity  Psychiatric:         Behavior: Behavior is cooperative  Additional Data:     Lab Results: I have personally reviewed pertinent reports  Results from last 7 days   Lab Units 21  1450   WBC Thousand/uL 4 51   HEMOGLOBIN g/dL 9 9*   HEMATOCRIT % 32 3*   PLATELETS Thousands/uL 470*   NEUTROS PCT % 79*   LYMPHS PCT % 8*   MONOS PCT % 8   EOS PCT % 4     Results from last 7 days   Lab Units 21  1450   SODIUM mmol/L 138   POTASSIUM mmol/L 4 1   CHLORIDE mmol/L 103   CO2 mmol/L 29   BUN mg/dL 49*   CREATININE mg/dL 1 82*   ANION GAP mmol/L 6   CALCIUM mg/dL 8 3   ALBUMIN g/dL 2 1*   TOTAL BILIRUBIN mg/dL 0 41   ALK PHOS U/L 289*   ALT U/L 15   AST U/L 23   GLUCOSE RANDOM mg/dL 103     Results from last 7 days   Lab Units 21  1450   INR  1 18                   Imaging: I have personally reviewed pertinent reports  XR chest 1 view portable   Final Result by Juliann Batista DO ( [phone])      Calcified left hilar and paratracheal nodes  Clear lung fields  Workstation performed: [id number]             EKG, Pathology, and Other Studies Reviewed on Admission:   · EKG: NSR, 84 BPM  · CXR: Calcified left hilar and paratracheal nodes  Clear lung fields  Allscripts / Epic Records Reviewed: Yes     ** Please Note: This note has been constructed using a voice recognition system   **

## 2021-02-18 NOTE — ASSESSMENT & PLAN NOTE
· Could be some element of pulmonary HTN driving overloaded status   Echo in 2020 showed moderate pulmonary HTN   · Diuretics as above

## 2021-02-19 NOTE — CONSULTS
Consultation - Cardiology   Patricia Morrow 66 y o  female MRN: [id number]  Unit/Bed#: ED 16 Encounter: [id number]    Assessment/Plan     Assessment:   1  Shortness of breath  Likely secondary to #2  Patient started IV diuresis in the ED with 40 mg of Lasix b i d  Patient's BNP was elevated to 19,620  X-ray shows mild costophrenic blunting with cephalization  2  IVCnIN69%  Previous at 50% with G2DD  Patient on 10 mg torsemide BID and bysolic at home  3  ENEDELIA on Stage III CKD  Patient's baseline seems to between 1 2-1 5  Currently at 1 8, likely secondary to volume overload  Plan:  · Continue with IV Lasix b i d  Agnieszka Cosby · Daily weights, diuresed till dry weight of 133 lbs  · Monitor UOP    History of Present Illness   Physician Requesting Consult: Sis Agrawal MD  Reason for Consult / Principal Problem:  Shortness of breath  HPI: Patricia Morrow is a 66y o  year old female with past medical history of CHFpEF 50%, CKD 3, and hypertension who presents with progressively worsening shortness of breath over the last month  Patient's dry weight seems to be 133 lb, it is currently 150 lbs  Patient recently visited her cardiologist, Dr Pilar Lemos, who had increased her torsemide to 10 mg b i d  after patient was not responding to 10 mg q d  and was told to come to the ED if patient does not respond adequately  As patient's symptoms worsened  Yesterday, she was asked to come to the ED per her cardiologist   Patient states that she was not able to sleep flat in her bed, and has been sleeping in her chair for the last 2-3 weeks  Patient endorses PND  in the ED patient received 80 mg of IV Lasix, and was started on 40 mg of IV Lasix b i d  To which the patient  States she has been responding well to, requiring to go to the bathroom more so than usual   Patient states that she is also able to sleep flat on the hospital bed, which she has not been able to do last few weeks  Patient denies any shortness of breath, chest pain or palpitations at this time  Inpatient consult to Cardiology  Consult performed by: Esme Richards MD  Consult ordered by: Hammad Wolff PA-C          Review of Systems   Constitutional: Negative for chills and fever  HENT: Negative for ear pain and sore throat  Eyes: Negative for pain and visual disturbance  Respiratory: Positive for shortness of breath  Negative for cough  Cardiovascular: Positive for leg swelling  Negative for chest pain and palpitations  Gastrointestinal: Positive for abdominal distention  Negative for abdominal pain and vomiting  Genitourinary: Negative for dysuria and hematuria  Musculoskeletal: Negative for arthralgias and back pain  Skin: Negative for color change and rash  Neurological: Negative for seizures and syncope  All other systems reviewed and are negative        Historical Information   Past Medical History:   Diagnosis Date    Acute CHF ([facility] ) 11/8/2020    Acute kidney injury superimposed on chronic kidney disease ([facility] ) 1/12/2019    ENEDELIA (acute kidney injury) ([facility] ) 1/12/2019    Anemia     BCC (basal cell carcinoma of skin)     facial    Breast cancer ([facility] ) 2006    s/p mastectomy     Calculus, kidney 2013    Cancer [facility])     Breast Cancer    Cancer [facility])     Cervical Cancer    Cervical cancer ([facility] ) 6446    Diastolic CHF, acute ([facility] ) 11/8/2020    Disease of thyroid gland     Ganglion     Last Assessed:7/9/13    Heart murmur     mitral valve regurgitation    Hodgkin disease ([facility] )     Hydronephrosis 2013    Hyperlipidemia     Hypertension     Melanoma ([facility] )     NSTEMI (non-ST elevated myocardial infarction) ([facility] ) 1/12/2019    Osteopenia     Last Assessed:7/9/13    Osteoporosis     Paroxysmal supraventricular tachycardia (HCC)     Protein-calorie malnutrition ([facility] ) 2/12/2020    Renal calculi     Last Assessed:3/19/14    Renal cyst     Shoulder impingement     Last Assessed:1/18/13    Syncope 7/28/2019    Last Assessment & Plan:  Pre-syncopal symptoms leading to short syncopal episode without post-ictal period  Suspect dehydration/hypovolemia  Infectious, cardiopulm or neurologic cause less likely  No arrhythmias or AV block on ECG or telemetry thus far  No PE on CTA  No clinical signs of seizure   No acute pathology or mass on CTH  - s/p IVF; encourage PO intake - continue on telemetry - TTE c/f ne    UTI (urinary tract infection) 2013    Vasculitis ([facility] ) 1/12/2019     Past Surgical History:   Procedure Laterality Date    APPENDECTOMY      BREAST BIOPSY Left 09/29/2006    BREAST RECONSTRUCTION      COMPLEX WOUND CLOSURE TO EXTREMITY Left 10/9/2018    Procedure: COMPLEX CLOSURE RECONSTRUCTION;  Surgeon: Arcadio Castaneda MD;  [facility];  Service: Plastics    CT BONE MARROW BIOPSY AND ASPIRATION  11/15/2019    CYSTOSCOPY W/ RETROGRADES  2009    CYSTOSCOPY W/ URETEROSCOPY  2002    EXTRACORPOREAL SHOCK WAVE LITHOTRIPSY Right 2005    FLAP LOCAL HEAD / NECK Left 10/9/2018    Procedure: FLAP RECONSTRUCTION;  Surgeon: Arcadio Castaneda MD;  [facility];  Service: Plastics    FULL THICKNESS SKIN GRAFT Left 10/9/2018    Procedure: FULL THICKNESS SKIN GRAFT RECONSTRUCTION;  Surgeon: Arcadio Castaneda MD;  [facility];  Service: Plastics    HYSTERECTOMY      1983-cervical cancer    INTRAOPERATIVE RADIATION THERAPY ([facility])      Radiation Therapy    KIDNEY SURGERY      MASTECTOMY Left 11/09/2006    OOPHORECTOMY Left     OTHER SURGICAL HISTORY      Chemotherapeutics    REDUCTION MAMMAPLASTY Right 2006    SENTINEL LYMPH NODE BIOPSY      SPLENECTOMY      1985-Hodgkin's Disease    SQUAMOUS CELL CARCINOMA EXCISION Left 10/9/2018    Procedure: NOSE/CHEEK BCC EXCISION; FROZEN SECTION;  Surgeon: Arcadio Castaneda MD;  [facility];  Service: Plastics     Social History     Substance and Sexual Activity   Alcohol Use Not Currently    Frequency: Monthly or less    Comment: caffeine use; social drinker-1 beer every 2/3 months     Social History     Substance and Sexual Activity   Drug Use Never     E-Cigarette/Vaping     E-Cigarette/Vaping Substances    Nicotine No     THC No     CBD No     Flavoring No     Other No     Unknown No      Social History     Tobacco Use   Smoking Status Former Smoker    Quit date: 2011    Years since quitting: 10 1   Smokeless Tobacco Never Used     Family History: non-contributory    Meds/Allergies   all current active meds have been reviewed  Allergies   Allergen Reactions    Ciprofloxacin Other (See Comments)     Reaction Date: 24Jun2011; Hives/Uticaria    Sulfamethoxazole-Trimethoprim Hives     Patient does not remember rx    Pantoprazole Rash     vasculitis       Objective   Vitals: Blood pressure 118/60, pulse 76, temperature 98 1 °F (36 7 °C), temperature source Oral, resp  rate 20, weight 68 4 kg (150 lb 12 8 oz), SpO2 98 %, not currently breastfeeding  Orthostatic Blood Pressures      Most Recent Value   Blood Pressure  118/60 filed at 02/19/2021 0600   Patient Position - Orthostatic VS  Lying filed at 02/19/2021 0600          No intake or output data in the 24 hours ending 02/19/21 1001    Invasive Devices     Peripheral Intravenous Line            Peripheral IV 02/18/21 Right Antecubital less than 1 day                Physical Exam  Vitals signs and nursing note reviewed  Constitutional:       General: She is not in acute distress  Appearance: Normal appearance  She is well-developed  She is not ill-appearing  HENT:      Head: Normocephalic and atraumatic  Eyes:      Conjunctiva/sclera: Conjunctivae normal    Neck:      Musculoskeletal: Neck supple  Vascular: JVD present  Cardiovascular:      Rate and Rhythm: Normal rate and regular rhythm  Heart sounds: No murmur  Pulmonary:      Effort: Pulmonary effort is normal  No respiratory distress        Breath sounds: Normal breath sounds  Abdominal:      General: There is no distension  Palpations: Abdomen is soft  Tenderness: There is no abdominal tenderness  Musculoskeletal:         General: Swelling present  Right lower leg: Edema present  Left lower leg: Edema present  Skin:     General: Skin is warm and dry  Capillary Refill: Capillary refill takes less than 2 seconds  Neurological:      General: No focal deficit present  Mental Status: She is alert and oriented to person, place, and time  Psychiatric:         Mood and Affect: Mood normal          Behavior: Behavior normal          Lab Results:   CBC with diff:   Results from last 7 days   Lab Units 02/19/21  0454   WBC Thousand/uL 4 00*   RBC Million/uL 3 28*   HEMOGLOBIN g/dL 9 2*   HEMATOCRIT % 29 5*   MCV fL 90   MCH pg 28 0   MCHC g/dL 31 2*   RDW % 16 4*   MPV fL 9 4   PLATELETS Thousands/uL 425*     CMP:   Results from last 7 days   Lab Units 02/19/21  0454 02/18/21  1450   SODIUM mmol/L 140 138   POTASSIUM mmol/L 4 5 4 1   CHLORIDE mmol/L 106 103   CO2 mmol/L 27 29   BUN mg/dL 45* 49*   CREATININE mg/dL 1 81* 1 82*   CALCIUM mg/dL 8 3 8 3   AST U/L  --  23   ALT U/L  --  15   ALK PHOS U/L  --  289*   EGFR ml/min/1 73sq m 27 27     Troponin:   0   Lab Value Date/Time    TROPONINI <0 02 02/18/2021 1450    TROPONINI 0 06 (H) 11/09/2020 0006    TROPONINI 0 03 11/08/2020 2044    TROPONINI 0 04 11/08/2020 1628    TROPONINI 0 02 01/12/2019 0923    TROPONINI 0 02 01/12/2019 0614    TROPONINI 0 06 (H) 01/11/2019 2319     BNP:   Results from last 7 days   Lab Units 02/19/21  0454   POTASSIUM mmol/L 4 5   CHLORIDE mmol/L 106   CO2 mmol/L 27   BUN mg/dL 45*   CREATININE mg/dL 1 81*   CALCIUM mg/dL 8 3   EGFR ml/min/1 73sq m 27     Imaging: I have personally reviewed pertinent reports      EKG:   Regular rate and rhythm  VTE Prophylaxis: Sequential compression device (Venodyne)  and heparin    Code Status: Level 1 - Full Code  Advance Directive and Living Will:      Power of :    POLST:

## 2021-02-19 NOTE — ASSESSMENT & PLAN NOTE
Lab Results   Component Value Date    EGFR 27 02/19/2021    EGFR 27 02/18/2021    EGFR 37 11/23/2020    CREATININE 1 81 (H) 02/19/2021    CREATININE 1 82 (H) 02/18/2021    CREATININE 1 41 (H) 11/23/2020   · Patient appears to have a baseline creatinine of 1 2-1 6, slightly elevated at 1 8, not meeting ENEDELIA criteria   · Stable when compared to yesterday however still above baseline

## 2021-02-19 NOTE — ASSESSMENT & PLAN NOTE
Wt Readings from Last 3 Encounters:   02/19/21 67 9 kg (149 lb 11 1 oz)   02/09/21 66 2 kg (146 lb)   01/28/21 66 2 kg (146 lb)     · POA, as per patient weight is elevated up to 150 despite increasing diuretics  Reports MARI, orthopnea, and leg swelling  Some dietary indiscretion with soup broth  No fluid indiscretions  Exam significant for coarse breath sound 2-3+ pitting edema, and JVD  No intra now to daily weights documented however we will continue with diuretics given that the patient clinically appears and feels better  Her lower extremity edema is improved her breathing feels better and also she states that she has passed a lot of urine with continue with 80 mg IV b i d   Kidney function is similar to yesterday

## 2021-02-19 NOTE — PLAN OF CARE
Problem: Potential for Falls  Goal: Patient will remain free of falls  Description: INTERVENTIONS:  - Assess patient frequently for physical needs  -  Identify cognitive and physical deficits and behaviors that affect risk of falls    -  [place] fall precautions as indicated by assessment   - Educate patient/family on patient safety including physical limitations  - Instruct patient to call for assistance with activity based on assessment  - Modify environment to reduce risk of injury  - Consider OT/PT consult to assist with strengthening/mobility  Outcome: Progressing

## 2021-02-19 NOTE — PROGRESS NOTES
Progress Note - Patricia Morrow 1950, 66 y o  female MRN: [id number]    Unit/Bed#: S -01 Encounter: [id number]    Primary Care Provider: Efren Talbot MD   Date and time admitted to hospital: 2/18/2021  1:57 PM        Stage 3 chronic kidney disease  Assessment & Plan  Lab Results   Component Value Date    EGFR 27 02/19/2021    EGFR 27 02/18/2021    EGFR 37 11/23/2020    CREATININE 1 81 (H) 02/19/2021    CREATININE 1 82 (H) 02/18/2021    CREATININE 1 41 (H) 11/23/2020   · Patient appears to have a baseline creatinine of 1 2-1 6, slightly elevated at 1 8, not meeting ENEDELIA criteria   · Stable when compared to yesterday however still above baseline    History of pulmonary embolus (PE)  Assessment & Plan  · Not on anticoagulation  Not tachycardic  · DVT PPX ordered     Paroxysmal supraventricular tachycardia (HCC)  Assessment & Plan  · Noted history, appears to be in NSR on exam   · Continue Bystolic     Pulmonary hypertension ([address] )  Assessment & Plan  · Could be some element of pulmonary HTN driving overloaded status  Echo in 2020 showed moderate pulmonary HTN   · Diuretics as above     * Acute on chronic diastolic (congestive) heart failure (HCC)  Assessment & Plan  Wt Readings from Last 3 Encounters:   02/19/21 67 9 kg (149 lb 11 1 oz)   02/09/21 66 2 kg (146 lb)   01/28/21 66 2 kg (146 lb)     · POA, as per patient weight is elevated up to 150 despite increasing diuretics  Reports MARI, orthopnea, and leg swelling  Some dietary indiscretion with soup broth  No fluid indiscretions  Exam significant for coarse breath sound 2-3+ pitting edema, and JVD  No intra now to daily weights documented however we will continue with diuretics given that the patient clinically appears and feels better  Her lower extremity edema is improved her breathing feels better and also she states that she has passed a lot of urine with continue with 80 mg IV b i d   Kidney function is similar to yesterday            VTE Pharmacologic Prophylaxis:   Pharmacologic: Heparin  Mechanical VTE Prophylaxis in Place: Yes    Patient Centered Rounds: I have performed bedside rounds with nursing staff today  Discussions with Specialists or Other Care Team Provider:  Discussed with nursing    Education and Discussions with Family / Patient:  Discussed with the patient and  at bedside    Time Spent for Care: 30 minutes  More than 50% of total time spent on counseling and coordination of care as described above  Current Length of Stay: 1 day(s)    Current Patient Status: Inpatient   Certification Statement: The patient will continue to require additional inpatient hospital stay due to IV diuresis    Discharge Plan:  Possibly 48 hours    Code Status: Level 1 - Full Code      Subjective:   Patient seen examined  Feeling better    Objective:     Vitals:   Temp (24hrs), Av 8 °F (36 6 °C), Min:97 5 °F (36 4 °C), Max:98 2 °F (36 8 °C)    Temp:  [97 5 °F (36 4 °C)-98 2 °F (36 8 °C)] 97 5 °F (36 4 °C)  HR:  [72-85] 85  Resp:  [16-20] 20  BP: ()/(49-60) 103/51  SpO2:  [92 %-100 %] 99 %  Body mass index is 28 28 kg/m²  Input and Output Summary (last 24 hours): Intake/Output Summary (Last 24 hours) at 2021 1544  Last data filed at 2021 1500  Gross per 24 hour   Intake 420 ml   Output 1000 ml   Net -580 ml       Physical Exam:     Physical Exam  Constitutional:       General: She is not in acute distress  Appearance: She is not ill-appearing, toxic-appearing or diaphoretic  HENT:      Head: Normocephalic  Comments: Neck JVP     Mouth/Throat:      Mouth: Mucous membranes are moist    Eyes:      Pupils: Pupils are equal, round, and reactive to light  Neck:      Musculoskeletal: No neck rigidity or muscular tenderness  Vascular: No carotid bruit  Cardiovascular:      Rate and Rhythm: Normal rate  Heart sounds: No murmur  No friction rub  No gallop      Pulmonary:      Effort: Pulmonary effort is normal  No respiratory distress  Breath sounds: No wheezing or rhonchi  Chest:      Chest wall: No tenderness  Abdominal:      General: There is no distension  Palpations: There is no mass  Tenderness: There is no abdominal tenderness  There is no right CVA tenderness, left CVA tenderness or guarding  Hernia: No hernia is present  Musculoskeletal:         General: Swelling present  No tenderness, deformity or signs of injury  Right lower leg: Edema present  Left lower leg: Edema present  Lymphadenopathy:      Cervical: No cervical adenopathy  Skin:     Coloration: Skin is pale  Skin is not jaundiced  Findings: No bruising, erythema, lesion or rash  Neurological:      General: No focal deficit present  Cranial Nerves: No cranial nerve deficit  Sensory: No sensory deficit  Motor: No weakness  Coordination: Coordination normal       Gait: Gait normal       Deep Tendon Reflexes: Reflexes normal    Psychiatric:         Mood and Affect: Mood normal            Additional Data:     Labs:    Results from last 7 days   Lab Units 02/19/21  0454 02/18/21  1450   WBC Thousand/uL 4 00* 4 51   HEMOGLOBIN g/dL 9 2* 9 9*   HEMATOCRIT % 29 5* 32 3*   PLATELETS Thousands/uL 425* 470*   NEUTROS PCT %  --  79*   LYMPHS PCT %  --  8*   MONOS PCT %  --  8   EOS PCT %  --  4     Results from last 7 days   Lab Units 02/19/21  0454 02/18/21  1450   SODIUM mmol/L 140 138   POTASSIUM mmol/L 4 5 4 1   CHLORIDE mmol/L 106 103   CO2 mmol/L 27 29   BUN mg/dL 45* 49*   CREATININE mg/dL 1 81* 1 82*   ANION GAP mmol/L 7 6   CALCIUM mg/dL 8 3 8 3   ALBUMIN g/dL  --  2 1*   TOTAL BILIRUBIN mg/dL  --  0 41   ALK PHOS U/L  --  289*   ALT U/L  --  15   AST U/L  --  23   GLUCOSE RANDOM mg/dL 85 103     Results from last 7 days   Lab Units 02/18/21  1450   INR  1 18                       * I Have Reviewed All Lab Data Listed Above  * Additional Pertinent Lab Tests Reviewed:  All Labs For Current Hospital Admission Reviewed    Imaging:    Imaging Reports Reviewed Today Include:   None pertinent to this encounter  Imaging Personally Reviewed by Myself Includes:  As above    Recent Cultures (last 7 days):           Last 24 Hours Medication List:   Current Facility-Administered Medications   Medication Dose Route Frequency Provider Last Rate    albuterol  2 puff Inhalation Q6H PRN Jose Manuelbarby Fitch PA-C      aspirin  81 mg Oral Daily MARYANN Moore-OSMANI      cholecalciferol  1,000 Units Oral Daily MARYANN Moore-OSMANI      furosemide  80 mg Intravenous BID Gabe Gold MD      heparin (porcine)  5,000 Units Subcutaneous [facility] Jerrod Hull PA-C      levothyroxine  88 mcg Oral Daily Jerrod Hull PA-C      nebivolol  5 mg Oral Daily Jerrod Hull PA-C      ondansetron  4 mg Intravenous Q6H PRN Jose Manuel Fitch, PA-OSMANI      potassium chloride  20 mEq Oral Daily Jerrod Hull PA-C          Today, Patient Was Seen By: Lorene Dominguez MD    ** Please Note: Dictation voice to text software may have been used in the creation of this document   **

## 2021-02-20 NOTE — PLAN OF CARE
Problem: Potential for Falls  Goal: Patient will remain free of falls  Description: INTERVENTIONS:  - Assess patient frequently for physical needs  -  Identify cognitive and physical deficits and behaviors that affect risk of falls  -  [place] fall precautions as indicated by assessment   - Educate patient/family on patient safety including physical limitations  - Instruct patient to call for assistance with activity based on assessment  - Modify environment to reduce risk of injury  - Consider OT/PT consult to assist with strengthening/mobility  2/20/2021 0847 by Natacha Barnes RN  Outcome: Progressing  2/20/2021 0847 by Natacha Barnes RN  Outcome: Progressing     Problem: Nutrition/Hydration-ADULT  Goal: Nutrient/Hydration intake appropriate for improving, restoring or maintaining nutritional needs  Description: Monitor and assess patient's nutrition/hydration status for malnutrition  Collaborate with interdisciplinary team and initiate plan and interventions as ordered  Monitor patient's weight and dietary intake as ordered or per policy  Utilize nutrition screening tool and intervene as necessary  Determine patient's food preferences and provide high-protein, high-caloric foods as appropriate       INTERVENTIONS:  - Monitor oral intake, urinary output, labs, and treatment plans  - Assess nutrition and hydration status and recommend course of action  - Evaluate amount of meals eaten  - Assist patient with eating if necessary   - Allow adequate time for meals  - Recommend/ encourage appropriate diets, oral nutritional supplements, and vitamin/mineral supplements  - Order, calculate, and assess calorie counts as needed  - Recommend, monitor, and adjust tube feedings and TPN/PPN based on assessed needs  - Assess need for intravenous fluids  - Provide specific nutrition/hydration education as appropriate  - Include patient/family/caregiver in decisions related to nutrition  2/20/2021 0847 by Huseyin Burks Lisa Tejeda RN  Outcome: Progressing  2/20/2021 0847 by Nicole Donato RN  Outcome: Progressing     Problem: CARDIOVASCULAR - ADULT  Goal: Maintains optimal cardiac output and hemodynamic stability  Description: INTERVENTIONS:  - Monitor I/O, vital signs and rhythm  - Monitor for S/S and trends of decreased cardiac output  - Administer and titrate ordered vasoactive medications to optimize hemodynamic stability  - Assess quality of pulses, skin color and temperature  - Assess for signs of decreased coronary artery perfusion  - Instruct patient to report change in severity of symptoms  Outcome: Progressing  Goal: Absence of cardiac dysrhythmias or at baseline rhythm  Description: INTERVENTIONS:  - Continuous cardiac monitoring, vital signs, obtain 12 lead EKG if ordered  - Administer antiarrhythmic and heart rate control medications as ordered  - Monitor electrolytes and administer replacement therapy as ordered  Outcome: Progressing     Problem: RESPIRATORY - ADULT  Goal: Achieves optimal ventilation and oxygenation  Description: INTERVENTIONS:  - Assess for changes in respiratory status  - Assess for changes in mentation and behavior  - Position to facilitate oxygenation and minimize respiratory effort  - Oxygen administered by appropriate delivery if ordered  - Initiate smoking cessation education as indicated  - Encourage broncho-pulmonary hygiene including cough, deep breathe, Incentive Spirometry  - Assess the need for suctioning and aspirate as needed  - Assess and instruct to report SOB or any respiratory difficulty  - Respiratory Therapy support as indicated  Outcome: Progressing     Problem: SKIN/TISSUE INTEGRITY - ADULT  Goal: Skin integrity remains intact  Description: INTERVENTIONS  - Identify patients at risk for skin breakdown  - Assess and monitor skin integrity  - Assess and monitor nutrition and hydration status  - Monitor labs (i e  albumin)  - Assess for incontinence   - Turn and reposition patient  - Assist with mobility/ambulation  - Relieve pressure over bony prominences  - Avoid friction and shearing  - Provide appropriate hygiene as needed including keeping skin clean and dry  - Evaluate need for skin moisturizer/barrier cream  - Collaborate with interdisciplinary team (i e  Nutrition, Rehabilitation, etc )   - Patient/family teaching  Outcome: Progressing  Goal: Incision(s), wounds(s) or drain site(s) healing without S/S of infection  Description: INTERVENTIONS  - Assess and document risk factors for skin impairment   - Assess and document dressing, incision, wound bed, drain sites and surrounding tissue  - Consider nutrition services referral as needed  - Oral mucous membranes remain intact  - Provide patient/ family education  Outcome: Progressing  Goal: Oral mucous membranes remain intact  Description: INTERVENTIONS  - Assess oral mucosa and hygiene practices  - Implement preventative oral hygiene regimen  - Implement oral medicated treatments as ordered  - Initiate Nutrition services referral as needed  Outcome: Progressing

## 2021-02-21 NOTE — PLAN OF CARE
Problem: Potential for Falls  Goal: Patient will remain free of falls  Description: INTERVENTIONS:  - Assess patient frequently for physical needs  -  Identify cognitive and physical deficits and behaviors that affect risk of falls  -  [place] fall precautions as indicated by assessment   - Educate patient/family on patient safety including physical limitations  - Instruct patient to call for assistance with activity based on assessment  - Modify environment to reduce risk of injury  - Consider OT/PT consult to assist with strengthening/mobility  Outcome: Progressing     Problem: Nutrition/Hydration-ADULT  Goal: Nutrient/Hydration intake appropriate for improving, restoring or maintaining nutritional needs  Description: Monitor and assess patient's nutrition/hydration status for malnutrition  Collaborate with interdisciplinary team and initiate plan and interventions as ordered  Monitor patient's weight and dietary intake as ordered or per policy  Utilize nutrition screening tool and intervene as necessary  Determine patient's food preferences and provide high-protein, high-caloric foods as appropriate       INTERVENTIONS:  - Monitor oral intake, urinary output, labs, and treatment plans  - Assess nutrition and hydration status and recommend course of action  - Evaluate amount of meals eaten  - Assist patient with eating if necessary   - Allow adequate time for meals  - Recommend/ encourage appropriate diets, oral nutritional supplements, and vitamin/mineral supplements  - Order, calculate, and assess calorie counts as needed  - Recommend, monitor, and adjust tube feedings and TPN/PPN based on assessed needs  - Assess need for intravenous fluids  - Provide specific nutrition/hydration education as appropriate  - Include patient/family/caregiver in decisions related to nutrition  Outcome: Progressing     Problem: CARDIOVASCULAR - ADULT  Goal: Maintains optimal cardiac output and hemodynamic stability  Description: INTERVENTIONS:  - Monitor I/O, vital signs and rhythm  - Monitor for S/S and trends of decreased cardiac output  - Administer and titrate ordered vasoactive medications to optimize hemodynamic stability  - Assess quality of pulses, skin color and temperature  - Assess for signs of decreased coronary artery perfusion  - Instruct patient to report change in severity of symptoms  Outcome: Progressing  Goal: Absence of cardiac dysrhythmias or at baseline rhythm  Description: INTERVENTIONS:  - Continuous cardiac monitoring, vital signs, obtain 12 lead EKG if ordered  - Administer antiarrhythmic and heart rate control medications as ordered  - Monitor electrolytes and administer replacement therapy as ordered  Outcome: Progressing     Problem: RESPIRATORY - ADULT  Goal: Achieves optimal ventilation and oxygenation  Description: INTERVENTIONS:  - Assess for changes in respiratory status  - Assess for changes in mentation and behavior  - Position to facilitate oxygenation and minimize respiratory effort  - Oxygen administered by appropriate delivery if ordered  - Initiate smoking cessation education as indicated  - Encourage broncho-pulmonary hygiene including cough, deep breathe, Incentive Spirometry  - Assess the need for suctioning and aspirate as needed  - Assess and instruct to report SOB or any respiratory difficulty  - Respiratory Therapy support as indicated  Outcome: Progressing     Problem: SKIN/TISSUE INTEGRITY - ADULT  Goal: Skin integrity remains intact  Description: INTERVENTIONS  - Identify patients at risk for skin breakdown  - Assess and monitor skin integrity  - Assess and monitor nutrition and hydration status  - Monitor labs (i e  albumin)  - Assess for incontinence   - Turn and reposition patient  - Assist with mobility/ambulation  - Relieve pressure over bony prominences  - Avoid friction and shearing  - Provide appropriate hygiene as needed including keeping skin clean and dry  - Evaluate need for skin moisturizer/barrier cream  - Collaborate with interdisciplinary team (i e  Nutrition, Rehabilitation, etc )   - Patient/family teaching  Outcome: Progressing  Goal: Incision(s), wounds(s) or drain site(s) healing without S/S of infection  Description: INTERVENTIONS  - Assess and document risk factors for skin impairment   - Assess and document dressing, incision, wound bed, drain sites and surrounding tissue  - Consider nutrition services referral as needed  - Oral mucous membranes remain intact  - Provide patient/ family education  Outcome: Progressing  Goal: Oral mucous membranes remain intact  Description: INTERVENTIONS  - Assess oral mucosa and hygiene practices  - Implement preventative oral hygiene regimen  - Implement oral medicated treatments as ordered  - Initiate Nutrition services referral as needed  Outcome: Progressing     Problem: PAIN - ADULT  Goal: Verbalizes/displays adequate comfort level or baseline comfort level  Description: Interventions:  - Encourage patient to monitor pain and request assistance  - Assess pain using appropriate pain scale  - Administer analgesics based on type and severity of pain and evaluate response  - Implement non-pharmacological measures as appropriate and evaluate response  - Consider cultural and social influences on pain and pain management  - Notify physician/advanced practitioner if interventions unsuccessful or patient reports new pain  Outcome: Progressing     Problem: INFECTION - ADULT  Goal: Absence or prevention of progression during hospitalization  Description: INTERVENTIONS:  - Assess and monitor for signs and symptoms of infection  - Monitor lab/diagnostic results  - Monitor all insertion sites, i e  indwelling lines, tubes, and drains  - Monitor endotracheal if appropriate and nasal secretions for changes in amount and color  - [place] appropriate cooling/warming therapies per order  - Administer medications as ordered  - Instruct and encourage patient and family to use good hand hygiene technique  - Identify and instruct in appropriate isolation precautions for identified infection/condition  Outcome: Progressing  Goal: Absence of fever/infection during neutropenic period  Description: INTERVENTIONS:  - Monitor WBC    Outcome: Progressing     Problem: SAFETY ADULT  Goal: Patient will remain free of falls  Description: INTERVENTIONS:  - Assess patient frequently for physical needs  -  Identify cognitive and physical deficits and behaviors that affect risk of falls    -  [place] fall precautions as indicated by assessment   - Educate patient/family on patient safety including physical limitations  - Instruct patient to call for assistance with activity based on assessment  - Modify environment to reduce risk of injury  - Consider OT/PT consult to assist with strengthening/mobility  Outcome: Progressing  Goal: Maintain or return to baseline ADL function  Description: INTERVENTIONS:  -  Assess patient's ability to carry out ADLs; assess patient's baseline for ADL function and identify physical deficits which impact ability to perform ADLs (bathing, care of mouth/teeth, toileting, grooming, dressing, etc )  - Assess/evaluate cause of self-care deficits   - Assess range of motion  - Assess patient's mobility; develop plan if impaired  - Assess patient's need for assistive devices and provide as appropriate  - Encourage maximum independence but intervene and supervise when necessary  - Involve family in performance of ADLs  - Assess for home care needs following discharge   - Consider OT consult to assist with ADL evaluation and planning for discharge  - Provide patient education as appropriate  Outcome: Progressing  Goal: Maintain or return mobility status to optimal level  Description: INTERVENTIONS:  - Assess patient's baseline mobility status (ambulation, transfers, stairs, etc )    - Identify cognitive and physical deficits and behaviors that affect mobility  - Identify mobility aids required to assist with transfers and/or ambulation (gait belt, sit-to-stand, lift, walker, cane, etc )  - [place] fall precautions as indicated by assessment  - Record patient progress and toleration of activity level on Mobility SBAR; progress patient to next Phase/Stage  - Instruct patient to call for assistance with activity based on assessment  - Consider rehabilitation consult to assist with strengthening/weightbearing, etc   Outcome: Progressing     Problem: DISCHARGE PLANNING  Goal: Discharge to home or other facility with appropriate resources  Description: INTERVENTIONS:  - Identify barriers to discharge w/patient and caregiver  - Arrange for needed discharge resources and transportation as appropriate  - Identify discharge learning needs (meds, wound care, etc )  - Arrange for interpretive services to assist at discharge as needed  - Refer to Case Management Department for coordinating discharge planning if the patient needs post-hospital services based on physician/advanced practitioner order or complex needs related to functional status, cognitive ability, or social support system  Outcome: Progressing     Problem: Knowledge Deficit  Goal: Patient/family/caregiver demonstrates understanding of disease process, treatment plan, medications, and discharge instructions  Description: Complete learning assessment and assess knowledge base    Interventions:  - Provide teaching at level of understanding  - Provide teaching via preferred learning methods  Outcome: Progressing

## 2021-02-21 NOTE — ASSESSMENT & PLAN NOTE
Lab Results   Component Value Date    EGFR 29 02/21/2021    EGFR 28 02/20/2021    EGFR 27 02/19/2021    CREATININE 1 74 (H) 02/21/2021    CREATININE 1 77 (H) 02/20/2021    CREATININE 1 81 (H) 02/19/2021   · Patient appears to have a baseline creatinine of 1 2-1 6, creatinine 1 74, stable with diuresis  · Monitor with diuresis

## 2021-02-21 NOTE — ASSESSMENT & PLAN NOTE
Wt Readings from Last 3 Encounters:   02/20/21 67 3 kg (148 lb 6 4 oz)   02/09/21 66 2 kg (146 lb)   01/28/21 66 2 kg (146 lb)     POA, as per patient weight is elevated up to 150 despite increasing diuretics  Reports MARI, orthopnea, and leg swelling  Some dietary indiscretion with soup broth  No fluid indiscretions     Some improvement with Lasix IV  Diuretics changed to Bumex IV by Cardiology today  Monitor I/O, daily weights  Cardiology input appreciated

## 2021-02-21 NOTE — PROGRESS NOTES
Progress Note - Jaqui Frost 1950, 66 y o  female MRN: [id number]    Unit/Bed#: S -01 Encounter: [id number]    Primary Care Provider: Corry Madison MD   Date and time admitted to hospital: 2/18/2021  1:57 PM        * Acute on chronic diastolic (congestive) heart failure ([address] )  Assessment & Plan  Wt Readings from Last 3 Encounters:   02/21/21 64 1 kg (141 lb 6 4 oz)   02/09/21 66 2 kg (146 lb)   01/28/21 66 2 kg (146 lb)   · POA, as per patient weight is elevated up to 150 despite increasing diuretics  Reports MARI, orthopnea, and leg swelling  Some dietary indiscretion with soup broth  No fluid indiscretions  · Diuresing better with IV Bumex  Continue IV Bumex  · Monitor I/O, daily weights, fluid and sodium restriction  · Cardiology input appreciated        Stage 3 chronic kidney disease  Assessment & Plan  Lab Results   Component Value Date    EGFR 29 02/21/2021    EGFR 28 02/20/2021    EGFR 27 02/19/2021    CREATININE 1 74 (H) 02/21/2021    CREATININE 1 77 (H) 02/20/2021    CREATININE 1 81 (H) 02/19/2021   · Patient appears to have a baseline creatinine of 1 2-1 6, creatinine 1 74, stable with diuresis  · Monitor with diuresis      History of pulmonary embolus (PE)  Assessment & Plan  · Not on anticoagulation as outpatient on presentation  · DVT PPX ordered     Paroxysmal supraventricular tachycardia (HCC)  Assessment & Plan  · Noted history, appears to be in NSR on exam   · Continue Bystolic       VTE Pharmacologic Prophylaxis:   Pharmacologic: Heparin  Mechanical VTE Prophylaxis in Place: Yes    Patient Centered Rounds: I have performed bedside rounds with nursing staff today  Discussions with Specialists or Other Care Team Provider:  Cardiology    Education and Discussions with Family / Patient: Will provide medical update for patient's family member this afternoon    Time Spent for Care: 30 minutes    More than 50% of total time spent on counseling and coordination of care as described above  Current Length of Stay: 3 day(s)    Current Patient Status: Inpatient   Certification Statement: The patient will continue to require additional inpatient hospital stay due to IV diuresis    Discharge Plan:  Home when medically stable    Code Status: Level 1 - Full Code      Subjective:   Patient seen examined at bedside  No acute events overnight  Patient notes some improvement in her shortness of breath and lower extremity edema  Denies any chest pain    Objective:     Vitals:   Temp (24hrs), Av 8 °F (36 6 °C), Min:97 5 °F (36 4 °C), Max:98 2 °F (36 8 °C)    Temp:  [97 5 °F (36 4 °C)-98 2 °F (36 8 °C)] 97 5 °F (36 4 °C)  HR:  [81-83] 83  Resp:  [18] 18  BP: ()/(50-62) 109/55  SpO2:  [94 %-100 %] 94 %  Body mass index is 26 72 kg/m²  Input and Output Summary (last 24 hours): Intake/Output Summary (Last 24 hours) at 2021 1437  Last data filed at 2021 1339  Gross per 24 hour   Intake 480 ml   Output 2200 ml   Net -1720 ml       Physical Exam:     Physical Exam  Vitals signs reviewed  Constitutional:       General: She is not in acute distress  Appearance: She is not toxic-appearing  HENT:      Head: Normocephalic and atraumatic  Cardiovascular:      Rate and Rhythm: Normal rate and regular rhythm  Pulmonary:      Effort: No respiratory distress  Breath sounds: No wheezing or rhonchi  Abdominal:      General: There is no distension  Palpations: Abdomen is soft  Tenderness: There is no abdominal tenderness  There is no guarding  Musculoskeletal:         General: Swelling present  Skin:     General: Skin is warm and dry  Neurological:      Mental Status: She is alert           Additional Data:     Labs:    Results from last 7 days   Lab Units 21  0613  21  1450   WBC Thousand/uL 4 67   < > 4 51   HEMOGLOBIN g/dL 10 1*   < > 9 9*   HEMATOCRIT % 32 3*   < > 32 3*   PLATELETS Thousands/uL 468*   < > 470*   NEUTROS PCT %  -- --  79*   LYMPHS PCT %  --   --  8*   MONOS PCT %  --   --  8   EOS PCT %  --   --  4    < > = values in this interval not displayed  Results from last 7 days   Lab Units 02/21/21  0613  02/18/21  1450   SODIUM mmol/L 137   < > 138   POTASSIUM mmol/L 4 7   < > 4 1   CHLORIDE mmol/L 102   < > 103   CO2 mmol/L 29   < > 29   BUN mg/dL 43*   < > 49*   CREATININE mg/dL 1 74*   < > 1 82*   ANION GAP mmol/L 6   < > 6   CALCIUM mg/dL 8 3   < > 8 3   ALBUMIN g/dL  --   --  2 1*   TOTAL BILIRUBIN mg/dL  --   --  0 41   ALK PHOS U/L  --   --  289*   ALT U/L  --   --  15   AST U/L  --   --  23   GLUCOSE RANDOM mg/dL 80   < > 103    < > = values in this interval not displayed  Results from last 7 days   Lab Units 02/18/21  1450   INR  1 18                   * I Have Reviewed All Lab Data Listed Above  * Additional Pertinent Lab Tests Reviewed: All Labs Within Last 24 Hours Reviewed      Recent Cultures (last 7 days):           Last 24 Hours Medication List:   Current Facility-Administered Medications   Medication Dose Route Frequency Provider Last Rate    albuterol  2 puff Inhalation Q6H PRN Carla Roque PA-C      aspirin  81 mg Oral Daily Jerrod Hooks PA-C      bumetanide  2 mg Intravenous BID Teresita Vela MD      cholecalciferol  1,000 Units Oral Daily Jerrod Hooks PA-C      heparin (porcine)  5,000 Units Subcutaneous [facility] Jerrod Hooks PA-C      levothyroxine  88 mcg Oral Daily Jerrod Hooks PA-C      nebivolol  5 mg Oral Daily Jerrod Hooks PA-C      ondansetron  4 mg Intravenous Q6H PRN Carla Roque PA-C      potassium chloride  20 mEq Oral Daily Jerrod Hooks PA-C          Today, Patient Was Seen By: Bethany Fortune MD    ** Please Note: Dictation voice to text software may have been used in the creation of this document   **

## 2021-02-21 NOTE — ASSESSMENT & PLAN NOTE
Lab Results   Component Value Date    EGFR 28 02/20/2021    EGFR 27 02/19/2021    EGFR 27 02/18/2021    CREATININE 1 77 (H) 02/20/2021    CREATININE 1 81 (H) 02/19/2021    CREATININE 1 82 (H) 02/18/2021   · Patient appears to have a baseline creatinine of 1 2-1 6, slightly elevated at 1 77 today - stable from 2/19  · Monitor with diuresis

## 2021-02-21 NOTE — PLAN OF CARE
Problem: Potential for Falls  Goal: Patient will remain free of falls  Description: INTERVENTIONS:  - Assess patient frequently for physical needs  -  Identify cognitive and physical deficits and behaviors that affect risk of falls  -  [place] fall precautions as indicated by assessment   - Educate patient/family on patient safety including physical limitations  - Instruct patient to call for assistance with activity based on assessment  - Modify environment to reduce risk of injury  - Consider OT/PT consult to assist with strengthening/mobility  Outcome: Progressing     Problem: Nutrition/Hydration-ADULT  Goal: Nutrient/Hydration intake appropriate for improving, restoring or maintaining nutritional needs  Description: Monitor and assess patient's nutrition/hydration status for malnutrition  Collaborate with interdisciplinary team and initiate plan and interventions as ordered  Monitor patient's weight and dietary intake as ordered or per policy  Utilize nutrition screening tool and intervene as necessary  Determine patient's food preferences and provide high-protein, high-caloric foods as appropriate       INTERVENTIONS:  - Monitor oral intake, urinary output, labs, and treatment plans  - Assess nutrition and hydration status and recommend course of action  - Evaluate amount of meals eaten  - Assist patient with eating if necessary   - Allow adequate time for meals  - Recommend/ encourage appropriate diets, oral nutritional supplements, and vitamin/mineral supplements  - Order, calculate, and assess calorie counts as needed  - Recommend, monitor, and adjust tube feedings and TPN/PPN based on assessed needs  - Assess need for intravenous fluids  - Provide specific nutrition/hydration education as appropriate  - Include patient/family/caregiver in decisions related to nutrition  Outcome: Progressing     Problem: CARDIOVASCULAR - ADULT  Goal: Maintains optimal cardiac output and hemodynamic stability  Description: INTERVENTIONS:  - Monitor I/O, vital signs and rhythm  - Monitor for S/S and trends of decreased cardiac output  - Administer and titrate ordered vasoactive medications to optimize hemodynamic stability  - Assess quality of pulses, skin color and temperature  - Assess for signs of decreased coronary artery perfusion  - Instruct patient to report change in severity of symptoms  Outcome: Progressing  Goal: Absence of cardiac dysrhythmias or at baseline rhythm  Description: INTERVENTIONS:  - Continuous cardiac monitoring, vital signs, obtain 12 lead EKG if ordered  - Administer antiarrhythmic and heart rate control medications as ordered  - Monitor electrolytes and administer replacement therapy as ordered  Outcome: Progressing     Problem: RESPIRATORY - ADULT  Goal: Achieves optimal ventilation and oxygenation  Description: INTERVENTIONS:  - Assess for changes in respiratory status  - Assess for changes in mentation and behavior  - Position to facilitate oxygenation and minimize respiratory effort  - Oxygen administered by appropriate delivery if ordered  - Initiate smoking cessation education as indicated  - Encourage broncho-pulmonary hygiene including cough, deep breathe, Incentive Spirometry  - Assess the need for suctioning and aspirate as needed  - Assess and instruct to report SOB or any respiratory difficulty  - Respiratory Therapy support as indicated  Outcome: Progressing     Problem: SKIN/TISSUE INTEGRITY - ADULT  Goal: Skin integrity remains intact  Description: INTERVENTIONS  - Identify patients at risk for skin breakdown  - Assess and monitor skin integrity  - Assess and monitor nutrition and hydration status  - Monitor labs (i e  albumin)  - Assess for incontinence   - Turn and reposition patient  - Assist with mobility/ambulation  - Relieve pressure over bony prominences  - Avoid friction and shearing  - Provide appropriate hygiene as needed including keeping skin clean and dry  - Evaluate need for skin moisturizer/barrier cream  - Collaborate with interdisciplinary team (i e  Nutrition, Rehabilitation, etc )   - Patient/family teaching  Outcome: Progressing  Goal: Incision(s), wounds(s) or drain site(s) healing without S/S of infection  Description: INTERVENTIONS  - Assess and document risk factors for skin impairment   - Assess and document dressing, incision, wound bed, drain sites and surrounding tissue  - Consider nutrition services referral as needed  - Oral mucous membranes remain intact  - Provide patient/ family education  Outcome: Progressing  Goal: Oral mucous membranes remain intact  Description: INTERVENTIONS  - Assess oral mucosa and hygiene practices  - Implement preventative oral hygiene regimen  - Implement oral medicated treatments as ordered  - Initiate Nutrition services referral as needed  Outcome: Progressing     Problem: PAIN - ADULT  Goal: Verbalizes/displays adequate comfort level or baseline comfort level  Description: Interventions:  - Encourage patient to monitor pain and request assistance  - Assess pain using appropriate pain scale  - Administer analgesics based on type and severity of pain and evaluate response  - Implement non-pharmacological measures as appropriate and evaluate response  - Consider cultural and social influences on pain and pain management  - Notify physician/advanced practitioner if interventions unsuccessful or patient reports new pain  Outcome: Progressing     Problem: INFECTION - ADULT  Goal: Absence or prevention of progression during hospitalization  Description: INTERVENTIONS:  - Assess and monitor for signs and symptoms of infection  - Monitor lab/diagnostic results  - Monitor all insertion sites, i e  indwelling lines, tubes, and drains  - Monitor endotracheal if appropriate and nasal secretions for changes in amount and color  - Bradley appropriate cooling/warming therapies per order  - Administer medications as ordered  - Instruct and encourage patient and family to use good hand hygiene technique  - Identify and instruct in appropriate isolation precautions for identified infection/condition  Outcome: Progressing  Goal: Absence of fever/infection during neutropenic period  Description: INTERVENTIONS:  - Monitor WBC    Outcome: Progressing     Problem: SAFETY ADULT  Goal: Patient will remain free of falls  Description: INTERVENTIONS:  - Assess patient frequently for physical needs  -  Identify cognitive and physical deficits and behaviors that affect risk of falls    -  [place] fall precautions as indicated by assessment   - Educate patient/family on patient safety including physical limitations  - Instruct patient to call for assistance with activity based on assessment  - Modify environment to reduce risk of injury  - Consider OT/PT consult to assist with strengthening/mobility  Outcome: Progressing  Goal: Maintain or return to baseline ADL function  Description: INTERVENTIONS:  -  Assess patient's ability to carry out ADLs; assess patient's baseline for ADL function and identify physical deficits which impact ability to perform ADLs (bathing, care of mouth/teeth, toileting, grooming, dressing, etc )  - Assess/evaluate cause of self-care deficits   - Assess range of motion  - Assess patient's mobility; develop plan if impaired  - Assess patient's need for assistive devices and provide as appropriate  - Encourage maximum independence but intervene and supervise when necessary  - Involve family in performance of ADLs  - Assess for home care needs following discharge   - Consider OT consult to assist with ADL evaluation and planning for discharge  - Provide patient education as appropriate  Outcome: Progressing  Goal: Maintain or return mobility status to optimal level  Description: INTERVENTIONS:  - Assess patient's baseline mobility status (ambulation, transfers, stairs, etc )    - Identify cognitive and physical deficits and behaviors that affect mobility  - Identify mobility aids required to assist with transfers and/or ambulation (gait belt, sit-to-stand, lift, walker, cane, etc )  - [place] fall precautions as indicated by assessment  - Record patient progress and toleration of activity level on Mobility SBAR; progress patient to next Phase/Stage  - Instruct patient to call for assistance with activity based on assessment  - Consider rehabilitation consult to assist with strengthening/weightbearing, etc   Outcome: Progressing     Problem: DISCHARGE PLANNING  Goal: Discharge to home or other facility with appropriate resources  Description: INTERVENTIONS:  - Identify barriers to discharge w/patient and caregiver  - Arrange for needed discharge resources and transportation as appropriate  - Identify discharge learning needs (meds, wound care, etc )  - Arrange for interpretive services to assist at discharge as needed  - Refer to Case Management Department for coordinating discharge planning if the patient needs post-hospital services based on physician/advanced practitioner order or complex needs related to functional status, cognitive ability, or social support system  Outcome: Progressing     Problem: Knowledge Deficit  Goal: Patient/family/caregiver demonstrates understanding of disease process, treatment plan, medications, and discharge instructions  Description: Complete learning assessment and assess knowledge base    Interventions:  - Provide teaching at level of understanding  - Provide teaching via preferred learning methods  Outcome: Progressing

## 2021-02-21 NOTE — PROGRESS NOTES
Cardiology Progress Note - Leopoldo Disla 66 y o  female MRN: [id number]    Unit/Bed#: S -01 Encounter: [id number]        Subjective:    No significant events overnight  No chest pain or dyspnea at rest      Review of Systems   Constitution: Positive for malaise/fatigue  Cardiovascular: Positive for leg swelling  Negative for chest pain  Respiratory: Positive for shortness of breath  Objective:   Vitals: Blood pressure 109/55, pulse 83, temperature 97 5 °F (36 4 °C), temperature source Oral, resp  rate 18, weight 64 1 kg (141 lb 6 4 oz), SpO2 94 %, not currently breastfeeding , Body mass index is 26 72 kg/m² ,   Orthostatic Blood Pressures      Most Recent Value   Blood Pressure  109/55 filed at 02/21/2021 0813   Patient Position - Orthostatic VS  Lying filed at 02/21/2021 7424         Systolic (17OYD), TMP:019 , Min:90 , MRQ:542     Diastolic (87XMR), BRANDON:50, Min:50, Max:62      Intake/Output Summary (Last 24 hours) at 2/21/2021 1139  Last data filed at 2/21/2021 0600  Gross per 24 hour   Intake 480 ml   Output 1300 ml   Net -820 ml     Weight (last 2 days)     Date/Time   Weight    02/21/21 0600   64 1 (141 4)    02/20/21 0600   67 3 (148 4)    02/19/21 1150   67 9 (149 69)    02/19/21 0600   64 1 (141 32)                    Physical Exam  Vitals signs and nursing note reviewed  Constitutional:       Appearance: Normal appearance  HENT:      Head: Normocephalic  Nose: Nose normal       Mouth/Throat:      Mouth: Mucous membranes are moist    Eyes:      General: No scleral icterus  Conjunctiva/sclera: Conjunctivae normal    Neck:      Musculoskeletal: Normal range of motion and neck supple  Cardiovascular:      Rate and Rhythm: Normal rate and regular rhythm  Heart sounds: No murmur  No gallop  Pulmonary:      Effort: Pulmonary effort is normal  No respiratory distress  Breath sounds: Normal breath sounds  No wheezing or rales     Abdominal:      General: Abdomen is flat  Bowel sounds are normal  There is no distension  Palpations: Abdomen is soft  Tenderness: There is no abdominal tenderness  There is no guarding  Musculoskeletal:         General: Swelling present  Right lower leg: Edema present  Left lower leg: Edema present  Skin:     General: Skin is warm and dry  Neurological:      General: No focal deficit present  Mental Status: She is alert and oriented to person, place, and time  Psychiatric:         Mood and Affect: Mood normal          Behavior: Behavior normal            Laboratory Results:  Results from last 7 days   Lab Units 02/18/21  1450   TROPONIN I ng/mL <0 02       CBC with diff:   Results from last 7 days   Lab Units 02/21/21  0613 02/19/21  0454 02/18/21  1450   WBC Thousand/uL 4 67 4 00* 4 51   HEMOGLOBIN g/dL 10 1* 9 2* 9 9*   HEMATOCRIT % 32 3* 29 5* 32 3*   MCV fL 89 90 91   PLATELETS Thousands/uL 468* 425* 470*   MCH pg 27 9 28 0 27 9   MCHC g/dL 31 3* 31 2* 30 7*   RDW % 16 1* 16 4* 16 6*   MPV fL 9 6 9 4 9 3   NRBC AUTO /100 WBCs  --   --  0         CMP:  Results from last 7 days   Lab Units 02/21/21  0613 02/20/21  1104 02/19/21  0454 02/18/21  1450   POTASSIUM mmol/L 4 7 4 7 4 5 4 1   CHLORIDE mmol/L 102 105 106 103   CO2 mmol/L 29 29 27 29   BUN mg/dL 43* 43* 45* 49*   CREATININE mg/dL 1 74* 1 77* 1 81* 1 82*   CALCIUM mg/dL 8 3 8 1* 8 3 8 3   AST U/L  --   --   --  23   ALT U/L  --   --   --  15   ALK PHOS U/L  --   --   --  289*   EGFR ml/min/1 73sq m 29 28 27 27         BMP:  Results from last 7 days   Lab Units 02/21/21  0613 02/20/21  1104 02/19/21  0454 02/18/21  1450   POTASSIUM mmol/L 4 7 4 7 4 5 4 1   CHLORIDE mmol/L 102 105 106 103   CO2 mmol/L 29 29 27 29   BUN mg/dL 43* 43* 45* 49*   CREATININE mg/dL 1 74* 1 77* 1 81* 1 82*   CALCIUM mg/dL 8 3 8 1* 8 3 8 3       BNP: No results for input(s): BNP in the last 72 hours      Magnesium:   Results from last 7 days   Lab Units 02/21/21  0613 02/20/21  1109 21  1450   MAGNESIUM mg/dL 2 4 2 3 2 4       Coags:   Results from last 7 days   Lab Units 21  1450   PTT seconds 30   INR  1 18       TSH: No results found for: TSH    Hemoglobin A1C       Lipid Profile:       Cardiac testing:   Results for orders placed during the hospital encounter of 20   Echo complete with contrast if indicated    Narrative [address] [facility]  [phone]    Transthoracic Echocardiogram  2D, M-mode, Doppler, and Color Doppler    Study date:  2020    Patient: Alka Montgomery  MR number: [id number]  Account number: [de-identified]  : 1950  Age: 65 years  Gender: Female  Status: Inpatient  Location: Bedside  Height: 61 in  Weight: 144 lb  BP: 116/ 57 mmHg    Indications: Heart Failure    Diagnoses: I50 9 - Heart failure, unspecified    Sonographer:  SHANTELL Jackson  Primary Physician:  Sheri Zuluaga MD  Referring Physician:  Elba Vallejo MD  Group:  [facility]  Interpreting Physician:  Renard Carvalho MD    SUMMARY    LEFT VENTRICLE:  Size was normal   Systolic function was at the lower limits of normal  Ejection fraction was estimated to be 50 %  There was mild diffuse hypokinesis  Wall thickness was normal   Features were consistent with a pseudonormal left ventricular filling pattern, with concomitant abnormal relaxation and increased filling pressure (grade 2 diastolic dysfunction)  RIGHT VENTRICLE:  The ventricle was mildly dilated  Systolic function was mildly reduced  MITRAL VALVE:  There was moderate regurgitation  TRICUSPID VALVE:  There was moderate regurgitation  Pulmonary artery systolic pressure was moderately increased  The findings suggest moderate pulmonary hypertension  COMPARISONS:  Comparison was made with the previous study of 2019  Aortic regurgitation has improved      HISTORY: PRIOR HISTORY: Hyperlipidemia, Mitral regurgitation, PSVT, tricuspid regurgitation, Aortic insufficiency, CM, CKD III, Acute CHF, HTN  PROCEDURE: The procedure was performed at the bedside  This was a routine study  Parasternals are technically difficult due to left breast implant  The transthoracic approach was used  The study included complete 2D imaging, M-mode,  complete spectral Doppler, and color Doppler  The heart rate was 91 bpm, at the start of the study  Images were obtained from the parasternal, apical, subcostal, and suprasternal notch acoustic windows  This was a technically difficult  study  LEFT VENTRICLE: Size was normal  Systolic function was at the lower limits of normal  Ejection fraction was estimated to be 50 %  There was mild diffuse hypokinesis  Wall thickness was normal  DOPPLER: Features were consistent with a  pseudonormal left ventricular filling pattern, with concomitant abnormal relaxation and increased filling pressure (grade 2 diastolic dysfunction)  RIGHT VENTRICLE: The ventricle was mildly dilated  Systolic function was mildly reduced  Wall thickness was normal     LEFT ATRIUM: Size was normal     RIGHT ATRIUM: Size was normal     MITRAL VALVE: Valve structure was normal  There was normal leaflet separation  DOPPLER: The transmitral velocity was within the normal range  There was no evidence for stenosis  There was moderate regurgitation  AORTIC VALVE: The valve was trileaflet  Leaflets exhibited normal thickness and normal cuspal separation  DOPPLER: Transaortic velocity was within the normal range  There was no evidence for stenosis  There was no regurgitation  TRICUSPID VALVE: The valve structure was normal  There was normal leaflet separation  DOPPLER: The transtricuspid velocity was within the normal range  There was no evidence for stenosis  There was moderate regurgitation  Pulmonary artery  systolic pressure was moderately increased  Estimated peak PA pressure was 55 mmHg   The findings suggest moderate pulmonary hypertension  PULMONIC VALVE: Leaflets exhibited normal thickness, no calcification, and normal cuspal separation  DOPPLER: The transpulmonic velocity was within the normal range  There was trace regurgitation  PERICARDIUM: There was no pericardial effusion  The pericardium was normal in appearance  AORTA: The root exhibited normal size  SYSTEMIC VEINS: IVC: The inferior vena cava was normal in size and course  Respirophasic changes were normal     SYSTEM MEASUREMENT TABLES    2D  %FS: 21 01 %  Ao Diam: 2 57 cm  EDV(Teich): 85 55 ml  EF(Teich): 42 99 %  ESV(Teich): 48 78 ml  IVSd: 0 79 cm  LA Area: 16 44 cm2  LA Diam: 4 2 cm  LVEDV MOD A4C: 70 91 ml  LVEF MOD A4C: 49 92 %  LVESV MOD A4C: 35 51 ml  LVIDd: 4 35 cm  LVIDs: 3 44 cm  LVLd A4C: 7 01 cm  LVLs A4C: 6 04 cm  LVPWd: 0 79 cm  RA Area: 15 8 cm2  RVIDd: 3 44 cm  SV MOD A4C: 35 4 ml  SV(Teich): 36 78 ml    CF  MR Als  Parth: 0 36 m/s  MR Flow: 22 07 ml/s  MR Rad: 0 31 cm    CW  MR VTI: 143 7 cm  MR Vmax: 4 28 m/s  MR Vmean: 3 58 m/s  MR maxP 3 mmHg  MR meanP 38 mmHg  TR MaxP 52 mmHg  TR Vmax: 3 3 m/s    MM  TAPSE: 1 56 cm    PW  E' Sept: 0 08 m/s  E/E' Sept: 9 84  MV A Parth: 0 3 m/s  MV Dec Wilkin: 7 93 m/s2  MV DecT: 95 22 ms  MV E Parth: 0 75 m/s  MV E/A Ratio: 2 55  MV PHT: 27 61 ms  MVA By PHT: 7 97 cm2    [facility] [place] [facility] Accredited Echocardi[facility]    Prepared and electronically signed by    Shelia Cisse MD  Signed [id number] 12:26:58       No results found for this or any previous visit  No results found for this or any previous visit  No results found for this or any previous visit      Meds/Allergies   current meds:   Current Facility-Administered Medications   Medication Dose Route Frequency    albuterol (PROVENTIL HFA,VENTOLIN HFA) inhaler 2 puff  2 puff Inhalation Q6H PRN    aspirin (ECOTRIN LOW STRENGTH) EC tablet 81 mg  81 mg Oral Daily    bumetanide (BUMEX) injection 2 mg  2 mg Intravenous BID    cholecalciferol (VITAMIN D3) tablet 1,000 Units  1,000 Units Oral Daily    heparin (porcine) subcutaneous injection 5,000 Units  5,000 Units Subcutaneous Q8H [address]    levothyroxine tablet 88 mcg  88 mcg Oral Daily    nebivolol (BYSTOLIC) tablet 5 mg  5 mg Oral Daily    ondansetron (ZOFRAN) injection 4 mg  4 mg Intravenous Q6H PRN    potassium chloride (K-DUR,KLOR-CON) CR tablet 20 mEq  20 mEq Oral Daily     Medications Prior to Admission   Medication    albuterol (PROVENTIL HFA,VENTOLIN HFA) 90 mcg/act inhaler    aspirin (ECOTRIN LOW STRENGTH) 81 mg EC tablet    cholecalciferol (VITAMIN D3) 1,000 units tablet    clobetasol (TEMOVATE) 0 05 % cream    Fexofenadine HCl (ALLEGRA ALLERGY PO)    levothyroxine 88 mcg tablet    nebivolol (Bystolic) 5 mg tablet    potassium chloride (K-DUR,KLOR-CON) 20 mEq tablet    torsemide (DEMADEX) 10 mg tablet          Assessment:  Principal Problem:    Acute on chronic diastolic (congestive) heart failure (HCC)  Active Problems:    Pulmonary hypertension (HCC)    Paroxysmal supraventricular tachycardia (HCC)    History of pulmonary embolus (PE)    Thrombocytosis (HCC)    Stage 3 chronic kidney disease      Plan:     Acute on Chronic diastolic CHF: Continue with IV bumex  Renal function is stable  Volume status is improving       ENEDELIA on CKD3: Renal function stable at 1 74           Counseling / Coordination of Care  Total floor / unit time spent today 25 minutes  Greater than 50% of total time was spent with the patient and / or family counseling and / or coordination of care  A description of the counseling / coordination of care

## 2021-02-21 NOTE — ASSESSMENT & PLAN NOTE
Wt Readings from Last 3 Encounters:   02/21/21 64 1 kg (141 lb 6 4 oz)   02/09/21 66 2 kg (146 lb)   01/28/21 66 2 kg (146 lb)   · POA, as per patient weight is elevated up to 150 despite increasing diuretics  Reports MARI, orthopnea, and leg swelling  Some dietary indiscretion with soup broth  No fluid indiscretions  · Diuresing better with IV Bumex    Continue IV Bumex  · Monitor I/O, daily weights, fluid and sodium restriction  · Cardiology input appreciated

## 2021-02-22 NOTE — PLAN OF CARE
Problem: Potential for Falls  Goal: Patient will remain free of falls  Description: INTERVENTIONS:  - Assess patient frequently for physical needs  -  Identify cognitive and physical deficits and behaviors that affect risk of falls  -  [place] fall precautions as indicated by assessment   - Educate patient/family on patient safety including physical limitations  - Instruct patient to call for assistance with activity based on assessment  - Modify environment to reduce risk of injury  - Consider OT/PT consult to assist with strengthening/mobility  Outcome: Progressing     Problem: Nutrition/Hydration-ADULT  Goal: Nutrient/Hydration intake appropriate for improving, restoring or maintaining nutritional needs  Description: Monitor and assess patient's nutrition/hydration status for malnutrition  Collaborate with interdisciplinary team and initiate plan and interventions as ordered  Monitor patient's weight and dietary intake as ordered or per policy  Utilize nutrition screening tool and intervene as necessary  Determine patient's food preferences and provide high-protein, high-caloric foods as appropriate       INTERVENTIONS:  - Monitor oral intake, urinary output, labs, and treatment plans  - Assess nutrition and hydration status and recommend course of action  - Evaluate amount of meals eaten  - Assist patient with eating if necessary   - Allow adequate time for meals  - Recommend/ encourage appropriate diets, oral nutritional supplements, and vitamin/mineral supplements  - Order, calculate, and assess calorie counts as needed  - Recommend, monitor, and adjust tube feedings and TPN/PPN based on assessed needs  - Assess need for intravenous fluids  - Provide specific nutrition/hydration education as appropriate  - Include patient/family/caregiver in decisions related to nutrition  Outcome: Progressing     Problem: CARDIOVASCULAR - ADULT  Goal: Maintains optimal cardiac output and hemodynamic stability  Description: INTERVENTIONS:  - Monitor I/O, vital signs and rhythm  - Monitor for S/S and trends of decreased cardiac output  - Administer and titrate ordered vasoactive medications to optimize hemodynamic stability  - Assess quality of pulses, skin color and temperature  - Assess for signs of decreased coronary artery perfusion  - Instruct patient to report change in severity of symptoms  Outcome: Progressing  Goal: Absence of cardiac dysrhythmias or at baseline rhythm  Description: INTERVENTIONS:  - Continuous cardiac monitoring, vital signs, obtain 12 lead EKG if ordered  - Administer antiarrhythmic and heart rate control medications as ordered  - Monitor electrolytes and administer replacement therapy as ordered  Outcome: Progressing     Problem: RESPIRATORY - ADULT  Goal: Achieves optimal ventilation and oxygenation  Description: INTERVENTIONS:  - Assess for changes in respiratory status  - Assess for changes in mentation and behavior  - Position to facilitate oxygenation and minimize respiratory effort  - Oxygen administered by appropriate delivery if ordered  - Initiate smoking cessation education as indicated  - Encourage broncho-pulmonary hygiene including cough, deep breathe, Incentive Spirometry  - Assess the need for suctioning and aspirate as needed  - Assess and instruct to report SOB or any respiratory difficulty  - Respiratory Therapy support as indicated  Outcome: Progressing     Problem: SKIN/TISSUE INTEGRITY - ADULT  Goal: Skin integrity remains intact  Description: INTERVENTIONS  - Identify patients at risk for skin breakdown  - Assess and monitor skin integrity  - Assess and monitor nutrition and hydration status  - Monitor labs (i e  albumin)  - Assess for incontinence   - Turn and reposition patient  - Assist with mobility/ambulation  - Relieve pressure over bony prominences  - Avoid friction and shearing  - Provide appropriate hygiene as needed including keeping skin clean and dry  - Evaluate need for skin moisturizer/barrier cream  - Collaborate with interdisciplinary team (i e  Nutrition, Rehabilitation, etc )   - Patient/family teaching  Outcome: Progressing  Goal: Incision(s), wounds(s) or drain site(s) healing without S/S of infection  Description: INTERVENTIONS  - Assess and document risk factors for skin impairment   - Assess and document dressing, incision, wound bed, drain sites and surrounding tissue  - Consider nutrition services referral as needed  - Oral mucous membranes remain intact  - Provide patient/ family education  Outcome: Progressing  Goal: Oral mucous membranes remain intact  Description: INTERVENTIONS  - Assess oral mucosa and hygiene practices  - Implement preventative oral hygiene regimen  - Implement oral medicated treatments as ordered  - Initiate Nutrition services referral as needed  Outcome: Progressing     Problem: PAIN - ADULT  Goal: Verbalizes/displays adequate comfort level or baseline comfort level  Description: Interventions:  - Encourage patient to monitor pain and request assistance  - Assess pain using appropriate pain scale  - Administer analgesics based on type and severity of pain and evaluate response  - Implement non-pharmacological measures as appropriate and evaluate response  - Consider cultural and social influences on pain and pain management  - Notify physician/advanced practitioner if interventions unsuccessful or patient reports new pain  Outcome: Progressing     Problem: INFECTION - ADULT  Goal: Absence or prevention of progression during hospitalization  Description: INTERVENTIONS:  - Assess and monitor for signs and symptoms of infection  - Monitor lab/diagnostic results  - Monitor all insertion sites, i e  indwelling lines, tubes, and drains  - Monitor endotracheal if appropriate and nasal secretions for changes in amount and color  - [place] appropriate cooling/warming therapies per order  - Administer medications as ordered  - Instruct and encourage patient and family to use good hand hygiene technique  - Identify and instruct in appropriate isolation precautions for identified infection/condition  Outcome: Progressing  Goal: Absence of fever/infection during neutropenic period  Description: INTERVENTIONS:  - Monitor WBC    Outcome: Progressing     Problem: SAFETY ADULT  Goal: Patient will remain free of falls  Description: INTERVENTIONS:  - Assess patient frequently for physical needs  -  Identify cognitive and physical deficits and behaviors that affect risk of falls    -  [place] fall precautions as indicated by assessment   - Educate patient/family on patient safety including physical limitations  - Instruct patient to call for assistance with activity based on assessment  - Modify environment to reduce risk of injury  - Consider OT/PT consult to assist with strengthening/mobility  Outcome: Progressing  Goal: Maintain or return to baseline ADL function  Description: INTERVENTIONS:  -  Assess patient's ability to carry out ADLs; assess patient's baseline for ADL function and identify physical deficits which impact ability to perform ADLs (bathing, care of mouth/teeth, toileting, grooming, dressing, etc )  - Assess/evaluate cause of self-care deficits   - Assess range of motion  - Assess patient's mobility; develop plan if impaired  - Assess patient's need for assistive devices and provide as appropriate  - Encourage maximum independence but intervene and supervise when necessary  - Involve family in performance of ADLs  - Assess for home care needs following discharge   - Consider OT consult to assist with ADL evaluation and planning for discharge  - Provide patient education as appropriate  Outcome: Progressing  Goal: Maintain or return mobility status to optimal level  Description: INTERVENTIONS:  - Assess patient's baseline mobility status (ambulation, transfers, stairs, etc )    - Identify cognitive and physical deficits and behaviors that affect mobility  - Identify mobility aids required to assist with transfers and/or ambulation (gait belt, sit-to-stand, lift, walker, cane, etc )  - [place] fall precautions as indicated by assessment  - Record patient progress and toleration of activity level on Mobility SBAR; progress patient to next Phase/Stage  - Instruct patient to call for assistance with activity based on assessment  - Consider rehabilitation consult to assist with strengthening/weightbearing, etc   Outcome: Progressing     Problem: DISCHARGE PLANNING  Goal: Discharge to home or other facility with appropriate resources  Description: INTERVENTIONS:  - Identify barriers to discharge w/patient and caregiver  - Arrange for needed discharge resources and transportation as appropriate  - Identify discharge learning needs (meds, wound care, etc )  - Arrange for interpretive services to assist at discharge as needed  - Refer to Case Management Department for coordinating discharge planning if the patient needs post-hospital services based on physician/advanced practitioner order or complex needs related to functional status, cognitive ability, or social support system  Outcome: Progressing     Problem: Knowledge Deficit  Goal: Patient/family/caregiver demonstrates understanding of disease process, treatment plan, medications, and discharge instructions  Description: Complete learning assessment and assess knowledge base    Interventions:  - Provide teaching at level of understanding  - Provide teaching via preferred learning methods  Outcome: Progressing

## 2021-02-22 NOTE — ASSESSMENT & PLAN NOTE
POA, as per patient weight is elevated up to 150 despite increasing diuretics  Reports MARI, orthopnea, and leg swelling  Some dietary indiscretion with soup broth  No fluid indiscretions  Diuresing better with IV Bumex  Continue IV Bumex  Monitor I/O, daily weights, fluid and sodium restriction  Cardiology input appreciated  -2 L since yesterday   Lost 0 7 kg

## 2021-02-22 NOTE — PROGRESS NOTES
Progress Note - Rolley Falling 1950, 66 y o  female MRN: [id number]    Unit/Bed#: S -01 Encounter: [id number]    Primary Care Provider: Ezequiel Martinez MD   Date and time admitted to hospital: 2/18/2021  1:57 PM        Stage 3 chronic kidney disease  Assessment & Plan  Lab Results   Component Value Date    EGFR 31 02/22/2021    EGFR 29 02/21/2021    EGFR 28 02/20/2021    CREATININE 1 64 (H) 02/22/2021    CREATININE 1 74 (H) 02/21/2021    CREATININE 1 77 (H) 02/20/2021   · Patient appears to have a baseline creatinine of 1 2-1 6,   · Now 1 64 at/close to baseline  History of pulmonary embolus (PE)  Assessment & Plan  Not on anticoagulation as outpatient on presentation  DVT PPX ordered     Paroxysmal supraventricular tachycardia (HCC)  Assessment & Plan  Noted history, appears to be in NSR on exam   Continue Bystolic     * Acute on chronic diastolic (congestive) heart failure (HCC)  Assessment & Plan  POA, as per patient weight is elevated up to 150 despite increasing diuretics  Reports MARI, orthopnea, and leg swelling  Some dietary indiscretion with soup broth  No fluid indiscretions  Diuresing better with IV Bumex  Continue IV Bumex  Monitor I/O, daily weights, fluid and sodium restriction  Cardiology input appreciated  -2 L since yesterday  Lost 0 7 kg           VTE Pharmacologic Prophylaxis:   Pharmacologic: Heparin  Mechanical VTE Prophylaxis in Place: Yes    Patient Centered Rounds: I have performed bedside rounds with nursing staff today  Discussions with Specialists or Other Care Team Provider: Discussed with nursing  Education and Discussions with Family / Patient: Discussed with patient  Time Spent for Care: 30 minutes  More than 50% of total time spent on counseling and coordination of care as described above      Current Length of Stay: 4 day(s)    Current Patient Status: Inpatient   Certification Statement: The patient will continue to require additional inpatient hospital stay due to IV diuresis  Discharge Plan: Not medically stable for DC  Code Status: Level 1 - Full Code      Subjective:   Patient seen and examined  Feeling "better"    Objective:     Vitals:   Temp (24hrs), Av 1 °F (36 7 °C), Min:97 7 °F (36 5 °C), Max:98 6 °F (37 °C)    Temp:  [97 7 °F (36 5 °C)-98 6 °F (37 °C)] 98 6 °F (37 °C)  HR:  [83-89] 89  Resp:  [18] 18  BP: (104-118)/(53-68) 112/68  SpO2:  [96 %-98 %] 96 %  Body mass index is 26 53 kg/m²  Input and Output Summary (last 24 hours): Intake/Output Summary (Last 24 hours) at 2021 1315  Last data filed at 2021 0859  Gross per 24 hour   Intake 580 ml   Output 2100 ml   Net -1520 ml       Physical Exam:     Physical Exam  Constitutional:       General: She is not in acute distress  Appearance: She is not ill-appearing, toxic-appearing or diaphoretic  HENT:      Head:      Comments: Positive hepatojugular reflux  Nose: Nose normal  No congestion or rhinorrhea  Mouth/Throat:      Mouth: Mucous membranes are moist       Pharynx: No oropharyngeal exudate or posterior oropharyngeal erythema  Eyes:      General:         Right eye: No discharge  Left eye: No discharge  Pupils: Pupils are equal, round, and reactive to light  Cardiovascular:      Rate and Rhythm: Normal rate  Pulmonary:      Effort: Pulmonary effort is normal  No respiratory distress  Breath sounds: No wheezing, rhonchi or rales  Chest:      Chest wall: No tenderness  Abdominal:      General: Abdomen is flat  Musculoskeletal:         General: Swelling present  Right lower leg: Edema present  Left lower leg: Edema present  Skin:     Coloration: Skin is pale  Skin is not jaundiced  Findings: No bruising or lesion  Neurological:      General: No focal deficit present  Cranial Nerves: No cranial nerve deficit  Sensory: No sensory deficit  Motor: No weakness        Gait: Gait normal  Psychiatric:         Mood and Affect: Mood normal            Additional Data:     Labs:    Results from last 7 days   Lab Units 02/21/21  0613  02/18/21  1450   WBC Thousand/uL 4 67   < > 4 51   HEMOGLOBIN g/dL 10 1*   < > 9 9*   HEMATOCRIT % 32 3*   < > 32 3*   PLATELETS Thousands/uL 468*   < > 470*   NEUTROS PCT %  --   --  79*   LYMPHS PCT %  --   --  8*   MONOS PCT %  --   --  8   EOS PCT %  --   --  4    < > = values in this interval not displayed  Results from last 7 days   Lab Units 02/22/21  0523  02/18/21  1450   SODIUM mmol/L 135*   < > 138   POTASSIUM mmol/L 4 5   < > 4 1   CHLORIDE mmol/L 100   < > 103   CO2 mmol/L 29   < > 29   BUN mg/dL 42*   < > 49*   CREATININE mg/dL 1 64*   < > 1 82*   ANION GAP mmol/L 6   < > 6   CALCIUM mg/dL 8 4   < > 8 3   ALBUMIN g/dL  --   --  2 1*   TOTAL BILIRUBIN mg/dL  --   --  0 41   ALK PHOS U/L  --   --  289*   ALT U/L  --   --  15   AST U/L  --   --  23   GLUCOSE RANDOM mg/dL 83   < > 103    < > = values in this interval not displayed  Results from last 7 days   Lab Units 02/18/21  1450   INR  1 18                       * I Have Reviewed All Lab Data Listed Above  * Additional Pertinent Lab Tests Reviewed: All Labs For Current Hospital Admission Reviewed    Imaging:    Imaging Reports Reviewed Today Include:   None pertinent to this encounter     Imaging Personally Reviewed by Myself Includes: as above     Recent Cultures (last 7 days):           Last 24 Hours Medication List:   Current Facility-Administered Medications   Medication Dose Route Frequency Provider Last Rate    albuterol  2 puff Inhalation Q6H PRN Lety Sullivan PA-C      aspirin  81 mg Oral Daily Jerrod Mckinnon PA-C      bumetanide  2 mg Intravenous BID Jennifer Garcia MD      bumetanide  2 mg Intravenous Once Miguel Boas, CRNP      cholecalciferol  1,000 Units Oral Daily Jerrod Mckinnon PA-C      heparin (porcine)  5,000 Units Subcutaneous [facility] Jerrod Mckinnon PA-C      levothyroxine  88 mcg Oral Daily Jerrod Pérez PA-C      nebivolol  5 mg Oral Daily Jerrod Pérez PA-C      ondansetron  4 mg Intravenous Q6H PRN Emaline Stager, VEENA      potassium chloride  20 mEq Oral Daily Jerrod Pérez PA-C          Today, Patient Was Seen By: Paulette Padron MD    ** Please Note: Dictation voice to text software may have been used in the creation of this document   **    '

## 2021-02-22 NOTE — PROGRESS NOTES
General Cardiology   Progress Note -  Team One   [facility] Gram 66 y o  female MRN: [id number]    Unit/Bed#: S -01 Encounter: [id number]    Assessment  1  Acute on chronic diastolic CHF exacerbation   2  Moderate MR/TR, and PHTN  -Presented to the ED on 2/18 with complaints of progressively worsening SOB and increasing lower extremity edema over the last month  -On PE; volume status has improved but still overloaded; JVD, abdominal distension, and +2 to +3 pitting bilateral lower extremity edema  -Denies SOB at rest or significant dyspnea; no supplemental O2 requirement  -NT proBNP 19620  -TTE 11/2020; LVEF 50%, mild diffuse hypokinesis, grade 2 DD, RV mildly dilated, systolic function mildly reduced, moderate MR, moderate TR; estimated peak PA pressure 55 mmHg  -Previously on torsemide 10 mg b i d as an outpatient (on hold currently)  -Currently on IV Bumex 2 mg b i d   -24 hour I&O balance; -2 1 L ;overall -4 7 L  Weights (dry weight around 130 lbs)  -2/19 -- 149 lb -- standing scale  -2/20 -- 148 lb -- standing scale  -2/21 -- 141 lb -- standing scale  -2/22 -- 140 lb -- standing scale    3  Hypertension  -Average /59, last recorded 112/68, HR 89  -On Bystolic 5 mg daily    4  CKD stage 3  -Baseline creatinine around 1 4-1 6  -Creatinine 1 82 on admission, improving with IV diuresis currently at 1 64    5  Paroxysmal SVT  -Non tele  -12 lead ECG on 02/18; NSR no significant arrhythmias  -On Bystolic 5 mg daily    Plan  -Continue IV Bumex, will give an extra dose this afternoon and assess response  -Continue Bystolic 5 mg daily  -Daily standing weights, 2 g NA +diet 1 8 L FR  -Monitor renal function and electrolytes closely    Subjective  Review of Systems   Constitution: Negative for chills, fever and malaise/fatigue  Eyes: Negative for visual disturbance  Cardiovascular: Positive for leg swelling   Negative for chest pain, cyanosis, dyspnea on exertion, irregular heartbeat, near-syncope, orthopnea and palpitations  Respiratory: Negative for cough and shortness of breath  Musculoskeletal: Negative for arthritis and myalgias  Gastrointestinal: Positive for bloating  Negative for abdominal pain, nausea and vomiting  Genitourinary: Negative for dysuria  Neurological: Negative for dizziness, focal weakness, headaches, light-headedness and weakness  All other systems reviewed and are negative  Objective:   Physical Exam  Vitals signs and nursing note reviewed  Constitutional:       General: She is not in acute distress  Appearance: Normal appearance  She is obese  She is not ill-appearing  HENT:      Head: Normocephalic and atraumatic  Mouth/Throat:      Mouth: Mucous membranes are moist    Eyes:      General: No scleral icterus  Neck:      Comments: +JVD  Cardiovascular:      Rate and Rhythm: Normal rate and regular rhythm  Pulses: Normal pulses  Pulmonary:      Effort: Pulmonary effort is normal       Breath sounds: Normal breath sounds  No wheezing or rales  Abdominal:      General: Bowel sounds are normal  There is distension  Palpations: Abdomen is soft  Tenderness: There is no abdominal tenderness  Musculoskeletal:      Right lower leg: Edema present  Left lower leg: Edema present  Skin:     General: Skin is warm and dry  Capillary Refill: Capillary refill takes less than 2 seconds  Neurological:      General: No focal deficit present  Mental Status: She is alert and oriented to person, place, and time  Psychiatric:         Mood and Affect: Mood normal          Vitals: Blood pressure 112/68, pulse 89, temperature 98 6 °F (37 °C), temperature source Oral, resp  rate 18, weight 63 7 kg (140 lb 6 9 oz), SpO2 96 %, not currently breastfeeding ,     Body mass index is 26 53 kg/m²  ,   Systolic (58MJP), PRW:521 , Min:104 , MSV:729     Diastolic (84IWX), JQM:89, Min:53, Max:68      Intake/Output Summary (Last 24 hours) at 2/22/2021 Paddy Stinson 86 filed at 2/22/2021 0859  Gross per 24 hour   Intake 580 ml   Output 2100 ml   Net -1520 ml     Weight (last 2 days)     Date/Time   Weight    02/22/21 0600   63 7 (140 43)    02/22/21 0526   63 7 (140 4)    02/21/21 0600   64 1 (141 4)    02/20/21 0600   67 3 (148 4)              LABORATORY RESULTS  Results from last 7 days   Lab Units 02/18/21  1450   TROPONIN I ng/mL <0 02     CBC with diff:   Results from last 7 days   Lab Units 02/21/21  0613 02/19/21  0454 02/18/21  1450   WBC Thousand/uL 4 67 4 00* 4 51   HEMOGLOBIN g/dL 10 1* 9 2* 9 9*   HEMATOCRIT % 32 3* 29 5* 32 3*   MCV fL 89 90 91   PLATELETS Thousands/uL 468* 425* 470*   MCH pg 27 9 28 0 27 9   MCHC g/dL 31 3* 31 2* 30 7*   RDW % 16 1* 16 4* 16 6*   MPV fL 9 6 9 4 9 3   NRBC AUTO /100 WBCs  --   --  0       CMP:  Results from last 7 days   Lab Units 02/22/21  0523 02/21/21  0613 02/20/21  1104 02/19/21  0454 02/18/21  1450   POTASSIUM mmol/L 4 5 4 7 4 7 4 5 4 1   CHLORIDE mmol/L 100 102 105 106 103   CO2 mmol/L 29 29 29 27 29   BUN mg/dL 42* 43* 43* 45* 49*   CREATININE mg/dL 1 64* 1 74* 1 77* 1 81* 1 82*   CALCIUM mg/dL 8 4 8 3 8 1* 8 3 8 3   AST U/L  --   --   --   --  23   ALT U/L  --   --   --   --  15   ALK PHOS U/L  --   --   --   --  289*   EGFR ml/min/1 73sq m 31 29 28 27 27       BMP:  Results from last 7 days   Lab Units 02/22/21  0523 02/21/21  0613 02/20/21  1104 02/19/21  0454 02/18/21  1450   POTASSIUM mmol/L 4 5 4 7 4 7 4 5 4 1   CHLORIDE mmol/L 100 102 105 106 103   CO2 mmol/L 29 29 29 27 29   BUN mg/dL 42* 43* 43* 45* 49*   CREATININE mg/dL 1 64* 1 74* 1 77* 1 81* 1 82*   CALCIUM mg/dL 8 4 8 3 8 1* 8 3 8 3       Lab Results   Component Value Date    NTBN 19,620 (H) 02/18/2021    NTBNP 18,716 (H) 11/08/2020    NTBN 3,376 (H) 01/12/2019        Results from last 7 days   Lab Units 02/22/21  0523 02/21/21  0613 02/20/21  1104 02/18/21  1450   MAGNESIUM mg/dL 2 3 2 4 2 3 2 4                   Results from last 7 days   Lab Units 21  1450   INR  1 18       Lipid Profile:   No results found for: CHOL  Lab Results   Component Value Date    HDL 37 (L) 2020    HDL 49 2020    HDL 37 (L) 2019     Lab Results   Component Value Date    LDLCALC 94 2020    LDLCALC 57 2020    LDLCALC 61 2019     Lab Results   Component Value Date    TRIG 57 2020    TRIG 58 2020    TRIG 72 2019       Cardiac testing:   Results for orders placed during the hospital encounter of 20   Echo complete with contrast if indicated    Narrative [facility] [address] [facility]  [phone]    Transthoracic Echocardiogram  2D, M-mode, Doppler, and Color Doppler    Study date:  2020    Patient: Betty Eastman  MR number: [id number]  Account number: [de-identified]  : 1950  Age: 65 years  Gender: Female  Status: Inpatient  Location: Bedside  Height: 61 in  Weight: 144 lb  BP: 116/ 57 mmHg    Indications: Heart Failure    Diagnoses: I50 9 - Heart failure, unspecified    Sonographer:  SHANTELL Powers  Primary Physician:  Ariel Herron MD  Referring Physician:  Harley Tate MD  Group:  [facility]  Interpreting Physician:  Bernabe Brody MD    SUMMARY    LEFT VENTRICLE:  Size was normal   Systolic function was at the lower limits of normal  Ejection fraction was estimated to be 50 %  There was mild diffuse hypokinesis  Wall thickness was normal   Features were consistent with a pseudonormal left ventricular filling pattern, with concomitant abnormal relaxation and increased filling pressure (grade 2 diastolic dysfunction)  RIGHT VENTRICLE:  The ventricle was mildly dilated  Systolic function was mildly reduced  MITRAL VALVE:  There was moderate regurgitation  TRICUSPID VALVE:  There was moderate regurgitation  Pulmonary artery systolic pressure was moderately increased    The findings suggest moderate pulmonary hypertension  COMPARISONS:  Comparison was made with the previous study of 13-Jan-2019  Aortic regurgitation has improved  HISTORY: PRIOR HISTORY: Hyperlipidemia, Mitral regurgitation, PSVT, tricuspid regurgitation, Aortic insufficiency, CM, CKD III, Acute CHF, HTN  PROCEDURE: The procedure was performed at the bedside  This was a routine study  Parasternals are technically difficult due to left breast implant  The transthoracic approach was used  The study included complete 2D imaging, M-mode,  complete spectral Doppler, and color Doppler  The heart rate was 91 bpm, at the start of the study  Images were obtained from the parasternal, apical, subcostal, and suprasternal notch acoustic windows  This was a technically difficult  study  LEFT VENTRICLE: Size was normal  Systolic function was at the lower limits of normal  Ejection fraction was estimated to be 50 %  There was mild diffuse hypokinesis  Wall thickness was normal  DOPPLER: Features were consistent with a  pseudonormal left ventricular filling pattern, with concomitant abnormal relaxation and increased filling pressure (grade 2 diastolic dysfunction)  RIGHT VENTRICLE: The ventricle was mildly dilated  Systolic function was mildly reduced  Wall thickness was normal     LEFT ATRIUM: Size was normal     RIGHT ATRIUM: Size was normal     MITRAL VALVE: Valve structure was normal  There was normal leaflet separation  DOPPLER: The transmitral velocity was within the normal range  There was no evidence for stenosis  There was moderate regurgitation  AORTIC VALVE: The valve was trileaflet  Leaflets exhibited normal thickness and normal cuspal separation  DOPPLER: Transaortic velocity was within the normal range  There was no evidence for stenosis  There was no regurgitation  TRICUSPID VALVE: The valve structure was normal  There was normal leaflet separation  DOPPLER: The transtricuspid velocity was within the normal range   There was no evidence for stenosis  There was moderate regurgitation  Pulmonary artery  systolic pressure was moderately increased  Estimated peak PA pressure was 55 mmHg  The findings suggest moderate pulmonary hypertension  PULMONIC VALVE: Leaflets exhibited normal thickness, no calcification, and normal cuspal separation  DOPPLER: The transpulmonic velocity was within the normal range  There was trace regurgitation  PERICARDIUM: There was no pericardial effusion  The pericardium was normal in appearance  AORTA: The root exhibited normal size  SYSTEMIC VEINS: IVC: The inferior vena cava was normal in size and course  Respirophasic changes were normal     SYSTEM MEASUREMENT TABLES    2D  %FS: 21 01 %  Ao Diam: 2 57 cm  EDV(Teich): 85 55 ml  EF(Teich): 42 99 %  ESV(Teich): 48 78 ml  IVSd: 0 79 cm  LA Area: 16 44 cm2  LA Diam: 4 2 cm  LVEDV MOD A4C: 70 91 ml  LVEF MOD A4C: 49 92 %  LVESV MOD A4C: 35 51 ml  LVIDd: 4 35 cm  LVIDs: 3 44 cm  LVLd A4C: 7 01 cm  LVLs A4C: 6 04 cm  LVPWd: 0 79 cm  RA Area: 15 8 cm2  RVIDd: 3 44 cm  SV MOD A4C: 35 4 ml  SV(Teich): 36 78 ml    CF  MR Als  Parth: 0 36 m/s  MR Flow: 22 07 ml/s  MR Rad: 0 31 cm    CW  MR VTI: 143 7 cm  MR Vmax: 4 28 m/s  MR Vmean: 3 58 m/s  MR maxP 3 mmHg  MR meanP 38 mmHg  TR MaxP 52 mmHg  TR Vmax: 3 3 m/s    MM  TAPSE: 1 56 cm    PW  E' Sept: 0 08 m/s  E/E' Sept: 9 84  MV A Parth: 0 3 m/s  MV Dec [place]: 7 93 m/s2  MV DecT: 95 22 ms  MV E Parth: 0 75 m/s  MV E/A Ratio: 2 55  MV PHT: 27 61 ms  MVA By PHT: 7 97 cm2    [employer] Accredited Echocardiography [facility]    Prepared and electronically signed by    Antoinette Peña MD  Signed [id number] 12:26:58       No results found for this or any previous visit  No results found for this or any previous visit  No procedure found  No results found for this or any previous visit      Meds/Allergies   all current active meds have been reviewed and current meds:   Current Facility-Administered Medications   Medication Dose Route Frequency    albuterol (PROVENTIL HFA,VENTOLIN HFA) inhaler 2 puff  2 puff Inhalation Q6H PRN    aspirin (ECOTRIN LOW STRENGTH) EC tablet 81 mg  81 mg Oral Daily    bumetanide (BUMEX) injection 2 mg  2 mg Intravenous BID    cholecalciferol (VITAMIN D3) tablet 1,000 Units  1,000 Units Oral Daily    heparin (porcine) subcutaneous injection 5,000 Units  5,000 Units Subcutaneous Q8H [address]    levothyroxine tablet 88 mcg  88 mcg Oral Daily    nebivolol (BYSTOLIC) tablet 5 mg  5 mg Oral Daily    ondansetron (ZOFRAN) injection 4 mg  4 mg Intravenous Q6H PRN    potassium chloride (K-DUR,KLOR-CON) CR tablet 20 mEq  20 mEq Oral Daily            EKG personally reviewed by ARACELY Saldana  Assessment:  Principal Problem:    Acute on chronic diastolic (congestive) heart failure (HCC)  Active Problems:    Paroxysmal supraventricular tachycardia (HCC)    History of pulmonary embolus (PE)    Stage 3 chronic kidney disease      Counseling / Coordination of Care  Total floor / unit time spent today 20 minutes  Greater than 50% of total time was spent with the patient and / or family counseling and / or coordination of care  ** Please Note: Dragon 360 Dictation voice to text software may have been used in the creation of this document   **

## 2021-02-22 NOTE — PLAN OF CARE
Problem: Potential for Falls  Goal: Patient will remain free of falls  Description: INTERVENTIONS:  - Assess patient frequently for physical needs  -  Identify cognitive and physical deficits and behaviors that affect risk of falls  -  [place] fall precautions as indicated by assessment   - Educate patient/family on patient safety including physical limitations  - Instruct patient to call for assistance with activity based on assessment  - Modify environment to reduce risk of injury  - Consider OT/PT consult to assist with strengthening/mobility  Outcome: Progressing     Problem: Nutrition/Hydration-ADULT  Goal: Nutrient/Hydration intake appropriate for improving, restoring or maintaining nutritional needs  Description: Monitor and assess patient's nutrition/hydration status for malnutrition  Collaborate with interdisciplinary team and initiate plan and interventions as ordered  Monitor patient's weight and dietary intake as ordered or per policy  Utilize nutrition screening tool and intervene as necessary  Determine patient's food preferences and provide high-protein, high-caloric foods as appropriate       INTERVENTIONS:  - Monitor oral intake, urinary output, labs, and treatment plans  - Assess nutrition and hydration status and recommend course of action  - Evaluate amount of meals eaten  - Assist patient with eating if necessary   - Allow adequate time for meals  - Recommend/ encourage appropriate diets, oral nutritional supplements, and vitamin/mineral supplements  - Order, calculate, and assess calorie counts as needed  - Recommend, monitor, and adjust tube feedings and TPN/PPN based on assessed needs  - Assess need for intravenous fluids  - Provide specific nutrition/hydration education as appropriate  - Include patient/family/caregiver in decisions related to nutrition  Outcome: Progressing     Problem: CARDIOVASCULAR - ADULT  Goal: Maintains optimal cardiac output and hemodynamic stability  Description: INTERVENTIONS:  - Monitor I/O, vital signs and rhythm  - Monitor for S/S and trends of decreased cardiac output  - Administer and titrate ordered vasoactive medications to optimize hemodynamic stability  - Assess quality of pulses, skin color and temperature  - Assess for signs of decreased coronary artery perfusion  - Instruct patient to report change in severity of symptoms  Outcome: Progressing  Goal: Absence of cardiac dysrhythmias or at baseline rhythm  Description: INTERVENTIONS:  - Continuous cardiac monitoring, vital signs, obtain 12 lead EKG if ordered  - Administer antiarrhythmic and heart rate control medications as ordered  - Monitor electrolytes and administer replacement therapy as ordered  Outcome: Progressing     Problem: RESPIRATORY - ADULT  Goal: Achieves optimal ventilation and oxygenation  Description: INTERVENTIONS:  - Assess for changes in respiratory status  - Assess for changes in mentation and behavior  - Position to facilitate oxygenation and minimize respiratory effort  - Oxygen administered by appropriate delivery if ordered  - Initiate smoking cessation education as indicated  - Encourage broncho-pulmonary hygiene including cough, deep breathe, Incentive Spirometry  - Assess the need for suctioning and aspirate as needed  - Assess and instruct to report SOB or any respiratory difficulty  - Respiratory Therapy support as indicated  Outcome: Progressing     Problem: SKIN/TISSUE INTEGRITY - ADULT  Goal: Skin integrity remains intact  Description: INTERVENTIONS  - Identify patients at risk for skin breakdown  - Assess and monitor skin integrity  - Assess and monitor nutrition and hydration status  - Monitor labs (i e  albumin)  - Assess for incontinence   - Turn and reposition patient  - Assist with mobility/ambulation  - Relieve pressure over bony prominences  - Avoid friction and shearing  - Provide appropriate hygiene as needed including keeping skin clean and dry  - Evaluate need for skin moisturizer/barrier cream  - Collaborate with interdisciplinary team (i e  Nutrition, Rehabilitation, etc )   - Patient/family teaching  Outcome: Progressing  Goal: Incision(s), wounds(s) or drain site(s) healing without S/S of infection  Description: INTERVENTIONS  - Assess and document risk factors for skin impairment   - Assess and document dressing, incision, wound bed, drain sites and surrounding tissue  - Consider nutrition services referral as needed  - Oral mucous membranes remain intact  - Provide patient/ family education  Outcome: Progressing  Goal: Oral mucous membranes remain intact  Description: INTERVENTIONS  - Assess oral mucosa and hygiene practices  - Implement preventative oral hygiene regimen  - Implement oral medicated treatments as ordered  - Initiate Nutrition services referral as needed  Outcome: Progressing     Problem: PAIN - ADULT  Goal: Verbalizes/displays adequate comfort level or baseline comfort level  Description: Interventions:  - Encourage patient to monitor pain and request assistance  - Assess pain using appropriate pain scale  - Administer analgesics based on type and severity of pain and evaluate response  - Implement non-pharmacological measures as appropriate and evaluate response  - Consider cultural and social influences on pain and pain management  - Notify physician/advanced practitioner if interventions unsuccessful or patient reports new pain  Outcome: Progressing     Problem: INFECTION - ADULT  Goal: Absence or prevention of progression during hospitalization  Description: INTERVENTIONS:  - Assess and monitor for signs and symptoms of infection  - Monitor lab/diagnostic results  - Monitor all insertion sites, i e  indwelling lines, tubes, and drains  - Monitor endotracheal if appropriate and nasal secretions for changes in amount and color  - [place] appropriate cooling/warming therapies per order  - Administer medications as ordered  - Instruct and encourage patient and family to use good hand hygiene technique  - Identify and instruct in appropriate isolation precautions for identified infection/condition  Outcome: Progressing  Goal: Absence of fever/infection during neutropenic period  Description: INTERVENTIONS:  - Monitor WBC    Outcome: Progressing     Problem: SAFETY ADULT  Goal: Patient will remain free of falls  Description: INTERVENTIONS:  - Assess patient frequently for physical needs  -  Identify cognitive and physical deficits and behaviors that affect risk of falls    -  [place] fall precautions as indicated by assessment   - Educate patient/family on patient safety including physical limitations  - Instruct patient to call for assistance with activity based on assessment  - Modify environment to reduce risk of injury  - Consider OT/PT consult to assist with strengthening/mobility  Outcome: Progressing  Goal: Maintain or return to baseline ADL function  Description: INTERVENTIONS:  -  Assess patient's ability to carry out ADLs; assess patient's baseline for ADL function and identify physical deficits which impact ability to perform ADLs (bathing, care of mouth/teeth, toileting, grooming, dressing, etc )  - Assess/evaluate cause of self-care deficits   - Assess range of motion  - Assess patient's mobility; develop plan if impaired  - Assess patient's need for assistive devices and provide as appropriate  - Encourage maximum independence but intervene and supervise when necessary  - Involve family in performance of ADLs  - Assess for home care needs following discharge   - Consider OT consult to assist with ADL evaluation and planning for discharge  - Provide patient education as appropriate  Outcome: Progressing  Goal: Maintain or return mobility status to optimal level  Description: INTERVENTIONS:  - Assess patient's baseline mobility status (ambulation, transfers, stairs, etc )    - Identify cognitive and physical deficits and behaviors that affect mobility  - Identify mobility aids required to assist with transfers and/or ambulation (gait belt, sit-to-stand, lift, walker, cane, etc )  - [place] fall precautions as indicated by assessment  - Record patient progress and toleration of activity level on Mobility SBAR; progress patient to next Phase/Stage  - Instruct patient to call for assistance with activity based on assessment  - Consider rehabilitation consult to assist with strengthening/weightbearing, etc   Outcome: Progressing     Problem: DISCHARGE PLANNING  Goal: Discharge to home or other facility with appropriate resources  Description: INTERVENTIONS:  - Identify barriers to discharge w/patient and caregiver  - Arrange for needed discharge resources and transportation as appropriate  - Identify discharge learning needs (meds, wound care, etc )  - Arrange for interpretive services to assist at discharge as needed  - Refer to Case Management Department for coordinating discharge planning if the patient needs post-hospital services based on physician/advanced practitioner order or complex needs related to functional status, cognitive ability, or social support system  Outcome: Progressing     Problem: Knowledge Deficit  Goal: Patient/family/caregiver demonstrates understanding of disease process, treatment plan, medications, and discharge instructions  Description: Complete learning assessment and assess knowledge base    Interventions:  - Provide teaching at level of understanding  - Provide teaching via preferred learning methods  Outcome: Progressing

## 2021-02-22 NOTE — ASSESSMENT & PLAN NOTE
Lab Results   Component Value Date    EGFR 31 02/22/2021    EGFR 29 02/21/2021    EGFR 28 02/20/2021    CREATININE 1 64 (H) 02/22/2021    CREATININE 1 74 (H) 02/21/2021    CREATININE 1 77 (H) 02/20/2021   · Patient appears to have a baseline creatinine of 1 2-1 6,   · Now 1 64 at/close to baseline

## 2021-02-22 NOTE — CASE MANAGEMENT
PT IS NOT A READMISSION  PT IS NOT IDENTIFIED AS A BUNDLE  Pt resides with jerrod Lin and adult son in a ranch style home with the use of a cane and 1 step to enter the home  Pt reported being independent with ADLs, family normally provided any required transportation, no hx of [place] [id number] services, SNF, mental health or drug/alcohol placements  Pt reported having a living will, uses [facility] pharmacy in [facility] and has Liza Garcia as a PCP  Family to transport upon d/c     CM reviewed d/c planning process including the following: identifying help at home, patient preference for d/c planning needs, Discharge Lounge, [facility] Meds to Bed program, availability of treatment team to discuss questions or concerns patient and/or family may have regarding understanding medications and recognizing signs and symptoms once discharged  CM also encouraged patient to follow up with all recommended appointments after discharge  Patient advised of importance for patient and family to participate in managing patients medical well being

## 2021-02-23 NOTE — ASSESSMENT & PLAN NOTE
Lab Results   Component Value Date    EGFR 32 02/23/2021    EGFR 31 02/22/2021    EGFR 29 02/21/2021    CREATININE 1 61 (H) 02/23/2021    CREATININE 1 64 (H) 02/22/2021    CREATININE 1 74 (H) 02/21/2021   · Patient appears to have a baseline creatinine of 1 2-1 6,   · Now 1 61  · Patient's renal function at baseline

## 2021-02-23 NOTE — PROGRESS NOTES
General Cardiology   Progress Note -  [facility]   Elicia Hidden 66 y o  female MRN: [id number]    Unit/Bed#: S -01 Encounter: [id number]    Assessment  1  Acute on chronic diastolic CHF exacerbation   2  Moderate MR/TR, and PHTN  -Presented to the ED on 2/18 with complaints of progressively worsening SOB and increasing lower extremity edema over the last month  -On PE volume status is improving, however still has evidence volume overload with JVD, abdominal distension, and +2 pitting bilateral extremity edema  -Denies SOB at rest or significant dyspnea; no supplemental O2 requirement  -NT proBNP 19620  -TTE 11/2020; LVEF 50%, mild diffuse hypokinesis, grade 2 DD, RV mildly dilated, systolic function mildly reduced, moderate MR, moderate TR; estimated peak PA pressure 55 mmHg  -Previously on torsemide 10 mg b i d as an outpatient (on hold currently)  -Currently on IV Bumex 2 mg b i d (received x1 extra dose yesterday)  -24 hour I&O balance; -880 mL ;overall -5 8 L  Weights (dry weight around 130 lbs)  -2/19 -- 149 lb -- standing scale  -2/20 -- 148 lb -- standing scale  -2/21 -- 141 lb -- standing scale  -2/22 -- 140 lb -- standing scale  -2/23 -- 138 lb -- standing scale     3  Hypertension  -Average /55, last recorded 110/56, HR 86  -On Bystolic 5 mg daily     4  CKD stage 3  -Baseline creatinine around 1 4-1 6  -Creatinine 1 82 on admission, improving with IV diuresis currently at 1 61     5  Paroxysmal SVT  -Non-telem  -12 lead ECG on 02/18; NSR no significant arrhythmias  -On Bystolic 5 mg daily     Plan  -Will increase IV Bumex to 2 mg t i d, + K-Dur 20 mEq daily  -Continue Bystolic 5 mg daily  -Need to obtain standing weights, 2 g NA +diet 1 8 L FR  -Monitor renal function and electrolytes closely  -No indication at this time for telemetry monitoring     Subjective  Review of Systems   Constitution: Positive for malaise/fatigue  Negative for chills and fever     Cardiovascular: Positive for leg swelling  Negative for chest pain, cyanosis, dyspnea on exertion, irregular heartbeat, near-syncope, orthopnea and palpitations  Respiratory: Negative for cough and shortness of breath  Gastrointestinal: Positive for bloating  Negative for abdominal pain  Genitourinary: Negative for dysuria  Neurological: Negative for dizziness, headaches and light-headedness  All other systems reviewed and are negative  Objective:   Physical Exam  Vitals signs and nursing note reviewed  Constitutional:       General: She is not in acute distress  Appearance: Normal appearance  She is obese  She is not ill-appearing  HENT:      Head: Normocephalic and atraumatic  Mouth/Throat:      Mouth: Mucous membranes are moist    Eyes:      General: No scleral icterus  Neck:      Comments: +JVD  Cardiovascular:      Rate and Rhythm: Normal rate and regular rhythm  Pulses: Normal pulses  Heart sounds: Normal heart sounds  No murmur  Pulmonary:      Effort: Pulmonary effort is normal       Breath sounds: Normal breath sounds  No wheezing or rales  Abdominal:      Palpations: Abdomen is soft  Tenderness: There is no abdominal tenderness  Musculoskeletal:      Right lower leg: Edema present  Left lower leg: Edema present  Skin:     General: Skin is warm and dry  Capillary Refill: Capillary refill takes less than 2 seconds  Neurological:      General: No focal deficit present  Mental Status: She is alert and oriented to person, place, and time  Psychiatric:         Mood and Affect: Mood normal          Vitals: Blood pressure 110/56, pulse 86, temperature 97 6 °F (36 4 °C), temperature source Oral, resp  rate 16, weight 63 6 kg (140 lb 3 4 oz), SpO2 94 %, not currently breastfeeding ,     Body mass index is 26 49 kg/m²  ,   Systolic (00WUH), ZSQ:568 , Min:105 , MON:828     Diastolic (32MSD), XXN:59, Min:51, Max:57      Intake/Output Summary (Last 24 hours) at 2/23/2021 0851  Last data filed at 2/23/2021 0838  Gross per 24 hour   Intake 720 ml   Output 3100 ml   Net -2380 ml     Weight (last 2 days)     Date/Time   Weight    02/23/21 0600   63 6 (140 21)    02/22/21 0600   63 7 (140 43)    02/22/21 0526   63 7 (140 4)    02/21/21 0600   64 1 (141 4)              LABORATORY RESULTS  Results from last 7 days   Lab Units 02/18/21  1450   TROPONIN I ng/mL <0 02     CBC with diff:   Results from last 7 days   Lab Units 02/23/21  0628 02/21/21  0613 02/19/21  0454 02/18/21  1450   WBC Thousand/uL 5 56 4 67 4 00* 4 51   HEMOGLOBIN g/dL 9 3* 10 1* 9 2* 9 9*   HEMATOCRIT % 29 5* 32 3* 29 5* 32 3*   MCV fL 89 89 90 91   PLATELETS Thousands/uL 458* 468* 425* 470*   MCH pg 28 1 27 9 28 0 27 9   MCHC g/dL 31 5 31 3* 31 2* 30 7*   RDW % 16 4* 16 1* 16 4* 16 6*   MPV fL 9 7 9 6 9 4 9 3   NRBC AUTO /100 WBCs 0  --   --  0       CMP:  Results from last 7 days   Lab Units 02/23/21  0628 02/22/21  0523 02/21/21  0613 02/20/21  1104 02/19/21  0454 02/18/21  1450   POTASSIUM mmol/L 4 5 4 5 4 7 4 7 4 5 4 1   CHLORIDE mmol/L 100 100 102 105 106 103   CO2 mmol/L 30 29 29 29 27 29   BUN mg/dL 41* 42* 43* 43* 45* 49*   CREATININE mg/dL 1 61* 1 64* 1 74* 1 77* 1 81* 1 82*   CALCIUM mg/dL 8 3 8 4 8 3 8 1* 8 3 8 3   AST U/L 23  --   --   --   --  23   ALT U/L 11*  --   --   --   --  15   ALK PHOS U/L 242*  --   --   --   --  289*   EGFR ml/min/1 73sq m 32 31 29 28 27 27       BMP:  Results from last 7 days   Lab Units 02/23/21  0628 02/22/21  0523 02/21/21  0613 02/20/21  1104 02/19/21  0454 02/18/21  1450   POTASSIUM mmol/L 4 5 4 5 4 7 4 7 4 5 4 1   CHLORIDE mmol/L 100 100 102 105 106 103   CO2 mmol/L 30 29 29 29 27 29   BUN mg/dL 41* 42* 43* 43* 45* 49*   CREATININE mg/dL 1 61* 1 64* 1 74* 1 77* 1 81* 1 82*   CALCIUM mg/dL 8 3 8 4 8 3 8 1* 8 3 8 3       Lab Results   Component Value Date    NTBN 19,620 (H) 02/18/2021    NTBN 18,716 (H) 11/08/2020    NTBN 3,376 (H) 01/12/2019        Results from last 7 days   Lab Units 21  0628 21  0523 21  0613 21  1104 21  1450   MAGNESIUM mg/dL 2 4 2 3 2 4 2 3 2 4                   Results from last 7 days   Lab Units 21  1450   INR  1 18       Lipid Profile:   No results found for: CHOL  Lab Results   Component Value Date    HDL 37 (L) 2020    HDL 49 2020    HDL 37 (L) 2019     Lab Results   Component Value Date    LDLCALC 94 2020    LDLCALC 57 2020    LDLCALC 61 2019     Lab Results   Component Value Date    TRIG 57 2020    TRIG 58 2020    TRIG 72 2019       Cardiac testing:   Results for orders placed during the hospital encounter of 20   Echo complete with contrast if indicated    Narrative [facility] [address] [facility]  [phone]    Transthoracic Echocardiogram  2D, M-mode, Doppler, and Color Doppler    Study date:  2020    Patient: Pascual Escobar  MR number: [id number]  Account number: [de-identified]  : 1950  Age: 65 years  Gender: Female  Status: Inpatient  Location: Bedside  Height: 61 in  Weight: 144 lb  BP: 116/ 57 mmHg    Indications: Heart Failure    Diagnoses: I50 9 - Heart failure, unspecified    Sonographer:  SHANTELL Mcmahan Aas  Primary Physician:  Dixon Madrigal MD  Referring Physician:  Adam Beckman MD  Group:  [facility]  Interpreting Physician:  Jaspal Young MD    SUMMARY    LEFT VENTRICLE:  Size was normal   Systolic function was at the lower limits of normal  Ejection fraction was estimated to be 50 %  There was mild diffuse hypokinesis  Wall thickness was normal   Features were consistent with a pseudonormal left ventricular filling pattern, with concomitant abnormal relaxation and increased filling pressure (grade 2 diastolic dysfunction)  RIGHT VENTRICLE:  The ventricle was mildly dilated  Systolic function was mildly reduced      MITRAL VALVE:  There was moderate regurgitation  TRICUSPID VALVE:  There was moderate regurgitation  Pulmonary artery systolic pressure was moderately increased  The findings suggest moderate pulmonary hypertension  COMPARISONS:  Comparison was made with the previous study of 13-Jan-2019  Aortic regurgitation has improved  HISTORY: PRIOR HISTORY: Hyperlipidemia, Mitral regurgitation, PSVT, tricuspid regurgitation, Aortic insufficiency, CM, CKD III, Acute CHF, HTN  PROCEDURE: The procedure was performed at the bedside  This was a routine study  Parasternals are technically difficult due to left breast implant  The transthoracic approach was used  The study included complete 2D imaging, M-mode,  complete spectral Doppler, and color Doppler  The heart rate was 91 bpm, at the start of the study  Images were obtained from the parasternal, apical, subcostal, and suprasternal notch acoustic windows  This was a technically difficult  study  LEFT VENTRICLE: Size was normal  Systolic function was at the lower limits of normal  Ejection fraction was estimated to be 50 %  There was mild diffuse hypokinesis  Wall thickness was normal  DOPPLER: Features were consistent with a  pseudonormal left ventricular filling pattern, with concomitant abnormal relaxation and increased filling pressure (grade 2 diastolic dysfunction)  RIGHT VENTRICLE: The ventricle was mildly dilated  Systolic function was mildly reduced  Wall thickness was normal     LEFT ATRIUM: Size was normal     RIGHT ATRIUM: Size was normal     MITRAL VALVE: Valve structure was normal  There was normal leaflet separation  DOPPLER: The transmitral velocity was within the normal range  There was no evidence for stenosis  There was moderate regurgitation  AORTIC VALVE: The valve was trileaflet  Leaflets exhibited normal thickness and normal cuspal separation  DOPPLER: Transaortic velocity was within the normal range  There was no evidence for stenosis   There was no regurgitation  TRICUSPID VALVE: The valve structure was normal  There was normal leaflet separation  DOPPLER: The transtricuspid velocity was within the normal range  There was no evidence for stenosis  There was moderate regurgitation  Pulmonary artery  systolic pressure was moderately increased  Estimated peak PA pressure was 55 mmHg  The findings suggest moderate pulmonary hypertension  PULMONIC VALVE: Leaflets exhibited normal thickness, no calcification, and normal cuspal separation  DOPPLER: The transpulmonic velocity was within the normal range  There was trace regurgitation  PERICARDIUM: There was no pericardial effusion  The pericardium was normal in appearance  AORTA: The root exhibited normal size  SYSTEMIC VEINS: IVC: The inferior vena cava was normal in size and course  Respirophasic changes were normal     SYSTEM MEASUREMENT TABLES    2D  %FS: 21 01 %  Ao Diam: 2 57 cm  EDV(Teich): 85 55 ml  EF(Teich): 42 99 %  ESV(Teich): 48 78 ml  IVSd: 0 79 cm  LA Area: 16 44 cm2  LA Diam: 4 2 cm  LVEDV MOD A4C: 70 91 ml  LVEF MOD A4C: 49 92 %  LVESV MOD A4C: 35 51 ml  LVIDd: 4 35 cm  LVIDs: 3 44 cm  LVLd A4C: 7 01 cm  LVLs A4C: 6 04 cm  LVPWd: 0 79 cm  RA Area: 15 8 cm2  RVIDd: 3 44 cm  SV MOD A4C: 35 4 ml  SV(Teich): 36 78 ml    CF  MR Als  Parth: 0 36 m/s  MR Flow: 22 07 ml/s  MR Rad: 0 31 cm    CW  MR VTI: 143 7 cm  MR Vmax: 4 28 m/s  MR Vmean: 3 58 m/s  MR maxP 3 mmHg  MR meanP 38 mmHg  TR MaxP 52 mmHg  TR Vmax: 3 3 m/s    MM  TAPSE: 1 56 cm    PW  E' Sept: 0 08 m/s  E/E' Sept: 9 84  MV A Parth: 0 3 m/s  MV Dec [place]: 7 93 m/s2  MV DecT: 95 22 ms  MV E Parth: 0 75 m/s  MV E/A Ratio: 2 55  MV PHT: 27 61 ms  MVA By PHT: 7 97 cm2    [employer] Accredited Echocardiography Laboratory    Prepared and electronically signed by    Jung Alva MD  Signed [id number] 12:26:58       No results found for this or any previous visit    No results found for this or any previous visit  No procedure found  No results found for this or any previous visit  Meds/Allergies   all current active meds have been reviewed, current meds:   Current Facility-Administered Medications   Medication Dose Route Frequency    albuterol (PROVENTIL HFA,VENTOLIN HFA) inhaler 2 puff  2 puff Inhalation Q6H PRN    aspirin (ECOTRIN LOW STRENGTH) EC tablet 81 mg  81 mg Oral Daily    bumetanide (BUMEX) injection 2 mg  2 mg Intravenous BID    cholecalciferol (VITAMIN D3) tablet 1,000 Units  1,000 Units Oral Daily    heparin (porcine) subcutaneous injection 5,000 Units  5,000 Units Subcutaneous Q8H [address]    levothyroxine tablet 88 mcg  88 mcg Oral Daily    nebivolol (BYSTOLIC) tablet 5 mg  5 mg Oral Daily    ondansetron (ZOFRAN) injection 4 mg  4 mg Intravenous Q6H PRN    potassium chloride (K-DUR,KLOR-CON) CR tablet 20 mEq  20 mEq Oral Daily    and PTA meds:   Prior to Admission Medications   Prescriptions Last Dose Informant Patient Reported? Taking? Fexofenadine HCl (ALLEGRA ALLERGY PO)  Self Yes Yes   Sig: Take by mouth   albuterol (PROVENTIL HFA,VENTOLIN HFA) 90 mcg/act inhaler  Self No Yes   Sig: Inhale 2 puffs every 6 (six) hours as needed for wheezing or shortness of breath   aspirin (ECOTRIN LOW STRENGTH) 81 mg EC tablet  Self Yes Yes   Sig: Take 81 mg by mouth daily   cholecalciferol (VITAMIN D3) 1,000 units tablet  Self Yes Yes   Sig: Take 1,000 Units by mouth daily     clobetasol (TEMOVATE) 0 05 % cream  Self No Yes   Sig: Apply topically twice daily to legs for 14 days only, DO NOT apply to face or groin     levothyroxine 88 mcg tablet  Self No Yes   Sig: Take 1 tablet (88 mcg total) by mouth daily   nebivolol (Bystolic) 5 mg tablet  Self No Yes   Sig: Take 1 tablet (5 mg total) by mouth daily   potassium chloride (K-DUR,KLOR-CON) 20 mEq tablet   No Yes   Sig: Take 1 tablet (20 mEq total) by mouth daily   torsemide (DEMADEX) 10 mg tablet   No Yes   Sig: Take 1 tablet (10 mg total) by mouth 2 (two) times a day      Facility-Administered Medications: None          EKG personally reviewed by ARACELY Ni    Assessment:  Principal Problem:    Acute on chronic diastolic (congestive) heart failure (HCC)  Active Problems:    Paroxysmal supraventricular tachycardia (HCC)    History of pulmonary embolus (PE)    Stage 3 chronic kidney disease      Counseling / Coordination of Care  Total floor / unit time spent today 20 minutes  Greater than 50% of total time was spent with the patient and / or family counseling and / or coordination of care  ** Please Note: Dragon 360 Dictation voice to text software may have been used in the creation of this document   **

## 2021-02-23 NOTE — PROGRESS NOTES
Progress Note - Angelo Bauman 1950, 66 y o  female MRN: [id number]    Unit/Bed#: S -01 Encounter: [id number]    Primary Care Provider: Angela Gill MD   Date and time admitted to hospital: 2/18/2021  1:57 PM        * Acute on chronic diastolic (congestive) heart failure ([facility] [address] )  Assessment & Plan  · POA, as per patient weight is elevated up to 150 despite increasing diuretics  Reports MARI, orthopnea, and leg swelling  Some dietary indiscretion with soup broth  No fluid indiscretions  · Diuresing better with IV Bumex  Continue IV Bumex per cardiology  · Monitor I/O, daily weights, fluid and sodium restriction  · Cardiology input appreciated  · 138 lb today  · Some continue lower extremity edema      Stage 3 chronic kidney disease  Assessment & Plan  Lab Results   Component Value Date    EGFR 32 02/23/2021    EGFR 31 02/22/2021    EGFR 29 02/21/2021    CREATININE 1 61 (H) 02/23/2021    CREATININE 1 64 (H) 02/22/2021    CREATININE 1 74 (H) 02/21/2021   · Patient appears to have a baseline creatinine of 1 2-1 6,   · Now 1 61  · Patient's renal function at baseline      Paroxysmal supraventricular tachycardia ([facility] [address] )  Assessment & Plan  · Noted history  · Continue Bystolic     History of pulmonary embolus (PE)  Assessment & Plan  · Not on anticoagulation as outpatient on presentation  · DVT PPX ordered       VTE Pharmacologic Prophylaxis:   Pharmacologic: Heparin  Mechanical VTE Prophylaxis in Place: No    Patient Centered Rounds: I have performed bedside rounds with nursing staff today  Education and Discussions with Family / Patient:  Offered to call patient's  patient refused    Time Spent for Care: 30 minutes  More than 50% of total time spent on counseling and coordination of care as described above      Current Length of Stay: 5 day(s)    Current Patient Status: Inpatient   Certification Statement:  Patient requires continued hospitalization for need of IV diuretics    Discharge Plan: Possible discharge in 24-48 hours    Code Status: Level 1 - Full Code      Subjective:   Comfortable no acute distress    Objective:     Vitals:   Temp (24hrs), Av 6 °F (36 4 °C), Min:97 6 °F (36 4 °C), Max:97 7 °F (36 5 °C)    Temp:  [97 6 °F (36 4 °C)-97 7 °F (36 5 °C)] 97 6 °F (36 4 °C)  HR:  [84-86] 86  Resp:  [16-20] 16  BP: (105-111)/(51-57) 110/56  SpO2:  [94 %-98 %] 94 %  Body mass index is 26 07 kg/m²  Input and Output Summary (last 24 hours): Intake/Output Summary (Last 24 hours) at 2021 1107  Last data filed at 2021 0957  Gross per 24 hour   Intake 720 ml   Output 3100 ml   Net -2380 ml       Physical Exam:     Physical Exam  Constitutional:       Appearance: She is not toxic-appearing or diaphoretic  Cardiovascular:      Rate and Rhythm: Normal rate and regular rhythm  Heart sounds: No murmur  No gallop  Pulmonary:      Effort: Pulmonary effort is normal  No respiratory distress  Breath sounds: No wheezing or rales  Abdominal:      General: There is no distension  Tenderness: There is no abdominal tenderness  Musculoskeletal:      Right lower leg: Edema present  Left lower leg: Edema present  Additional Data:     Labs:    Results from last 7 days   Lab Units 21  0628   WBC Thousand/uL 5 56   HEMOGLOBIN g/dL 9 3*   HEMATOCRIT % 29 5*   PLATELETS Thousands/uL 458*   NEUTROS PCT % 77*   LYMPHS PCT % 9*   MONOS PCT % 10   EOS PCT % 3     Results from last 7 days   Lab Units 21  0628   SODIUM mmol/L 135*   POTASSIUM mmol/L 4 5   CHLORIDE mmol/L 100   CO2 mmol/L 30   BUN mg/dL 41*   CREATININE mg/dL 1 61*   ANION GAP mmol/L 5   CALCIUM mg/dL 8 3   ALBUMIN g/dL 2 2*   TOTAL BILIRUBIN mg/dL 0 43   ALK PHOS U/L 242*   ALT U/L 11*   AST U/L 23   GLUCOSE RANDOM mg/dL 76     Results from last 7 days   Lab Units 21  1450   INR  1 18                       * I Have Reviewed All Lab Data Listed Above    * Additional Pertinent Lab Tests Reviewed: All Labs Within Last 24 Hours Reviewed    Imaging:    Imaging Reports Reviewed Today Include:   Imaging Personally Reviewed by Myself Includes:      Recent Cultures (last 7 days):           Last 24 Hours Medication List:   Current Facility-Administered Medications   Medication Dose Route Frequency Provider Last Rate    albuterol  2 puff Inhalation Q6H PRN Easton Vega PA-C      aspirin  81 mg Oral Daily Jerrod [employer] Player, PA-OSMANI      bumetanide  2 mg Intravenous TID (diuretic) ARACELY Art      cholecalciferol  1,000 Units Oral Daily [employer] Player, VEENA      heparin (porcine)  5,000 Units Subcutaneous [facility] Jerrod [employer] Player, PA-OSMANI      levothyroxine  88 mcg Oral Daily [email or web address] Player, PA-C      nebivolol  5 mg Oral Daily [email or web address] Player, PA-OSMANI      ondansetron  4 mg Intravenous Q6H PRN [email or web address] Player, PA-C      potassium chloride  20 mEq Oral Daily [email or web address] Player, VEENA          Today, Patient Was Seen By: Henrietta Lopez MD    ** Please Note: Dictation voice to text software may have been used in the creation of this document   **

## 2021-02-23 NOTE — PLAN OF CARE
Problem: Potential for Falls  Goal: Patient will remain free of falls  Description: INTERVENTIONS:  - Assess patient frequently for physical needs  -  Identify cognitive and physical deficits and behaviors that affect risk of falls  -  [place] fall precautions as indicated by assessment   - Educate patient/family on patient safety including physical limitations  - Instruct patient to call for assistance with activity based on assessment  - Modify environment to reduce risk of injury  - Consider OT/PT consult to assist with strengthening/mobility  2/23/2021 1155 by Marli Silva RN  Outcome: Progressing  2/23/2021 1154 by Marli Silva RN  Outcome: Progressing     Problem: Nutrition/Hydration-ADULT  Goal: Nutrient/Hydration intake appropriate for improving, restoring or maintaining nutritional needs  Description: Monitor and assess patient's nutrition/hydration status for malnutrition  Collaborate with interdisciplinary team and initiate plan and interventions as ordered  Monitor patient's weight and dietary intake as ordered or per policy  Utilize nutrition screening tool and intervene as necessary  Determine patient's food preferences and provide high-protein, high-caloric foods as appropriate       INTERVENTIONS:  - Monitor oral intake, urinary output, labs, and treatment plans  - Assess nutrition and hydration status and recommend course of action  - Evaluate amount of meals eaten  - Assist patient with eating if necessary   - Allow adequate time for meals  - Recommend/ encourage appropriate diets, oral nutritional supplements, and vitamin/mineral supplements  - Order, calculate, and assess calorie counts as needed  - Recommend, monitor, and adjust tube feedings and TPN/PPN based on assessed needs  - Assess need for intravenous fluids  - Provide specific nutrition/hydration education as appropriate  - Include patient/family/caregiver in decisions related to nutrition  2/23/2021 1155 by Marli Silva RN  Outcome: Progressing  2/23/2021 1154 by Michael Garduno RN  Outcome: Progressing     Problem: CARDIOVASCULAR - ADULT  Goal: Maintains optimal cardiac output and hemodynamic stability  Description: INTERVENTIONS:  - Monitor I/O, vital signs and rhythm  - Monitor for S/S and trends of decreased cardiac output  - Administer and titrate ordered vasoactive medications to optimize hemodynamic stability  - Assess quality of pulses, skin color and temperature  - Assess for signs of decreased coronary artery perfusion  - Instruct patient to report change in severity of symptoms  2/23/2021 1155 by Michael Garduno RN  Outcome: Progressing  2/23/2021 1154 by Michael Garduno RN  Outcome: Progressing  Goal: Absence of cardiac dysrhythmias or at baseline rhythm  Description: INTERVENTIONS:  - Continuous cardiac monitoring, vital signs, obtain 12 lead EKG if ordered  - Administer antiarrhythmic and heart rate control medications as ordered  - Monitor electrolytes and administer replacement therapy as ordered  2/23/2021 1155 by Michael Garduno RN  Outcome: Progressing  2/23/2021 1154 by Michael Garduno RN  Outcome: Progressing     Problem: RESPIRATORY - ADULT  Goal: Achieves optimal ventilation and oxygenation  Description: INTERVENTIONS:  - Assess for changes in respiratory status  - Assess for changes in mentation and behavior  - Position to facilitate oxygenation and minimize respiratory effort  - Oxygen administered by appropriate delivery if ordered  - Initiate smoking cessation education as indicated  - Encourage broncho-pulmonary hygiene including cough, deep breathe, Incentive Spirometry  - Assess the need for suctioning and aspirate as needed  - Assess and instruct to report SOB or any respiratory difficulty  - Respiratory Therapy support as indicated  2/23/2021 1155 by Michael Garduno RN  Outcome: Progressing  2/23/2021 1154 by Michael Garduno RN  Outcome: Progressing     Problem: SKIN/TISSUE INTEGRITY - ADULT  Goal: Skin integrity remains intact  Description: INTERVENTIONS  - Identify patients at risk for skin breakdown  - Assess and monitor skin integrity  - Assess and monitor nutrition and hydration status  - Monitor labs (i e  albumin)  - Assess for incontinence   - Turn and reposition patient  - Assist with mobility/ambulation  - Relieve pressure over bony prominences  - Avoid friction and shearing  - Provide appropriate hygiene as needed including keeping skin clean and dry  - Evaluate need for skin moisturizer/barrier cream  - Collaborate with interdisciplinary team (i e  Nutrition, Rehabilitation, etc )   - Patient/family teaching  2/23/2021 1155 by Neyda Mclean RN  Outcome: Progressing  2/23/2021 1154 by Neyda Mclean RN  Outcome: Progressing  Goal: Incision(s), wounds(s) or drain site(s) healing without S/S of infection  Description: INTERVENTIONS  - Assess and document risk factors for skin impairment   - Assess and document dressing, incision, wound bed, drain sites and surrounding tissue  - Consider nutrition services referral as needed  - Oral mucous membranes remain intact  - Provide patient/ family education  2/23/2021 1155 by Neyda Mclean RN  Outcome: Progressing  2/23/2021 1154 by Neyda Mclean RN  Outcome: Progressing  Goal: Oral mucous membranes remain intact  Description: INTERVENTIONS  - Assess oral mucosa and hygiene practices  - Implement preventative oral hygiene regimen  - Implement oral medicated treatments as ordered  - Initiate Nutrition services referral as needed  2/23/2021 1155 by Neyda Mclean RN  Outcome: Progressing  2/23/2021 1154 by Neyda Mclean RN  Outcome: Progressing     Problem: PAIN - ADULT  Goal: Verbalizes/displays adequate comfort level or baseline comfort level  Description: Interventions:  - Encourage patient to monitor pain and request assistance  - Assess pain using appropriate pain scale  - Administer analgesics based on type and severity of pain and evaluate response  - Implement non-pharmacological measures as appropriate and evaluate response  - Consider cultural and social influences on pain and pain management  - Notify physician/advanced practitioner if interventions unsuccessful or patient reports new pain  2/23/2021 1155 by Neyda Mclean RN  Outcome: Progressing  2/23/2021 1154 by Neyda Mclean RN  Outcome: Progressing     Problem: INFECTION - ADULT  Goal: Absence or prevention of progression during hospitalization  Description: INTERVENTIONS:  - Assess and monitor for signs and symptoms of infection  - Monitor lab/diagnostic results  - Monitor all insertion sites, i e  indwelling lines, tubes, and drains  - Monitor endotracheal if appropriate and nasal secretions for changes in amount and color  - [place] appropriate cooling/warming therapies per order  - Administer medications as ordered  - Instruct and encourage patient and family to use good hand hygiene technique  - Identify and instruct in appropriate isolation precautions for identified infection/condition  2/23/2021 1155 by Neyda Mclean RN  Outcome: Progressing  2/23/2021 1154 by Neyda Mclean RN  Outcome: Progressing  Goal: Absence of fever/infection during neutropenic period  Description: INTERVENTIONS:  - Monitor WBC    2/23/2021 1155 by Neyda Mclean RN  Outcome: Progressing  2/23/2021 1154 by Neyda Mclean RN  Outcome: Progressing     Problem: SAFETY ADULT  Goal: Patient will remain free of falls  Description: INTERVENTIONS:  - Assess patient frequently for physical needs  -  Identify cognitive and physical deficits and behaviors that affect risk of falls    -  [place] fall precautions as indicated by assessment   - Educate patient/family on patient safety including physical limitations  - Instruct patient to call for assistance with activity based on assessment  - Modify environment to reduce risk of injury  - Consider OT/PT consult to assist with strengthening/mobility  2/23/2021 1155 by Neyda Mclean RN  Outcome: Progressing  2/23/2021 1154 by Neyda Romance, RN  Outcome: Progressing  Goal: Maintain or return to baseline ADL function  Description: INTERVENTIONS:  -  Assess patient's ability to carry out ADLs; assess patient's baseline for ADL function and identify physical deficits which impact ability to perform ADLs (bathing, care of mouth/teeth, toileting, grooming, dressing, etc )  - Assess/evaluate cause of self-care deficits   - Assess range of motion  - Assess patient's mobility; develop plan if impaired  - Assess patient's need for assistive devices and provide as appropriate  - Encourage maximum independence but intervene and supervise when necessary  - Involve family in performance of ADLs  - Assess for home care needs following discharge   - Consider OT consult to assist with ADL evaluation and planning for discharge  - Provide patient education as appropriate  2/23/2021 1155 by Neyda Mclean RN  Outcome: Progressing  2/23/2021 1154 by Neyda Mclean RN  Outcome: Progressing  Goal: Maintain or return mobility status to optimal level  Description: INTERVENTIONS:  - Assess patient's baseline mobility status (ambulation, transfers, stairs, etc )    - Identify cognitive and physical deficits and behaviors that affect mobility  - Identify mobility aids required to assist with transfers and/or ambulation (gait belt, sit-to-stand, lift, walker, cane, etc )  - [place] fall precautions as indicated by assessment  - Record patient progress and toleration of activity level on Mobility SBAR; progress patient to next Phase/Stage  - Instruct patient to call for assistance with activity based on assessment  - Consider rehabilitation consult to assist with strengthening/weightbearing, etc   2/23/2021 1155 by Neyda Mclean RN  Outcome: Progressing  2/23/2021 1154 by Neyda Mclean RN  Outcome: Progressing     Problem: DISCHARGE PLANNING  Goal: Discharge to home or other facility with appropriate resources  Description: INTERVENTIONS:  - Identify barriers to discharge w/patient and caregiver  - Arrange for needed discharge resources and transportation as appropriate  - Identify discharge learning needs (meds, wound care, etc )  - Arrange for interpretive services to assist at discharge as needed  - Refer to Case Management Department for coordinating discharge planning if the patient needs post-hospital services based on physician/advanced practitioner order or complex needs related to functional status, cognitive ability, or social support system  2/23/2021 1155 by Marli Silva RN  Outcome: Progressing  2/23/2021 1154 by Marli Silva RN  Outcome: Progressing     Problem: Knowledge Deficit  Goal: Patient/family/caregiver demonstrates understanding of disease process, treatment plan, medications, and discharge instructions  Description: Complete learning assessment and assess knowledge base    Interventions:  - Provide teaching at level of understanding  - Provide teaching via preferred learning methods  2/23/2021 1155 by Marli Silva RN  Outcome: Progressing  2/23/2021 1154 by Marli Silva RN  Outcome: Progressing

## 2021-02-23 NOTE — ASSESSMENT & PLAN NOTE
· POA, as per patient weight is elevated up to 150 despite increasing diuretics  Reports MARI, orthopnea, and leg swelling  Some dietary indiscretion with soup broth  No fluid indiscretions  · Diuresing better with IV Bumex    Continue IV Bumex per cardiology  · Monitor I/O, daily weights, fluid and sodium restriction  · Cardiology input appreciated  · 138 lb today  · Some continue lower extremity edema

## 2021-02-24 NOTE — CASE MANAGEMENT
Patient now identified as a bundle  CM met with patient to introduce self, explain role, and follow up on DC planning  CM reviewed and provided bundle notice to patient at bedside  IMM reviewed with patient at bedside, signed by CM, and placed in medical records bin  Copy provided for patient's records  Patient confirms her plan upon medical stability is to return home with her  and son, and family will provide transportation at DC  Patient reports no further concerns from CM standpoint at this time  CM encouraged patient to reach out with any questions or concerns  CM noted patient has an OP CM Brittney Loop  CM sent in basket message to Brittney Loop to ensure coordination of care  CM will continue to follow for further care coordination

## 2021-02-24 NOTE — PLAN OF CARE
Problem: Potential for Falls  Goal: Patient will remain free of falls  Description: INTERVENTIONS:  - Assess patient frequently for physical needs  -  Identify cognitive and physical deficits and behaviors that affect risk of falls  -  [place] fall precautions as indicated by assessment   - Educate patient/family on patient safety including physical limitations  - Instruct patient to call for assistance with activity based on assessment  - Modify environment to reduce risk of injury  - Consider OT/PT consult to assist with strengthening/mobility  Outcome: Progressing     Problem: Nutrition/Hydration-ADULT  Goal: Nutrient/Hydration intake appropriate for improving, restoring or maintaining nutritional needs  Description: Monitor and assess patient's nutrition/hydration status for malnutrition  Collaborate with interdisciplinary team and initiate plan and interventions as ordered  Monitor patient's weight and dietary intake as ordered or per policy  Utilize nutrition screening tool and intervene as necessary  Determine patient's food preferences and provide high-protein, high-caloric foods as appropriate       INTERVENTIONS:  - Monitor oral intake, urinary output, labs, and treatment plans  - Assess nutrition and hydration status and recommend course of action  - Evaluate amount of meals eaten  - Assist patient with eating if necessary   - Allow adequate time for meals  - Recommend/ encourage appropriate diets, oral nutritional supplements, and vitamin/mineral supplements  - Order, calculate, and assess calorie counts as needed  - Recommend, monitor, and adjust tube feedings and TPN/PPN based on assessed needs  - Assess need for intravenous fluids  - Provide specific nutrition/hydration education as appropriate  - Include patient/family/caregiver in decisions related to nutrition  Outcome: Progressing     Problem: CARDIOVASCULAR - ADULT  Goal: Maintains optimal cardiac output and hemodynamic stability  Description: INTERVENTIONS:  - Monitor I/O, vital signs and rhythm  - Monitor for S/S and trends of decreased cardiac output  - Administer and titrate ordered vasoactive medications to optimize hemodynamic stability  - Assess quality of pulses, skin color and temperature  - Assess for signs of decreased coronary artery perfusion  - Instruct patient to report change in severity of symptoms  Outcome: Progressing  Goal: Absence of cardiac dysrhythmias or at baseline rhythm  Description: INTERVENTIONS:  - Continuous cardiac monitoring, vital signs, obtain 12 lead EKG if ordered  - Administer antiarrhythmic and heart rate control medications as ordered  - Monitor electrolytes and administer replacement therapy as ordered  Outcome: Progressing     Problem: RESPIRATORY - ADULT  Goal: Achieves optimal ventilation and oxygenation  Description: INTERVENTIONS:  - Assess for changes in respiratory status  - Assess for changes in mentation and behavior  - Position to facilitate oxygenation and minimize respiratory effort  - Oxygen administered by appropriate delivery if ordered  - Initiate smoking cessation education as indicated  - Encourage broncho-pulmonary hygiene including cough, deep breathe, Incentive Spirometry  - Assess the need for suctioning and aspirate as needed  - Assess and instruct to report SOB or any respiratory difficulty  - Respiratory Therapy support as indicated  Outcome: Progressing     Problem: SKIN/TISSUE INTEGRITY - ADULT  Goal: Skin integrity remains intact  Description: INTERVENTIONS  - Identify patients at risk for skin breakdown  - Assess and monitor skin integrity  - Assess and monitor nutrition and hydration status  - Monitor labs (i e  albumin)  - Assess for incontinence   - Turn and reposition patient  - Assist with mobility/ambulation  - Relieve pressure over bony prominences  - Avoid friction and shearing  - Provide appropriate hygiene as needed including keeping skin clean and dry  - Evaluate need for skin moisturizer/barrier cream  - Collaborate with interdisciplinary team (i e  Nutrition, Rehabilitation, etc )   - Patient/family teaching  Outcome: Progressing  Goal: Incision(s), wounds(s) or drain site(s) healing without S/S of infection  Description: INTERVENTIONS  - Assess and document risk factors for skin impairment   - Assess and document dressing, incision, wound bed, drain sites and surrounding tissue  - Consider nutrition services referral as needed  - Oral mucous membranes remain intact  - Provide patient/ family education  Outcome: Progressing  Goal: Oral mucous membranes remain intact  Description: INTERVENTIONS  - Assess oral mucosa and hygiene practices  - Implement preventative oral hygiene regimen  - Implement oral medicated treatments as ordered  - Initiate Nutrition services referral as needed  Outcome: Progressing     Problem: SKIN/TISSUE INTEGRITY - ADULT  Goal: Skin integrity remains intact  Description: INTERVENTIONS  - Identify patients at risk for skin breakdown  - Assess and monitor skin integrity  - Assess and monitor nutrition and hydration status  - Monitor labs (i e  albumin)  - Assess for incontinence   - Turn and reposition patient  - Assist with mobility/ambulation  - Relieve pressure over bony prominences  - Avoid friction and shearing  - Provide appropriate hygiene as needed including keeping skin clean and dry  - Evaluate need for skin moisturizer/barrier cream  - Collaborate with interdisciplinary team (i e  Nutrition, Rehabilitation, etc )   - Patient/family teaching  Outcome: Progressing  Goal: Incision(s), wounds(s) or drain site(s) healing without S/S of infection  Description: INTERVENTIONS  - Assess and document risk factors for skin impairment   - Assess and document dressing, incision, wound bed, drain sites and surrounding tissue  - Consider nutrition services referral as needed  - Oral mucous membranes remain intact  - Provide patient/ family education  Outcome: Progressing  Goal: Oral mucous membranes remain intact  Description: INTERVENTIONS  - Assess oral mucosa and hygiene practices  - Implement preventative oral hygiene regimen  - Implement oral medicated treatments as ordered  - Initiate Nutrition services referral as needed  Outcome: Progressing     Problem: PAIN - ADULT  Goal: Verbalizes/displays adequate comfort level or baseline comfort level  Description: Interventions:  - Encourage patient to monitor pain and request assistance  - Assess pain using appropriate pain scale  - Administer analgesics based on type and severity of pain and evaluate response  - Implement non-pharmacological measures as appropriate and evaluate response  - Consider cultural and social influences on pain and pain management  - Notify physician/advanced practitioner if interventions unsuccessful or patient reports new pain  Outcome: Progressing     Problem: INFECTION - ADULT  Goal: Absence or prevention of progression during hospitalization  Description: INTERVENTIONS:  - Assess and monitor for signs and symptoms of infection  - Monitor lab/diagnostic results  - Monitor all insertion sites, i e  indwelling lines, tubes, and drains  - Monitor endotracheal if appropriate and nasal secretions for changes in amount and color  - [place] appropriate cooling/warming therapies per order  - Administer medications as ordered  - Instruct and encourage patient and family to use good hand hygiene technique  - Identify and instruct in appropriate isolation precautions for identified infection/condition  Outcome: Progressing  Goal: Absence of fever/infection during neutropenic period  Description: INTERVENTIONS:  - Monitor WBC    Outcome: Progressing     Problem: SAFETY ADULT  Goal: Patient will remain free of falls  Description: INTERVENTIONS:  - Assess patient frequently for physical needs  -  Identify cognitive and physical deficits and behaviors that affect risk of falls    - [place] fall precautions as indicated by assessment   - Educate patient/family on patient safety including physical limitations  - Instruct patient to call for assistance with activity based on assessment  - Modify environment to reduce risk of injury  - Consider OT/PT consult to assist with strengthening/mobility  Outcome: Progressing  Goal: Maintain or return to baseline ADL function  Description: INTERVENTIONS:  -  Assess patient's ability to carry out ADLs; assess patient's baseline for ADL function and identify physical deficits which impact ability to perform ADLs (bathing, care of mouth/teeth, toileting, grooming, dressing, etc )  - Assess/evaluate cause of self-care deficits   - Assess range of motion  - Assess patient's mobility; develop plan if impaired  - Assess patient's need for assistive devices and provide as appropriate  - Encourage maximum independence but intervene and supervise when necessary  - Involve family in performance of ADLs  - Assess for home care needs following discharge   - Consider OT consult to assist with ADL evaluation and planning for discharge  - Provide patient education as appropriate  Outcome: Progressing  Goal: Maintain or return mobility status to optimal level  Description: INTERVENTIONS:  - Assess patient's baseline mobility status (ambulation, transfers, stairs, etc )    - Identify cognitive and physical deficits and behaviors that affect mobility  - Identify mobility aids required to assist with transfers and/or ambulation (gait belt, sit-to-stand, lift, walker, cane, etc )  - [place] fall precautions as indicated by assessment  - Record patient progress and toleration of activity level on Mobility SBAR; progress patient to next Phase/Stage  - Instruct patient to call for assistance with activity based on assessment  - Consider rehabilitation consult to assist with strengthening/weightbearing, etc   Outcome: Progressing     Problem: DISCHARGE PLANNING  Goal: Discharge to home or other facility with appropriate resources  Description: INTERVENTIONS:  - Identify barriers to discharge w/patient and caregiver  - Arrange for needed discharge resources and transportation as appropriate  - Identify discharge learning needs (meds, wound care, etc )  - Arrange for interpretive services to assist at discharge as needed  - Refer to Case Management Department for coordinating discharge planning if the patient needs post-hospital services based on physician/advanced practitioner order or complex needs related to functional status, cognitive ability, or social support system  Outcome: Progressing     Problem: Knowledge Deficit  Goal: Patient/family/caregiver demonstrates understanding of disease process, treatment plan, medications, and discharge instructions  Description: Complete learning assessment and assess knowledge base    Interventions:  - Provide teaching at level of understanding  - Provide teaching via preferred learning methods  Outcome: Progressing

## 2021-02-24 NOTE — PROGRESS NOTES
Progress Note - Carlitos Head 1950, 66 y o  female MRN: [id number]    Unit/Bed#: S -01 Encounter: [id number]    Primary Care Provider: Rickie Pulido MD   Date and time admitted to hospital: 2/18/2021  1:57 PM        * Acute on chronic diastolic (congestive) heart failure ([address] )  Assessment & Plan  · POA, as per patient weight is elevated up to 150 despite increasing diuretics  Reports MARI, orthopnea, and leg swelling  Some dietary indiscretion with soup broth  No fluid indiscretions  · Diuresing better with IV Bumex  · 5 lb weight loss overnight per Nursing  Not yet charted  · Still with some dependent edema  · Patient very happy with urine output  · Will discuss with Cardiology question oral diuretics and observation for 24 hours prior to discharge? Stage 3 chronic kidney disease  Assessment & Plan  Lab Results   Component Value Date    EGFR 32 02/23/2021    EGFR 31 02/22/2021    EGFR 29 02/21/2021    CREATININE 1 61 (H) 02/23/2021    CREATININE 1 64 (H) 02/22/2021    CREATININE 1 74 (H) 02/21/2021   · Patient appears to have a baseline creatinine of 1 2-1 6,   · BMP this a m  Pending  · Patient's renal function at baseline      Paroxysmal supraventricular tachycardia (HCC)  Assessment & Plan  · Noted history  · Continue Bystolic     History of pulmonary embolus (PE)  Assessment & Plan  · Not on anticoagulation as outpatient on presentation  · DVT PPX ordered     VTE Pharmacologic Prophylaxis:   Pharmacologic: Heparin  Mechanical VTE Prophylaxis in Place: No    Patient Centered Rounds: I have performed bedside rounds with nursing staff today  Education and Discussions with Family / Patient:  I offered to call patient's  patient refused    Time Spent for Care: 30 minutes  More than 50% of total time spent on counseling and coordination of care as described above      Current Length of Stay: 6 day(s)    Current Patient Status: Inpatient   Certification Statement: The patient will continue to require additional inpatient hospital stay due to Need for continue IV diuretics    Discharge Plan:  Possible discharge in 24 hours    Code Status: Level 1 - Full Code      Subjective:   Patient feels much better with continue diuresis    Objective:     Vitals:   Temp (24hrs), Av °F (36 7 °C), Min:97 9 °F (36 6 °C), Max:98 2 °F (36 8 °C)    Temp:  [97 9 °F (36 6 °C)-98 2 °F (36 8 °C)] 97 9 °F (36 6 °C)  HR:  [82-89] 89  Resp:  [16] 16  BP: (106-113)/(52-65) 106/65  SpO2:  [93 %-97 %] 96 %  Body mass index is 26 07 kg/m²  Input and Output Summary (last 24 hours): Intake/Output Summary (Last 24 hours) at 2021 0841  Last data filed at 2021 0809  Gross per 24 hour   Intake 480 ml   Output 3700 ml   Net -3220 ml       Physical Exam:     Physical Exam  Constitutional:       Appearance: She is not toxic-appearing or diaphoretic  Cardiovascular:      Rate and Rhythm: Normal rate and regular rhythm  Heart sounds: No murmur  No gallop  Pulmonary:      Effort: Pulmonary effort is normal  No respiratory distress  Breath sounds: Normal breath sounds  No wheezing, rhonchi or rales  Abdominal:      General: There is no distension  Tenderness: There is no abdominal tenderness  There is no guarding  Musculoskeletal:      Right lower leg: Edema present  Left lower leg: Edema present           Additional Data:     Labs:    Results from last 7 days   Lab Units 21  0628   WBC Thousand/uL 5 56   HEMOGLOBIN g/dL 9 3*   HEMATOCRIT % 29 5*   PLATELETS Thousands/uL 458*   NEUTROS PCT % 77*   LYMPHS PCT % 9*   MONOS PCT % 10   EOS PCT % 3     Results from last 7 days   Lab Units 21  0628   SODIUM mmol/L 135*   POTASSIUM mmol/L 4 5   CHLORIDE mmol/L 100   CO2 mmol/L 30   BUN mg/dL 41*   CREATININE mg/dL 1 61*   ANION GAP mmol/L 5   CALCIUM mg/dL 8 3   ALBUMIN g/dL 2 2*   TOTAL BILIRUBIN mg/dL 0 43   ALK PHOS U/L 242*   ALT U/L 11*   AST U/L 23   GLUCOSE RANDOM mg/dL 76     Results from last 7 days   Lab Units 02/18/21  1450   INR  1 18                       * I Have Reviewed All Lab Data Listed Above  * Additional Pertinent Lab Tests Reviewed: All Labs Within Last 24 Hours Reviewed    Imaging:    Imaging Reports Reviewed Today Include:   Imaging Personally Reviewed by Myself Includes:      Recent Cultures (last 7 days):           Last 24 Hours Medication List:   Current Facility-Administered Medications   Medication Dose Route Frequency Provider Last Rate    albuterol  2 puff Inhalation Q6H PRN Juana Brooks PA-C      aspirin  81 mg Oral Daily Jerrod Urias PA-C      bumetanide  2 mg Intravenous TID (diuretic) ARACELY Gomez      cholecalciferol  1,000 Units Oral Daily Jerrod Urias PA-C      heparin (porcine)  5,000 Units Subcutaneous Replaced by [facility] Jerrod Urias PA-C      levothyroxine  88 mcg Oral Daily Jerrod Urias PA-C      nebivolol  5 mg Oral Daily Jerrod Urias PA-C      ondansetron  4 mg Intravenous Q6H PRN Jerrod Urias PA-C      potassium chloride  20 mEq Oral Daily Jerrod Urias PA-C          Today, Patient Was Seen By: Kayce Hargrove MD    ** Please Note: Dictation voice to text software may have been used in the creation of this document   **

## 2021-02-24 NOTE — PLAN OF CARE
Problem: Potential for Falls  Goal: Patient will remain free of falls  Description: INTERVENTIONS:  - Assess patient frequently for physical needs  -  Identify cognitive and physical deficits and behaviors that affect risk of falls  -  [place] fall precautions as indicated by assessment   - Educate patient/family on patient safety including physical limitations  - Instruct patient to call for assistance with activity based on assessment  - Modify environment to reduce risk of injury  - Consider OT/PT consult to assist with strengthening/mobility  Outcome: Progressing     Problem: Nutrition/Hydration-ADULT  Goal: Nutrient/Hydration intake appropriate for improving, restoring or maintaining nutritional needs  Description: Monitor and assess patient's nutrition/hydration status for malnutrition  Collaborate with interdisciplinary team and initiate plan and interventions as ordered  Monitor patient's weight and dietary intake as ordered or per policy  Utilize nutrition screening tool and intervene as necessary  Determine patient's food preferences and provide high-protein, high-caloric foods as appropriate       INTERVENTIONS:  - Monitor oral intake, urinary output, labs, and treatment plans  - Assess nutrition and hydration status and recommend course of action  - Evaluate amount of meals eaten  - Assist patient with eating if necessary   - Allow adequate time for meals  - Recommend/ encourage appropriate diets, oral nutritional supplements, and vitamin/mineral supplements  - Order, calculate, and assess calorie counts as needed  - Recommend, monitor, and adjust tube feedings and TPN/PPN based on assessed needs  - Assess need for intravenous fluids  - Provide specific nutrition/hydration education as appropriate  - Include patient/family/caregiver in decisions related to nutrition  Outcome: Progressing     Problem: CARDIOVASCULAR - ADULT  Goal: Maintains optimal cardiac output and hemodynamic stability  Description: INTERVENTIONS:  - Monitor I/O, vital signs and rhythm  - Monitor for S/S and trends of decreased cardiac output  - Administer and titrate ordered vasoactive medications to optimize hemodynamic stability  - Assess quality of pulses, skin color and temperature  - Assess for signs of decreased coronary artery perfusion  - Instruct patient to report change in severity of symptoms  Outcome: Progressing  Goal: Absence of cardiac dysrhythmias or at baseline rhythm  Description: INTERVENTIONS:  - Continuous cardiac monitoring, vital signs, obtain 12 lead EKG if ordered  - Administer antiarrhythmic and heart rate control medications as ordered  - Monitor electrolytes and administer replacement therapy as ordered  Outcome: Progressing     Problem: RESPIRATORY - ADULT  Goal: Achieves optimal ventilation and oxygenation  Description: INTERVENTIONS:  - Assess for changes in respiratory status  - Assess for changes in mentation and behavior  - Position to facilitate oxygenation and minimize respiratory effort  - Oxygen administered by appropriate delivery if ordered  - Initiate smoking cessation education as indicated  - Encourage broncho-pulmonary hygiene including cough, deep breathe, Incentive Spirometry  - Assess the need for suctioning and aspirate as needed  - Assess and instruct to report SOB or any respiratory difficulty  - Respiratory Therapy support as indicated  Outcome: Progressing     Problem: SKIN/TISSUE INTEGRITY - ADULT  Goal: Skin integrity remains intact  Description: INTERVENTIONS  - Identify patients at risk for skin breakdown  - Assess and monitor skin integrity  - Assess and monitor nutrition and hydration status  - Monitor labs (i e  albumin)  - Assess for incontinence   - Turn and reposition patient  - Assist with mobility/ambulation  - Relieve pressure over bony prominences  - Avoid friction and shearing  - Provide appropriate hygiene as needed including keeping skin clean and dry  - Evaluate need for skin moisturizer/barrier cream  - Collaborate with interdisciplinary team (i e  Nutrition, Rehabilitation, etc )   - Patient/family teaching  Outcome: Progressing  Goal: Incision(s), wounds(s) or drain site(s) healing without S/S of infection  Description: INTERVENTIONS  - Assess and document risk factors for skin impairment   - Assess and document dressing, incision, wound bed, drain sites and surrounding tissue  - Consider nutrition services referral as needed  - Oral mucous membranes remain intact  - Provide patient/ family education  Outcome: Progressing  Goal: Oral mucous membranes remain intact  Description: INTERVENTIONS  - Assess oral mucosa and hygiene practices  - Implement preventative oral hygiene regimen  - Implement oral medicated treatments as ordered  - Initiate Nutrition services referral as needed  Outcome: Progressing     Problem: PAIN - ADULT  Goal: Verbalizes/displays adequate comfort level or baseline comfort level  Description: Interventions:  - Encourage patient to monitor pain and request assistance  - Assess pain using appropriate pain scale  - Administer analgesics based on type and severity of pain and evaluate response  - Implement non-pharmacological measures as appropriate and evaluate response  - Consider cultural and social influences on pain and pain management  - Notify physician/advanced practitioner if interventions unsuccessful or patient reports new pain  Outcome: Progressing     Problem: INFECTION - ADULT  Goal: Absence or prevention of progression during hospitalization  Description: INTERVENTIONS:  - Assess and monitor for signs and symptoms of infection  - Monitor lab/diagnostic results  - Monitor all insertion sites, i e  indwelling lines, tubes, and drains  - Monitor endotracheal if appropriate and nasal secretions for changes in amount and color  - [place] appropriate cooling/warming therapies per order  - Administer medications as ordered  - Instruct and encourage patient and family to use good hand hygiene technique  - Identify and instruct in appropriate isolation precautions for identified infection/condition  Outcome: Progressing  Goal: Absence of fever/infection during neutropenic period  Description: INTERVENTIONS:  - Monitor WBC    Outcome: Progressing     Problem: SAFETY ADULT  Goal: Patient will remain free of falls  Description: INTERVENTIONS:  - Assess patient frequently for physical needs  -  Identify cognitive and physical deficits and behaviors that affect risk of falls    -  [place] fall precautions as indicated by assessment   - Educate patient/family on patient safety including physical limitations  - Instruct patient to call for assistance with activity based on assessment  - Modify environment to reduce risk of injury  - Consider OT/PT consult to assist with strengthening/mobility  Outcome: Progressing  Goal: Maintain or return to baseline ADL function  Description: INTERVENTIONS:  -  Assess patient's ability to carry out ADLs; assess patient's baseline for ADL function and identify physical deficits which impact ability to perform ADLs (bathing, care of mouth/teeth, toileting, grooming, dressing, etc )  - Assess/evaluate cause of self-care deficits   - Assess range of motion  - Assess patient's mobility; develop plan if impaired  - Assess patient's need for assistive devices and provide as appropriate  - Encourage maximum independence but intervene and supervise when necessary  - Involve family in performance of ADLs  - Assess for home care needs following discharge   - Consider OT consult to assist with ADL evaluation and planning for discharge  - Provide patient education as appropriate  Outcome: Progressing  Goal: Maintain or return mobility status to optimal level  Description: INTERVENTIONS:  - Assess patient's baseline mobility status (ambulation, transfers, stairs, etc )    - Identify cognitive and physical deficits and behaviors that affect mobility  - Identify mobility aids required to assist with transfers and/or ambulation (gait belt, sit-to-stand, lift, walker, cane, etc )  - [place] fall precautions as indicated by assessment  - Record patient progress and toleration of activity level on Mobility SBAR; progress patient to next Phase/Stage  - Instruct patient to call for assistance with activity based on assessment  - Consider rehabilitation consult to assist with strengthening/weightbearing, etc   Outcome: Progressing     Problem: DISCHARGE PLANNING  Goal: Discharge to home or other facility with appropriate resources  Description: INTERVENTIONS:  - Identify barriers to discharge w/patient and caregiver  - Arrange for needed discharge resources and transportation as appropriate  - Identify discharge learning needs (meds, wound care, etc )  - Arrange for interpretive services to assist at discharge as needed  - Refer to Case Management Department for coordinating discharge planning if the patient needs post-hospital services based on physician/advanced practitioner order or complex needs related to functional status, cognitive ability, or social support system  Outcome: Progressing     Problem: Knowledge Deficit  Goal: Patient/family/caregiver demonstrates understanding of disease process, treatment plan, medications, and discharge instructions  Description: Complete learning assessment and assess knowledge base    Interventions:  - Provide teaching at level of understanding  - Provide teaching via preferred learning methods  Outcome: Progressing

## 2021-02-24 NOTE — ASSESSMENT & PLAN NOTE
Lab Results   Component Value Date    EGFR 32 02/23/2021    EGFR 31 02/22/2021    EGFR 29 02/21/2021    CREATININE 1 61 (H) 02/23/2021    CREATININE 1 64 (H) 02/22/2021    CREATININE 1 74 (H) 02/21/2021   · Patient appears to have a baseline creatinine of 1 2-1 6,   · BMP this a m   Pending  · Patient's renal function at baseline

## 2021-02-24 NOTE — PROGRESS NOTES
General Cardiology   Progress Note -  [facility]   Crissy Browne 66 y o  female MRN: [id number]    Unit/Bed#: S -01 Encounter: [id number]    Assessment  1  Acute on chronic diastolic CHF exacerbation   2  Moderate MR/TR, and PHTN  -Presented to the ED on 2/18 with complaints of progressively worsening SOB and increasing lower extremity edema over the last month  -On PE volume status improving, still has evidence of volume overload on PE  -Denies SOB at rest or significant dyspnea; no supplemental O2 requirement  -NT proBNP 19620  -TTE 11/2020; LVEF 50%, mild diffuse hypokinesis, grade 2 DD, RV mildly dilated, systolic function mildly reduced, moderate MR, moderate TR; estimated peak PA pressure 55 mmHg  -Previously on torsemide 10 mg b i d as an outpatient (on hold currently)  -Currently on IV Bumex 2 mg t i d  -24 hour I&O balance; -3 9 L ;overall -9 8 L  Weights (dry weight around 130 lbs)  -2/19 -- 149 lb -- standing scale  -2/20 -- 148 lb -- standing scale  -2/21 -- 141 lb -- standing scale  -2/22 -- 140 lb -- standing scale  -2/23 -- 138 lb -- standing scale   -2/24 -- 133 lb -- standing scale     3  Hypertension  -Average /57, last recorded 106/65, HR 89  -On Bystolic 5 mg daily     4  CKD stage 3  -Baseline creatinine around 1 4-1 6  -Creatinine 1 82 on admission, improving with IV diuresis currently at 1 61     5  Paroxysmal SVT  -Non-telem  -12 lead ECG on 02/18; NSR no significant arrhythmias  -On Bystolic 5 mg daily     Plan  -Continue IV Bumex 2 mg t i d for today, + K-Dur 20 mEq daily; check BMP today; may be able to transition to oral diuretics next 24 hrs    -Continue Bystolic 5 mg daily  -Need to obtain standing weights, 2 g NA +diet 1 8 L FR  -Monitor renal function and electrolytes closely -- replete to maintain K+at 4 and mag at 2  -No indication at this time for telemetry monitoring     Subjective  Review of Systems   Constitution: Negative for chills, fever and malaise/fatigue  HENT: Negative for congestion  Eyes: Negative for visual disturbance  Cardiovascular: Positive for leg swelling  Negative for chest pain, dyspnea on exertion, irregular heartbeat, near-syncope, orthopnea and palpitations  Respiratory: Negative for cough and shortness of breath  Musculoskeletal: Negative for arthritis and myalgias  Gastrointestinal: Positive for bloating  Negative for abdominal pain  Genitourinary: Negative for dysuria  Neurological: Negative for dizziness, focal weakness, headaches, light-headedness and weakness  All other systems reviewed and are negative  Objective:   Physical Exam  Vitals signs and nursing note reviewed  Constitutional:       General: She is not in acute distress  Appearance: Normal appearance  She is obese  She is not ill-appearing  HENT:      Head: Normocephalic and atraumatic  Mouth/Throat:      Mouth: Mucous membranes are moist    Eyes:      General: No scleral icterus  Neck:      Comments: +JVD  Cardiovascular:      Rate and Rhythm: Normal rate and regular rhythm  Pulses: Normal pulses  Heart sounds: Normal heart sounds  No murmur  Pulmonary:      Effort: Pulmonary effort is normal       Breath sounds: Normal breath sounds  No wheezing or rales  Abdominal:      General: There is distension  Palpations: Abdomen is soft  Musculoskeletal:      Right lower leg: Edema present  Left lower leg: Edema present  Skin:     General: Skin is warm and dry  Capillary Refill: Capillary refill takes less than 2 seconds  Neurological:      General: No focal deficit present  Mental Status: She is alert and oriented to person, place, and time  Psychiatric:         Mood and Affect: Mood normal          Vitals: Blood pressure 106/65, pulse 89, temperature 97 9 °F (36 6 °C), temperature source Oral, resp  rate 16, weight 62 6 kg (138 lb), SpO2 96 %, not currently breastfeeding ,     Body mass index is 26 07 kg/m²  , Systolic (64EUC), TAHMINA:027 , Min:106 , CPN:274     Diastolic (89VNO), FPF:36, Min:52, Max:65      Intake/Output Summary (Last 24 hours) at 2/24/2021 1055  Last data filed at 2/24/2021 0809  Gross per 24 hour   Intake 240 ml   Output 3700 ml   Net -3460 ml     Weight (last 2 days)     Date/Time   Weight    02/23/21 0908   62 6 (138)    02/23/21 0600   63 6 (140 21)    02/22/21 0600   63 7 (140 43)    02/22/21 0526   63 7 (140 4)              LABORATORY RESULTS  Results from last 7 days   Lab Units 02/18/21  1450   TROPONIN I ng/mL <0 02     CBC with diff:   Results from last 7 days   Lab Units 02/23/21  0628 02/21/21  0613 02/19/21  0454 02/18/21  1450   WBC Thousand/uL 5 56 4 67 4 00* 4 51   HEMOGLOBIN g/dL 9 3* 10 1* 9 2* 9 9*   HEMATOCRIT % 29 5* 32 3* 29 5* 32 3*   MCV fL 89 89 90 91   PLATELETS Thousands/uL 458* 468* 425* 470*   MCH pg 28 1 27 9 28 0 27 9   MCHC g/dL 31 5 31 3* 31 2* 30 7*   RDW % 16 4* 16 1* 16 4* 16 6*   MPV fL 9 7 9 6 9 4 9 3   NRBC AUTO /100 WBCs 0  --   --  0       CMP:  Results from last 7 days   Lab Units 02/23/21  0628 02/22/21  0523 02/21/21  0613 02/20/21  1104 02/19/21  0454 02/18/21  1450   POTASSIUM mmol/L 4 5 4 5 4 7 4 7 4 5 4 1   CHLORIDE mmol/L 100 100 102 105 106 103   CO2 mmol/L 30 29 29 29 27 29   BUN mg/dL 41* 42* 43* 43* 45* 49*   CREATININE mg/dL 1 61* 1 64* 1 74* 1 77* 1 81* 1 82*   CALCIUM mg/dL 8 3 8 4 8 3 8 1* 8 3 8 3   AST U/L 23  --   --   --   --  23   ALT U/L 11*  --   --   --   --  15   ALK PHOS U/L 242*  --   --   --   --  289*   EGFR ml/min/1 73sq m 32 31 29 28 27 27       BMP:  Results from last 7 days   Lab Units 02/23/21  0628 02/22/21  0523 02/21/21  0613 02/20/21  1104 02/19/21  0454 02/18/21  1450   POTASSIUM mmol/L 4 5 4 5 4 7 4 7 4 5 4 1   CHLORIDE mmol/L 100 100 102 105 106 103   CO2 mmol/L 30 29 29 29 27 29   BUN mg/dL 41* 42* 43* 43* 45* 49*   CREATININE mg/dL 1 61* 1 64* 1 74* 1 77* 1 81* 1 82*   CALCIUM mg/dL 8 3 8 4 8 3 8 1* 8 3 8 3       Lab Results   Component Value Date    NTBNP 19,620 (H) 2021    NTBNP 18,716 (H) 2020    NTBNP 3,376 (H) 2019        Results from last 7 days   Lab Units 21  0628 21  0523 21  0613 21  1104 21  1450   MAGNESIUM mg/dL 2 4 2 3 2 4 2 3 2 4                   Results from last 7 days   Lab Units 21  1450   INR  1 18       Lipid Profile:   No results found for: CHOL  Lab Results   Component Value Date    HDL 37 (L) 2020    HDL 49 2020    HDL 37 (L) 2019     Lab Results   Component Value Date    LDLCALC 94 2020    LDLCALC 57 2020    LDLCALC 61 2019     Lab Results   Component Value Date    TRIG 57 2020    TRIG 58 2020    TRIG 72 2019       Cardiac testing:   Results for orders placed during the hospital encounter of 20   Echo complete with contrast if indicated    Narrative [place] [address] [address] [place] [address]  [phone]    Transthoracic Echocardiogram  2D, M-mode, Doppler, and Color Doppler    Study date:  2020    Patient: Cheryl Nava  MR number: [id number]  Account number: [de-identified]  : 1950  Age: 65 years  Gender: Female  Status: Inpatient  Location: Bedside  Height: 61 in  Weight: 144 lb  BP: 116/ 57 mmHg    Indications: Heart Failure    Diagnoses: I50 9 - Heart failure, unspecified    Sonographer:  SHANTELL Dudley  Primary Physician:  Nohemi Brown MD  Referring Physician:  Reji Nolan MD  Group:  [facility]  Interpreting Physician:  Bill Ann MD    SUMMARY    LEFT VENTRICLE:  Size was normal   Systolic function was at the lower limits of normal  Ejection fraction was estimated to be 50 %  There was mild diffuse hypokinesis    Wall thickness was normal   Features were consistent with a pseudonormal left ventricular filling pattern, with concomitant abnormal relaxation and increased filling pressure (grade 2 diastolic dysfunction)  RIGHT VENTRICLE:  The ventricle was mildly dilated  Systolic function was mildly reduced  MITRAL VALVE:  There was moderate regurgitation  TRICUSPID VALVE:  There was moderate regurgitation  Pulmonary artery systolic pressure was moderately increased  The findings suggest moderate pulmonary hypertension  COMPARISONS:  Comparison was made with the previous study of 13-Jan-2019  Aortic regurgitation has improved  HISTORY: PRIOR HISTORY: Hyperlipidemia, Mitral regurgitation, PSVT, tricuspid regurgitation, Aortic insufficiency, CM, CKD III, Acute CHF, HTN  PROCEDURE: The procedure was performed at the bedside  This was a routine study  Parasternals are technically difficult due to left breast implant  The transthoracic approach was used  The study included complete 2D imaging, M-mode,  complete spectral Doppler, and color Doppler  The heart rate was 91 bpm, at the start of the study  Images were obtained from the parasternal, apical, subcostal, and suprasternal notch acoustic windows  This was a technically difficult  study  LEFT VENTRICLE: Size was normal  Systolic function was at the lower limits of normal  Ejection fraction was estimated to be 50 %  There was mild diffuse hypokinesis  Wall thickness was normal  DOPPLER: Features were consistent with a  pseudonormal left ventricular filling pattern, with concomitant abnormal relaxation and increased filling pressure (grade 2 diastolic dysfunction)  RIGHT VENTRICLE: The ventricle was mildly dilated  Systolic function was mildly reduced  Wall thickness was normal     LEFT ATRIUM: Size was normal     RIGHT ATRIUM: Size was normal     MITRAL VALVE: Valve structure was normal  There was normal leaflet separation  DOPPLER: The transmitral velocity was within the normal range  There was no evidence for stenosis  There was moderate regurgitation  AORTIC VALVE: The valve was trileaflet   Leaflets exhibited normal thickness and normal cuspal separation  DOPPLER: Transaortic velocity was within the normal range  There was no evidence for stenosis  There was no regurgitation  TRICUSPID VALVE: The valve structure was normal  There was normal leaflet separation  DOPPLER: The transtricuspid velocity was within the normal range  There was no evidence for stenosis  There was moderate regurgitation  Pulmonary artery  systolic pressure was moderately increased  Estimated peak PA pressure was 55 mmHg  The findings suggest moderate pulmonary hypertension  PULMONIC VALVE: Leaflets exhibited normal thickness, no calcification, and normal cuspal separation  DOPPLER: The transpulmonic velocity was within the normal range  There was trace regurgitation  PERICARDIUM: There was no pericardial effusion  The pericardium was normal in appearance  AORTA: The root exhibited normal size  SYSTEMIC VEINS: IVC: The inferior vena cava was normal in size and course  Respirophasic changes were normal     SYSTEM MEASUREMENT TABLES    2D  %FS: 21 01 %  Ao Diam: 2 57 cm  EDV(Teich): 85 55 ml  EF(Teich): 42 99 %  ESV(Teich): 48 78 ml  IVSd: 0 79 cm  LA Area: 16 44 cm2  LA Diam: 4 2 cm  LVEDV MOD A4C: 70 91 ml  LVEF MOD A4C: 49 92 %  LVESV MOD A4C: 35 51 ml  LVIDd: 4 35 cm  LVIDs: 3 44 cm  LVLd A4C: 7 01 cm  LVLs A4C: 6 04 cm  LVPWd: 0 79 cm  RA Area: 15 8 cm2  RVIDd: 3 44 cm  SV MOD A4C: 35 4 ml  SV(Teich): 36 78 ml    CF  MR Als  Parth: 0 36 m/s  MR Flow: 22 07 ml/s  MR Rad: 0 31 cm    CW  MR VTI: 143 7 cm  MR Vmax: 4 28 m/s  MR Vmean: 3 58 m/s  MR maxP 3 mmHg  MR meanP 38 mmHg  TR MaxP 52 mmHg  TR Vmax: 3 3 m/s    MM  TAPSE: 1 56 cm    PW  E' Sept: 0 08 m/s  E/E' Sept: 9 84  MV A Parth: 0 3 m/s  MV Dec [place]: 7 93 m/s2  MV DecT: 95 22 ms  MV E Parth: 0 75 m/s  MV E/A Ratio: 2 55  MV PHT: 27 61 ms  MVA By PHT: 7 97 cm2    [employer] Accredited Echocardiography [facility]    Prepared and electronically signed by    Selvin Rosas MD  Signed 09-Nov-2020 12:26:58       No results found for this or any previous visit  No results found for this or any previous visit  No procedure found  No results found for this or any previous visit  Meds/Allergies   all current active meds have been reviewed, current meds:   Current Facility-Administered Medications   Medication Dose Route Frequency    albuterol (PROVENTIL HFA,VENTOLIN HFA) inhaler 2 puff  2 puff Inhalation Q6H PRN    aspirin (ECOTRIN LOW STRENGTH) EC tablet 81 mg  81 mg Oral Daily    bumetanide (BUMEX) injection 2 mg  2 mg Intravenous TID (diuretic)    cholecalciferol (VITAMIN D3) tablet 1,000 Units  1,000 Units Oral Daily    heparin (porcine) subcutaneous injection 5,000 Units  5,000 Units Subcutaneous Q8H [facility]    levothyroxine tablet 88 mcg  88 mcg Oral Daily    nebivolol (BYSTOLIC) tablet 5 mg  5 mg Oral Daily    ondansetron (ZOFRAN) injection 4 mg  4 mg Intravenous Q6H PRN    potassium chloride (K-DUR,KLOR-CON) CR tablet 20 mEq  20 mEq Oral Daily    and PTA meds:   Prior to Admission Medications   Prescriptions Last Dose Informant Patient Reported? Taking? Fexofenadine HCl (ALLEGRA ALLERGY PO)  Self Yes Yes   Sig: Take by mouth   albuterol (PROVENTIL HFA,VENTOLIN HFA) 90 mcg/act inhaler  Self No Yes   Sig: Inhale 2 puffs every 6 (six) hours as needed for wheezing or shortness of breath   aspirin (ECOTRIN LOW STRENGTH) 81 mg EC tablet  Self Yes Yes   Sig: Take 81 mg by mouth daily   cholecalciferol (VITAMIN D3) 1,000 units tablet  Self Yes Yes   Sig: Take 1,000 Units by mouth daily     clobetasol (TEMOVATE) 0 05 % cream  Self No Yes   Sig: Apply topically twice daily to legs for 14 days only, DO NOT apply to face or groin     levothyroxine 88 mcg tablet  Self No Yes   Sig: Take 1 tablet (88 mcg total) by mouth daily   nebivolol (Bystolic) 5 mg tablet  Self No Yes   Sig: Take 1 tablet (5 mg total) by mouth daily   potassium chloride (K-DUR,KLOR-CON) 20 mEq tablet   No Yes   Sig: Take 1 tablet (20 mEq total) by mouth daily   torsemide (DEMADEX) 10 mg tablet   No Yes   Sig: Take 1 tablet (10 mg total) by mouth 2 (two) times a day      Facility-Administered Medications: None        Telemetry Review: No significant arrhythmias seen on telemetry review  EKG personally reviewed by ARACELY George    Assessment:  Principal Problem:    Acute on chronic diastolic (congestive) heart failure (HCC)  Active Problems:    Paroxysmal supraventricular tachycardia (HCC)    History of pulmonary embolus (PE)    Stage 3 chronic kidney disease    Counseling / Coordination of Care  Total floor / unit time spent today 20 minutes  Greater than 50% of total time was spent with the patient and / or family counseling and / or coordination of care  ** Please Note: Dragon 360 Dictation voice to text software may have been used in the creation of this document   **

## 2021-02-24 NOTE — ASSESSMENT & PLAN NOTE
· POA, as per patient weight is elevated up to 150 despite increasing diuretics  Reports MARI, orthopnea, and leg swelling  Some dietary indiscretion with soup broth  No fluid indiscretions  · Diuresing better with IV Bumex  · 5 lb weight loss overnight per Nursing  Not yet charted  · Still with some dependent edema  · Patient very happy with urine output  · Will discuss with Cardiology question oral diuretics and observation for 24 hours prior to discharge?

## 2021-02-25 NOTE — ASSESSMENT & PLAN NOTE
Lab Results   Component Value Date    EGFR 27 02/25/2021    EGFR 32 02/23/2021    EGFR 31 02/22/2021    CREATININE 1 84 (H) 02/25/2021    CREATININE 1 61 (H) 02/23/2021    CREATININE 1 64 (H) 02/22/2021   · Patient appears to have a baseline creatinine of 1 2-1 6,   · Follow-up BMP in [facility]

## 2021-02-25 NOTE — PROGRESS NOTES
Pt lying in bed, appears comfortable, easy to arouse, denies any needs at this time, agree with prior nurse assessment, call bell within reach, will continue to monitor

## 2021-02-25 NOTE — PLAN OF CARE
Problem: Potential for Falls  Goal: Patient will remain free of falls  Description: INTERVENTIONS:  - Assess patient frequently for physical needs  -  Identify cognitive and physical deficits and behaviors that affect risk of falls  -  [place] fall precautions as indicated by assessment   - Educate patient/family on patient safety including physical limitations  - Instruct patient to call for assistance with activity based on assessment  - Modify environment to reduce risk of injury  - Consider OT/PT consult to assist with strengthening/mobility  Outcome: Progressing     Problem: Nutrition/Hydration-ADULT  Goal: Nutrient/Hydration intake appropriate for improving, restoring or maintaining nutritional needs  Description: Monitor and assess patient's nutrition/hydration status for malnutrition  Collaborate with interdisciplinary team and initiate plan and interventions as ordered  Monitor patient's weight and dietary intake as ordered or per policy  Utilize nutrition screening tool and intervene as necessary  Determine patient's food preferences and provide high-protein, high-caloric foods as appropriate       INTERVENTIONS:  - Monitor oral intake, urinary output, labs, and treatment plans  - Assess nutrition and hydration status and recommend course of action  - Evaluate amount of meals eaten  - Assist patient with eating if necessary   - Allow adequate time for meals  - Recommend/ encourage appropriate diets, oral nutritional supplements, and vitamin/mineral supplements  - Order, calculate, and assess calorie counts as needed  - Recommend, monitor, and adjust tube feedings and TPN/PPN based on assessed needs  - Assess need for intravenous fluids  - Provide specific nutrition/hydration education as appropriate  - Include patient/family/caregiver in decisions related to nutrition  Outcome: Progressing     Problem: CARDIOVASCULAR - ADULT  Goal: Maintains optimal cardiac output and hemodynamic stability  Description: INTERVENTIONS:  - Monitor I/O, vital signs and rhythm  - Monitor for S/S and trends of decreased cardiac output  - Administer and titrate ordered vasoactive medications to optimize hemodynamic stability  - Assess quality of pulses, skin color and temperature  - Assess for signs of decreased coronary artery perfusion  - Instruct patient to report change in severity of symptoms  Outcome: Progressing  Goal: Absence of cardiac dysrhythmias or at baseline rhythm  Description: INTERVENTIONS:  - Continuous cardiac monitoring, vital signs, obtain 12 lead EKG if ordered  - Administer antiarrhythmic and heart rate control medications as ordered  - Monitor electrolytes and administer replacement therapy as ordered  Outcome: Progressing     Problem: RESPIRATORY - ADULT  Goal: Achieves optimal ventilation and oxygenation  Description: INTERVENTIONS:  - Assess for changes in respiratory status  - Assess for changes in mentation and behavior  - Position to facilitate oxygenation and minimize respiratory effort  - Oxygen administered by appropriate delivery if ordered  - Initiate smoking cessation education as indicated  - Encourage broncho-pulmonary hygiene including cough, deep breathe, Incentive Spirometry  - Assess the need for suctioning and aspirate as needed  - Assess and instruct to report SOB or any respiratory difficulty  - Respiratory Therapy support as indicated  Outcome: Progressing     Problem: SKIN/TISSUE INTEGRITY - ADULT  Goal: Skin integrity remains intact  Description: INTERVENTIONS  - Identify patients at risk for skin breakdown  - Assess and monitor skin integrity  - Assess and monitor nutrition and hydration status  - Monitor labs (i e  albumin)  - Assess for incontinence   - Turn and reposition patient  - Assist with mobility/ambulation  - Relieve pressure over bony prominences  - Avoid friction and shearing  - Provide appropriate hygiene as needed including keeping skin clean and dry  - Evaluate need for skin moisturizer/barrier cream  - Collaborate with interdisciplinary team (i e  Nutrition, Rehabilitation, etc )   - Patient/family teaching  Outcome: Progressing  Goal: Incision(s), wounds(s) or drain site(s) healing without S/S of infection  Description: INTERVENTIONS  - Assess and document risk factors for skin impairment   - Assess and document dressing, incision, wound bed, drain sites and surrounding tissue  - Consider nutrition services referral as needed  - Oral mucous membranes remain intact  - Provide patient/ family education  Outcome: Progressing  Goal: Oral mucous membranes remain intact  Description: INTERVENTIONS  - Assess oral mucosa and hygiene practices  - Implement preventative oral hygiene regimen  - Implement oral medicated treatments as ordered  - Initiate Nutrition services referral as needed  Outcome: Progressing     Problem: PAIN - ADULT  Goal: Verbalizes/displays adequate comfort level or baseline comfort level  Description: Interventions:  - Encourage patient to monitor pain and request assistance  - Assess pain using appropriate pain scale  - Administer analgesics based on type and severity of pain and evaluate response  - Implement non-pharmacological measures as appropriate and evaluate response  - Consider cultural and social influences on pain and pain management  - Notify physician/advanced practitioner if interventions unsuccessful or patient reports new pain  Outcome: Progressing     Problem: INFECTION - ADULT  Goal: Absence or prevention of progression during hospitalization  Description: INTERVENTIONS:  - Assess and monitor for signs and symptoms of infection  - Monitor lab/diagnostic results  - Monitor all insertion sites, i e  indwelling lines, tubes, and drains  - Monitor endotracheal if appropriate and nasal secretions for changes in amount and color  - [place] appropriate cooling/warming therapies per order  - Administer medications as ordered  - Instruct and encourage patient and family to use good hand hygiene technique  - Identify and instruct in appropriate isolation precautions for identified infection/condition  Outcome: Progressing  Goal: Absence of fever/infection during neutropenic period  Description: INTERVENTIONS:  - Monitor WBC    Outcome: Progressing     Problem: SAFETY ADULT  Goal: Patient will remain free of falls  Description: INTERVENTIONS:  - Assess patient frequently for physical needs  -  Identify cognitive and physical deficits and behaviors that affect risk of falls    -  [place] fall precautions as indicated by assessment   - Educate patient/family on patient safety including physical limitations  - Instruct patient to call for assistance with activity based on assessment  - Modify environment to reduce risk of injury  - Consider OT/PT consult to assist with strengthening/mobility  Outcome: Progressing  Goal: Maintain or return to baseline ADL function  Description: INTERVENTIONS:  -  Assess patient's ability to carry out ADLs; assess patient's baseline for ADL function and identify physical deficits which impact ability to perform ADLs (bathing, care of mouth/teeth, toileting, grooming, dressing, etc )  - Assess/evaluate cause of self-care deficits   - Assess range of motion  - Assess patient's mobility; develop plan if impaired  - Assess patient's need for assistive devices and provide as appropriate  - Encourage maximum independence but intervene and supervise when necessary  - Involve family in performance of ADLs  - Assess for home care needs following discharge   - Consider OT consult to assist with ADL evaluation and planning for discharge  - Provide patient education as appropriate  Outcome: Progressing  Goal: Maintain or return mobility status to optimal level  Description: INTERVENTIONS:  - Assess patient's baseline mobility status (ambulation, transfers, stairs, etc )    - Identify cognitive and physical deficits and behaviors that affect mobility  - Identify mobility aids required to assist with transfers and/or ambulation (gait belt, sit-to-stand, lift, walker, cane, etc )  - [place] fall precautions as indicated by assessment  - Record patient progress and toleration of activity level on Mobility SBAR; progress patient to next Phase/Stage  - Instruct patient to call for assistance with activity based on assessment  - Consider rehabilitation consult to assist with strengthening/weightbearing, etc   Outcome: Progressing     Problem: DISCHARGE PLANNING  Goal: Discharge to home or other facility with appropriate resources  Description: INTERVENTIONS:  - Identify barriers to discharge w/patient and caregiver  - Arrange for needed discharge resources and transportation as appropriate  - Identify discharge learning needs (meds, wound care, etc )  - Arrange for interpretive services to assist at discharge as needed  - Refer to Case Management Department for coordinating discharge planning if the patient needs post-hospital services based on physician/advanced practitioner order or complex needs related to functional status, cognitive ability, or social support system  Outcome: Progressing     Problem: Knowledge Deficit  Goal: Patient/family/caregiver demonstrates understanding of disease process, treatment plan, medications, and discharge instructions  Description: Complete learning assessment and assess knowledge base    Interventions:  - Provide teaching at level of understanding  - Provide teaching via preferred learning methods  Outcome: Progressing

## 2021-02-25 NOTE — PROGRESS NOTES
Progress Note - Sarah Dill 1950, 66 y o  female MRN: [id number]    Unit/Bed#: S -01 Encounter: [id number]    Primary Care Provider: Na Carmen MD   Date and time admitted to hospital: 2/18/2021  1:57 PM        * Acute on chronic diastolic (congestive) heart failure ([address] )  Assessment & Plan  · POA, as per patient weight is elevated up to 150 despite increasing diuretics  Reports MARI, orthopnea, and leg swelling  Some dietary indiscretion with soup broth  No fluid indiscretions  · We no done 131 lb  · Case discussed with Cardiology plan to start oral diuretics today  · Repeat renal function in a m   · Hopeful discharge tomorrow      Stage 3 chronic kidney disease  Assessment & Plan  Lab Results   Component Value Date    EGFR 27 02/25/2021    EGFR 32 02/23/2021    EGFR 31 02/22/2021    CREATININE 1 84 (H) 02/25/2021    CREATININE 1 61 (H) 02/23/2021    CREATININE 1 64 (H) 02/22/2021   · Patient appears to have a baseline creatinine of 1 2-1 6,   · Follow-up BMP in a m  Paroxysmal supraventricular tachycardia (HCC)  Assessment & Plan  · Noted history  · Continue Bystolic       VTE Pharmacologic Prophylaxis:   Pharmacologic: Heparin  Mechanical VTE Prophylaxis in Place: No    Patient Centered Rounds: I have performed bedside rounds with nursing staff today  Discussions with Specialists or Other Care Team Provider:  Cardiology    Education and Discussions with Family / Patient:  Offered to place call to patient's  patient refused    Time Spent for Care: 30 minutes  More than 50% of total time spent on counseling and coordination of care as described above  Current Length of Stay: 7 day(s)    Current Patient Status: Inpatient   Certification Statement: The patient will continue to require additional inpatient hospital stay due to Continue diuresis    Discharge Plan:  Hopeful discharge in a m      Code Status: Level 1 - Full Code      Subjective:   Patient feeling better no short [place] of breath no chest pain    Objective:     Vitals:   Temp (24hrs), Av 3 °F (36 8 °C), Min:98 °F (36 7 °C), Max:98 7 °F (37 1 °C)    Temp:  [98 °F (36 7 °C)-98 7 °F (37 1 °C)] 98 1 °F (36 7 °C)  HR:  [86-89] 89  Resp:  [18] 18  BP: ()/(50-55) 94/54  SpO2:  [94 %-97 %] 94 %  Body mass index is 24 9 kg/m²  Input and Output Summary (last 24 hours): Intake/Output Summary (Last 24 hours) at 2021 1149  Last data filed at 2021 0900  Gross per 24 hour   Intake 720 ml   Output 1600 ml   Net -880 ml       Physical Exam:     Physical Exam  HENT:      Head: Normocephalic and atraumatic  Cardiovascular:      Rate and Rhythm: Normal rate  Heart sounds: No murmur  No gallop  Pulmonary:      Effort: Pulmonary effort is normal  No respiratory distress  Breath sounds: No wheezing, rhonchi or rales  Abdominal:      General: There is no distension  Tenderness: There is no abdominal tenderness  There is no guarding  Musculoskeletal:      Right lower leg: Edema present  Left lower leg: Edema present  Additional Data:     Labs:    Results from last 7 days   Lab Units 21  0628   WBC Thousand/uL 5 56   HEMOGLOBIN g/dL 9 3*   HEMATOCRIT % 29 5*   PLATELETS Thousands/uL 458*   NEUTROS PCT % 77*   LYMPHS PCT % 9*   MONOS PCT % 10   EOS PCT % 3     Results from last 7 days   Lab Units 21  0501 21  0628   SODIUM mmol/L 134* 135*   POTASSIUM mmol/L 4 3 4 5   CHLORIDE mmol/L 96* 100   CO2 mmol/L 33* 30   BUN mg/dL 40* 41*   CREATININE mg/dL 1 84* 1 61*   ANION GAP mmol/L 5 5   CALCIUM mg/dL 8 4 8 3   ALBUMIN g/dL  --  2 2*   TOTAL BILIRUBIN mg/dL  --  0 43   ALK PHOS U/L  --  242*   ALT U/L  --  11*   AST U/L  --  23   GLUCOSE RANDOM mg/dL 80 76     Results from last 7 days   Lab Units 21  1450   INR  1 18                       * I Have Reviewed All Lab Data Listed Above  * Additional Pertinent Lab Tests Reviewed:  All Labs Within Last 24 Hours Reviewed    Imaging:    Imaging Reports Reviewed Today Include:   Imaging Personally Reviewed by Myself Includes:      Recent Cultures (last 7 days):           Last 24 Hours Medication List:   Current Facility-Administered Medications   Medication Dose Route Frequency Provider Last Rate    albuterol  2 puff Inhalation Q6H PRN Jerrod Andrews PA-C      aluminum-magnesium hydroxide-simethicone  30 mL Oral Q4H PRN Romeo Jimenez MD      aspirin  81 mg Oral Daily Jerrod Andrews PA-C      cholecalciferol  1,000 Units Oral Daily Jerrod Andrews PA-C      heparin (porcine)  5,000 Units Subcutaneous Q8H [address] Jerrod Andrews PA-C      levothyroxine  88 mcg Oral Daily Jerrod Andrews PA-C      [START ON 2/26/2021] nebivolol  2 5 mg Oral Daily ARACELY Epps      ondansetron  4 mg Intravenous Q6H PRN Maynor Mahoney PA-C      [START ON 2/26/2021] potassium chloride  10 mEq Oral BID ARACELY Epps      torsemide  20 mg Oral BID (diuretic) ARACELY Padilla          Today, Patient Was Seen By: Romeo Jimenez MD    ** Please Note: Dictation voice to text software may have been used in the creation of this document   **

## 2021-02-25 NOTE — ASSESSMENT & PLAN NOTE
· POA, as per patient weight is elevated up to 150 despite increasing diuretics  Reports MARI, orthopnea, and leg swelling  Some dietary indiscretion with soup broth  No fluid indiscretions     · We no done 131 lb  · Case discussed with Cardiology plan to start oral diuretics today  · Repeat renal function in a m   · Hopeful discharge tomorrow

## 2021-02-25 NOTE — PLAN OF CARE
Problem: Potential for Falls  Goal: Patient will remain free of falls  Description: INTERVENTIONS:  - Assess patient frequently for physical needs  -  Identify cognitive and physical deficits and behaviors that affect risk of falls  -  [place] fall precautions as indicated by assessment   - Educate patient/family on patient safety including physical limitations  - Instruct patient to call for assistance with activity based on assessment  - Modify environment to reduce risk of injury  - Consider OT/PT consult to assist with strengthening/mobility  Outcome: Progressing     Problem: Nutrition/Hydration-ADULT  Goal: Nutrient/Hydration intake appropriate for improving, restoring or maintaining nutritional needs  Description: Monitor and assess patient's nutrition/hydration status for malnutrition  Collaborate with interdisciplinary team and initiate plan and interventions as ordered  Monitor patient's weight and dietary intake as ordered or per policy  Utilize nutrition screening tool and intervene as necessary  Determine patient's food preferences and provide high-protein, high-caloric foods as appropriate       INTERVENTIONS:  - Monitor oral intake, urinary output, labs, and treatment plans  - Assess nutrition and hydration status and recommend course of action  - Evaluate amount of meals eaten  - Assist patient with eating if necessary   - Allow adequate time for meals  - Recommend/ encourage appropriate diets, oral nutritional supplements, and vitamin/mineral supplements  - Order, calculate, and assess calorie counts as needed  - Recommend, monitor, and adjust tube feedings and TPN/PPN based on assessed needs  - Assess need for intravenous fluids  - Provide specific nutrition/hydration education as appropriate  - Include patient/family/caregiver in decisions related to nutrition  Outcome: Progressing     Problem: CARDIOVASCULAR - ADULT  Goal: Maintains optimal cardiac output and hemodynamic stability  Description: INTERVENTIONS:  - Monitor I/O, vital signs and rhythm  - Monitor for S/S and trends of decreased cardiac output  - Administer and titrate ordered vasoactive medications to optimize hemodynamic stability  - Assess quality of pulses, skin color and temperature  - Assess for signs of decreased coronary artery perfusion  - Instruct patient to report change in severity of symptoms  Outcome: Progressing  Goal: Absence of cardiac dysrhythmias or at baseline rhythm  Description: INTERVENTIONS:  - Continuous cardiac monitoring, vital signs, obtain 12 lead EKG if ordered  - Administer antiarrhythmic and heart rate control medications as ordered  - Monitor electrolytes and administer replacement therapy as ordered  Outcome: Progressing     Problem: RESPIRATORY - ADULT  Goal: Achieves optimal ventilation and oxygenation  Description: INTERVENTIONS:  - Assess for changes in respiratory status  - Assess for changes in mentation and behavior  - Position to facilitate oxygenation and minimize respiratory effort  - Oxygen administered by appropriate delivery if ordered  - Initiate smoking cessation education as indicated  - Encourage broncho-pulmonary hygiene including cough, deep breathe, Incentive Spirometry  - Assess the need for suctioning and aspirate as needed  - Assess and instruct to report SOB or any respiratory difficulty  - Respiratory Therapy support as indicated  Outcome: Progressing     Problem: SKIN/TISSUE INTEGRITY - ADULT  Goal: Skin integrity remains intact  Description: INTERVENTIONS  - Identify patients at risk for skin breakdown  - Assess and monitor skin integrity  - Assess and monitor nutrition and hydration status  - Monitor labs (i e  albumin)  - Assess for incontinence   - Turn and reposition patient  - Assist with mobility/ambulation  - Relieve pressure over bony prominences  - Avoid friction and shearing  - Provide appropriate hygiene as needed including keeping skin clean and dry  - Evaluate need for skin moisturizer/barrier cream  - Collaborate with interdisciplinary team (i e  Nutrition, Rehabilitation, etc )   - Patient/family teaching  Outcome: Progressing  Goal: Incision(s), wounds(s) or drain site(s) healing without S/S of infection  Description: INTERVENTIONS  - Assess and document risk factors for skin impairment   - Assess and document dressing, incision, wound bed, drain sites and surrounding tissue  - Consider nutrition services referral as needed  - Oral mucous membranes remain intact  - Provide patient/ family education  Outcome: Progressing  Goal: Oral mucous membranes remain intact  Description: INTERVENTIONS  - Assess oral mucosa and hygiene practices  - Implement preventative oral hygiene regimen  - Implement oral medicated treatments as ordered  - Initiate Nutrition services referral as needed  Outcome: Progressing     Problem: INFECTION - ADULT  Goal: Absence or prevention of progression during hospitalization  Description: INTERVENTIONS:  - Assess and monitor for signs and symptoms of infection  - Monitor lab/diagnostic results  - Monitor all insertion sites, i e  indwelling lines, tubes, and drains  - Monitor endotracheal if appropriate and nasal secretions for changes in amount and color  - [place] appropriate cooling/warming therapies per order  - Administer medications as ordered  - Instruct and encourage patient and family to use good hand hygiene technique  - Identify and instruct in appropriate isolation precautions for identified infection/condition  Outcome: Progressing  Goal: Absence of fever/infection during neutropenic period  Description: INTERVENTIONS:  - Monitor WBC    Outcome: Progressing     Problem: SAFETY ADULT  Goal: Patient will remain free of falls  Description: INTERVENTIONS:  - Assess patient frequently for physical needs  -  Identify cognitive and physical deficits and behaviors that affect risk of falls    -  [place] fall precautions as indicated by assessment   - Educate patient/family on patient safety including physical limitations  - Instruct patient to call for assistance with activity based on assessment  - Modify environment to reduce risk of injury  - Consider OT/PT consult to assist with strengthening/mobility  Outcome: Progressing  Goal: Maintain or return to baseline ADL function  Description: INTERVENTIONS:  -  Assess patient's ability to carry out ADLs; assess patient's baseline for ADL function and identify physical deficits which impact ability to perform ADLs (bathing, care of mouth/teeth, toileting, grooming, dressing, etc )  - Assess/evaluate cause of self-care deficits   - Assess range of motion  - Assess patient's mobility; develop plan if impaired  - Assess patient's need for assistive devices and provide as appropriate  - Encourage maximum independence but intervene and supervise when necessary  - Involve family in performance of ADLs  - Assess for home care needs following discharge   - Consider OT consult to assist with ADL evaluation and planning for discharge  - Provide patient education as appropriate  Outcome: Progressing  Goal: Maintain or return mobility status to optimal level  Description: INTERVENTIONS:  - Assess patient's baseline mobility status (ambulation, transfers, stairs, etc )    - Identify cognitive and physical deficits and behaviors that affect mobility  - Identify mobility aids required to assist with transfers and/or ambulation (gait belt, sit-to-stand, lift, walker, cane, etc )  - [place] fall precautions as indicated by assessment  - Record patient progress and toleration of activity level on Mobility SBAR; progress patient to next Phase/Stage  - Instruct patient to call for assistance with activity based on assessment  - Consider rehabilitation consult to assist with strengthening/weightbearing, etc   Outcome: Progressing     Problem: DISCHARGE PLANNING  Goal: Discharge to home or other facility with appropriate resources  Description: INTERVENTIONS:  - Identify barriers to discharge w/patient and caregiver  - Arrange for needed discharge resources and transportation as appropriate  - Identify discharge learning needs (meds, wound care, etc )  - Arrange for interpretive services to assist at discharge as needed  - Refer to Case Management Department for coordinating discharge planning if the patient needs post-hospital services based on physician/advanced practitioner order or complex needs related to functional status, cognitive ability, or social support system  Outcome: Progressing     Problem: Knowledge Deficit  Goal: Patient/family/caregiver demonstrates understanding of disease process, treatment plan, medications, and discharge instructions  Description: Complete learning assessment and assess knowledge base    Interventions:  - Provide teaching at level of understanding  - Provide teaching via preferred learning methods  Outcome: Progressing

## 2021-02-25 NOTE — DISCHARGE INSTR - DIET
Weekly meal delivery service for therapeutic diets (Low Sodium): [employer]: [phone] or [employer]

## 2021-02-25 NOTE — PROGRESS NOTES
General Cardiology   Progress Note -  Team One   Gabriella Levin 66 y o  female MRN: [id number]    Unit/Bed#: S -01 Encounter: [id number]    Assessment  1  Acute on chronic diastolic CHF exacerbation   2  Moderate MR/TR, and PHTN  -Presented to the ED on 2/18 with complaints of progressively worsening SOB and increasing lower extremity edema over the last month  -On PE  volume status significantly improved; close to euvolemia; has some residual mild nonpitting bilateral pedal edema  -Denies SOB at rest or significant dyspnea; no supplemental O2 requirement  -NT proBNP 19620  -TTE 11/2020; LVEF 50%, mild diffuse hypokinesis, grade 2 DD, RV mildly dilated, systolic function mildly reduced, moderate MR, moderate TR; estimated peak PA pressure 55 mmHg  -Previously on torsemide 10 mg b i d as an outpatient (on hold currently)  -Currently on IV Bumex 2 mg t i d (only received x 2 doses yesterday d/t low BP)  -24 hour I&O balance; -1 9 L ;overall -12 L  Weights (dry weight around 130 lbs)  -2/19 -- 149 lb -- standing scale  -2/20 -- 148 lb -- standing scale  -2/21 -- 141 lb -- standing scale  -2/22 -- 140 lb -- standing scale  -2/23 -- 138 lb -- standing scale   -2/24 -- 133 lb -- standing scale  -2/25 -- RN to obtain SS weight today     3  Hypertension  -Average BP  97/53, last recorded 94/54, HR 89  -On Bystolic 5 mg daily     4  CKD stage 3  -Baseline creatinine around 1 4-1 6  -Creatinine 1 82 on admission; improved with IV diuresis, did bump slightly today to 1 84     5   Paroxysmal SVT  -Non-telem  -12 lead ECG on 2/18; NSR no significant arrhythmias  -On Bystolic 5 mg daily     Plan  -DC IV Bumex, start torsemide 20 mg b i d; 1st dose this afternoon add K-Dur 10 mEq b i d to regimen (hold for SBP <100); likely stable for DC next 24 hours  -Continue Bystolic, will decrease to 2 5 mg daily (hold for SBP <110)  -Need to obtain standing weights, 2 g NA +diet 1 8 L FR  -Monitor renal function and electrolytes closely -- replete to maintain K+at 4 and mag at 2  -No indication at this time for telemetry monitoring     Subjective  Review of Systems   Constitution: Positive for malaise/fatigue  Negative for chills and fever  HENT: Negative for congestion  Eyes: Negative for visual disturbance  Respiratory: Negative for cough and shortness of breath  Gastrointestinal: Positive for nausea  Negative for abdominal pain, constipation, diarrhea and vomiting  Genitourinary: Negative for dysuria  Neurological: Negative for dizziness, focal weakness, headaches, light-headedness and weakness  All other systems reviewed and are negative  Objective:   Physical Exam  Vitals signs and nursing note reviewed  Constitutional:       General: She is not in acute distress  Appearance: Normal appearance  She is not ill-appearing  HENT:      Head: Normocephalic and atraumatic  Mouth/Throat:      Mouth: Mucous membranes are moist    Eyes:      General: No scleral icterus  Neck:      Musculoskeletal: Normal range of motion and neck supple  Cardiovascular:      Rate and Rhythm: Normal rate and regular rhythm  Pulses: Normal pulses  Heart sounds: Normal heart sounds  No murmur  Pulmonary:      Effort: Pulmonary effort is normal       Breath sounds: Normal breath sounds  No wheezing or rales  Abdominal:      Palpations: Abdomen is soft  Musculoskeletal:      Right lower leg: Edema present  Left lower leg: Edema present  Comments: Mild nonpitting pedal edema   Skin:     General: Skin is warm  Capillary Refill: Capillary refill takes less than 2 seconds  Neurological:      General: No focal deficit present  Mental Status: She is alert and oriented to person, place, and time  Psychiatric:         Mood and Affect: Mood normal          Vitals: Blood pressure 94/54, pulse 89, temperature 98 1 °F (36 7 °C), temperature source Oral, resp   rate 18, weight 60 3 kg (133 lb), SpO2 94 %, not currently breastfeeding ,     Body mass index is 25 13 kg/m²  ,   Systolic (48DUV), CZP:26 , Min:92 , RLD:629     Diastolic (16OQM), OUI:53, Min:50, Max:55      Intake/Output Summary (Last 24 hours) at 2/25/2021 0948  Last data filed at 2/25/2021 0819  Gross per 24 hour   Intake 480 ml   Output 1600 ml   Net -1120 ml     Weight (last 2 days)     Date/Time   Weight    02/24/21 0730   60 3 (133)    02/23/21 0908   62 6 (138)    02/23/21 0600   63 6 (140 21)              LABORATORY RESULTS  Results from last 7 days   Lab Units 02/18/21  1450   TROPONIN I ng/mL <0 02     CBC with diff:   Results from last 7 days   Lab Units 02/23/21  0628 02/21/21  0613 02/19/21  0454 02/18/21  1450   WBC Thousand/uL 5 56 4 67 4 00* 4 51   HEMOGLOBIN g/dL 9 3* 10 1* 9 2* 9 9*   HEMATOCRIT % 29 5* 32 3* 29 5* 32 3*   MCV fL 89 89 90 91   PLATELETS Thousands/uL 458* 468* 425* 470*   MCH pg 28 1 27 9 28 0 27 9   MCHC g/dL 31 5 31 3* 31 2* 30 7*   RDW % 16 4* 16 1* 16 4* 16 6*   MPV fL 9 7 9 6 9 4 9 3   NRBC AUTO /100 WBCs 0  --   --  0       CMP:  Results from last 7 days   Lab Units 02/25/21  0501 02/23/21  0628 02/22/21  0523 02/21/21  0613 02/20/21  1104 02/19/21  0454 02/18/21  1450   POTASSIUM mmol/L 4 3 4 5 4 5 4 7 4 7 4 5 4 1   CHLORIDE mmol/L 96* 100 100 102 105 106 103   CO2 mmol/L 33* 30 29 29 29 27 29   BUN mg/dL 40* 41* 42* 43* 43* 45* 49*   CREATININE mg/dL 1 84* 1 61* 1 64* 1 74* 1 77* 1 81* 1 82*   CALCIUM mg/dL 8 4 8 3 8 4 8 3 8 1* 8 3 8 3   AST U/L  --  23  --   --   --   --  23   ALT U/L  --  11*  --   --   --   --  15   ALK PHOS U/L  --  242*  --   --   --   --  289*   EGFR ml/min/1 73sq m 27 32 31 29 28 27 27       BMP:  Results from last 7 days   Lab Units 02/25/21  0501 02/23/21  0628 02/22/21  0523 02/21/21  0613 02/20/21  1104 02/19/21  0454 02/18/21  1450   POTASSIUM mmol/L 4 3 4 5 4 5 4 7 4 7 4 5 4 1   CHLORIDE mmol/L 96* 100 100 102 105 106 103   CO2 mmol/L 33* 30 29 29 29 27 29   BUN mg/dL 40* 41* 42* 43* 43* 45* 49*   CREATININE mg/dL 1 84* 1 61* 1 64* 1 74* 1 77* 1 81* 1 82*   CALCIUM mg/dL 8 4 8 3 8 4 8 3 8 1* 8 3 8 3       Lab Results   Component Value Date    NTBNP 19,620 (H) 2021    NTBNP 18,716 (H) 2020    NTBNP 3,376 (H) 2019        Results from last 7 days   Lab Units 21  0628 21  0523 21  0613 21  1104 21  1450   MAGNESIUM mg/dL 2 4 2 3 2 4 2 3 2 4                   Results from last 7 days   Lab Units 21  1450   INR  1 18       Lipid Profile:   No results found for: CHOL  Lab Results   Component Value Date    HDL 37 (L) 2020    HDL 49 2020    HDL 37 (L) 2019     Lab Results   Component Value Date    LDLCALC 94 2020    LDLCALC 57 2020    LDLCALC 61 2019     Lab Results   Component Value Date    TRIG 57 2020    TRIG 58 2020    TRIG 72 2019       Cardiac testing:   Results for orders placed during the hospital encounter of 20   Echo complete with contrast if indicated    Narrative [place] [address] [address] [place] [address]  [phone]    Transthoracic Echocardiogram  2D, M-mode, Doppler, and Color Doppler    Study date:  2020    Patient: Shivam Kilpatrick  MR number: [id number]  Account number: [de-identified]  : 1950  Age: 65 years  Gender: Female  Status: Inpatient  Location: Bedside  Height: 61 in  Weight: 144 lb  BP: 116/ 57 mmHg    Indications: Heart Failure    Diagnoses: I50 9 - Heart failure, unspecified    Sonographer:  SHANTELL Bolanos  Primary Physician:  Caroline Irving MD  Referring Physician:  Wai Centeno MD  Group:  [facility]  Interpreting Physician:  Pravin Mcgowan MD    SUMMARY    LEFT VENTRICLE:  Size was normal   Systolic function was at the lower limits of normal  Ejection fraction was estimated to be 50 %  There was mild diffuse hypokinesis    Wall thickness was normal   Features were consistent with a pseudonormal left ventricular filling pattern, with concomitant abnormal relaxation and increased filling pressure (grade 2 diastolic dysfunction)  RIGHT VENTRICLE:  The ventricle was mildly dilated  Systolic function was mildly reduced  MITRAL VALVE:  There was moderate regurgitation  TRICUSPID VALVE:  There was moderate regurgitation  Pulmonary artery systolic pressure was moderately increased  The findings suggest moderate pulmonary hypertension  COMPARISONS:  Comparison was made with the previous study of 13-Jan-2019  Aortic regurgitation has improved  HISTORY: PRIOR HISTORY: Hyperlipidemia, Mitral regurgitation, PSVT, tricuspid regurgitation, Aortic insufficiency, CM, CKD III, Acute CHF, HTN  PROCEDURE: The procedure was performed at the bedside  This was a routine study  Parasternals are technically difficult due to left breast implant  The transthoracic approach was used  The study included complete 2D imaging, M-mode,  complete spectral Doppler, and color Doppler  The heart rate was 91 bpm, at the start of the study  Images were obtained from the parasternal, apical, subcostal, and suprasternal notch acoustic windows  This was a technically difficult  study  LEFT VENTRICLE: Size was normal  Systolic function was at the lower limits of normal  Ejection fraction was estimated to be 50 %  There was mild diffuse hypokinesis  Wall thickness was normal  DOPPLER: Features were consistent with a  pseudonormal left ventricular filling pattern, with concomitant abnormal relaxation and increased filling pressure (grade 2 diastolic dysfunction)  RIGHT VENTRICLE: The ventricle was mildly dilated  Systolic function was mildly reduced  Wall thickness was normal     LEFT ATRIUM: Size was normal     RIGHT ATRIUM: Size was normal     MITRAL VALVE: Valve structure was normal  There was normal leaflet separation  DOPPLER: The transmitral velocity was within the normal range  There was no evidence for stenosis  There was moderate regurgitation  AORTIC VALVE: The valve was trileaflet  Leaflets exhibited normal thickness and normal cuspal separation  DOPPLER: Transaortic velocity was within the normal range  There was no evidence for stenosis  There was no regurgitation  TRICUSPID VALVE: The valve structure was normal  There was normal leaflet separation  DOPPLER: The transtricuspid velocity was within the normal range  There was no evidence for stenosis  There was moderate regurgitation  Pulmonary artery  systolic pressure was moderately increased  Estimated peak PA pressure was 55 mmHg  The findings suggest moderate pulmonary hypertension  PULMONIC VALVE: Leaflets exhibited normal thickness, no calcification, and normal cuspal separation  DOPPLER: The transpulmonic velocity was within the normal range  There was trace regurgitation  PERICARDIUM: There was no pericardial effusion  The pericardium was normal in appearance  AORTA: The root exhibited normal size  SYSTEMIC VEINS: IVC: The inferior vena cava was normal in size and course  Respirophasic changes were normal     SYSTEM MEASUREMENT TABLES    2D  %FS: 21 01 %  Ao Diam: 2 57 cm  EDV(Teich): 85 55 ml  EF(Teich): 42 99 %  ESV(Teich): 48 78 ml  IVSd: 0 79 cm  LA Area: 16 44 cm2  LA Diam: 4 2 cm  LVEDV MOD A4C: 70 91 ml  LVEF MOD A4C: 49 92 %  LVESV MOD A4C: 35 51 ml  LVIDd: 4 35 cm  LVIDs: 3 44 cm  LVLd A4C: 7 01 cm  LVLs A4C: 6 04 cm  LVPWd: 0 79 cm  RA Area: 15 8 cm2  RVIDd: 3 44 cm  SV MOD A4C: 35 4 ml  SV(Teich): 36 78 ml    CF  MR Als  Parth: 0 36 m/s  MR Flow: 22 07 ml/s  MR Rad: 0 31 cm    CW  MR VTI: 143 7 cm  MR Vmax: 4 28 m/s  MR Vmean: 3 58 m/s  MR maxP 3 mmHg  MR meanP 38 mmHg  TR MaxP 52 mmHg  TR Vmax: 3 3 m/s    MM  TAPSE: 1 56 cm    PW  E' Sept: 0 08 m/s  E/E' Sept: 9 84  MV A Parth: 0 3 m/s  MV Dec [place]: 7 93 m/s2  MV DecT: 95 22 ms  MV E Parth: 0 75 m/s  MV E/A Ratio: 2 55  MV PHT: 27 61 ms  MVA By PHT: 7 97 cm2    [address] [facility]redited [facility]    Prepared and electronically signed by    Rafael Dela Cruz MD  Signed [id number] 12:26:58       No results found for this or any previous visit  No results found for this or any previous visit  No procedure found  No results found for this or any previous visit  Meds/Allergies   all current active meds have been reviewed, current meds:   Current Facility-Administered Medications   Medication Dose Route Frequency    albuterol (PROVENTIL HFA,VENTOLIN HFA) inhaler 2 puff  2 puff Inhalation Q6H PRN    aluminum-magnesium hydroxide-simethicone (MYLANTA) oral suspension 30 mL  30 mL Oral Q4H PRN    aspirin (ECOTRIN LOW STRENGTH) EC tablet 81 mg  81 mg Oral Daily    bumetanide (BUMEX) injection 2 mg  2 mg Intravenous TID (diuretic)    cholecalciferol (VITAMIN D3) tablet 1,000 Units  1,000 Units Oral Daily    heparin (porcine) subcutaneous injection 5,000 Units  5,000 Units Subcutaneous Q8H [address]    levothyroxine tablet 88 mcg  88 mcg Oral Daily    nebivolol (BYSTOLIC) tablet 5 mg  5 mg Oral Daily    ondansetron (ZOFRAN) injection 4 mg  4 mg Intravenous Q6H PRN    potassium chloride (K-DUR,KLOR-CON) CR tablet 20 mEq  20 mEq Oral Daily    and PTA meds:   Prior to Admission Medications   Prescriptions Last Dose Informant Patient Reported? Taking? Fexofenadine HCl (ALLEGRA ALLERGY PO)  Self Yes Yes   Sig: Take by mouth   albuterol (PROVENTIL HFA,VENTOLIN HFA) 90 mcg/act inhaler  Self No Yes   Sig: Inhale 2 puffs every 6 (six) hours as needed for wheezing or shortness of breath   aspirin (ECOTRIN LOW STRENGTH) 81 mg EC tablet  Self Yes Yes   Sig: Take 81 mg by mouth daily   cholecalciferol (VITAMIN D3) 1,000 units tablet  Self Yes Yes   Sig: Take 1,000 Units by mouth daily     clobetasol (TEMOVATE) 0 05 % cream  Self No Yes   Sig: Apply topically twice daily to legs for 14 days only, DO NOT apply to face or groin     levothyroxine 88 mcg tablet  Self No Yes   Sig: Take 1 tablet (88 mcg total) by mouth daily   nebivolol (Bystolic) 5 mg tablet  Self No Yes   Sig: Take 1 tablet (5 mg total) by mouth daily   potassium chloride (K-DUR,KLOR-CON) 20 mEq tablet   No Yes   Sig: Take 1 tablet (20 mEq total) by mouth daily   torsemide (DEMADEX) 10 mg tablet   No Yes   Sig: Take 1 tablet (10 mg total) by mouth 2 (two) times a day      Facility-Administered Medications: None          EKG personally reviewed by ARACELY Soto    Assessment:  Principal Problem:    Acute on chronic diastolic (congestive) heart failure (HCC)  Active Problems:    Paroxysmal supraventricular tachycardia (HCC)    History of pulmonary embolus (PE)    Stage 3 chronic kidney disease    Counseling / Coordination of Care  Total floor / unit time spent today 20 minutes  Greater than 50% of total time was spent with the patient and / or family counseling and / or coordination of care  ** Please Note: Dragon 360 Dictation voice to text software may have been used in the creation of this document   **

## 2021-02-26 NOTE — PLAN OF CARE
Problem: Potential for Falls  Goal: Patient will remain free of falls  Description: INTERVENTIONS:  - Assess patient frequently for physical needs  -  Identify cognitive and physical deficits and behaviors that affect risk of falls  -  [place] fall precautions as indicated by assessment   - Educate patient/family on patient safety including physical limitations  - Instruct patient to call for assistance with activity based on assessment  - Modify environment to reduce risk of injury  - Consider OT/PT consult to assist with strengthening/mobility  Outcome: Progressing     Problem: Nutrition/Hydration-ADULT  Goal: Nutrient/Hydration intake appropriate for improving, restoring or maintaining nutritional needs  Description: Monitor and assess patient's nutrition/hydration status for malnutrition  Collaborate with interdisciplinary team and initiate plan and interventions as ordered  Monitor patient's weight and dietary intake as ordered or per policy  Utilize nutrition screening tool and intervene as necessary  Determine patient's food preferences and provide high-protein, high-caloric foods as appropriate       INTERVENTIONS:  - Monitor oral intake, urinary output, labs, and treatment plans  - Assess nutrition and hydration status and recommend course of action  - Evaluate amount of meals eaten  - Assist patient with eating if necessary   - Allow adequate time for meals  - Recommend/ encourage appropriate diets, oral nutritional supplements, and vitamin/mineral supplements  - Order, calculate, and assess calorie counts as needed  - Recommend, monitor, and adjust tube feedings and TPN/PPN based on assessed needs  - Assess need for intravenous fluids  - Provide specific nutrition/hydration education as appropriate  - Include patient/family/caregiver in decisions related to nutrition  Outcome: Progressing     Problem: CARDIOVASCULAR - ADULT  Goal: Maintains optimal cardiac output and hemodynamic stability  Description: INTERVENTIONS:  - Monitor I/O, vital signs and rhythm  - Monitor for S/S and trends of decreased cardiac output  - Administer and titrate ordered vasoactive medications to optimize hemodynamic stability  - Assess quality of pulses, skin color and temperature  - Assess for signs of decreased coronary artery perfusion  - Instruct patient to report change in severity of symptoms  Outcome: Progressing  Goal: Absence of cardiac dysrhythmias or at baseline rhythm  Description: INTERVENTIONS:  - Continuous cardiac monitoring, vital signs, obtain 12 lead EKG if ordered  - Administer antiarrhythmic and heart rate control medications as ordered  - Monitor electrolytes and administer replacement therapy as ordered  Outcome: Progressing     Problem: RESPIRATORY - ADULT  Goal: Achieves optimal ventilation and oxygenation  Description: INTERVENTIONS:  - Assess for changes in respiratory status  - Assess for changes in mentation and behavior  - Position to facilitate oxygenation and minimize respiratory effort  - Oxygen administered by appropriate delivery if ordered  - Initiate smoking cessation education as indicated  - Encourage broncho-pulmonary hygiene including cough, deep breathe, Incentive Spirometry  - Assess the need for suctioning and aspirate as needed  - Assess and instruct to report SOB or any respiratory difficulty  - Respiratory Therapy support as indicated  Outcome: Progressing     Problem: SKIN/TISSUE INTEGRITY - ADULT  Goal: Skin integrity remains intact  Description: INTERVENTIONS  - Identify patients at risk for skin breakdown  - Assess and monitor skin integrity  - Assess and monitor nutrition and hydration status  - Monitor labs (i e  albumin)  - Assess for incontinence   - Turn and reposition patient  - Assist with mobility/ambulation  - Relieve pressure over bony prominences  - Avoid friction and shearing  - Provide appropriate hygiene as needed including keeping skin clean and dry  - Evaluate need for skin moisturizer/barrier cream  - Collaborate with interdisciplinary team (i e  Nutrition, Rehabilitation, etc )   - Patient/family teaching  Outcome: Progressing  Goal: Incision(s), wounds(s) or drain site(s) healing without S/S of infection  Description: INTERVENTIONS  - Assess and document risk factors for skin impairment   - Assess and document dressing, incision, wound bed, drain sites and surrounding tissue  - Consider nutrition services referral as needed  - Oral mucous membranes remain intact  - Provide patient/ family education  Outcome: Progressing  Goal: Oral mucous membranes remain intact  Description: INTERVENTIONS  - Assess oral mucosa and hygiene practices  - Implement preventative oral hygiene regimen  - Implement oral medicated treatments as ordered  - Initiate Nutrition services referral as needed  Outcome: Progressing     Problem: PAIN - ADULT  Goal: Verbalizes/displays adequate comfort level or baseline comfort level  Description: Interventions:  - Encourage patient to monitor pain and request assistance  - Assess pain using appropriate pain scale  - Administer analgesics based on type and severity of pain and evaluate response  - Implement non-pharmacological measures as appropriate and evaluate response  - Consider cultural and social influences on pain and pain management  - Notify physician/advanced practitioner if interventions unsuccessful or patient reports new pain  Outcome: Progressing     Problem: INFECTION - ADULT  Goal: Absence or prevention of progression during hospitalization  Description: INTERVENTIONS:  - Assess and monitor for signs and symptoms of infection  - Monitor lab/diagnostic results  - Monitor all insertion sites, i e  indwelling lines, tubes, and drains  - Monitor endotracheal if appropriate and nasal secretions for changes in amount and color  - [place] appropriate cooling/warming therapies per order  - Administer medications as ordered  - Instruct and encourage patient and family to use good hand hygiene technique  - Identify and instruct in appropriate isolation precautions for identified infection/condition  Outcome: Progressing  Goal: Absence of fever/infection during neutropenic period  Description: INTERVENTIONS:  - Monitor WBC    Outcome: Progressing     Problem: SAFETY ADULT  Goal: Patient will remain free of falls  Description: INTERVENTIONS:  - Assess patient frequently for physical needs  -  Identify cognitive and physical deficits and behaviors that affect risk of falls    -  [place] fall precautions as indicated by assessment   - Educate patient/family on patient safety including physical limitations  - Instruct patient to call for assistance with activity based on assessment  - Modify environment to reduce risk of injury  - Consider OT/PT consult to assist with strengthening/mobility  Outcome: Progressing  Goal: Maintain or return to baseline ADL function  Description: INTERVENTIONS:  -  Assess patient's ability to carry out ADLs; assess patient's baseline for ADL function and identify physical deficits which impact ability to perform ADLs (bathing, care of mouth/teeth, toileting, grooming, dressing, etc )  - Assess/evaluate cause of self-care deficits   - Assess range of motion  - Assess patient's mobility; develop plan if impaired  - Assess patient's need for assistive devices and provide as appropriate  - Encourage maximum independence but intervene and supervise when necessary  - Involve family in performance of ADLs  - Assess for home care needs following discharge   - Consider OT consult to assist with ADL evaluation and planning for discharge  - Provide patient education as appropriate  Outcome: Progressing  Goal: Maintain or return mobility status to optimal level  Description: INTERVENTIONS:  - Assess patient's baseline mobility status (ambulation, transfers, stairs, etc )    - Identify cognitive and physical deficits and behaviors that affect mobility  - Identify mobility aids required to assist with transfers and/or ambulation (gait belt, sit-to-stand, lift, walker, cane, etc )  - [place] fall precautions as indicated by assessment  - Record patient progress and toleration of activity level on Mobility SBAR; progress patient to next Phase/Stage  - Instruct patient to call for assistance with activity based on assessment  - Consider rehabilitation consult to assist with strengthening/weightbearing, etc   Outcome: Progressing     Problem: DISCHARGE PLANNING  Goal: Discharge to home or other facility with appropriate resources  Description: INTERVENTIONS:  - Identify barriers to discharge w/patient and caregiver  - Arrange for needed discharge resources and transportation as appropriate  - Identify discharge learning needs (meds, wound care, etc )  - Arrange for interpretive services to assist at discharge as needed  - Refer to Case Management Department for coordinating discharge planning if the patient needs post-hospital services based on physician/advanced practitioner order or complex needs related to functional status, cognitive ability, or social support system  Outcome: Progressing     Problem: Knowledge Deficit  Goal: Patient/family/caregiver demonstrates understanding of disease process, treatment plan, medications, and discharge instructions  Description: Complete learning assessment and assess knowledge base    Interventions:  - Provide teaching at level of understanding  - Provide teaching via preferred learning methods  Outcome: Progressing

## 2021-02-26 NOTE — DISCHARGE SUMMARY
Discharge- Carlitos Meraz 1950, 66 y o  female MRN: [id number]    Unit/Bed#: S -01 Encounter: [id number]    Primary Care Provider: Rickie Pulido MD   Date and time admitted to hospital: 2/18/2021  1:57 PM        * Acute on chronic diastolic (congestive) heart failure ([address] )  Assessment & Plan  · POA, as per patient weight is elevated up to 150 despite increasing diuretics  Reports MARI, orthopnea, and leg swelling  Some dietary indiscretion with soup broth  No fluid indiscretions  · Weight improved  · Renal function improved  · Patient feeling much better  · Currently 131 lb  · Discharge home with outpatient Cardiology follow-up increase Demadex 20 mg b i d  Discharge      Stage 3 chronic kidney disease  Assessment & Plan  Lab Results   Component Value Date    EGFR 30 02/26/2021    EGFR 27 02/25/2021    EGFR 32 02/23/2021    CREATININE 1 66 (H) 02/26/2021    CREATININE 1 84 (H) 02/25/2021    CREATININE 1 61 (H) 02/23/2021   · Creatinine stable and at baseline      History of pulmonary embolus (PE)  Assessment & Plan  · Not on anticoagulation as outpatient on presentation  · This is a remote history      Discharging Physician / Practitioner: Kody Rodriguez MD  PCP: Rickie Pulido MD  Admission Date:   Admission Orders (From admission, onward)     Ordered        02/18/21 1544  Inpatient Admission  Once                   Discharge Date: 02/26/21    Resolved Problems  Date Reviewed: 2/26/2021    None          Consultations During Hospital Stay:  · Cardiology    Procedures Performed:   · Chest x-ray calcified left hilar paratracheal nodes clear lung fields    Significant Findings / Test Results:   · None    Incidental Findings:   · None     Test Results Pending at Discharge (will require follow up):    · None     Outpatient Tests Requested:  · None    Complications:  None    Reason for Admission:  Shortness of breath    Hospital Course:     Carlitos Meraz is a 66 y o  female patient who originally presented to the hospital on 2/18/2021 due to shortness of breath  Patient with known history of diastolic congestive heart failure remote history of pulmonary embolism CKD stage 3 and hypertension who presented with weight gain  She reports a baseline weight of around 133 lb and came in at 150 lb  She was aggressively diuresed down to 131 lb start on oral diuretics which she is tolerating well  Her home diuretic dose and be increased from Demadex 10 mg b i d  To 20 mg b i d  Please see above list of diagnoses and related plan for additional information  Condition at Discharge: good     Discharge Day Visit / Exam:     Subjective:  I feel much better  Vitals: Blood Pressure: 104/52 (02/26/21 0757)  Pulse: 68 (02/26/21 0757)  Temperature: 98 1 °F (36 7 °C) (02/26/21 0757)  Temp Source: Oral (02/26/21 0757)  Respirations: 18 (02/26/21 0757)  Weight - Scale: 59 kg (130 lb) (02/26/21 0536)  SpO2: 92 % (02/26/21 0757)  Exam:   Physical Exam  Constitutional:       General: She is not in acute distress  Appearance: She is not diaphoretic  Cardiovascular:      Rate and Rhythm: Normal rate  Pulses: Normal pulses  Heart sounds: No murmur  No gallop  Pulmonary:      Effort: Pulmonary effort is normal  No respiratory distress  Breath sounds: No wheezing, rhonchi or rales  Abdominal:      General: There is no distension  Palpations: Abdomen is soft  Tenderness: There is no abdominal tenderness  There is no guarding  Discussion with Family:  Patient's  at bedside    Discharge instructions/Information to patient and family:   See after visit summary for information provided to patient and family  Provisions for Follow-Up Care:  See after visit summary for information related to follow-up care and any pertinent home health orders        Disposition:     Home    For Discharges to [facility] SNF:   · Not Applicable to this Patient - Not Applicable to this Patient    Planned Readmission: no     Discharge Statement:  I spent 30 minutes discharging the patient  This time was spent on the day of discharge  I had direct contact with the patient on the day of discharge  Greater than 50% of the total time was spent examining patient, answering all patient questions, arranging and discussing plan of care with patient as well as directly providing post-discharge instructions  Additional time then spent on discharge activities  Discharge Medications:  See after visit summary for reconciled discharge medications provided to patient and family        ** Please Note: This note has been constructed using a voice recognition system **

## 2021-02-26 NOTE — CASE MANAGEMENT
Pt is cleared for discharge to home today  No CM needs  IMM reviewed with patient   patient agree with discharge determination

## 2021-02-26 NOTE — PROGRESS NOTES
General Cardiology   Progress Note -  [facility]   Jodie Tafoya 66 y o  female MRN: [id number]    Unit/Bed#: S -01 Encounter: [id number]     Assessment  1  Acute on chronic diastolic CHF exacerbation   2  Moderate MR/TR, and PHTN  -Presented to the ED on 2/18 w/ c/o of progressively worsening SOB and increasing lower extremity edema over the last month  -On PE volume status has improved significantly, she has some residual ankle/pedal edema  -Denies SOB at rest or significant dyspnea; no supplemental O2 requirement  -NT proBNP 19, 620  -TTE 11/2020; LVEF 50%, mild diffuse hypokinesis, grade 2 DD, RV mildly dilated, systolic function mildly reduced, moderate MR, moderate TR; estimated peak PA pressure 55 mmHg  -Previously on torsemide 10 mg b i d as an outpatient (on hold currently)  -IV Bumex discontinued, switched to torsemide 20 mg b i d today  -24 hour I&O balance; -180 mL ;overall -12 L  Weights (dry weight around 130 lbs)  -2/19 -- 149 lb -- standing scale  -2/20 -- 148 lb -- standing scale  -2/21 -- 141 lb -- standing scale  -2/22 -- 140 lb -- standing scale  -2/23 -- 138 lb -- standing scale   -2/24 -- 133 lb -- standing scale  -2/25 -- 131 lb -- standing scale  -2/26 -- 130 lb -- standing scale     3  Hypertension  -Average BP 105/53, last recorded at 104/52 , HR 68  -On Bystolic 2 5 mg daily + torsemide 20 mg b i d      4  CKD stage 3  -Baseline creatinine around 1 4-1 6  -Creatinine 1 82 on admission, creatinine currently 1 66 (down from 1 84)     5  Paroxysmal SVT  -Non-telem  -12 lead ECG on 2/18; NSR no significant arrhythmias  -On Bystolic 2 5 mg daily     Plan  -Continue torsemide 20 mg b i d  +K-Dur 10 mEq b i d  -- will assess response, possibly stable for DC later today -- with close outpatient follow-up  -Continue Bystolic 2 5 mg daily  -Daily standing weights, 2 g NA +diet 2L FR    Subjective  Review of Systems   Constitution: Negative for chills, fever and malaise/fatigue     HENT: Negative for congestion  Eyes: Negative for visual disturbance  Cardiovascular: Positive for leg swelling  Negative for chest pain, dyspnea on exertion, irregular heartbeat, near-syncope, orthopnea and palpitations  Respiratory: Negative for cough and shortness of breath  Musculoskeletal: Negative for arthritis and myalgias  Gastrointestinal: Negative for abdominal pain  Genitourinary: Negative for dysuria  Neurological: Negative for dizziness, focal weakness, headaches, light-headedness and weakness  All other systems reviewed and are negative  Objective:   Physical Exam  Vitals signs and nursing note reviewed  Constitutional:       General: She is not in acute distress  Appearance: Normal appearance  She is obese  She is not ill-appearing  HENT:      Head: Normocephalic and atraumatic  Mouth/Throat:      Mouth: Mucous membranes are moist    Eyes:      General: No scleral icterus  Neck:      Musculoskeletal: Normal range of motion and neck supple  Cardiovascular:      Rate and Rhythm: Normal rate and regular rhythm  Pulses: Normal pulses  Heart sounds: Normal heart sounds  Pulmonary:      Effort: Pulmonary effort is normal       Breath sounds: Normal breath sounds  No wheezing  Abdominal:      Palpations: Abdomen is soft  Musculoskeletal:      Right lower leg: Edema present  Left lower leg: Edema present  Comments: +1 pitting ankle/pedal edema   Skin:     General: Skin is warm and dry  Capillary Refill: Capillary refill takes less than 2 seconds  Neurological:      General: No focal deficit present  Mental Status: She is alert and oriented to person, place, and time  Psychiatric:         Mood and Affect: Mood normal          Vitals: Blood pressure 104/52, pulse 68, temperature 98 1 °F (36 7 °C), temperature source Oral, resp  rate 18, weight 59 kg (130 lb), SpO2 92 %, not currently breastfeeding ,     Body mass index is 24 56 kg/m²  , Systolic (35NJN), WSO:086 , Min:103 , YCY:650     Diastolic (38IKC), VDJ:25, Min:52, Max:54      Intake/Output Summary (Last 24 hours) at 2/26/2021 0949  Last data filed at 2/26/2021 0757  Gross per 24 hour   Intake 480 ml   Output 900 ml   Net -420 ml     Weight (last 2 days)     Date/Time   Weight    02/26/21 0536   59 (130)    02/25/21 1009   59 8 (131 8)    02/24/21 0730   60 3 (133)              LABORATORY RESULTS      CBC with diff:   Results from last 7 days   Lab Units 02/23/21  0628 02/21/21  0613   WBC Thousand/uL 5 56 4 67   HEMOGLOBIN g/dL 9 3* 10 1*   HEMATOCRIT % 29 5* 32 3*   MCV fL 89 89   PLATELETS Thousands/uL 458* 468*   MCH pg 28 1 27 9   MCHC g/dL 31 5 31 3*   RDW % 16 4* 16 1*   MPV fL 9 7 9 6   NRBC AUTO /100 WBCs 0  --        CMP:  Results from last 7 days   Lab Units 02/26/21  0458 02/25/21  0501 02/23/21  0628 02/22/21  0523 02/21/21  0613 02/20/21  1104   POTASSIUM mmol/L 4 8 4 3 4 5 4 5 4 7 4 7   CHLORIDE mmol/L 98* 96* 100 100 102 105   CO2 mmol/L 32 33* 30 29 29 29   BUN mg/dL 40* 40* 41* 42* 43* 43*   CREATININE mg/dL 1 66* 1 84* 1 61* 1 64* 1 74* 1 77*   CALCIUM mg/dL 8 3 8 4 8 3 8 4 8 3 8 1*   AST U/L  --   --  23  --   --   --    ALT U/L  --   --  11*  --   --   --    ALK PHOS U/L  --   --  242*  --   --   --    EGFR ml/min/1 73sq m 30 27 32 31 29 28       BMP:  Results from last 7 days   Lab Units 02/26/21  0458 02/25/21  0501 02/23/21  0628 02/22/21  0523 02/21/21  0613 02/20/21  1104   POTASSIUM mmol/L 4 8 4 3 4 5 4 5 4 7 4 7   CHLORIDE mmol/L 98* 96* 100 100 102 105   CO2 mmol/L 32 33* 30 29 29 29   BUN mg/dL 40* 40* 41* 42* 43* 43*   CREATININE mg/dL 1 66* 1 84* 1 61* 1 64* 1 74* 1 77*   CALCIUM mg/dL 8 3 8 4 8 3 8 4 8 3 8 1*       Lab Results   Component Value Date    [facility] 19,620 (H) 02/18/2021    NTBN 18,716 (H) 11/08/2020    NTBN 3,376 (H) 01/12/2019        Results from last 7 days   Lab Units 02/23/21  0628 02/22/21  0523 02/21/21  0613 02/20/21  1104   MAGNESIUM mg/dL 2 4 2 3 2 4 2 3                         Lipid Profile:   No results found for: CHOL  Lab Results   Component Value Date    HDL 37 (L) 2020    HDL 49 2020    HDL 37 (L) 2019     Lab Results   Component Value Date    LDLCALC 94 2020    LDLCALC 57 2020    LDLCALC 61 2019     Lab Results   Component Value Date    TRIG 57 2020    TRIG 58 2020    TRIG 72 2019       Cardiac testing:   Results for orders placed during the hospital encounter of 20   Echo complete with contrast if indicated    Narrative [facility] [address], [address] [facility]  [phone]    Transthoracic Echocardiogram  2D, M-mode, Doppler, and Color Doppler    Study date:  2020    Patient: Sixto Mclean  MR number: [id number]  Account number: [de-identified]  : 1950  Age: 65 years  Gender: Female  Status: Inpatient  Location: Bedside  Height: 61 in  Weight: 144 lb  BP: 116/ 57 mmHg    Indications: Heart Failure    Diagnoses: I50 9 - Heart failure, unspecified    Sonographer:  SHANTELL Guardado  Primary Physician:  Manuella Cabot, MD  Referring Physician:  Christine Nina MD  Group[facility]  [facility]  Interpreting Physician:  Evelina Navarrete MD    SUMMARY    LEFT VENTRICLE:  Size was normal   Systolic function was at the lower limits of normal  Ejection fraction was estimated to be 50 %  There was mild diffuse hypokinesis  Wall thickness was normal   Features were consistent with a pseudonormal left ventricular filling pattern, with concomitant abnormal relaxation and increased filling pressure (grade 2 diastolic dysfunction)  RIGHT VENTRICLE:  The ventricle was mildly dilated  Systolic function was mildly reduced  MITRAL VALVE:  There was moderate regurgitation  TRICUSPID VALVE:  There was moderate regurgitation  Pulmonary artery systolic pressure was moderately increased    The findings suggest moderate pulmonary hypertension  COMPARISONS:  Comparison was made with the previous study of 13-Jan-2019  Aortic regurgitation has improved  HISTORY: PRIOR HISTORY: Hyperlipidemia, Mitral regurgitation, PSVT, tricuspid regurgitation, Aortic insufficiency, CM, CKD III, Acute CHF, HTN  PROCEDURE: The procedure was performed at the bedside  This was a routine study  Parasternals are technically difficult due to left breast implant  The transthoracic approach was used  The study included complete 2D imaging, M-mode,  complete spectral Doppler, and color Doppler  The heart rate was 91 bpm, at the start of the study  Images were obtained from the parasternal, apical, subcostal, and suprasternal notch acoustic windows  This was a technically difficult  study  LEFT VENTRICLE: Size was normal  Systolic function was at the lower limits of normal  Ejection fraction was estimated to be 50 %  There was mild diffuse hypokinesis  Wall thickness was normal  DOPPLER: Features were consistent with a  pseudonormal left ventricular filling pattern, with concomitant abnormal relaxation and increased filling pressure (grade 2 diastolic dysfunction)  RIGHT VENTRICLE: The ventricle was mildly dilated  Systolic function was mildly reduced  Wall thickness was normal     LEFT ATRIUM: Size was normal     RIGHT ATRIUM: Size was normal     MITRAL VALVE: Valve structure was normal  There was normal leaflet separation  DOPPLER: The transmitral velocity was within the normal range  There was no evidence for stenosis  There was moderate regurgitation  AORTIC VALVE: The valve was trileaflet  Leaflets exhibited normal thickness and normal cuspal separation  DOPPLER: Transaortic velocity was within the normal range  There was no evidence for stenosis  There was no regurgitation  TRICUSPID VALVE: The valve structure was normal  There was normal leaflet separation  DOPPLER: The transtricuspid velocity was within the normal range   There was no evidence for stenosis  There was moderate regurgitation  Pulmonary artery  systolic pressure was moderately increased  Estimated peak PA pressure was 55 mmHg  The findings suggest moderate pulmonary hypertension  PULMONIC VALVE: Leaflets exhibited normal thickness, no calcification, and normal cuspal separation  DOPPLER: The transpulmonic velocity was within the normal range  There was trace regurgitation  PERICARDIUM: There was no pericardial effusion  The pericardium was normal in appearance  AORTA: The root exhibited normal size  SYSTEMIC VEINS: IVC: The inferior vena cava was normal in size and course  Respirophasic changes were normal     SYSTEM MEASUREMENT TABLES    2D  %FS: 21 01 %  Ao Diam: 2 57 cm  EDV(Teich): 85 55 ml  EF(Teich): 42 99 %  ESV(Teich): 48 78 ml  IVSd: 0 79 cm  LA Area: 16 44 cm2  LA Diam: 4 2 cm  LVEDV MOD A4C: 70 91 ml  LVEF MOD A4C: 49 92 %  LVESV MOD A4C: 35 51 ml  LVIDd: 4 35 cm  LVIDs: 3 44 cm  LVLd A4C: 7 01 cm  LVLs A4C: 6 04 cm  LVPWd: 0 79 cm  RA Area: 15 8 cm2  RVIDd: 3 44 cm  SV MOD A4C: 35 4 ml  SV(Teich): 36 78 ml    CF  MR Als  Parth: 0 36 m/s  MR Flow: 22 07 ml/s  MR Rad: 0 31 cm    CW  MR VTI: 143 7 cm  MR Vmax: 4 28 m/s  MR Vmean: 3 58 m/s  MR maxP 3 mmHg  MR meanP 38 mmHg  TR MaxP 52 mmHg  TR Vmax: 3 3 m/s    MM  TAPSE: 1 56 cm    PW  E' Sept: 0 08 m/s  E/E' Sept: 9 84  MV A Parth: 0 3 m/s  MV Dec [place]: 7 93 m/s2  MV DecT: 95 22 ms  MV E Parth: 0 75 m/s  MV E/A Ratio: 2 55  MV PHT: 27 61 ms  MVA By PHT: 7 97 cm2    [employer] Accredited Echocardiography [facility]    Prepared and electronically signed by    Codie Adamson MD  Signed [id number] 12:26:58       No results found for this or any previous visit  No results found for this or any previous visit  No procedure found  No results found for this or any previous visit      Meds/Allergies   all current active meds have been reviewed, current meds:   Current Facility-Administered Medications Medication Dose Route Frequency    albuterol (PROVENTIL HFA,VENTOLIN HFA) inhaler 2 puff  2 puff Inhalation Q6H PRN    aluminum-magnesium hydroxide-simethicone (MYLANTA) oral suspension 30 mL  30 mL Oral Q4H PRN    aspirin (ECOTRIN LOW STRENGTH) EC tablet 81 mg  81 mg Oral Daily    cholecalciferol (VITAMIN D3) tablet 1,000 Units  1,000 Units Oral Daily    heparin (porcine) subcutaneous injection 5,000 Units  5,000 Units Subcutaneous Q8H [address]    levothyroxine tablet 88 mcg  88 mcg Oral Daily    nebivolol (BYSTOLIC) tablet 2 5 mg  2 5 mg Oral Daily    ondansetron (ZOFRAN) injection 4 mg  4 mg Intravenous Q6H PRN    potassium chloride (K-DUR,KLOR-CON) CR tablet 10 mEq  10 mEq Oral BID    torsemide (DEMADEX) tablet 20 mg  20 mg Oral BID (diuretic)    and PTA meds:   Prior to Admission Medications   Prescriptions Last Dose Informant Patient Reported? Taking? Fexofenadine HCl (ALLEGRA ALLERGY PO)  Self Yes Yes   Sig: Take by mouth   albuterol (PROVENTIL HFA,VENTOLIN HFA) 90 mcg/act inhaler  Self No Yes   Sig: Inhale 2 puffs every 6 (six) hours as needed for wheezing or shortness of breath   aspirin (ECOTRIN LOW STRENGTH) 81 mg EC tablet  Self Yes Yes   Sig: Take 81 mg by mouth daily   cholecalciferol (VITAMIN D3) 1,000 units tablet  Self Yes Yes   Sig: Take 1,000 Units by mouth daily     clobetasol (TEMOVATE) 0 05 % cream  Self No Yes   Sig: Apply topically twice daily to legs for 14 days only, DO NOT apply to face or groin     levothyroxine 88 mcg tablet  Self No Yes   Sig: Take 1 tablet (88 mcg total) by mouth daily   nebivolol (Bystolic) 5 mg tablet  Self No Yes   Sig: Take 1 tablet (5 mg total) by mouth daily   potassium chloride (K-DUR,KLOR-CON) 20 mEq tablet   No Yes   Sig: Take 1 tablet (20 mEq total) by mouth daily   torsemide (DEMADEX) 10 mg tablet   No Yes   Sig: Take 1 tablet (10 mg total) by mouth 2 (two) times a day      Facility-Administered Medications: None          EKG personally reviewed by ARACELY Alfred    Assessment:  Principal Problem:    Acute on chronic diastolic (congestive) heart failure (HCC)  Active Problems:    Paroxysmal supraventricular tachycardia (HCC)    History of pulmonary embolus (PE)    Stage 3 chronic kidney disease    Counseling / Coordination of Care  Total floor / unit time spent today 20 minutes  Greater than 50% of total time was spent with the patient and / or family counseling and / or coordination of care  ** Please Note: Dragon 360 Dictation voice to text software may have been used in the creation of this document   **

## 2021-02-26 NOTE — DISCHARGE INSTRUCTIONS
Take your medications as directed, and keep your follow up appointments  Adhere to a heart healthy lifestyle, maintaining a low sodium diet  Daily weight and record    If your weight increases 2-3 lbs in one day, or 5 lbs in 2 days, you are short of breath or have lower extremity swelling, please call Nurse Matteo Washington at  [place] [address] office  at [phone]

## 2021-02-26 NOTE — ASSESSMENT & PLAN NOTE
· POA, as per patient weight is elevated up to 150 despite increasing diuretics  Reports MARI, orthopnea, and leg swelling  Some dietary indiscretion with soup broth  No fluid indiscretions  · Weight improved  · Renal function improved  · Patient feeling much better  · Currently 131 lb  · Discharge home with outpatient Cardiology follow-up increase Demadex 20 mg b i d   Discharge

## 2021-02-26 NOTE — ASSESSMENT & PLAN NOTE
Lab Results   Component Value Date    EGFR 30 02/26/2021    EGFR 27 02/25/2021    EGFR 32 02/23/2021    CREATININE 1 66 (H) 02/26/2021    CREATININE 1 84 (H) 02/25/2021    CREATININE 1 61 (H) 02/23/2021   · Creatinine stable and at baseline

## 2021-02-26 NOTE — PLAN OF CARE
Problem: Potential for Falls  Goal: Patient will remain free of falls  Description: INTERVENTIONS:  - Assess patient frequently for physical needs  -  Identify cognitive and physical deficits and behaviors that affect risk of falls  -  [place] fall precautions as indicated by assessment   - Educate patient/family on patient safety including physical limitations  - Instruct patient to call for assistance with activity based on assessment  - Modify environment to reduce risk of injury  - Consider OT/PT consult to assist with strengthening/mobility  Outcome: Progressing     Problem: Nutrition/Hydration-ADULT  Goal: Nutrient/Hydration intake appropriate for improving, restoring or maintaining nutritional needs  Description: Monitor and assess patient's nutrition/hydration status for malnutrition  Collaborate with interdisciplinary team and initiate plan and interventions as ordered  Monitor patient's weight and dietary intake as ordered or per policy  Utilize nutrition screening tool and intervene as necessary  Determine patient's food preferences and provide high-protein, high-caloric foods as appropriate       INTERVENTIONS:  - Monitor oral intake, urinary output, labs, and treatment plans  - Assess nutrition and hydration status and recommend course of action  - Evaluate amount of meals eaten  - Assist patient with eating if necessary   - Allow adequate time for meals  - Recommend/ encourage appropriate diets, oral nutritional supplements, and vitamin/mineral supplements  - Order, calculate, and assess calorie counts as needed  - Recommend, monitor, and adjust tube feedings and TPN/PPN based on assessed needs  - Assess need for intravenous fluids  - Provide specific nutrition/hydration education as appropriate  - Include patient/family/caregiver in decisions related to nutrition  Outcome: Progressing     Problem: CARDIOVASCULAR - ADULT  Goal: Maintains optimal cardiac output and hemodynamic stability  Description: INTERVENTIONS:  - Monitor I/O, vital signs and rhythm  - Monitor for S/S and trends of decreased cardiac output  - Administer and titrate ordered vasoactive medications to optimize hemodynamic stability  - Assess quality of pulses, skin color and temperature  - Assess for signs of decreased coronary artery perfusion  - Instruct patient to report change in severity of symptoms  Outcome: Progressing  Goal: Absence of cardiac dysrhythmias or at baseline rhythm  Description: INTERVENTIONS:  - Continuous cardiac monitoring, vital signs, obtain 12 lead EKG if ordered  - Administer antiarrhythmic and heart rate control medications as ordered  - Monitor electrolytes and administer replacement therapy as ordered  Outcome: Progressing     Problem: RESPIRATORY - ADULT  Goal: Achieves optimal ventilation and oxygenation  Description: INTERVENTIONS:  - Assess for changes in respiratory status  - Assess for changes in mentation and behavior  - Position to facilitate oxygenation and minimize respiratory effort  - Oxygen administered by appropriate delivery if ordered  - Initiate smoking cessation education as indicated  - Encourage broncho-pulmonary hygiene including cough, deep breathe, Incentive Spirometry  - Assess the need for suctioning and aspirate as needed  - Assess and instruct to report SOB or any respiratory difficulty  - Respiratory Therapy support as indicated  Outcome: Progressing     Problem: SKIN/TISSUE INTEGRITY - ADULT  Goal: Skin integrity remains intact  Description: INTERVENTIONS  - Identify patients at risk for skin breakdown  - Assess and monitor skin integrity  - Assess and monitor nutrition and hydration status  - Monitor labs (i e  albumin)  - Assess for incontinence   - Turn and reposition patient  - Assist with mobility/ambulation  - Relieve pressure over bony prominences  - Avoid friction and shearing  - Provide appropriate hygiene as needed including keeping skin clean and dry  - Evaluate need for skin moisturizer/barrier cream  - Collaborate with interdisciplinary team (i e  Nutrition, Rehabilitation, etc )   - Patient/family teaching  Outcome: Progressing  Goal: Incision(s), wounds(s) or drain site(s) healing without S/S of infection  Description: INTERVENTIONS  - Assess and document risk factors for skin impairment   - Assess and document dressing, incision, wound bed, drain sites and surrounding tissue  - Consider nutrition services referral as needed  - Oral mucous membranes remain intact  - Provide patient/ family education  Outcome: Progressing  Goal: Oral mucous membranes remain intact  Description: INTERVENTIONS  - Assess oral mucosa and hygiene practices  - Implement preventative oral hygiene regimen  - Implement oral medicated treatments as ordered  - Initiate Nutrition services referral as needed  Outcome: Progressing     Problem: PAIN - ADULT  Goal: Verbalizes/displays adequate comfort level or baseline comfort level  Description: Interventions:  - Encourage patient to monitor pain and request assistance  - Assess pain using appropriate pain scale  - Administer analgesics based on type and severity of pain and evaluate response  - Implement non-pharmacological measures as appropriate and evaluate response  - Consider cultural and social influences on pain and pain management  - Notify physician/advanced practitioner if interventions unsuccessful or patient reports new pain  Outcome: Progressing     Problem: INFECTION - ADULT  Goal: Absence or prevention of progression during hospitalization  Description: INTERVENTIONS:  - Assess and monitor for signs and symptoms of infection  - Monitor lab/diagnostic results  - Monitor all insertion sites, i e  indwelling lines, tubes, and drains  - Monitor endotracheal if appropriate and nasal secretions for changes in amount and color  - [place] appropriate cooling/warming therapies per order  - Administer medications as ordered  - Instruct and encourage patient and family to use good hand hygiene technique  - Identify and instruct in appropriate isolation precautions for identified infection/condition  Outcome: Progressing  Goal: Absence of fever/infection during neutropenic period  Description: INTERVENTIONS:  - Monitor WBC    Outcome: Progressing     Problem: SAFETY ADULT  Goal: Patient will remain free of falls  Description: INTERVENTIONS:  - Assess patient frequently for physical needs  -  Identify cognitive and physical deficits and behaviors that affect risk of falls    -  [place] fall precautions as indicated by assessment   - Educate patient/family on patient safety including physical limitations  - Instruct patient to call for assistance with activity based on assessment  - Modify environment to reduce risk of injury  - Consider OT/PT consult to assist with strengthening/mobility  Outcome: Progressing  Goal: Maintain or return to baseline ADL function  Description: INTERVENTIONS:  -  Assess patient's ability to carry out ADLs; assess patient's baseline for ADL function and identify physical deficits which impact ability to perform ADLs (bathing, care of mouth/teeth, toileting, grooming, dressing, etc )  - Assess/evaluate cause of self-care deficits   - Assess range of motion  - Assess patient's mobility; develop plan if impaired  - Assess patient's need for assistive devices and provide as appropriate  - Encourage maximum independence but intervene and supervise when necessary  - Involve family in performance of ADLs  - Assess for home care needs following discharge   - Consider OT consult to assist with ADL evaluation and planning for discharge  - Provide patient education as appropriate  Outcome: Progressing  Goal: Maintain or return mobility status to optimal level  Description: INTERVENTIONS:  - Assess patient's baseline mobility status (ambulation, transfers, stairs, etc )    - Identify cognitive and physical deficits and behaviors that affect mobility  - Identify mobility aids required to assist with transfers and/or ambulation (gait belt, sit-to-stand, lift, walker, cane, etc )  - [place] fall precautions as indicated by assessment  - Record patient progress and toleration of activity level on Mobility SBAR; progress patient to next Phase/Stage  - Instruct patient to call for assistance with activity based on assessment  - Consider rehabilitation consult to assist with strengthening/weightbearing, etc   Outcome: Progressing     Problem: DISCHARGE PLANNING  Goal: Discharge to home or other facility with appropriate resources  Description: INTERVENTIONS:  - Identify barriers to discharge w/patient and caregiver  - Arrange for needed discharge resources and transportation as appropriate  - Identify discharge learning needs (meds, wound care, etc )  - Arrange for interpretive services to assist at discharge as needed  - Refer to Case Management Department for coordinating discharge planning if the patient needs post-hospital services based on physician/advanced practitioner order or complex needs related to functional status, cognitive ability, or social support system  Outcome: Progressing     Problem: Knowledge Deficit  Goal: Patient/family/caregiver demonstrates understanding of disease process, treatment plan, medications, and discharge instructions  Description: Complete learning assessment and assess knowledge base    Interventions:  - Provide teaching at level of understanding  - Provide teaching via preferred learning methods  Outcome: Progressing

## 2021-03-02 NOTE — PROGRESS NOTES
Spoke with patient today who reports if feeling "so much better"  She denies edema or SOB and states is sleeping lying down now and has improved activity tolerance  Weights have been stable since discharge- 129# yesterday 130# today  Good understanding of medication changes and diet  States she and her  are making their own meals and aware of reading food labels for salt content  To see cardiology NP tomorrow and PCP Thursday   to drive    Elastera MS, RN Outpatient Care Manager

## 2021-03-04 NOTE — PROGRESS NOTES
Virtual Regular Visit      Assessment/Plan:    Problem List Items Addressed This Visit     None               Reason for visit is   Chief Complaint   Patient presents with    Virtual Tcm    Virtual Regular Visit        Encounter provider Frederick Boxer, MD    Provider located at [place] [address]  [phone]      Recent Visits  No visits were found meeting these conditions  Showing recent visits within past 7 days and meeting all other requirements     Today's Visits  Date Type Provider Dept   03/04/21 Telemedicine Frederick Boxer, MD [facility]   Showing today's visits and meeting all other requirements     Future Appointments  No visits were found meeting these conditions  Showing future appointments within next 150 days and meeting all other requirements        The patient was identified by name and date of birth  Crissy Browne was informed that this is a telemedicine visit and that the visit is being conducted through {AMB CORONAVIRUS VISIT XAQQJB:66746}  {Telemedicine confidentiality :43001} {Telemedicine participants:62589}  She acknowledged consent and understanding of privacy and security of the video platform  The patient has agreed to participate and understands they can discontinue the visit at any time  Patient is aware this is a billable service  Subjective  Crissy Browne is a 66 y o  female ***         HPI     Past Medical History:   Diagnosis Date    Acute CHF ([address] ) 11/8/2020    Acute kidney injury superimposed on chronic kidney disease ([address] ) 1/12/2019    ENEDELIA (acute kidney injury) ([address] ) 1/12/2019    Anemia     BCC (basal cell carcinoma of skin)     facial    Breast cancer ([address] ) 2006    s/p mastectomy     Calculus, kidney 2013    Cancer [facility])     Breast Cancer    Cancer [facility])     Cervical Cancer    Cervical cancer ([facility] ) 0546    Diastolic CHF, acute ([facility] ) 11/8/2020    Disease of thyroid gland     Ganglion Last Assessed:7/9/13    Heart murmur     mitral valve regurgitation    Hodgkin disease ([place] [address] )     Hydronephrosis 2013    Hyperlipidemia     Hypertension     Melanoma ([place] [address] )     NSTEMI (non-ST elevated myocardial infarction) ([place] [address] ) 1/12/2019    Osteopenia     Last Assessed:7/9/13    Osteoporosis     Paroxysmal supraventricular tachycardia (HCC)     Protein-calorie malnutrition ([facility] ) 2/12/2020    Renal calculi     Last Assessed:3/19/14    Renal cyst     Shoulder impingement     Last Assessed:1/18/13    Syncope 7/28/2019    Last Assessment & Plan:  Pre-syncopal symptoms leading to short syncopal episode without post-ictal period  Suspect dehydration/hypovolemia  Infectious, cardiopulm or neurologic cause less likely  No arrhythmias or AV block on ECG or telemetry thus far  No PE on CTA  No clinical signs of seizure   No acute pathology or mass on CTH  - s/p IVF; encourage PO intake - continue on telemetry - TTE c/f ne    UTI (urinary tract infection) 2013    Vasculitis ([place] [address] ) 1/12/2019       Past Surgical History:   Procedure Laterality Date    APPENDECTOMY      BREAST BIOPSY Left 09/29/2006    BREAST RECONSTRUCTION      COMPLEX WOUND CLOSURE TO EXTREMITY Left 10/9/2018    Procedure: COMPLEX CLOSURE RECONSTRUCTION;  Surgeon: Caitlin Anderson MD;  [facility];  Service: Plastics    CT BONE MARROW BIOPSY AND ASPIRATION  11/15/2019    CYSTOSCOPY W/ RETROGRADES  2009    CYSTOSCOPY W/ URETEROSCOPY  2002    EXTRACORPOREAL SHOCK WAVE LITHOTRIPSY Right 2005    FLAP LOCAL HEAD / NECK Left 10/9/2018    Procedure: FLAP RECONSTRUCTION;  Surgeon: Caitlin Anderson MD;  [facility];  Service: Plastics    FULL THICKNESS SKIN GRAFT Left 10/9/2018    Procedure: FULL THICKNESS SKIN GRAFT RECONSTRUCTION;  Surgeon: Caitlin Anderson MD;  [facility];  Service: Plastics    HYSTERECTOMY      1983-cervical cancer    INTRAOPERATIVE RADIATION THERAPY ([facility])      Radiation Therapy    KIDNEY SURGERY      MASTECTOMY Left 11/09/2006    OOPHORECTOMY Left     OTHER SURGICAL HISTORY      Chemotherapeutics    REDUCTION MAMMAPLASTY Right 2006    SENTINEL LYMPH NODE BIOPSY      SPLENECTOMY      1985-Hodgkin's Disease    SQUAMOUS CELL CARCINOMA EXCISION Left 10/9/2018    Procedure: NOSE/CHEEK [address] EXCISION; FROZEN SECTION;  Surgeon: Yee Lawson MD;  [facility];  Service: Plastics       Current Outpatient Medications   Medication Sig Dispense Refill    albuterol (PROVENTIL HFA,VENTOLIN HFA) 90 mcg/act inhaler Inhale 2 puffs every 6 (six) hours as needed for wheezing or shortness of breath 1 Inhaler 5    aspirin (ECOTRIN LOW STRENGTH) 81 mg EC tablet Take 81 mg by mouth daily      cholecalciferol (VITAMIN D3) 1,000 units tablet Take 1,000 Units by mouth daily        clobetasol (TEMOVATE) 0 05 % cream Apply topically twice daily to legs for 14 days only, DO NOT apply to face or groin  60 g 0    Fexofenadine HCl (ALLEGRA ALLERGY PO) Take by mouth      levothyroxine 88 mcg tablet Take 1 tablet (88 mcg total) by mouth daily 90 tablet 3    nebivolol (Bystolic) 5 mg tablet Take 1 tablet (5 mg total) by mouth daily 90 tablet 3    potassium chloride (K-DUR,KLOR-CON) 10 mEq tablet Take 1 tablet (10 mEq total) by mouth 2 (two) times a day 60 tablet 0    torsemide (DEMADEX) 20 mg tablet Take 1 tablet (20 mg total) by mouth 2 (two) times a day 60 tablet 0     No current facility-administered medications for this visit  Allergies   Allergen Reactions    Ciprofloxacin Other (See Comments)     Reaction Date: 24Jun2011; Hives/Uticaria    Sulfamethoxazole-Trimethoprim Hives     Patient does not remember rx    Pantoprazole Rash     vasculitis       Review of Systems    Video Exam    There were no vitals filed for this visit      Physical Exam     {covid time spent:41958}      VIRTUAL VISIT DISCLAIMER    Jesus Kay acknowledges that she has consented to an online visit or consultation  She understands that the online visit is based solely on information provided by her, and that, in the absence of a face-to-face physical evaluation by the physician, the diagnosis she receives is both limited and provisional in terms of accuracy and completeness  This is not intended to replace a full medical face-to-face evaluation by the physician  Leopoldo Disla understands and accepts these terms

## 2021-03-04 NOTE — PROGRESS NOTES
Virtual Brief Visit    Assessment/Plan:    Problem List Items Addressed This Visit        Digestive    Calculus of gallbladder without cholecystitis without obstruction       Endocrine    Hypothyroidism    Relevant Medications    nebivolol (BYSTOLIC) 2 5 mg tablet       Cardiovascular and Mediastinum    Hypertension    Relevant Medications    nebivolol (BYSTOLIC) 2 5 mg tablet    Mitral regurgitation    Relevant Medications    nebivolol (BYSTOLIC) 2 5 mg tablet    Pulmonary hypertension (HCC)    Paroxysmal supraventricular tachycardia (HCC)    Relevant Medications    nebivolol (BYSTOLIC) 2 5 mg tablet    Hypersensitivity angiitis (HCC)    Non-rheumatic tricuspid valve insufficiency    Relevant Medications    nebivolol (BYSTOLIC) 2 5 mg tablet    Nonrheumatic aortic valve insufficiency    Relevant Medications    nebivolol (BYSTOLIC) 2 5 mg tablet    Acute on chronic diastolic (congestive) heart failure (HCC) - Primary    Relevant Medications    nebivolol (BYSTOLIC) 2 5 mg tablet    Other Relevant Orders    Magnesium    Basic metabolic panel       Musculoskeletal and Integument    Osteoporosis    Relevant Orders    DXA bone density spine hip and pelvis       Other    History of breast cancer    History of Hodgkin's disease    History of pulmonary embolus (PE)    History of DVT of lower extremity    History of cervical cancer    Myeloproliferative disorder (HCC)    Relevant Orders    CBC and differential        Continue with current medications  Dose of Bystolic decreased to 2 5 mg daily  Daily weights  Recheck labs  Patient has a follow-up with Cardiology        Reason for visit is   Chief Complaint   Patient presents with    Virtual Tcm    Virtual Regular Visit    Virtual Brief Visit        Encounter provider Hilda Aguero MD    Provider located at [place] [address]  [phone]    Recent Visits  No visits were found meeting these conditions  Showing recent visits within past 7 days and meeting all other requirements     Today's Visits  Date Type Provider Dept   03/04/21 Telemedicine Wyonia Shames, MD [facility]   Showing today's visits and meeting all other requirements     Future Appointments  No visits were found meeting these conditions  Showing future appointments within next 150 days and meeting all other requirements        After connecting through telephone, the patient was identified by name and date of birth  Carlitos Meraz was informed that this is a telemedicine visit and that the visit is being conducted through telephone  My office door was closed  No one else was in the room  She acknowledged consent and understanding of privacy and security of the platform  The patient has agreed to participate and understands she can discontinue the visit at any time  Patient is aware this is a billable service  Subjective    Carlitos Meraz is a 66 y o  female  It was my intent to perform this visit via video technology but the patient was not able to do a video connection so the visit was completed via audio telephone only     Telemedicine visit-telephone  S/p admission 02/18/2021 to 02/26/2021 acute on chronic diastolic CHF  Patient presented with dyspnea on exertion orthopnea, weight gain and lower extremity edema  Known history of diastolic CHF  Admission chest x-ray no active disease  Calcified left hilar and paratracheal lymph nodes  NT BNP 19,620  EKG normal sinus rhythm  No acute changes  Nonspecific T-wave abnormality  Admission labs troponin less than 0 02   CBC WBC 4510  Hemoglobin 9 9  MCV 91  Platelet count 363,854  Chemistry profile random blood glucose 103  Electrolytes normal Magnesium 2 4  Creatinine 1 82  LFTs normal except for alkaline phosphatase 289  Elevated total protein at 8 6  Albumin low at 2 1  Patient was treated with aggressive IV diuresis    Her dose of Demadex was increased to 20 mg twice a day at discharge along with potassium supplement  Discharge BMP creatinine 1 33  Electrolytes normal except for sodium 133 and chloride 98  She did not require supplemental oxygen at discharge  She is on a fluid restrictions along with daily weights  She denies any shortness of breath  Lower extremity edema has improved  Intermittent cough at times productive of clear phlegm  No orthopnea or PND  COVID-19 testing in the hospital negative   Hypertension and CKD blood pressures on Bystolic 5 mg tablet daily  She has had a number of low BP readings  and has been holding Bystolic    27/4638 creatinine 1 41  GFR 37  Electrolytes normal except for sodium 134 and chloride 99  Hemoglobin 10 2  03/2020 creatinine 1 24  GFR 43  Electrolytes normal  Hgb 11 8  09/2019 creatinine 1 19  GFR 46  03/2018 renal ultrasound large right kidney with upper pole complex cystic mass and lower pole complex solid mass  Large right-sided staghorn calculus  No obvious hydronephrosis  Nonobstructing left renal collecting system calculus  Lower pole cortical scarring  08/2017 nuclear renal flow scan abnormal delayed perfusion and diminished functioning within the right kidney with a split function measuring only 17% on the right  83% on the left  Hyperlipidemia no longer on Simvastatin 40 mg daily  Patient stopped due to muscle pain and cramps in her fingers  Lipid profile 03/2020 cholesterol 118  HDL 58  LDL 49  LDL 57  07/2019 Cholesterol 112  Triglycerides 72  HDL 37  LDL 61  LFTs normal except for alkaline phosphatase 157  Admission 11/2020 for acute diastolic CHF  Chest x-ray at that time showed left basilar opacity and possible effusion  NT proBNP 18,716  Hemoglobin 11 3  EKG normal sinus rhythm with ST T-wave abnormalities  Echocardiogram systolic function lower limits of normal   EF 50%  Mild diffuse hypokinesis  Grade 2 diastolic dysfunction  Right ventricle mildly dilated    Right ventricular systolic function mildly reduced  Moderate MR  Moderate TR  Moderate pulmonary hypertension  No pericardial effusion  Aortic root normal size  Aortic valve no reported abnormality  Echo 01/2019 moderate to severe AR  Past Medical History:   Diagnosis Date    Acute [facility]F ([facility] ) 11/8/2020    Acute kidney injury superimposed on chronic kidney disease ([facility] ) 1/12/2019    [facility]EL[facility] 1/12/2019    Anemia     BCC (basal cell carcinoma of skin)     facial    Breast cancer ([place] [address] ) 2006    s/p mastectomy     Calculus, kidney 2013    Cancer [facility])     Breast Cancer    Cancer [facility])     Cervical Cancer    Cervical cancer ([place] [address] ) 4134    Diastolic CHF, acute ([place] [address] ) 11/8/2020    Disease of thyroid gland     Ganglion     Last Assessed:7/9/13    Heart murmur     mitral valve regurgitation    Hodgkin disease ([place] [address] )     Hydronephrosis 2013    Hyperlipidemia     Hypertension     Melanoma ([place] [address] )     NSTEMI (non-ST elevated myocardial infarction) ([facility] ) 1/12/2019    Osteopenia     Last Assessed:7/9/13    Osteoporosis     Paroxysmal supraventricular tachycardia ([place] [address] )     Protein-calorie malnutrition ([facility] ) 2/12/2020    Renal calculi     Last Assessed:3/19/14    Renal cyst     Shoulder impingement     Last Assessed:1/18/13    Syncope 7/28/2019    Last Assessment & Plan:  Pre-syncopal symptoms leading to short syncopal episode without post-ictal period  Suspect dehydration/hypovolemia  Infectious, cardiopulm or neurologic cause less likely  No arrhythmias or AV block on ECG or telemetry thus far  No PE on CTA  No clinical signs of seizure   No acute pathology or mass on CTH  - s/p IVF; encourage PO intake - continue on telemetry - TTE c/f ne    UTI (urinary tract infection) 2013    Vasculitis ([place] [address] ) 1/12/2019       Past Surgical History:   Procedure Laterality Date    APPENDECTOMY      BREAST BIOPSY Left 09/29/2006    BREAST RECONSTRUCTION      COMPLEX WOUND CLOSURE TO EXTREMITY Left 10/9/2018    Procedure: COMPLEX CLOSURE RECONSTRUCTION;  Surgeon: Arcadio Castaneda MD;  [facility];  Service: Plastics    CT BONE MARROW BIOPSY AND ASPIRATION  11/15/2019    CYSTOSCOPY W/ RETROGRADES  2009    CYSTOSCOPY W/ URETEROSCOPY  2002    EXTRACORPOREAL SHOCK WAVE LITHOTRIPSY Right 2005    FLAP LOCAL HEAD / NECK Left 10/9/2018    Procedure: FLAP RECONSTRUCTION;  Surgeon: Arcadio Castaneda MD;  [facility];  Service: Plastics    FULL THICKNESS SKIN GRAFT Left 10/9/2018    Procedure: FULL THICKNESS SKIN GRAFT RECONSTRUCTION;  Surgeon: Arcadio Castaneda MD;  [facility];  Service: Plastics    HYSTERECTOMY      1983-cervical cancer    INTRAOPERATIVE RADIATION THERAPY ([facility])      Radiation Therapy    KIDNEY SURGERY      MASTECTOMY Left 11/09/2006    OOPHORECTOMY Left     OTHER SURGICAL HISTORY      Chemotherapeutics    REDUCTION MAMMAPLASTY Right 2006    SENTINEL LYMPH NODE BIOPSY      SPLENECTOMY      1985-Hodgkin's Disease    SQUAMOUS CELL CARCINOMA EXCISION Left 10/9/2018    Procedure: NOSE/CHEEK BCC EXCISION; FROZEN SECTION;  Surgeon: Arcadio Castaneda MD;  [facility];  Service: Plastics       Current Outpatient Medications   Medication Sig Dispense Refill    albuterol (PROVENTIL HFA,VENTOLIN HFA) 90 mcg/act inhaler Inhale 2 puffs every 6 (six) hours as needed for wheezing or shortness of breath 1 Inhaler 5    aspirin (ECOTRIN LOW STRENGTH) 81 mg EC tablet Take 81 mg by mouth daily      cholecalciferol (VITAMIN D3) 1,000 units tablet Take 1,000 Units by mouth daily        clobetasol (TEMOVATE) 0 05 % cream Apply topically twice daily to legs for 14 days only, DO NOT apply to face or groin   60 g 0    Fexofenadine HCl (ALLEGRA ALLERGY PO) Take by mouth      levothyroxine 88 mcg tablet Take 1 tablet (88 mcg total) by mouth daily 90 tablet 3    nebivolol (BYSTOLIC) 2 5 mg tablet Take 1 tablet (2 5 mg total) by mouth daily 90 tablet 3    potassium chloride (K-DUR,KLOR-CON) 10 mEq tablet Take 1 tablet (10 mEq total) by mouth 2 (two) times a day 60 tablet 0    torsemide (DEMADEX) 20 mg tablet Take 1 tablet (20 mg total) by mouth 2 (two) times a day 60 tablet 0     No current facility-administered medications for this visit  Allergies   Allergen Reactions    Ciprofloxacin Other (See Comments)     Reaction Date: 24Jun2011; Hives/Uticaria    Sulfamethoxazole-Trimethoprim Hives     Patient does not remember rx    Pantoprazole Rash     vasculitis       Review of Systems   Constitutional: Positive for fatigue  Negative for appetite change, chills, fever and unexpected weight change  HENT: Negative for congestion, ear pain, rhinorrhea, sore throat, trouble swallowing and voice change  Eyes: Negative for visual disturbance  Respiratory: Positive for cough  Negative for shortness of breath and wheezing  See HPI  Admission 01/2019 for PE/DVT  CXR no active disease  EKG sinus tachycardia with non specific T wave changes inferolaterally  Troponin 0 06  Elevated D dimer at 10,000  Acute bibasilar pulmonary emboli  Mild diffuse patchy ground-glass alveolar opacity, trace pleural effusions and smooth septal thickening suggestive of pulmonary vascular congestion/fluid overload  Trace bibasilar and lingular subsegmental atelectasis  No cardiomegaly  No pericardial effusion  Aortic and coronary artery calcification  Calcified prevascular lymph nodes  Shotty bilateral hilar, left paratracheal and subcarinal lymph nodes  Small sliding hiatal hernia  Venous Dopplers normal except for left leg acute nonocclusive deep vein thrombosis in the popliteal and 1 of the paired gastrocnemius veins  Admission labs CBC WBC 9,450  Hemoglobin 10 3  MCV 85  Platelet count 640,643 Chemistry profile  blood glucose 106  Electrolytes normal except for sodium 134 and chloride 98  creatinine 1 46    LFTs normal except for AST 50 and alkaline phosphatase 218  Total protein elevated 9 6  Albumin 2 3  NT pro BNP 3,376  Repeat CBC WBC 7,740  Hgb 8 8  MCV 86  Platelets 872,433  No longer on Eliquis  On ASA 81 mg daily  Cardiovascular: Negative for chest pain, palpitations and leg swelling         07/2019 admission for syncope [facility] in [place] at a baseball game  Patient was sitting down  She had an episode of vomiting  Subsequent syncopal episode with transient LOC  Admission labs random blood glucose 101  Creatinine 1 5  Electrolytes normal   CBC WBC 11,500  Hemoglobin 9 7  MCV 88  Platelet count 564,323  Troponin negative  C-reactive protein 7 0  NT proBNP 4,916  D-dimer elevated at 1,001  EKG sinus tachycardia  Probable left atrial abnormality  Repolarization abnormality suggest ischemia, diffuse leads  Chest x-ray no consolidation or edema  Small right pleural effusion  No pneumothorax  Multiple calcified lymph nodes projecting over the mediastinum  CT scan chest with contrast no evidence of pulmonary embolus  Staghorn calculi throughout the right kidney resulting in marked obstruction of the upper pole calices  Additional nonobstructing 1 5 cm left upper pole calculus  Incompletely characterized 2 9 cm solid renal right mass  Bilateral pleural effusions with associated atelectasis  Mosaic attenuation of lung parenchyma suggesting air trapping  Cholelithiasis  Surgical changes of splenectomy with left lateral abdominal wall herniation containing nonobstructed small large bowel  Echocardiogram normal left ventricular chamber size  Mildly decreased left ventricular systolic function with segmental wall abnormalities  Basal to mid inferolateral akinesis with thinning  Basal inferior thinning and akinesis  Low normal contraction of the remaining wall  Ejection fraction 45%  Left atrium mildly dilated  Mild mitral regurgitation  Mild aortic regurgitation  Mild to moderate tricuspid regurgitation    Severe pulmonary hypertension present  01/2019 echocardiogram normal left ventricular systolic function  EF 55%  Right ventricle normal   Mild MR  Moderate to severe AR  Moderate TR with finding suggesting severe pulmonary hypertension  No pericardial effusion  Normal aortic root  Repeat echocardiogram 04/2019 normal left ventricular systolic function  EF 55%  No regional wall motion abnormalities  Left atrium mildly dilated  No atrial septal defect or PFO  Mildly dilated right atrium  Mild to moderate MR  Moderate AR  Mild to moderate tricuspid regurgitation  No pericardial effusion  Aortic root normal size  Gastrointestinal: Positive for abdominal pain  Negative for blood in stool, constipation, diarrhea, nausea and vomiting  Intermittent abdominal pain no nausea or vomiting  No reflux  Bowels normal   She has been using as needed Pepcid and Mylanta   Endocrine:        Hypothyroidism on Levothyroxine 75 mcg daily  Osteoporosis on vitamin D 1,000 IU daily  Lab Results       Component                Value               Date                       [id number]             3 675               11/09/2020               Genitourinary: Negative for difficulty urinating, dysuria, flank pain and hematuria  Admission for pyelonephritis 01/30/2020 to 02/01/2020  Patient presented with gross hematuria and back pain  No fevers  She was on Bactrim DS at the time of admission  Known history of nephrolithiasis/staghorn calculi  CT renal stone study previous multilobular cystic mass in the right kidney upper pole with scattered wall calcifications currently measuring up to 11 3 cm- previously measuring 14 cm  Large parenchymal and collecting system calculi in the interpolar and lower pole collecting system and cortices  Large staghorn calculus in the renal pelvis measuring up to 2 3 cm    Interpolar and lower pole collecting systems does not appear dilated on today's exam   Right ureter partially obscured by metallic artifact from retroperitoneal surgical clips  Left kidney cortical scarring left kidney stable  nonobstructing collecting system calculi  Urinary bladder wall thickening atelectasis both lung bases  Cholelithiasis without evidence of cholecystitis  No ascites  No lymphadenopathy  Small hiatal hernia  Partial compression deformity T11 new from prior exam 01/2019  No surrounding inflammatory changes to suggest this is acute  Admission lab CBC WBC 10,190  Creatinine 1 82 decreased to 1 51 with IV fluids  LFTs normal except for alkaline phosphatase 142  Elevated total protein 9 3  Albumin 2 8 ( SPEP 09/2019 no monoclonal bands)  Blood culture x 2 negative  Urine culture > 100,000 mixed contaminants  Patient completed antibiotics  12/2018 abdominal ultrasound markedly enlarged incompletely evaluated right kidney with complex masses  Staghorn calculus  Cholelithiasis  01/2019 CT scan renal protocol no solid enhancing renal or urothelial mass  Multifocal right renal staghorn calculi including a 2 cm pelvic calculus  Interval progressive cystic hydronephrosis of the upper pole  Stable cystic caliectasis  posterior lower pole  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  Left nephrolithiasis 1 cm and less no obstructive uropathy  No acute intra-abdominal or intrapelvic process  Cholelithiasis  12/2018 abdominal ultrasound markedly enlarged incompletely evaluated right kidney with complex masses  Staghorn calculus  Cholelithiasis  Musculoskeletal: Positive for back pain  Negative for arthralgias and myalgias  See HPI  CT renal study 01/2020 showed partial compression T11 new from 01/2020  No findings to suggest a new fracture  Skin: Negative for rash  Non pruritic rash on lower extremities at that time of admission 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   Extensive lab testing by Dermatology  history of melanoma right flank  biopsy left arm 11/2016 SCC in situ  06/2018 biopsy lesion left side of nose BCC  Allergic/Immunologic: Positive for environmental allergies  On Loratadine  Neurological: Negative for dizziness and headaches  Hematological: Negative for adenopathy  Does not bruise/bleed easily  Anemia and thrombocytopenia followed by Hematology  02/2020 CBC WBC 9970  Hemoglobin 10 5  MCV 90  Platelet count 383,294   11/2019 bone marrow biopsy hypercellular bone marrow with trilineage hyperplasia, mild atypia, mixed lymphocytosis and polytypic plasmacytosis favor reactive cannot exclude low-grade MDS  Negative for morphologic or immunophenotypic evidence of involvement by Hodgkin's lymphoma, metastatic carcinoma or melanoma  Adequate stainable storage iron  Mildly increased reticulin fibers without overt fibrosis  Negative for collagen fibrosis, granulomata, vasculitis or necrosis 10/2019 hemoccult negative  09/2019 CBC WBC 9,990  Hgb 9 5  MCV 91  Platelets 122,626  Reticulocyte count 1 45  Folate 7 1 decreased from 9 7  Vitamin 435 decreased from 656    SPEP no monoclonal bands  TESS mutation negative   Normocytic anemia dating back to 12/2018  Hgb 10 2   02/2019 anemia studies vitamin B12 656  Folic acid 9 7  Iron low at 20  TIBC 151  % saturation 13  Ferritin 260  Reticulocyte count 1 45  SPEP/immunofixation no monoclonal gammopathy  No rectal bleeding or melena  No prior colonoscopy  Patient refused GI work up  Intolerance to oral iron  She received 3 infusions of IV iron 06/2019 08/2018 CBC WBC 8,510  Hgb 12 8  MCV 89  Platelets 017,777  54/9479 CBC WBC 12,950  Hgb 10 2  MCV 85  Platelets 394,881  remote history of Hodgkin's disease  breast CA s/p left mastectomy with chemotherapy  she completed a course of Arimidex in 2011  followed by oncology        Lab Results       Component                Value               Date WBC                      5 56                02/23/2021                 HGB                      9 3 (L)             02/23/2021                 HCT                      29 5 (L)            02/23/2021                 MCV                      89                  02/23/2021                 PLT                      458 (H)             02/23/2021                            Hemoglobin       Date                     Value               Ref Range           Status                02/23/2021               9 3 (L)             11 5 - 15 4 g/*     Final                 02/21/2021               10 1 (L)            11 5 - 15 4 g/*     Final                 02/19/2021               9 2 (L)             11 5 - 15 4 g/*     Final                        Platelets       Date                     Value               Ref Range           Status                03/24/2020               448 (H)             149 - 390 Thou*     Final                 02/01/2020               439 (H)             149 - 390 Thou*     Final                 01/31/2020               436 (H)             149 - 390 Thou*              Psychiatric/Behavioral: Negative for dysphoric mood and sleep disturbance  /53 (BP Location: Left arm, Patient Position: Sitting, Cuff Size: Standard)   Wt 60 3 kg (133 lb)   LMP  (LMP Unknown)   SpO2 97%   BMI 25 13 kg/m²         I spent 11 minutes directly with the patient during this visit    Terrance Banegas U  16  acknowledges that she has consented to an online visit or consultation  She understands that the online visit is based solely on information provided by her, and that, in the absence of a face-to-face physical evaluation by the physician, the diagnosis she receives is both limited and provisional in terms of accuracy and completeness  This is not intended to replace a full medical face-to-face evaluation by the physician  Crissy Browne understands and accepts these terms

## 2021-03-11 NOTE — TELEPHONE ENCOUNTER
Call I know you just called her re CBC  Other labs reviewed  Creatinine or kidney function elevated at 1 82 normal 0 6 to 1 30  Sodium mildly decreased at 135 normal 136 to 145  Both of these are likely secondary to diuretic use  I would not change her dose of diuretic at this time  Recent admission for CHF  She is scheduled to Cardiology on 03/15/2021  TSH abnormal indicating need to increase dose of Levothyroxine  Current dose 75 mcg daily  Dose can be increased to 88 mcg daily  Repeat TSH in 6 weeks       Recent Results (from the past 168 hour(s))   TSH, 3rd generation with Free T4 reflex    Collection Time: 03/11/21  7:26 AM   Result Value Ref Range    TSH 3RD GENERATON 8 258 (H) 0 358 - 3 740 uIU/mL   CBC and differential    Collection Time: 03/11/21  7:26 AM   Result Value Ref Range    WBC 8 52 4 31 - 10 16 Thousand/uL    RBC 3 40 (L) 3 81 - 5 12 Million/uL    Hemoglobin 9 3 (L) 11 5 - 15 4 g/dL    Hematocrit 30 4 (L) 34 8 - 46 1 %    MCV 89 82 - 98 fL    MCH 27 4 26 8 - 34 3 pg    MCHC 30 6 (L) 31 4 - 37 4 g/dL    RDW 15 8 (H) 11 6 - 15 1 %    MPV 9 0 8 9 - 12 7 fL    Platelets 338 (H) 989 - 390 Thousands/uL    nRBC 0 /100 WBCs    Neutrophils Relative 85 (H) 43 - 75 %    Immat GRANS % 1 0 - 2 %    Lymphocytes Relative 4 (L) 14 - 44 %    Monocytes Relative 7 4 - 12 %    Eosinophils Relative 2 0 - 6 %    Basophils Relative 1 0 - 1 %    Neutrophils Absolute 7 26 1 85 - 7 62 Thousands/µL    Immature Grans Absolute 0 06 0 00 - 0 20 Thousand/uL    Lymphocytes Absolute 0 35 (L) 0 60 - 4 47 Thousands/µL    Monocytes Absolute 0 62 0 17 - 1 22 Thousand/µL    Eosinophils Absolute 0 18 0 00 - 0 61 Thousand/µL    Basophils Absolute 0 05 0 00 - 0 10 Thousands/µL   Magnesium    Collection Time: 03/11/21  7:26 AM   Result Value Ref Range    Magnesium 2 2 1 6 - 2 6 mg/dL   Basic metabolic panel    Collection Time: 03/11/21  7:26 AM   Result Value Ref Range    Sodium 135 (L) 136 - 145 mmol/L    Potassium 4 4 3 5 - 5 3 mmol/L Chloride 99 (L) 100 - 108 mmol/L    CO2 29 21 - 32 mmol/L    ANION GAP 7 4 - 13 mmol/L    BUN 38 (H) 5 - 25 mg/dL    Creatinine 1 82 (H) 0 60 - 1 30 mg/dL    Glucose, Fasting 82 65 - 99 mg/dL    Calcium 8 7 8 3 - 10 1 mg/dL    eGFR 27 ml/min/1 73sq m

## 2021-03-11 NOTE — TELEPHONE ENCOUNTER
Spoke to patient   She states that prior to having blood work done she was not taking her Levothyroxine correctly  She prefers to stay on 88 mcg and have repeat blood work in 6 weeks

## 2021-03-11 NOTE — TELEPHONE ENCOUNTER
Call re repeat CBC Hgb 9 3  normal range 11 5 to 15 4  Hgb 9 3 to 10 1 range last month  Not sure when she is scheduled to see her Hematologist again   If she does not have an upcoming appt I would repeat a CBC in one month     Hemoglobin   Date Value Ref Range Status   03/11/2021 9 3 (L) 11 5 - 15 4 g/dL Final   02/23/2021 9 3 (L) 11 5 - 15 4 g/dL Final   02/21/2021 10 1 (L) 11 5 - 15 4 g/dL Final         Recent Results (from the past 168 hour(s))   CBC and differential    Collection Time: 03/11/21  7:26 AM   Result Value Ref Range    WBC 8 52 4 31 - 10 16 Thousand/uL    RBC 3 40 (L) 3 81 - 5 12 Million/uL    Hemoglobin 9 3 (L) 11 5 - 15 4 g/dL    Hematocrit 30 4 (L) 34 8 - 46 1 %    MCV 89 82 - 98 fL    MCH 27 4 26 8 - 34 3 pg    MCHC 30 6 (L) 31 4 - 37 4 g/dL    RDW 15 8 (H) 11 6 - 15 1 %    MPV 9 0 8 9 - 12 7 fL    Platelets 303 (H) 179 - 390 Thousands/uL    nRBC 0 /100 WBCs    Neutrophils Relative 85 (H) 43 - 75 %    Immat GRANS % 1 0 - 2 %    Lymphocytes Relative 4 (L) 14 - 44 %    Monocytes Relative 7 4 - 12 %    Eosinophils Relative 2 0 - 6 %    Basophils Relative 1 0 - 1 %    Neutrophils Absolute 7 26 1 85 - 7 62 Thousands/µL    Immature Grans Absolute 0 06 0 00 - 0 20 Thousand/uL    Lymphocytes Absolute 0 35 (L) 0 60 - 4 47 Thousands/µL    Monocytes Absolute 0 62 0 17 - 1 22 Thousand/µL    Eosinophils Absolute 0 18 0 00 - 0 61 Thousand/µL    Basophils Absolute 0 05 0 00 - 0 10 Thousands/µL

## 2021-03-12 NOTE — PROGRESS NOTES
Heart Failure Outpatient Care Manager: Patient continues to be engaged in self-care and is attempting to maintain a low sodium diet  She is following strategies to monitor for signs and symptoms of heart failure  We discussed alerting her healthcare provider of any symptoms or concerns  She welcomes continued outreach

## 2021-03-19 NOTE — PROGRESS NOTES
Heart Failure Outpatient Care Manager: Call to patient who continued to be very engaged in self-care and is without complaints of signs or symptoms of heart failure  She welcomes continued outreach

## 2021-03-24 NOTE — TELEPHONE ENCOUNTER
Patient called with update  She stated the last 3 to 4 days she has had no appetite  She would like to know if the Torsemide might be having an affect on her  Please advise   [phone]

## 2021-03-24 NOTE — TELEPHONE ENCOUNTER
Call loss of appetite can be a result of many different causes including Torsemide  She can try taking with food   Follow-up if symptoms persist

## 2021-03-25 NOTE — TELEPHONE ENCOUNTER
Patient informed,states that she has some nausea and loss of appetite  after second dose of Torsemide  States kip she does take second dose with food

## 2021-03-25 NOTE — TELEPHONE ENCOUNTER
Call She had an admission 02/2021 for acute exacerbation of CHF-her dose of diuretic was increased to twice a day at that time  I am reluctant to change her dosage to avoid readmission for CHF    Not sure when she is scheduled to see our cardiologist again

## 2021-03-26 NOTE — TELEPHONE ENCOUNTER
NEAL: Patient informed, states that her  had a GI virus and she had some nausea/diarrhea yesterday and today she feels better and has an appetite  Has an upcoming appointment in April with cardiology

## 2021-04-01 NOTE — PROGRESS NOTES
Heart Failure Outpatient Care Manager: Patient continues to follow strategies for self-care and monitoring for signs and symptoms of heart failure  She did state she is not sleeping well but unrelated to any problems with shortness of breath  The patient is without symptoms of heart failure and is able to verbalize the need to call her healthcare provider with any quick weight gain or symptoms and welcomes continued outreach

## 2021-04-07 NOTE — TELEPHONE ENCOUNTER
Esau Martins is taking torsemide 20 mg BID  She said she has developed a rash on different parts of her body over the last few weeks, particularly severe on her arm  She believes it is from Torsemide  Looks like she has been taking torsemide since at least Feb, but she said the dose was increased  She said her weight is stable and she asked if she can just take it once a day  Wt stays bet 131-134 lbs  She also asked for a BMP order as she is worried about her kidney function  Next ov with you is on 5/11  Please advise

## 2021-04-08 NOTE — TELEPHONE ENCOUNTER
I spoke with Lindsey Pederson and advised  Will d/c Torsemide and begin Bumex 2 mg BID  BMP order in  Pt verbalized understanding of all instructions

## 2021-04-19 NOTE — TELEPHONE ENCOUNTER
Candy Gomes called back on the nurse line  I gave her the message from Dr Jovita Hassan regarding result  She verbalized understanding

## 2021-04-19 NOTE — TELEPHONE ENCOUNTER
Attempted to contact patient to make her aware  Provided name and call back number for patient to return call

## 2021-04-23 NOTE — TELEPHONE ENCOUNTER
Pt calling in trying to reach Dr Medraon Sin office  States she has had a 5 lb weight gain within the last 3-4 days  Denies chest pain & denies SOB  Let pt know I would need to forward this information over to her Cardiology office since we do not actually triage for this office  Pt verbalized understanding  Called Cardiology [facility] back line and information given to  support staff who stated she would give information to the nurse right away

## 2021-04-23 NOTE — TELEPHONE ENCOUNTER
Call re labs TSH or thyroid test still slightly underactive  She is on Levothyroxine 88 mcg daily  Dose can be increased to 100 mcg daily with a repeat TSH in 6 weeks Her CBC continues to show anemia with a Hgb 9 1 range 11 5 to 15  4  not sure when she is scheduled to see Hematology in follow up     Recent Results (from the past 672 hour(s))   TSH, 3rd generation with Free T4 reflex    Collection Time: 04/16/21  7:41 AM   Result Value Ref Range    TSH 3RD GENERATON 5 930 (H) 0 358 - 3 740 uIU/mL   Basic metabolic panel    Collection Time: 04/16/21  7:41 AM   Result Value Ref Range    Sodium 134 (L) 136 - 145 mmol/L    Potassium 4 1 3 5 - 5 3 mmol/L    Chloride 100 100 - 108 mmol/L    CO2 28 21 - 32 mmol/L    ANION GAP 6 4 - 13 mmol/L    BUN 51 (H) 5 - 25 mg/dL    Creatinine 1 77 (H) 0 60 - 1 30 mg/dL    Glucose, Fasting 89 65 - 99 mg/dL    Calcium 8 2 (L) 8 3 - 10 1 mg/dL    eGFR 28 ml/min/1 73sq m   CBC and differential    Collection Time: 04/16/21  7:41 AM   Result Value Ref Range    WBC 11 80 (H) 4 31 - 10 16 Thousand/uL    RBC 3 29 (L) 3 81 - 5 12 Million/uL    Hemoglobin 9 1 (L) 11 5 - 15 4 g/dL    Hematocrit 28 9 (L) 34 8 - 46 1 %    MCV 88 82 - 98 fL    MCH 27 7 26 8 - 34 3 pg    MCHC 31 5 31 4 - 37 4 g/dL    RDW 16 0 (H) 11 6 - 15 1 %    MPV 9 1 8 9 - 12 7 fL    Platelets 645 (H) 915 - 390 Thousands/uL    nRBC 0 /100 WBCs    Neutrophils Relative 89 (H) 43 - 75 %    Immat GRANS % 1 0 - 2 %    Lymphocytes Relative 3 (L) 14 - 44 %    Monocytes Relative 6 4 - 12 %    Eosinophils Relative 1 0 - 6 %    Basophils Relative 0 0 - 1 %    Neutrophils Absolute 10 55 (H) 1 85 - 7 62 Thousands/µL    Immature Grans Absolute 0 07 0 00 - 0 20 Thousand/uL    Lymphocytes Absolute 0 40 (L) 0 60 - 4 47 Thousands/µL    Monocytes Absolute 0 67 0 17 - 1 22 Thousand/µL    Eosinophils Absolute 0 09 0 00 - 0 61 Thousand/µL    Basophils Absolute 0 02 0 00 - 0 10 Thousands/µL   T4, free    Collection Time: 04/16/21  7:41 AM   Result Value Ref Range    Free T4 1 42 0 76 - 1 46 ng/dL     Hemoglobin   Date Value Ref Range Status   04/16/2021 9 1 (L) 11 5 - 15 4 g/dL Final   03/11/2021 9 3 (L) 11 5 - 15 4 g/dL Final   02/23/2021 9 3 (L) 11 5 - 15 4 g/dL Final     Creatinine   Date Value Ref Range Status   04/16/2021 1 77 (H) 0 60 - 1 30 mg/dL Final     Comment:     Standardized to IDMS reference method   03/11/2021 1 82 (H) 0 60 - 1 30 mg/dL Final     Comment:     Standardized to IDMS reference method   02/26/2021 1 66 (H) 0 60 - 1 30 mg/dL Final     Comment:     Standardized to IDMS reference method

## 2021-04-23 NOTE — TELEPHONE ENCOUNTER
Patient informed  Patient had to cancel previous appt for Hematology due to being in hospital  Patient fell at home after first COVID shot from feeling weak  Patient is going to make f/u with hematology asap   Would like new levo to go to [facility] [place]

## 2021-04-23 NOTE — TELEPHONE ENCOUNTER
Scheduling Appointment     Who Is Calling to Schedule  patient   Doctor Dr Rafael Quintanilla   Date and Time 04/29/21 @840am         Patient verbalized understanding    yes

## 2021-04-23 NOTE — TELEPHONE ENCOUNTER
Regarding: chf exacerbation   ----- Message from Maurice Garner RN sent at 4/23/2021  8:02 AM EDT -----  "I have had a weight gain of ten pounds over the last week   My breathing is okay but I am weak and had a fall last week "

## 2021-04-23 NOTE — TELEPHONE ENCOUNTER
It looks like she takes bumex 2 mg bid    I would recommend metolazone 2 5 mg tomorrow am 30 minutes prior the am bumex dose and she should also take an additional potassium tablet

## 2021-04-25 NOTE — ED PROVIDER NOTES
History  Chief Complaint   Patient presents with    Back Pain     Pt arrives via EMS with complaint of pain between the shoulder blades and a low BP with Hx of CHF  History provided by:  Patient and spouse   used: No    55-year-old female with history of CHF, CKD presented for evaluation after developing some mid scapular pain this evening  She reports some dyspnea as well with a cough some pain with inspiration  Appears very uncomfortable  Breath sounds are clear  1+ pitting edema in the legs  She reports a 10 lb weight gain over the last week or so  She is had adverse reactions to multiple diuretics and is currently on Zaroxolyn and Bumex  Notes that she urinated a lot yesterday and today  Denies any direct trauma  She does have cough and back is very painful with coughing and with movement  No reproducible tenderness  Breath sounds are clear  Prior to Admission Medications   Prescriptions Last Dose Informant Patient Reported? Taking? Fexofenadine HCl (ALLEGRA ALLERGY PO) Past Week at Unknown time Self Yes Yes   Sig: Take by mouth   albuterol (PROVENTIL HFA,VENTOLIN HFA) 90 mcg/act inhaler 4/25/2021 at Unknown time Self No Yes   Sig: Inhale 2 puffs every 6 (six) hours as needed for wheezing or shortness of breath   aspirin (ECOTRIN LOW STRENGTH) 81 mg EC tablet Past Month at Unknown time Self Yes Yes   Sig: Take 81 mg by mouth daily   bumetanide (BUMEX) 2 mg tablet 4/25/2021 at Unknown time  No Yes   Sig: Take 1 tablet (2 mg total) by mouth 2 (two) times a day   cholecalciferol (VITAMIN D3) 1,000 units tablet Not Taking at Unknown time Self Yes No   Sig: Take 1,000 Units by mouth daily     clobetasol (TEMOVATE) 0 05 % cream Not Taking at Unknown time Self No No   Sig: Apply topically twice daily to legs for 14 days only, DO NOT apply to face or groin     Patient not taking: Reported on 4/26/2021   levothyroxine 100 mcg tablet 4/25/2021 at Unknown time  No Yes   Sig: Take 1 tablet (100 mcg total) by mouth daily   metolazone (ZAROXOLYN) 2 5 mg tablet Unknown at Unknown time  No No   Sig: Take 1 tablet (2 5 mg total) by mouth daily Take 1 tablet 30 minutes prior to taking Bumex only when instructed by physician  nebivolol (BYSTOLIC) 2 5 mg tablet Past Week at Unknown time Self No Yes   Sig: Take 1 tablet (2 5 mg total) by mouth daily   Patient taking differently: Take 2 5 mg by mouth daily Patient states as needed   potassium chloride (K-DUR,KLOR-CON) 10 mEq tablet   No No   Sig: Take 1 tablet (10 mEq total) by mouth 2 (two) times a day      Facility-Administered Medications: None       Past Medical History:   Diagnosis Date    Acute CHF ([facility] ) 11/8/2020    Acute kidney injury superimposed on chronic kidney disease ([facility] ) 1/12/2019    ENEDELIA (acute kidney injury) ([facility] ) 1/12/2019    Anemia     BCC (basal cell carcinoma of skin)     facial    Breast cancer ([facility] ) 2006    s/p mastectomy     Calculus, kidney 2013    Cancer [facility])     Breast Cancer    Cancer [facility])     Cervical Cancer    Cervical cancer ([facility] ) 7044    Diastolic CHF, acute ([place] [address] ) 11/8/2020    Disease of thyroid gland     Ganglion     Last Assessed:7/9/13    Heart murmur     mitral valve regurgitation    Hodgkin disease ([facility] )     Hydronephrosis 2013    Hyperlipidemia     Hypertension     [facility]     NSTEMI (non-ST elevated myocardial infarction) ([facility] ) 1/12/2019    Osteopenia     Last Assessed:7/9/13    Osteoporosis     Paroxysmal supraventricular tachycardia ([facility] )     Protein-calorie malnutrition ([facility] ) 2/12/2020    Renal calculi     Last Assessed:3/19/14    Renal cyst     Shoulder impingement     Last Assessed:1/18/13    Syncope 7/28/2019    Last Assessment & Plan:  Pre-syncopal symptoms leading to short syncopal episode without post-ictal period  Suspect dehydration/hypovolemia  Infectious, cardiopulm or neurologic cause less likely  No arrhythmias or AV block on ECG or telemetry thus far   No PE on CTA  No clinical signs of seizure   No acute pathology or mass on CTH  - s/p IVF; encourage PO intake - continue on telemetry - TTE c/f ne    UTI (urinary tract infection) 2013    Vasculitis ([facility] ) 1/12/2019       Past Surgical History:   Procedure Laterality Date    APPENDECTOMY      BREAST BIOPSY Left 09/29/2006    BREAST RECONSTRUCTION      COMPLEX WOUND CLOSURE TO EXTREMITY Left 10/9/2018    Procedure: COMPLEX CLOSURE RECONSTRUCTION;  Surgeon: Juan Luis Vincent MD;  [facility];  Service: Plastics    CT BONE MARROW BIOPSY AND ASPIRATION  11/15/2019    CYSTOSCOPY W/ RETROGRADES  2009    CYSTOSCOPY W/ URETEROSCOPY  2002    EXTRACORPOREAL SHOCK WAVE LITHOTRIPSY Right 2005    FLAP LOCAL HEAD / NECK Left 10/9/2018    Procedure: FLAP RECONSTRUCTION;  Surgeon: Juan Luis Vincent MD;  [facility];  Service: Plastics    FULL THICKNESS SKIN GRAFT Left 10/9/2018    Procedure: FULL THICKNESS SKIN GRAFT RECONSTRUCTION;  Surgeon: Juan Luis Vincent MD;  [facility];  Service: Plastics    HYSTERECTOMY      1983-cervical cancer    INTRAOPERATIVE RADIATION THERAPY ([facility])      Radiation Therapy    IR DRAINAGE TUBE PLACEMENT  4/27/2021    KIDNEY SURGERY      MASTECTOMY Left 11/09/2006    OOPHORECTOMY Left     OTHER SURGICAL HISTORY      Chemotherapeutics    REDUCTION MAMMAPLASTY Right 2006    SENTINEL LYMPH NODE BIOPSY      SPLENECTOMY      1985-Hodgkin's Disease    SQUAMOUS CELL CARCINOMA EXCISION Left 10/9/2018    Procedure: NOSE/CHEEK BCC EXCISION; FROZEN SECTION;  Surgeon: Juan Luis Vincent MD;  [facility];  Service: Plastics       Family History   Problem Relation Age of Onset    Leukemia Mother     Colon cancer Paternal Grandmother 80    Heart disease Family     Heart attack Father     Coronary artery disease Father         stent- five    Hypertension Father     Colon cancer Father 80    No Known Problems Maternal Grandmother     No Known Problems Maternal Grandfather     No Known Problems Paternal Grandfather     No Known Problems Paternal Aunt     Stroke Neg Hx     Anuerysm Neg Hx     Clotting disorder Neg Hx     Arrhythmia Neg Hx     Heart failure Neg Hx     Hyperlipidemia Neg Hx      I have reviewed and agree with the history as documented  E-Cigarette/Vaping    E-Cigarette Use Never User      E-Cigarette/Vaping Substances    Nicotine No     THC No     CBD No     Flavoring No     Other No     Unknown No      Social History     Tobacco Use    Smoking status: Former Smoker     Quit date: 2011     Years since quitting: 10 3    Smokeless tobacco: Never Used   Substance Use Topics    Alcohol use: Not Currently     Frequency: Monthly or less     Comment: caffeine use; social drinker-1 beer every 2/3 months    Drug use: Never       Review of Systems   Constitutional: Negative for activity change, appetite change, fatigue and fever  Respiratory: Positive for cough and shortness of breath  Negative for chest tightness  Cardiovascular: Negative for chest pain  Gastrointestinal: Negative for abdominal pain and nausea  Genitourinary: Negative for difficulty urinating and dysuria  Musculoskeletal: Positive for back pain  Negative for neck pain  Skin: Negative for color change and wound  Neurological: Negative for dizziness, weakness, numbness and headaches  All other systems reviewed and are negative  Physical Exam  Physical Exam  Vitals signs and nursing note reviewed  Constitutional:       Comments: Appears uncomfortable  Pain with movement in the bed  HENT:      Head: Normocephalic and atraumatic  Neck:      Musculoskeletal: Normal range of motion and neck supple  Cardiovascular:      Rate and Rhythm: Normal rate and regular rhythm  Pulmonary:      Effort: Pulmonary effort is normal       Breath sounds: Normal breath sounds  Chest:      Chest wall: No tenderness     Abdominal:      General: There is no distension  Palpations: Abdomen is soft  Tenderness: There is no abdominal tenderness  Musculoskeletal: Normal range of motion  Comments: No thoracic or lumbar spine tenderness  Skin:     General: Skin is warm and dry  Neurological:      General: No focal deficit present  Mental Status: She is alert     Psychiatric:         Mood and Affect: Mood normal          Behavior: Behavior normal          Vital Signs  ED Triage Vitals   Temperature Pulse Respirations Blood Pressure SpO2   04/25/21 1820 04/25/21 1818 04/25/21 1818 04/25/21 1818 04/25/21 1818   98 °F (36 7 °C) 83 18 132/84 100 %      Temp Source Heart Rate Source Patient Position - Orthostatic VS BP Location FiO2 (%)   04/25/21 1820 04/25/21 1814 04/25/21 1818 04/25/21 1818 --   Oral Monitor Lying Right arm       Pain Score       04/25/21 1818       7           Vitals:    04/27/21 1941 04/27/21 2042 04/27/21 2055 04/27/21 2100   BP: (!) 92/42 115/72 (!) 105/48 (!) 100/44   Pulse: 84 102 101 97   Patient Position - Orthostatic VS:             Visual Acuity  Visual Acuity      Most Recent Value   L Pupil Size (mm)  4   R Pupil Size (mm)  4   L Pupil Shape  Round   R Pupil Shape  Round          ED Medications  Medications   lidocaine (PF) (XYLOCAINE-MPF) 1 % injection **ADS Override Pull** (  Hold 4/26/21 0601)   HYDROmorphone (DILAUDID) injection 0 5 mg (0 5 mg Intravenous Given 4/25/21 1947)   heparin (porcine) injection 5,200 Units (5,200 Units Intravenous Given 4/25/21 2114)   sodium chloride 0 9 % bolus 500 mL (0 mL Intravenous Stopped 4/25/21 2231)   sodium chloride 0 9 % bolus 500 mL (0 mL Intravenous Stopped 4/25/21 2334)   albumin human (FLEXBUMIN) 5 % injection 12 5 g (0 g Intravenous Stopped 4/26/21 0312)   diphenhydrAMINE (BENADRYL) injection 25 mg (25 mg Intravenous Given 4/26/21 0352)   multi-electrolyte (ISOLYTE-S PH 7 4) bolus 500 mL (0 mL Intravenous Stopped 4/26/21 0600)   HYDROmorphone (DILAUDID) injection 0 5 mg (0 5 mg Intravenous Given 4/26/21 0604)   dextrose 50 % IV solution 50 mL (50 mL Intravenous Given 4/26/21 2239)   sodium bicarbonate 8 4 % injection 50 mEq (50 mEq Intravenous Given 4/26/21 2306)   dextrose 50 % IV solution 50 mL (50 mL Intravenous Given 4/26/21 2306)   insulin regular (HumuLIN R,NovoLIN R) injection 10 Units (10 Units Intravenous Given 4/26/21 2306)   sodium bicarbonate 8 4 % injection 50 mEq (50 mEq Intravenous Given 4/27/21 1013)   bumetanide (BUMEX) injection 2 mg (2 mg Intravenous Given 4/27/21 1456)   fentanyl citrate (PF) 100 MCG/2ML (25 mcg Intravenous Given 4/27/21 1416)   hydrocortisone sodium succinate (PF) (Solu-CORTEF) injection 100 mg (100 mg Intravenous Given 4/27/21 1507)   calcium gluconate 1 g in sodium chloride 0 9% 50 mL (premix) (0 g Intravenous Stopped 4/27/21 1741)   sodium bicarbonate 8 4 % injection 50 mEq (50 mEq Intravenous Given 4/27/21 1649)   lidocaine (PF) (XYLOCAINE-MPF) 1 % injection **ADS Override Pull** (50 mg  Given 4/27/21 1930)       Diagnostic Studies  Results Reviewed     Procedure Component Value Units Date/Time    Blood culture [[id number]] Collected: 04/26/21 1037    Lab Status: Final result Specimen: Blood from Line, Arterial Updated: 05/01/21 1502     Blood Culture No Growth After 5 Days  Blood culture [[id number]] Collected: 04/26/21 7997    Lab Status: Final result Specimen: Blood from Arm, Right Updated: 05/01/21 1101     Blood Culture No Growth After 5 Days      Urine culture [[id number]]  (Abnormal)  (Susceptibility) Collected: 04/26/21 0127    Lab Status: Final result Specimen: Urine, Clean Catch Updated: 04/28/21 0739     Urine Culture >100,000 cfu/ml Escherichia coli    Susceptibility     Escherichia coli (1)     Antibiotic Interpretation Microscan Method Status    ZID Performed  Yes  SAMIRA Final    Ampicillin ($$) Susceptible <=8 00 ug/ml SAMIRA Final    Aztreonam ($$$)  Susceptible <=4 ug/ml SAMIRA Final    Cefazolin ($) Susceptible <=2 00 ug/ml SAMIRA Final Ciprofloxacin ($)  Susceptible <=1 00 ug/ml SAMIRA Final    Gentamicin ($$) Susceptible <=1 ug/ml SAMIRA Final    Levofloxacin ($) Susceptible <=0 25 ug/ml SAMIRA Final    Nitrofurantoin Susceptible <=32 ug/ml SAMIRA Final    Tetracycline Resistant >8 ug/ml SAMIRA Final    Tobramycin ($) Susceptible <=1 ug/ml SAMIRA Final    Trimethoprim + Sulfamethoxazole ($$$) Susceptible <=2/38 ug/ml SAMIRA Final                   Basic metabolic panel [[id number]]  (Abnormal) Collected: 04/26/21 1037    Lab Status: Final result Specimen: Blood from Arm, Right Updated: 04/26/21 1126     Sodium 134 mmol/L      Potassium 5 2 mmol/L      Chloride 100 mmol/L      CO2 21 mmol/L      ANION GAP 13 mmol/L      BUN 90 mg/dL      Creatinine 3 29 mg/dL      Glucose 87 mg/dL      Calcium 8 4 mg/dL      eGFR 13 ml/min/1 73sq m     Narrative:      [place] guidelines for Chronic Kidney Disease (CKD):     Stage 1 with normal or high GFR (GFR > 90 mL/min/1 73 square meters)    Stage 2 Mild CKD (GFR = 60-89 mL/min/1 73 square meters)    Stage 3A Moderate CKD (GFR = 45-59 mL/min/1 73 square meters)    Stage 3B Moderate CKD (GFR = 30-44 mL/min/1 73 square meters)    Stage 4 Severe CKD (GFR = 15-29 mL/min/1 73 square meters)    Stage 5 End Stage CKD (GFR <15 mL/min/1 73 square meters)  Note: GFR calculation is accurate only with a steady state creatinine    Magnesium [[id number]]  (Normal) Collected: 04/26/21 1037    Lab Status: Final result Specimen: Blood from Arm, Right Updated: 04/26/21 1126     Magnesium 2 6 mg/dL     Phosphorus [[id number]]  (Abnormal) Collected: 04/26/21 1037    Lab Status: Final result Specimen: Blood from Arm, Right Updated: 04/26/21 1126     Phosphorus 6 8 mg/dL     Procalcitonin with AM Reflex [[id number]]  (Abnormal) Collected: 04/26/21 0319    Lab Status: Final result Specimen: Blood from Arm, Right Updated: 04/26/21 1121     Procalcitonin 0 28 ng/ml     CBC and differential [[id number]]  (Abnormal) Collected: 04/26/21 1037    Lab Status: Final result Specimen: Blood from Arm, Right Updated: 04/26/21 1050     WBC 11 70 Thousand/uL      RBC 3 03 Million/uL      Hemoglobin 8 4 g/dL      Hematocrit 26 1 %      MCV 86 fL      MCH 27 7 pg      MCHC 32 2 g/dL      RDW 16 6 %      MPV 9 7 fL      Platelets 746 Thousands/uL      nRBC 0 /100 WBCs      Neutrophils Relative 89 %      Immat GRANS % 1 %      Lymphocytes Relative 3 %      Monocytes Relative 7 %      Eosinophils Relative 0 %      Basophils Relative 0 %      Neutrophils Absolute 10 38 Thousands/µL      Immature Grans Absolute 0 09 Thousand/uL      Lymphocytes Absolute 0 36 Thousands/µL      Monocytes Absolute 0 83 Thousand/µL      Eosinophils Absolute 0 01 Thousand/µL      Basophils Absolute 0 03 Thousands/µL     Urea nitrogen, urine [[id number]] Collected: 04/26/21 0127    Lab Status: Final result Specimen: Urine, Clean Catch Updated: 04/26/21 0619     Urea Nitrogen, Ur 294 mg/dL     Lactic acid, plasma [[id number]]  (Normal) Collected: 04/26/21 0319    Lab Status: Final result Specimen: Blood from Arm, Right Updated: 04/26/21 0349     LACTIC ACID 2 0 mmol/L     Narrative:      Result may be elevated if tourniquet was used during collection      APTT [[id number]]  (Normal) Collected: 04/26/21 0319    Lab Status: Final result Specimen: Blood from Arm, Right Updated: 04/26/21 0342     PTT 32 seconds     Urinalysis with microscopic [[id number]]  (Abnormal) Collected: 04/26/21 0127    Lab Status: Final result Specimen: Urine, Clean Catch Updated: 04/26/21 0142     Clarity, UA Cloudy     Color, UA Yellow     Specific Gravity, UA 1 020     pH, UA 5 5     Glucose, UA Negative mg/dl      Ketones, UA Negative mg/dl      Blood, UA Moderate     Protein, UA 30 (1+) mg/dl      Nitrite, UA Positive     Bilirubin, UA Negative     Urobilinogen, UA 0 2 E U /dl      Leukocytes, UA Large     WBC, UA Innumerable /hpf      RBC, UA 1-2 /hpf      Bacteria, UA Moderate /hpf      Epithelial Cells None Seen /hpf     Creatinine, urine, random [[id number]] Collected: 04/26/21 0127    Lab Status: Final result Specimen: Urine, Clean Catch Updated: 04/26/21 0140     Creatinine, Ur 32 0 mg/dL     Osmolality, urine [[id number]]  (Normal) Collected: 04/26/21 0127    Lab Status: Final result Specimen: Urine, Clean Catch Updated: 04/26/21 0140     Osmolality, Ur 346 mmol/KG     Novel Coronavirus (Covid-19),PCR [facility] - 2 Hour Stat [[id number]]  (Normal) Collected: 04/25/21 2213    Lab Status: Final result Specimen: Nares from Nose Updated: 04/25/21 2321     SARS-CoV-2 Negative    Narrative: The specimen collection materials, transport medium, and/or testing methodology utilized in the production of these test results have been proven to be reliable in a limited validation with an abbreviated program under the Emergency Utilization Authorization provided by the FDA  Testing reported as "Presumptive positive" will be confirmed with secondary testing to ensure result accuracy  Clinical caution and judgement should be used with the interpretation of these results with consideration of the clinical impression and other laboratory testing  Testing reported as "Positive" or "Negative" has been proven to be accurate according to standard laboratory validation requirements  All testing is performed with control materials showing appropriate reactivity at standard intervals        NT-BNP PRO [[id number]]  (Abnormal) Collected: 04/25/21 1947    Lab Status: Final result Specimen: Blood from Arm, Left Updated: 04/25/21 2117     NT-proBNP 38,524 pg/mL     D-Dimer [[id number]]  (Abnormal) Collected: 04/25/21 1947    Lab Status: Final result Specimen: Blood from Arm, Left Updated: 04/25/21 2019     D-Dimer, Quant 9 20 ug/ml FEU     Troponin I [[id number]]  (Normal) Collected: 04/25/21 1947    Lab Status: Final result Specimen: Blood from Arm, Left Updated: 04/25/21 2015     Troponin I <0 02 ng/mL     Comprehensive metabolic panel [[id number]]  (Abnormal) Collected: 04/25/21 1947    Lab Status: Final result Specimen: Blood from Arm, Left Updated: 04/25/21 2013     Sodium 132 mmol/L      Potassium 4 3 mmol/L      Chloride 97 mmol/L      CO2 26 mmol/L      ANION GAP 9 mmol/L      BUN 92 mg/dL      Creatinine 3 21 mg/dL      Glucose 133 mg/dL      Calcium 8 8 mg/dL      Corrected Calcium 10 6 mg/dL      AST 34 U/L      ALT 20 U/L      Alkaline Phosphatase 445 U/L      Total Protein 9 3 g/dL      Albumin 1 7 g/dL      Total Bilirubin 0 44 mg/dL      eGFR 14 ml/min/1 73sq m     Narrative:      [employer] guidelines for Chronic Kidney Disease (CKD):     Stage 1 with normal or high GFR (GFR > 90 mL/min/1 73 square meters)    Stage 2 Mild CKD (GFR = 60-89 mL/min/1 73 square meters)    Stage 3A Moderate CKD (GFR = 45-59 mL/min/1 73 square meters)    Stage 3B Moderate CKD (GFR = 30-44 mL/min/1 73 square meters)    Stage 4 Severe CKD (GFR = 15-29 mL/min/1 73 square meters)    Stage 5 End Stage CKD (GFR <15 mL/min/1 73 square meters)  Note: GFR calculation is accurate only with a steady state creatinine    Protime-INR [[id number]]  (Abnormal) Collected: 04/25/21 1947    Lab Status: Final result Specimen: Blood from Arm, Left Updated: 04/25/21 2010     Protime 15 1 seconds      INR 1 17    APTT [[id number]]  (Normal) Collected: 04/25/21 1947    Lab Status: Final result Specimen: Blood from Arm, Left Updated: 04/25/21 2010     PTT 33 seconds     CBC and differential [[id number]]  (Abnormal) Collected: 04/25/21 1947    Lab Status: Final result Specimen: Blood from Arm, Left Updated: 04/25/21 1958     WBC 10 18 Thousand/uL      RBC 3 01 Million/uL      Hemoglobin 8 4 g/dL      Hematocrit 26 0 %      MCV 86 fL      MCH 27 9 pg      MCHC 32 3 g/dL      RDW 16 8 %      MPV 9 9 fL      Platelets 331 Thousands/uL      nRBC 0 /100 WBCs      Neutrophils Relative 88 %      Immat GRANS % 1 %      Lymphocytes Relative 3 %      Monocytes Relative 6 %      Eosinophils Relative 2 %      Basophils Relative 0 %      Neutrophils Absolute 9 00 Thousands/µL      Immature Grans Absolute 0 08 Thousand/uL      Lymphocytes Absolute 0 33 Thousands/µL      Monocytes Absolute 0 58 Thousand/µL      Eosinophils Absolute 0 15 Thousand/µL      Basophils Absolute 0 04 Thousands/µL                  XR chest portable ICU   Final Result by Joce Acharya MD (04/28 4301)      No acute cardiopulmonary disease  Workstation performed: [id number]         IR drainage tube placement   Final Result by Alcides Markham MD (04/30 3034)   Impression:      Technically successful right upper quadrant perinephric fluid collection drain placement  A 10-[place] all-purpose drainage catheter was placed  Workstation performed: [id number]         CT abdomen pelvis wo contrast   Final Result by Yovanny Dietrich MD (04/27 4533)      1  Complex heterogeneous right upper quadrant/subdiaphragmatic mass contiguous with the upper pole of the right kidney and abutting the posterior right hepatic lobe measuring 7 5 x 9 1 x 9 9 cm, which extends into the right posterolateral chest wall  Evaluation is limited on this unenhanced study  Considerations include infectious etiology/abscess and neoplasm  Recommend contrast-enhanced CT hematuria protocol or MR abdomen  If the patient cannot receive intravenous contrast, renal ultrasound or    unenhanced MR abdomen may also be considered for further characterization  2   Similar appearance of chronic cystic right upper pole caliectasis measuring higher than simple fluid attenuation which could be due in part to extensive beam hardening artifact, however pyelitis is considered  Associated marked cortical thinning in    keeping with chronicity as well as extensive right renal calculi including multiple staghorn calculi, similar to prior study  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  3   Bladder is incompletely decompressed containing a Biggs catheter; correlate clinically for catheter function  4   Small volume of ascites  5   Multiple prominent to mildly enlarged bilateral inguinal lymph nodes, nonspecific  Attention on follow-up  6   Small left greater than right pleural effusions  I personally discussed this study with Jeanna Moses on 4/27/2021 at 11:34 AM          t            Workstation performed: [id number]         VAS lower limb venous duplex study, complete bilateral   Final Result by Carol Shankar MD (04/26 2209)      XR chest portable   Final Result by Ezequiel Em MD (04/29 1334)      No acute cardiopulmonary disease  Workstation performed: [id number]         CT thoracic spine without contrast   Final Result by Debra Street MD (04/26 0022)      T4 compression fracture with 65% loss of height  T11 compression fracture with near complete loss of height (90+ percent)  Right apical pulmonary scarring  Small bilateral effusions  Cardiomegaly  Previously seen massively hydronephrotic right kidney is again noted                I personally discussed this study with Genesis Henderson on 4/26/2021 at 12:22 AM                Workstation performed: [id number]         XR chest 1 view portable   ED Interpretation by Sal Jacobs MD (04/25 2133)   No acute changes  Final Result by Olegario Galarza MD (04/26 6592)      No acute cardiopulmonary disease  Workstation performed: [id number]         XR thoracic spine 2 views   ED Interpretation by Sal Jacobs MD (04/25 2138)   T12 compression fracture      Final Result by Mary Kay Sarmiento MD (04/26 2978)      At least 2 if not 3 thoracic spine fractures    Please see the follow-up CT report         Workstation performed: [id number]         IR drainage tube check/change/reposition/reinsertion/upsize    (Results Pending)              Procedures  ECG 12 Lead Documentation Only    Date/Time: 4/25/2021 6:47 PM  Performed by: Lon Thakur MD  Authorized by: Lon Thakur MD     Indications / Diagnosis:  Chest pain  ECG reviewed by me, the ED Provider: yes    Patient location:  ED  Previous ECG:     Previous ECG:  Compared to current    Comparison ECG info:  2/18/21  Rate:     ECG rate:  84  Rhythm:     Rhythm: sinus rhythm    Ectopy:     Ectopy: none    QRS:     QRS axis:  Normal  Conduction:     Conduction: normal    ST segments:     ST segments:  Normal  T waves:     T waves: non-specific               ED Course  ED Course as of May 06 1604   Sun Apr 25, 2021 2027 GFR 14  Significantly elevated D-dimer  History of PE previously on Eliquis  Will treat empirically with heparin  2135 NT-proBNP(!): 38,524   2135 BUN(!): 92   2135 Creatinine(!): 3 21 2135 Sodium(!): 132   2135 Hemoglobin(!): 8 4   Mon Apr 26, 2021   0009 Patient has some improvement back pain  No complaining of buttock pain due to positioning in bed  She was able to get out of bed with some assistance, walked to the other and and reposition herself on her side  0009 Given persistent hypertension discussed admission with critical care team   To admit to Critical Care Service, level 1 step-down  MDM  Number of Diagnoses or Management Options  Acute-on-chronic kidney injury [facility]): new and requires workup  Anemia: new and requires workup  Hypotension: new and requires workup  Thoracic compression fracture [facility]): new and requires workup  Diagnosis management comments: 78-year-old female presented from home complaining of mid upper back pain, difficulty finding a comfortable position  No trauma  Uncomfortable on arrival   Found to have new thoracic compression fractures  Lab work remarkable for ENEDELIA and anemia  Appearing volume overloaded but hypotensive  Critical care consulted and admitted for vasopressors and diuresis    Patient stable at time of admission  Amount and/or Complexity of Data Reviewed  Clinical lab tests: ordered and reviewed  Tests in the radiology section of CPT®: ordered and reviewed  Discuss the patient with other providers: yes  Independent visualization of images, tracings, or specimens: yes    Patient Progress  Patient progress: stable      Disposition  Final diagnoses:   Thoracic compression fracture ([facility] )   Acute-on-chronic kidney injury ([place] [address] )   Anemia   Hypotension     Time reflects when diagnosis was documented in both MDM as applicable and the Disposition within this note     Time User Action Codes Description Comment    4/26/2021 12:07 AM Doneen Radon J Add [S22 000A] Thoracic compression fracture ([place] [address] )     4/26/2021 12:07 AM oJy Hinds Add [N17 9,  N18 9] Acute-on-chronic kidney injury ([place] [address] )     4/26/2021 12:07 AM Doneen Radon J Add [D64 9] Anemia     4/26/2021 12:07 AM Doneen Radon J Add [I95 9] Hypotension     4/26/2021 12:51 AM Cortney Sadler Add [I50 33] Acute on chronic diastolic (congestive) heart failure ([place] [address] )     4/26/2021 10:52 AM Lee Ann Mo Add [A41 9] Sepsis ([place] [address] )     4/26/2021 10:53 AM Lee Ann Mo Modify [S22 000A] Thoracic compression fracture ([place] [address] )     4/26/2021 10:53 AM Lee Ann Mo Modify [A41 9] Sepsis ([place] [address] )     4/27/2021  9:06 AM Lee Ann Mo Add [N17 9] ENEDELIA (acute kidney injury) ([place] [address] )     4/27/2021  2:45 PM Lee Ann Mo Add [N20 0] Bilateral kidney stones     4/27/2021  2:46 PM Lee Ann Mo Add [N12] Pyelonephritis of right kidney       ED Disposition     ED Disposition Condition Date/Time Comment    Admit Stable Mon Apr 26, 2021 12:09 AM Case was discussed with Dr Serena Anders and the patient's admission status was agreed to be Admission Status: inpatient status to the service of Dr Christina Fitch           Follow-up Information    None       Date, Time and Cause of Death    Date of Death: 4/27/21  Time of Death:  9:47 PM  Preliminary Cause of Death: Septic shock ([facility] )  Entered by: ARACELY Carlos [EW1 1]     Attribution     EW1 1 Kesha Coma, [address] 04/27/21 22:02        Discharge Medication List as of 4/28/2021  2:35 AM      START taking these medications    Details   sodium chloride, PF, 0 9 % 10 mL by Intracatheter route daily Intracatheter flushing daily, Starting Tue 4/27/2021, Print         STOP taking these medications       albuterol (PROVENTIL HFA,VENTOLIN HFA) 90 mcg/act inhaler Comments:   Reason for Stopping:         aspirin (ECOTRIN LOW STRENGTH) 81 mg EC tablet Comments:   Reason for Stopping:         bumetanide (BUMEX) 2 mg tablet Comments:   Reason for Stopping:         cholecalciferol (VITAMIN D3) 1,000 units tablet Comments:   Reason for Stopping:         clobetasol (TEMOVATE) 0 05 % cream Comments:   Reason for Stopping:         Fexofenadine HCl (ALLEGRA ALLERGY PO) Comments:   Reason for Stopping:         levothyroxine 100 mcg tablet Comments:   Reason for Stopping:         metolazone (ZAROXOLYN) 2 5 mg tablet Comments:   Reason for Stopping:         nebivolol (BYSTOLIC) 2 5 mg tablet Comments:   Reason for Stopping:         potassium chloride (K-DUR,KLOR-CON) 10 mEq tablet Comments:   Reason for Stopping:             Outpatient Discharge Orders   IR drainage tube check/change/reposition/reinsertion/upsize   Standing Status: Future Standing Exp   Date: 04/27/25       PDMP Review     None          ED Provider  Electronically Signed by           Leida Mckoy MD  05/06/21 9046

## 2021-04-26 PROBLEM — S22.049A CLOSED T4 FRACTURE (HCC): Status: ACTIVE | Noted: 2021-01-01
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
## 2021-04-26 PROBLEM — I95.9 HYPOTENSION: Status: ACTIVE | Noted: 2021-01-01

## 2021-04-26 NOTE — CONSULTS
[address] 1950, 66 y o  female MRN: [id number]  Unit/Bed#: ICU 08 Encounter: [id number]  Primary Care Provider: Olimpia Bell MD   Date and time admitted to hospital: 4/25/2021  6:13 PM    Inpatient consult to Neurosurgery  Consult performed by: Elvia Velazquez PA-C  Consult ordered by: ARACELY Santamaria           Chart and images reviewed 4/26/2015 approx 0800  Patient examined 4/26 approx 1600  Closed T4 fracture ([address] )  Assessment & Plan  Presents with new onset intrascapular pain after coughing fits    Imaging:  · CT thoracic spine 4/25/2021:  T4 compression fracture with approximately 60% loss of height centrally  T11 fracture with severe loss of height  Posterior elements appear grossly intact  Plan:  · Continue monitor neurological exam  · CT imaging results reviewed with patient and significant other at bedside  · Defer bracing given no tenderness to palpation midline  In addition patient with heart failure and concern for pleural effusion in bracing may inhibit respiratory status  · Will obtain baseline upright thoracic x-rays  · Pain control per primary team  · May mobilize as able when medically appropriate  · Patient educated on signs and symptoms of fracture progression including T4 radiculopathy and lower extremity complaints  · Discussion fracture may be related to osteoporosis and coughing fits  Recommend patient follow-up with PCP for DEXA scan and management of osteoporosis  · No neurosurgical intervention anticipated  Patient may follow up on as-needed basis  · May consider kyphoplasty patient has intolerable back pain  · Please call if any questions or concerns  Compression fracture of T11 vertebra (HCC)  Assessment & Plan  Chronic - admits to fall [facility] [address] years ago     NT    History of Present Illness     HPI: Brianna Bradford is a 66 y o  female with PMH including patient with extensive past medical [place] agree including hypertension, hyperlipidemia, melanoma, Hodgkin's disease, breast cancer, cervical cancer, congestive heart failure, kidney disease, osteoporosis and history of NSTEMI who presents with complaint of midback pain  Patient states she has significant coughing fit then developed new onset of intrascapular pain  Patient states his pain is focused to the center of her back without radiation  Denies any significant pain with change in positioning  Denies any lower extremity pain, numbness, tingling and or weakness  Biggs in place  Denies any headaches vision or speech changes  Denies any chest pain, shortness of breath, nausea or vomiting  Patient feels bloated and feels she is retaining fluids  Review of Systems   Constitutional: Negative for activity change, fatigue and fever  HENT: Negative for trouble swallowing and voice change  Eyes: Negative for photophobia and visual disturbance  Respiratory: Negative for cough and shortness of breath  Cardiovascular: Negative for chest pain and leg swelling  Gastrointestinal: Negative for abdominal pain, nausea and vomiting  Genitourinary: Negative for decreased urine volume and difficulty urinating  Musculoskeletal: Positive for back pain  Negative for gait problem and neck pain  Skin: Negative for pallor, rash and wound  Neurological: Negative for dizziness, tremors, seizures, speech difficulty, weakness, light-headedness, numbness and headaches  Psychiatric/Behavioral: Negative for agitation and confusion         Historical Information   Past Medical History:   Diagnosis Date    Acute CHF ([facility] 75 ) 11/8/2020    Acute kidney injury superimposed on chronic kidney disease ([facility] 75 ) 1/12/2019    ENEDELIA (acute kidney injury) ([facility] 75 ) 1/12/2019    Anemia     BCC (basal cell carcinoma of skin)     facial    Breast cancer ([facility] ) 2006    s/p mastectomy     Calculus, kidney 2013    Cancer [facility])     Breast Cancer    Cancer ([place] [address] ) Cervical Cancer    Cervical cancer ([place] [address] ) 3690    Diastolic CHF, acute ([place] [address] ) 11/8/2020    Disease of thyroid gland     Ganglion     Last Assessed:7/9/13    Heart murmur     mitral valve regurgitation    Hodgkin disease ([place] [address] )     Hydronephrosis 2013    Hyperlipidemia     Hypertension     Melanoma ([place] [address] )     NSTEMI (non-ST elevated myocardial infarction) ([place] [address] ) 1/12/2019    Osteopenia     Last Assessed:7/9/13    Osteoporosis     Paroxysmal supraventricular tachycardia ([place] [address] )     Protein-calorie malnutrition ([place] [address] ) 2/12/2020    Renal calculi     Last Assessed:3/19/14    Renal cyst     Shoulder impingement     Last Assessed:1/18/13    Syncope 7/28/2019    Last Assessment & Plan:  Pre-syncopal symptoms leading to short syncopal episode without post-ictal period  Suspect dehydration/hypovolemia  Infectious, cardiopulm or neurologic cause less likely  No arrhythmias or AV block on ECG or telemetry thus far  No PE on CTA  No clinical signs of seizure   No acute pathology or mass on CTH  - s/p IVF; encourage PO intake - continue on telemetry - TTE c/f ne    UTI (urinary tract infection) 2013    Vasculitis ([place] [address] ) 1/12/2019     Past Surgical History:   Procedure Laterality Date    APPENDECTOMY      BREAST BIOPSY Left 09/29/2006    BREAST RECONSTRUCTION      COMPLEX WOUND CLOSURE TO EXTREMITY Left 10/9/2018    Procedure: COMPLEX CLOSURE RECONSTRUCTION;  Surgeon: Joby Leonard MD;  [facility];  Service: Plastics    CT BONE MARROW BIOPSY AND ASPIRATION  11/15/2019    CYSTOSCOPY W/ RETROGRADES  2009    CYSTOSCOPY W/ URETEROSCOPY  2002    EXTRACORPOREAL SHOCK WAVE LITHOTRIPSY Right 2005    FLAP LOCAL HEAD / NECK Left 10/9/2018    Procedure: FLAP RECONSTRUCTION;  Surgeon: Joby Leonard MD;  [facility];  Service: Plastics    FULL THICKNESS SKIN GRAFT Left 10/9/2018    Procedure: FULL THICKNESS SKIN GRAFT RECONSTRUCTION;  Surgeon: Joby Leonard MD;  [facility];  Service: Plastics    HYSTERECTOMY      1983-cervical cancer    INTRAOPERATIVE RADIATION THERAPY ([facility])      Radiation Therapy    KIDNEY SURGERY      MASTECTOMY Left 11/09/2006    OOPHORECTOMY Left     OTHER SURGICAL HISTORY      Chemotherapeutics    REDUCTION MAMMAPLASTY Right 2006    SENTINEL LYMPH NODE BIOPSY      SPLENECTOMY      1985-Hodgkin's Disease    SQUAMOUS CELL CARCINOMA EXCISION Left 10/9/2018    Procedure: NOSE/CHEEK BCC EXCISION; FROZEN SECTION;  Surgeon: Liya Dixon MD;  [facility];  Service: Plastics     Social History     Substance and Sexual Activity   Alcohol Use Not Currently    Frequency: Monthly or less    Comment: caffeine use; social drinker-1 beer every 2/3 months     Social History     Substance and Sexual Activity   Drug Use Never     Social History     Tobacco Use   Smoking Status Former Smoker    Quit date: 2011    Years since quitting: 10 3   Smokeless Tobacco Never Used     Family History   Problem Relation Age of Onset    Leukemia Mother     Colon cancer Paternal Grandmother 80    Heart disease Family     Heart attack Father     Coronary artery disease Father         stent- five    Hypertension Father     Colon cancer Father 80    No Known Problems Maternal Grandmother     No Known Problems Maternal Grandfather     No Known Problems Paternal Grandfather     No Known Problems Paternal Aunt     Stroke Neg Hx     Anuerysm Neg Hx     Clotting disorder Neg Hx     Arrhythmia Neg Hx     Heart failure Neg Hx     Hyperlipidemia Neg Hx        Meds/Allergies   all current active meds have been reviewed, current meds:   Current Facility-Administered Medications   Medication Dose Route Frequency    acetaminophen (TYLENOL) tablet 650 mg  650 mg Oral Q6H PRN    albumin human (FLEXBUMIN) 5 % injection 12 5 g  12 5 g Intravenous Once    albuterol (PROVENTIL HFA,VENTOLIN HFA) inhaler 2 puff  2 puff Inhalation Q6H PRN    aspirin (ECOTRIN LOW STRENGTH) EC tablet 81 mg  81 mg Oral Daily    cefTRIAXone (ROCEPHIN) 2,000 mg in dextrose 5 % 50 mL IVPB  2,000 mg Intravenous Q24H    heparin (porcine) 25,000 units in 0 45% NaCl 250 mL infusion (premix)  3-30 Units/kg/hr (Order-Specific) Intravenous Titrated    heparin (porcine) injection 2,600 Units  2,600 Units Intravenous Q1H PRN    heparin (porcine) injection 5,200 Units  5,200 Units Intravenous Q1H PRN    levothyroxine tablet 100 mcg  100 mcg Oral Early Morning    [START ON 4/27/2021] lidocaine (LIDODERM) 5 % patch 1 patch  1 patch Topical Daily    norepinephrine (LEVOPHED) 4 mg (STANDARD CONCENTRATION) IV in sodium chloride 0 9% 250 mL  1-30 mcg/min Intravenous Titrated    vasopressin (PITRESSIN) 20 Units in sodium chloride 0 9 % 100 mL infusion  0 04 Units/min Intravenous Continuous    and PTA meds:   Prior to Admission Medications   Prescriptions Last Dose Informant Patient Reported? Taking? Fexofenadine HCl (ALLEGRA ALLERGY PO) Past Week at Unknown time Self Yes Yes   Sig: Take by mouth   albuterol (PROVENTIL HFA,VENTOLIN HFA) 90 mcg/act inhaler 4/25/2021 at Unknown time Self No Yes   Sig: Inhale 2 puffs every 6 (six) hours as needed for wheezing or shortness of breath   aspirin (ECOTRIN LOW STRENGTH) 81 mg EC tablet Past Month at Unknown time Self Yes Yes   Sig: Take 81 mg by mouth daily   bumetanide (BUMEX) 2 mg tablet 4/25/2021 at Unknown time  No Yes   Sig: Take 1 tablet (2 mg total) by mouth 2 (two) times a day   cholecalciferol (VITAMIN D3) 1,000 units tablet Not Taking at Unknown time Self Yes No   Sig: Take 1,000 Units by mouth daily     clobetasol (TEMOVATE) 0 05 % cream Not Taking at Unknown time Self No No   Sig: Apply topically twice daily to legs for 14 days only, DO NOT apply to face or groin     Patient not taking: Reported on 4/26/2021   levothyroxine 100 mcg tablet 4/25/2021 at Unknown time  No Yes   Sig: Take 1 tablet (100 mcg total) by mouth daily   metolazone (ZAROXOLYN) 2 5 mg tablet Unknown at Unknown time  No No   Sig: Take 1 tablet (2 5 mg total) by mouth daily Take 1 tablet 30 minutes prior to taking Bumex only when instructed by physician  nebivolol (BYSTOLIC) 2 5 mg tablet Past Week at Unknown time Self No Yes   Sig: Take 1 tablet (2 5 mg total) by mouth daily   Patient taking differently: Take 2 5 mg by mouth daily Patient states as needed   potassium chloride (K-DUR,KLOR-CON) 10 mEq tablet   No No   Sig: Take 1 tablet (10 mEq total) by mouth 2 (two) times a day      Facility-Administered Medications: None     Allergies   Allergen Reactions    Ciprofloxacin Other (See Comments)     Reaction Date: 24Jun2011; Hives/Uticaria    Sulfamethoxazole-Trimethoprim Hives     Patient does not remember rx    Lasix [Furosemide] Rash    Pantoprazole Rash     vasculitis       Objective   I/O       04/24 0701 - 04/25 0700 04/25 0701 - 04/26 0700 04/26 0701 - 04/27 0700    I V  (mL/kg)  718 2 (10 8) 520 2 (7 8)    IV Piggyback  500     Total Intake(mL/kg)  1218 2 (18 3) 520 2 (7 8)    Urine (mL/kg/hr)   350 (0 5)    Total Output   350    Net  +1218 2 +170 2                 Physical Exam  Constitutional:       General: She is not in acute distress  Appearance: Normal appearance  She is well-developed  She is not ill-appearing  HENT:      Head: Normocephalic and atraumatic  Nose: Nose normal       Mouth/Throat:      Mouth: Mucous membranes are moist    Eyes:      General: No scleral icterus  Right eye: No discharge  Left eye: No discharge  Conjunctiva/sclera: Conjunctivae normal    Neck:      Musculoskeletal: No muscular tenderness  Cardiovascular:      Rate and Rhythm: Normal rate  Pulmonary:      Effort: Pulmonary effort is normal       Comments: NC O2 in place  Musculoskeletal:         General: No tenderness  Skin:     General: Skin is warm and dry  Findings: No rash  Neurological:      Mental Status: She is alert     Psychiatric: Mood and Affect: Mood normal          Speech: Speech normal          Behavior: Behavior normal          Thought Content: Thought content normal          Judgment: Judgment normal        Neurologic Exam     Mental Status   Follows 3 step commands  Attention: normal  Concentration: normal    Speech: speech is normal   Level of consciousness: alert  Knowledge: good  Normal comprehension  Cranial Nerves     CN III, IV, VI   Conjugate gaze: present    CN VII   Facial expression full, symmetric  CN VIII   Hearing: intact    CN XI   Right trapezius strength: normal  Left trapezius strength: normal    CN XII   Tongue: not atrophic  Fasciculations: absent    Motor Exam   Muscle bulk: normal  Overall muscle tone: normalNo focal weakness  4-4+ throughout  Sensory Exam   Light touch normal      Gait, Coordination, and Reflexes     Tremor   Resting tremor: absent  Intention tremor: absent  Action tremor: absent    Reflexes   Right Argueta: absent  Left Argueta: absent  Right ankle clonus: absent  Left ankle clonus: absent        Vitals:Blood pressure 95/51, pulse 83, temperature 99 °F (37 2 °C), resp  rate (!) 24, height 5' 1 5" (1 562 m), weight 66 5 kg (146 lb 9 7 oz), SpO2 99 %, not currently breastfeeding  ,Body mass index is 27 25 kg/m²       Lab Results:   Results from last 7 days   Lab Units 04/26/21  1037 04/25/21  1947   WBC Thousand/uL 11 70* 10 18*   HEMOGLOBIN g/dL 8 4* 8 4*   HEMATOCRIT % 26 1* 26 0*   PLATELETS Thousands/uL 501* 534*   NEUTROS PCT % 89* 88*   MONOS PCT % 7 6     Results from last 7 days   Lab Units 04/26/21  1037 04/25/21 1947   POTASSIUM mmol/L 5 2 4 3   CHLORIDE mmol/L 100 97*   CO2 mmol/L 21 26   BUN mg/dL 90* 92*   CREATININE mg/dL 3 29* 3 21*   CALCIUM mg/dL 8 4 8 8   ALK PHOS U/L  --  445*   ALT U/L  --  20   AST U/L  --  34     Results from last 7 days   Lab Units 04/26/21  1037   MAGNESIUM mg/dL 2 6     Results from last 7 days   Lab Units 04/26/21  1037   PHOSPHORUS mg/dL 6 8*     Results from last 7 days   Lab Units 04/26/21  1037 04/26/21  0319 04/25/21 1947   INR   --   --  1 17   PTT seconds 99* 32 33     No results found for: TROPONINT  ABG:No results found for: PHART, ANA6NVO, PO2ART, CGV9QQH, X8GHFBEP, BEART, SOURCE    Imaging Studies: I have personally reviewed pertinent reports  and I have personally reviewed pertinent films in PACS    Xr Thoracic Spine 2 Views    Result Date: 4/26/2021  Impression: At least 2 if not 3 thoracic spine fractures  Please see the follow-up CT report Workstation performed: [id number]     Ct Thoracic Spine Without Contrast    Result Date: 4/26/2021  Impression: T4 compression fracture with 65% loss of height  T11 compression fracture with near complete loss of height (90+ percent)  Right apical pulmonary scarring  Small bilateral effusions  Cardiomegaly  Previously seen massively hydronephrotic right kidney is again noted     I personally discussed this study with Leonidas Ambrocio on 4/26/2021 at 12:22 AM  Workstation performed: [id number]       EKG, Pathology, and Other Studies: none    VTE Prophylaxis: Heparin    Code Status: Level 1 - Full Code  Advance Directive and Living Will:      Power of :    POLST:      Counseling / Coordination of Care  I spent 30 minutes with the patient

## 2021-04-26 NOTE — ED NOTES
Pt has a darkened red rash on bilateral arms, chest, bilateral anterior rib cages  Pt reports these rashes are from February in response to diuretics  Pt's left arm has a more brighter red rash, when asked about this rash prior, pt stated that rash started 2 days ago  Rash on left arm appears to now begin to spread  Will notify provider       Brendon Quinn, KYRA  04/26/21 [id number]

## 2021-04-26 NOTE — TELEPHONE ENCOUNTER
Patient's  Guillermo Nichols called to inform us patient is admitted to [facility]

## 2021-04-26 NOTE — ASSESSMENT & PLAN NOTE
· Compression fracture T4 and T11 following fall 1 week prior to admission  · No midline tenderness or radicular symptoms  · Appreciate neurosurgery consult - no bracing as patient has no tenderness and has respiratory concerns

## 2021-04-26 NOTE — PROGRESS NOTES
68-year-old female presenting with back pain shortness of breath  Found to have thoracic compression fracture at T4  Also noted to be in acute CHF and ENEDELIA on CKD ( creatinine 3 21) with soft blood pressure 80s/40s  Gained approximately 10 lb over the past few days  Patient on Bumex as an outpatient and follows with Cardiology  She reported to Cardiology a few days ago that she has gained approximately 5 lb  Cardiology started patient on metolazone in addition to continuing Bumex  Past medical history is notable for CHF (ejection fraction 50% and grade 2 diastolic dysfunction ), CKD stage 3 (baseline creatinine 1 4-1 7)  Vitals temperature 98 heart rate 77 respiratory rate 18 blood pressure 84/49 and 98% on room air  Labs notable for BNP 49054, D-dimer 9 2, sodium 132, creatinine 3 21, albumin 1 7, hemoglobin 8 4 (based 9-10 )  Exam alert but little drowsy ( received Dilaudid and 500 cc fluid in the emergency department)  Lungs diminished sounds  No reproducible back pain / tenderness or neuro deficits  1+ pitting edema bilateral lower extremities  Unable to get CTA due to renal function  Lung views on thoracic CT without significant ground-glass opacities and small left basal pleural effusion  Does not appear grossly volume overloaded on lung CT or chest x-ray  Of note patient's lab also notable for significant paraprotein gap  Patient follows with Hematology a/ Oncology and had recent SPEP/UPEP that did not reveal any monoclonal gammopathy  She also previously had iliac crest biopsy for evaluation of multiple myeloma which was unrevealing  Throughout the night patient had soft blood pressures requiring albumin administration  Diuretics were deferred due to soft blood pressures and patient saturating over 95% on room air  Venous Doppler obtained  Plan for V/ Q scan to evaluate for pulmonary embolism  Septic workup initiated and patient had UA revealing bacteria, WBC, nitrite    Concern for urosepsis  Patient started on Levophed  Due to poor access and increasing Levophed requirement patient requiring central line placement  Called patient's  over the phone and verbally obtained consent for central line placement on 4/26/21 at 7:00 a m  patient require assistance with pressors to aid in diuresis for volume overload and treatment of suspected urosepsis  Plan for repeat echocardiogram and CT  Abdomen pelvis to evaluate for stones  Case discussed with critical care advanced practitioner [place] and [place]  Case also discussed with attending Dr Raissa Drake

## 2021-04-26 NOTE — CONSULTS
Evaluation - Trauma   Jillian Vanegas 66 y o  female MRN: [id number]  Unit/Bed#: ICU 08 Encounter: [id number]    Fall  Assessment & Plan  · Mechanical fall 1 week prior to admission while using the restroom overnight  · Denies striking her head or LOC  · Reports right rib pain without other injuries  · No external signs of trauma, no tenderness or limited ROM    Closed T4 fracture (HCC)  Assessment & Plan  · Compression fracture T4 and T11 following fall 1 week prior to admission  · No midline tenderness or radicular symptoms  · Appreciate neurosurgery consult - no bracing as patient has no tenderness and has respiratory concerns    Compression fracture of T11 vertebra (HCC)  Assessment & Plan  · T4 and T11 compression fractures following a fall 1 week prior to admission   · Possibly related to coughing and osteoporosis  · No midline tenderness or radicular symptoms  · Appreciate neurosurgery consult - no bracing as patient has no tenderness and respiratory concerns        Assessment/Plan   Trauma Alert: Evaluation  Model of Arrival: Ambulance  Trauma Team: Attending Noreen Pulido and ADRIENNE Sandhu  Consultants: Neurosurgery    Chief Complaint:   Patient offers no complaints  History of Present Illness   Physician Requesting Evaluation: Jonas Lozada MD  Reason for Evaluation / Principal Problem: T4/T11 compression fractures with multiple falls at home  HPI:  Jillian Vanegas is a 66 y o  female who presents with back pain following a fall 1 week prior to arrival   She reports she was walking to her bathroom in the middle of the night when she became weak and fell to the ground  She denies striking her head or losing consciousness  She did have immediate onset of right lateral chest/rib pain with no other complaints  She has had multiple falls at home in recent weeks  She reports she does walk with a walker however does not always use it    She denies chest, shortness of breath, abdominal pain, nausea, vomiting, headaches, dizziness, or lightheadedness  Mechanism:Fall    Inpatient consult to Trauma (Inpatient Only)  Consult performed by: Dominick Arzola PA-C  Consult ordered by: Sonja Coulter PA-C          Review of Systems   Constitutional: Positive for activity change (decreased)  Negative for appetite change, chills, fatigue and fever  HENT: Negative for congestion, nosebleeds, postnasal drip, rhinorrhea, sinus pressure, sinus pain, sneezing and sore throat  Eyes: Negative for photophobia, pain and visual disturbance  Respiratory: Positive for cough and shortness of breath  Negative for chest tightness  Cardiovascular: Negative for chest pain and palpitations  Gastrointestinal: Negative for abdominal distention, abdominal pain, constipation, diarrhea, nausea and vomiting  Genitourinary: Positive for flank pain and frequency  Negative for dysuria, pelvic pain and urgency  Musculoskeletal: Positive for back pain  Negative for neck pain and neck stiffness  Skin: Positive for rash (chronic since 2/21)  Negative for wound  Neurological: Positive for weakness  Negative for dizziness, seizures, syncope, numbness and headaches         Historical Information       Past Medical History:   Diagnosis Date    Acute CHF ([place] [address] ) 11/8/2020    Acute kidney injury superimposed on chronic kidney disease ([facility] ) 1/12/2019    ENEDELIA (acute kidney injury) ([facility] ) 1/12/2019    Anemia     BCC (basal cell carcinoma of skin)     facial    Breast cancer ([facility] ) 2006    s/p mastectomy     Calculus, kidney 2013    Cancer [facility])     Breast Cancer    Cancer [facility])     Cervical Cancer    Cervical cancer ([place] [address] ) 3767    Diastolic CHF, acute ([place] [address] ) 11/8/2020    Disease of thyroid gland     Ganglion     Last Assessed:7/9/13    Heart murmur     mitral valve regurgitation    Hodgkin disease [facility] )     Hydronephrosis 2013    Hyperlipidemia     Hypertension     Melanoma ([facility] )     NSTEMI (non-ST elevated myocardial infarction) ([facility] ) 1/12/2019    Osteopenia     Last Assessed:7/9/13    Osteoporosis     Paroxysmal supraventricular tachycardia (HCC)     Protein-calorie malnutrition ([facility] ) 2/12/2020    Renal calculi     Last Assessed:3/19/14    Renal cyst     Shoulder impingement     Last Assessed:1/18/13    Syncope 7/28/2019    Last Assessment & Plan:  Pre-syncopal symptoms leading to short syncopal episode without post-ictal period  Suspect dehydration/hypovolemia  Infectious, cardiopulm or neurologic cause less likely  No arrhythmias or AV block on ECG or telemetry thus far  No PE on CTA  No clinical signs of seizure   No acute pathology or mass on CTH  - s/p IVF; encourage PO intake - continue on telemetry - TTE c/f ne    UTI (urinary tract infection) 2013    Vasculitis ([facility] ) 1/12/2019     Past Surgical History:   Procedure Laterality Date    APPENDECTOMY      BREAST BIOPSY Left 09/29/2006    BREAST RECONSTRUCTION      COMPLEX WOUND CLOSURE TO EXTREMITY Left 10/9/2018    Procedure: COMPLEX CLOSURE RECONSTRUCTION;  Surgeon: Jessica Farrell MD;  [facility];  Service: Plastics    CT BONE MARROW BIOPSY AND ASPIRATION  11/15/2019    CYSTOSCOPY W/ RETROGRADES  2009    CYSTOSCOPY W/ URETEROSCOPY  2002    EXTRACORPOREAL SHOCK WAVE LITHOTRIPSY Right 2005    FLAP LOCAL HEAD / NECK Left 10/9/2018    Procedure: FLAP RECONSTRUCTION;  Surgeon: Jessica Farrell MD;  [facility];  Service: Plastics    FULL THICKNESS SKIN GRAFT Left 10/9/2018    Procedure: FULL THICKNESS SKIN GRAFT RECONSTRUCTION;  Surgeon: Jessica Farrell MD;  [facility];  Service: Plastics    HYSTERECTOMY      1983-cervical cancer    INTRAOPERATIVE RADIATION THERAPY ([facility])      Radiation Therapy    KIDNEY SURGERY      MASTECTOMY Left 11/09/2006    OOPHORECTOMY Left     OTHER SURGICAL HISTORY      Chemotherapeutics    REDUCTION MAMMAPLASTY Right 2006    SENTINEL LYMPH NODE BIOPSY      SPLENECTOMY      1985-Hodgkin's Disease    SQUAMOUS CELL CARCINOMA EXCISION Left 10/9/2018    Procedure: NOSE/CHEEK BCC EXCISION; FROZEN SECTION;  Surgeon: Steve Zuniga MD;  [facility];  Service: Plastics     Social History   Social History     Substance and Sexual Activity   Alcohol Use Not Currently    Frequency: Monthly or less    Comment: caffeine use; social drinker-1 beer every 2/3 months     Social History     Substance and Sexual Activity   Drug Use Never     E-Cigarette/Vaping    E-Cigarette Use Never User      E-Cigarette/Vaping Substances    Nicotine No     THC No     CBD No     Flavoring No     Other No     Unknown No      Social History     Tobacco Use   Smoking Status Former Smoker    Quit date: 2011    Years since quitting: 10 3   Smokeless Tobacco Never Used     Immunization History   Administered Date(s) Administered    INFLUENZA 11/10/2014, 10/21/2015, 11/01/2016    Influenza Quadrivalent Preservative Free 3 years and older IM 10/18/2013    Influenza Split High Dose Preservative Free IM 10/21/2015, 11/01/2016    Influenza, seasonal, injectable 11/10/2012, 10/01/2014    Pneumococcal Conjugate 13-Valent 10/21/2015    Pneumococcal Polysaccharide PPV23 01/18/2013, 01/18/2013    SARS-CoV-2 / COVID-19 mRNA IM ([employer]) 04/13/2021    Zoster 03/21/2014     Last Tetanus:  N/A  Family History: Non-contributory      Meds/Allergies   all current active meds have been reviewed    Allergies   Allergen Reactions    Ciprofloxacin Other (See Comments)     Reaction Date: 24Jun2011;    Hives/Uticaria    Sulfamethoxazole-Trimethoprim Hives     Patient does not remember rx    Lasix [Furosemide] Rash    Pantoprazole Rash     vasculitis         Objective   Vitals:   First set: Temperature: 98 °F (36 7 °C) (04/25/21 1820)  Pulse: 83 (04/25/21 1818)  Respirations: 18 (04/25/21 1818)  Blood Pressure: 132/84 (04/25/21 1818)  Arterial Line BP: 101/36 (04/26/21 1100)    Invasive Devices     Central Venous Catheter Line            CVC Central Lines 04/26/21 Triple 20cm less than 1 day          Peripheral Intravenous Line            Peripheral IV 04/25/21 Right Antecubital less than 1 day          Arterial Line            Arterial Line 04/26/21 Femoral less than 1 day          Drain            Urethral Catheter Temperature probe less than 1 day                Neurologic Exam     Mental Status   Oriented to person, place, and time  Cranial Nerves     CN III, IV, VI   Pupils are equal, round, and reactive to light  Physical Exam  Vitals signs and nursing note reviewed  Constitutional:       General: She is not in acute distress  Appearance: Normal appearance  She is ill-appearing  She is not toxic-appearing  HENT:      Head: Normocephalic and atraumatic  Right Ear: Tympanic membrane normal       Left Ear: Tympanic membrane normal       Nose: No congestion or rhinorrhea  Mouth/Throat:      Mouth: Mucous membranes are moist       Pharynx: Oropharynx is clear  No oropharyngeal exudate  Eyes:      Extraocular Movements: Extraocular movements intact  Pupils: Pupils are equal, round, and reactive to light  Neck:      Musculoskeletal: Normal range of motion  No neck rigidity or muscular tenderness  Cardiovascular:      Rate and Rhythm: Normal rate and regular rhythm  Heart sounds: No murmur  No friction rub  No gallop  Pulmonary:      Effort: Pulmonary effort is normal       Breath sounds: No wheezing, rhonchi or rales  Abdominal:      Palpations: Abdomen is soft  Tenderness: There is no abdominal tenderness  There is no guarding or rebound  Musculoskeletal: Normal range of motion  General: No tenderness or signs of injury  Thoracic back: She exhibits no tenderness, no bony tenderness and no deformity  Skin:     General: Skin is warm and dry  Capillary Refill: Capillary refill takes less than 2 seconds     Neurological:      General: No focal deficit present  Mental Status: She is alert and oriented to person, place, and time  Motor: No weakness             Lab Results:   BMP/CMP:   Lab Results   Component Value Date    SODIUM 134 (L) 04/26/2021    K 5 2 04/26/2021     04/26/2021    CO2 21 04/26/2021    BUN 90 (H) 04/26/2021    CREATININE 3 29 (H) 04/26/2021    CALCIUM 8 4 04/26/2021    EGFR 13 04/26/2021   , CBC:   Lab Results   Component Value Date    WBC 11 70 (H) 04/26/2021    HGB 8 4 (L) 04/26/2021    HCT 26 1 (L) 04/26/2021    MCV 86 04/26/2021     (H) 04/26/2021    MCH 27 7 04/26/2021    MCHC 32 2 04/26/2021    RDW 16 6 (H) 04/26/2021    MPV 9 7 04/26/2021    NRBC 0 04/26/2021    and Lactate: No results found for: LACTATE  Imaging/EKG Studies: T-Spine: T4/T11 compression fractures  Other Studies: none    Code Status: Level 1 - Full Code

## 2021-04-26 NOTE — H&P
H&P Exam - Critical Care   Aleida Mcgovern 66 y o  female MRN: [id number]  Unit/Bed#: ED 11 Encounter: [id number]      -------------------------------------------------------------------------------------------------------------  Chief Complaint: "I dont feel well"    History of Present Illness   HX and PE limited by: Malena Perez is a 66 y o  female with a PMH of hodgkins lymphoma, HFPEF, CKD III, small vessel vasculitis, multiple falls who presented to the ED with back pain after multiple falls at home  In ED was noted to be hypotensive with MAPS 55-60 despite 1L IVF  CT T spine noted with T4 and T11 compression fractures, lab work noted with ENEDELIA, BNP markedly elevated  She was above her prior document weights and reported worsening peripheral edema but on satting well on room air  Her dDimer was elevated at 9, but due to her ENEDELIA she was unable to have a formal CT PE study performed and she was started on empiric heparin  Due to her continued hypotension she was admitted to the ICU for management  History obtained from chart review and the patient   -------------------------------------------------------------------------------------------------------------  Assessment and Plan:    Neuro:    Diagnosis: T4 and T11 compression fracture  - S/p multiple falls at home  - CT t-spine: T4 compression fracture with 65% loss of height   T11 compression fracture with near complete loss of height   - Consult neurosurgery  - PRN tylenol  - Neuro checks     CV:    Diagnosis: Heart failure with preserved ejection fraction  - Last ECHO: EF 50%, mild diffuse hypokinesis, G2DD  - Home weight per patient "133 pds" last charted weight 146 pounds, currently 152  - BNP 38,000, priors noted at 61331  - Cardiology consulted   Diagnosis: Shock, undifferentiated  - MAPS 50-60 in ED, asymptomatic, baseline BP 100s/50s per patient  - Perform POC US  - Send LA  - Unclear etiology: more likely cardiogenic/decompensated heart failure vs  Hypovolemia 2/2 aggressive diuresis vs  PE vs  sepsis  - Received 2mg Bumex in ED, hold additional diuresis  - Started on low dose levophed, trial albumin to attempt to wean albumin  - Formal ECHO in AM  - Hold BB  - Continue heparin drip  - Start ceftriaxone for UTI given hypotension      Pulm:   Diagnosis: Rule out PE  - Hx of PE 2019 not on [facility] outpatient  - Unable to perform CTA for PE study 2/2 ENEDELIA  - Continue empiric heparin  - Duplex studies ordered  - Possible VQ scan  - Formal ECHO  - On RA currently      GI:    Diagnosis: ENEDELIA on CKD III  - Baseline sCr 1 6- 1 7  - Presenting sCr 3 21  - Prerenal in setting of diuresis vs  Cardiorenal syndrome  - Avoid nephrotoxins/ hypotension  - Trial albumin   Diagnosis: Hyponatremia  - Mild, sCr   - Trend      :    Diagnosis: No acute issues      F: No IVF  E: Trend and replete K >4, Mg>2, phos >3  N: Cardiac diet 1500ml fluid restriction      Heme/Onc:    Diagnosis: Elevated Ddimer  - Diagnosed 2019, not on AC as outpatient  - Ddimer 9 2 on admission  - Unable to perform CTA PE 2/2 ENEDELIA  - Continue empiric heparin gtt, consider VQ scan when appropriate  - Lower extremity duplex ordered   Diagnosis: JAK2 positive myeloproliferative disorder  - Albumin 1 7 total protein 9 3  - SPEP possible band in gamma region, no monoclonal gammopathy on immunofixation  - Following OP with heme onc   Diagnosis: HX of hodgkins lymphoma   - S/p splenectomy    Diagnosis: Normocytic anemia  - Baseline hgb 9, HGB on presentation 8 4  - Trend, no concern for bleeding   - Consider sending iron panel    Endo:    Diagnosis: Hypothyroidism  - Continue synthroid 100 mcg  - Last TSH 5 9 4/16      ID:    Diagnosis: Hx of splenectomy 2/2 hodgkins lymphoma   Diagnosis: Bacturia  - UA noted with nitrates/blood/large leuks, WBC/bacteria  - No focal urinary complaints  - Send urine culture and blood cultures  - Start ceftriaxone  - Send procal      MSK/Skin:    Diagnosis: Rash  - Following outpatient with derm for possible small vessel vasculuitis  - Possible medication related/protonix, patient reports improved after discontinuing protonix  - Skin assessments      Disposition: Admit to Critical Care   Code Status: Level 1 - Full Code  --------------------------------------------------------------------------------------------------------------  Review of Systems   Constitutional: Negative for chills, diaphoresis and fatigue  Respiratory: Positive for cough and shortness of breath  Cardiovascular: Positive for leg swelling  Negative for chest pain  Gastrointestinal: Negative for abdominal pain, diarrhea, nausea and vomiting  Genitourinary: Positive for decreased urine volume  Musculoskeletal: Negative  Neurological: Negative  Hematological: Negative  Psychiatric/Behavioral: Negative  A 12-point, complete review of systems was reviewed and negative except as stated above     Physical Exam  Vitals signs and nursing note reviewed  Constitutional:       Appearance: Normal appearance  She is ill-appearing  HENT:      Head: Normocephalic and atraumatic  Mouth/Throat:      Mouth: Mucous membranes are moist    Eyes:      Extraocular Movements: Extraocular movements intact  Neck:      Musculoskeletal: Neck supple  Cardiovascular:      Rate and Rhythm: Normal rate  Pulmonary:      Effort: Pulmonary effort is normal  No respiratory distress  Breath sounds: Normal breath sounds  No wheezing  Abdominal:      General: There is no distension  Palpations: Abdomen is soft  Tenderness: There is no abdominal tenderness  There is no guarding  Musculoskeletal:      Right lower leg: Edema present  Left lower leg: Edema present  Skin:     General: Skin is warm and dry  Capillary Refill: Capillary refill takes less than 2 seconds        Comments: Diffuse purpuric rash on legs, trunk, arms   Neurological:      General: No focal deficit present  Mental Status: She is alert and oriented to person, place, and time  GCS: GCS eye subscore is 4  GCS verbal subscore is 5  GCS motor subscore is 6        --------------------------------------------------------------------------------------------------------------  Vitals:   Vitals:    04/26/21 0058 04/26/21 0111 04/26/21 0121 04/26/21 0139   BP: (!) 87/48 (!) 87/50 115/81 (!) 83/50   BP Location: Right arm Right arm Right arm Right arm   Pulse: 78 77 81 79   Resp: 18 18 18 18   Temp:       TempSrc:       SpO2: 96% 96% 97% 99%   Weight:       Height:         Temp  Min: 98 °F (36 7 °C)  Max: 98 °F (36 7 °C)  IBW (Ideal Body Weight): 47 8 kg  Height: 5' 1" (154 9 cm)  Body mass index is 28 78 kg/m²  N/A    Laboratory and Diagnostics:  Results from last 7 days   Lab Units 04/25/21 1947   WBC Thousand/uL 10 18*   HEMOGLOBIN g/dL 8 4*   HEMATOCRIT % 26 0*   PLATELETS Thousands/uL 534*   NEUTROS PCT % 88*   MONOS PCT % 6     Results from last 7 days   Lab Units 04/25/21 1947   SODIUM mmol/L 132*   POTASSIUM mmol/L 4 3   CHLORIDE mmol/L 97*   CO2 mmol/L 26   ANION GAP mmol/L 9   BUN mg/dL 92*   CREATININE mg/dL 3 21*   CALCIUM mg/dL 8 8   GLUCOSE RANDOM mg/dL 133   ALT U/L 20   AST U/L 34   ALK PHOS U/L 445*   ALBUMIN g/dL 1 7*   TOTAL BILIRUBIN mg/dL 0 44          Results from last 7 days   Lab Units 04/25/21 1947   INR  1 17   PTT seconds 33      Results from last 7 days   Lab Units 04/25/21 1947   TROPONIN I ng/mL <0 02         ABG:    VBG:          Micro:        EKG:   Imaging: I have personally reviewed pertinent reports        Historical Information   Past Medical History:   Diagnosis Date    Acute CHF ([place] [address] ) 11/8/2020    Acute kidney injury superimposed on chronic kidney disease ([place] [address] ) 1/12/2019    ENEDELIA (acute kidney injury) ([place] [address] ) 1/12/2019    Anemia     BCC (basal cell carcinoma of skin)     facial    Breast cancer ([place] [address] ) 2006    s/p mastectomy     Calculus, kidney 2013    Cancer ([place] [address] ) Breast Cancer    Cancer [facility])     Cervical Cancer    Cervical cancer ([place] [address] ) 0367    Diastolic CHF, acute ([place] [address] ) 11/8/2020    Disease of thyroid gland     Ganglion     Last Assessed:7/9/13    Heart murmur     mitral valve regurgitation    Hodgkin disease ([place] [address] )     Hydronephrosis 2013    Hyperlipidemia     Hypertension     Melanoma ([place] [address] )     NSTEMI (non-ST elevated myocardial infarction) ([place] [address] ) 1/12/2019    Osteopenia     Last Assessed:7/9/13    Osteoporosis     Paroxysmal supraventricular tachycardia (HCC)     Protein-calorie malnutrition ([place] [address] ) 2/12/2020    Renal calculi     Last Assessed:3/19/14    Renal cyst     Shoulder impingement     Last Assessed:1/18/13    Syncope 7/28/2019    Last Assessment & Plan:  Pre-syncopal symptoms leading to short syncopal episode without post-ictal period  Suspect dehydration/hypovolemia  Infectious, cardiopulm or neurologic cause less likely  No arrhythmias or AV block on ECG or telemetry thus far  No PE on CTA  No clinical signs of seizure   No acute pathology or mass on CTH  - s/p IVF; encourage PO intake - continue on telemetry - TTE c/f ne    UTI (urinary tract infection) 2013    Vasculitis ([place] [address] ) 1/12/2019     Past Surgical History:   Procedure Laterality Date    APPENDECTOMY      BREAST BIOPSY Left 09/29/2006    BREAST RECONSTRUCTION      COMPLEX WOUND CLOSURE TO EXTREMITY Left 10/9/2018    Procedure: COMPLEX CLOSURE RECONSTRUCTION;  Surgeon: Lexi Oropeza MD;  [facility];  Service: Plastics    CT BONE MARROW BIOPSY AND ASPIRATION  11/15/2019    CYSTOSCOPY W/ RETROGRADES  2009    CYSTOSCOPY W/ URETEROSCOPY  2002    EXTRACORPOREAL SHOCK WAVE LITHOTRIPSY Right 2005    FLAP LOCAL HEAD / NECK Left 10/9/2018    Procedure: FLAP RECONSTRUCTION;  Surgeon: Lexi Oropeza MD;  [facility];  Service: Plastics    FULL THICKNESS SKIN GRAFT Left 10/9/2018    Procedure: FULL THICKNESS SKIN GRAFT RECONSTRUCTION;  Surgeon: Nura Griffin Nieves Muñoz MD;  [facility];  Service: Plastics    HYSTERECTOMY      1983-cervical cancer    INTRAOPERATIVE RADIATION THERAPY ([facility])      Radiation Therapy    KIDNEY SURGERY      MASTECTOMY Left 11/09/2006    OOPHORECTOMY Left     OTHER SURGICAL HISTORY      Chemotherapeutics    REDUCTION MAMMAPLASTY Right 2006    SENTINEL LYMPH NODE BIOPSY      SPLENECTOMY      1985-Hodgkin's Disease    SQUAMOUS CELL CARCINOMA EXCISION Left 10/9/2018    Procedure: NOSE/CHEEK BCC EXCISION; FROZEN SECTION;  Surgeon: Oliva Faye MD;  [facility];  Service: Plastics     Social History   Social History     Substance and Sexual Activity   Alcohol Use Not Currently    Frequency: Monthly or less    Comment: caffeine use; social drinker-1 beer every 2/3 months     Social History     Substance and Sexual Activity   Drug Use Never     Social History     Tobacco Use   Smoking Status Former Smoker    Quit date: 2011    Years since quitting: 10 3   Smokeless Tobacco Never Used     Exercise History: Na  Family History:   Family History   Problem Relation Age of Onset    Leukemia Mother     Colon cancer Paternal Grandmother 80    Heart disease Family     Heart attack Father     Coronary artery disease Father         stent- five    Hypertension Father     Colon cancer Father 80    No Known Problems Maternal Grandmother     No Known Problems Maternal Grandfather     No Known Problems Paternal Grandfather     No Known Problems Paternal Aunt     Stroke Neg Hx     Anuerysm Neg Hx     Clotting disorder Neg Hx     Arrhythmia Neg Hx     Heart failure Neg Hx     Hyperlipidemia Neg Hx      I have reviewed this patient's family history and commented on sigificant items within the HPI      Medications:  Current Facility-Administered Medications   Medication Dose Route Frequency    albumin human (FLEXBUMIN) 5 % injection 12 5 g  12 5 g Intravenous Once    albuterol (PROVENTIL HFA,VENTOLIN HFA) inhaler 2 puff  2 puff Inhalation Q6H PRN    aspirin (ECOTRIN LOW STRENGTH) EC tablet 81 mg  81 mg Oral Daily    heparin (porcine) 25,000 units in 0 45% NaCl 250 mL infusion (premix)  3-30 Units/kg/hr (Order-Specific) Intravenous Titrated    heparin (porcine) injection 2,600 Units  2,600 Units Intravenous Q1H PRN    heparin (porcine) injection 5,200 Units  5,200 Units Intravenous Q1H PRN    levothyroxine tablet 100 mcg  100 mcg Oral Early Morning    norepinephrine (LEVOPHED) 4 mg (STANDARD CONCENTRATION) IV in sodium chloride 0 9% 250 mL  1-30 mcg/min Intravenous Titrated     Home medications:  Prior to Admission Medications   Prescriptions Last Dose Informant Patient Reported? Taking? Fexofenadine HCl (ALLEGRA ALLERGY PO)  Self Yes No   Sig: Take by mouth   albuterol (PROVENTIL HFA,VENTOLIN HFA) 90 mcg/act inhaler  Self No No   Sig: Inhale 2 puffs every 6 (six) hours as needed for wheezing or shortness of breath   aspirin (ECOTRIN LOW STRENGTH) 81 mg EC tablet  Self Yes No   Sig: Take 81 mg by mouth daily   bumetanide (BUMEX) 2 mg tablet   No No   Sig: Take 1 tablet (2 mg total) by mouth 2 (two) times a day   cholecalciferol (VITAMIN D3) 1,000 units tablet  Self Yes No   Sig: Take 1,000 Units by mouth daily     clobetasol (TEMOVATE) 0 05 % cream  Self No No   Sig: Apply topically twice daily to legs for 14 days only, DO NOT apply to face or groin  levothyroxine 100 mcg tablet   No No   Sig: Take 1 tablet (100 mcg total) by mouth daily   metolazone (ZAROXOLYN) 2 5 mg tablet   No No   Sig: Take 1 tablet (2 5 mg total) by mouth daily Take 1 tablet 30 minutes prior to taking Bumex only when instructed by physician  nebivolol (BYSTOLIC) 2 5 mg tablet   No No   Sig: Take 1 tablet (2 5 mg total) by mouth daily   potassium chloride (K-DUR,KLOR-CON) 10 mEq tablet   No No   Sig: Take 1 tablet (10 mEq total) by mouth 2 (two) times a day      Facility-Administered Medications: None     Allergies:   Allergies Allergen Reactions    Ciprofloxacin Other (See Comments)     Reaction Date: [id number]; Hives/Uticaria    Sulfamethoxazole-Trimethoprim Hives     Patient does not remember rx    Lasix [Furosemide] Rash    Pantoprazole Rash     vasculitis     ------------------------------------------------------------------------------------------------------------  Advance Directive and Living Will:      Power of :    POLST:    ------------------------------------------------------------------------------------------------------------  Anticipated Length of Stay is > 2 midnights    Care Time Delivered:   Upon my evaluation, this patient had a high probability of imminent or life-threatening deterioration due to acute decompensated heart failure, elevated ddimer, ENEDELIA, which required my direct attention, intervention, and personal management  I have personally provided 35 minutes (0030 to 0105) of critical care time, exclusive of procedures, teaching, family meetings, and any prior time recorded by providers other than myself  ARACELY Gonzalez        Portions of the record may have been created with voice recognition software  Occasional wrong word or "sound a like" substitutions may have occurred due to the inherent limitations of voice recognition software    Read the chart carefully and recognize, using context, where substitutions have occurred

## 2021-04-26 NOTE — ASSESSMENT & PLAN NOTE
· Mechanical fall 1 week prior to admission while using the restroom overnight  · Denies striking her head or LOC  · Reports right rib pain without other injuries  · No external signs of trauma, no tenderness or limited ROM

## 2021-04-26 NOTE — CASE MANAGEMENT
PT IS NOT A READMISSION  PT IS IDENTIFIED AS A BUNDLE WITH A LOS OF 13-15  READMISSION RISK SCORE OF 26  Pt continues to reside with spouse Arlette Malhotra and adult son in a ranch style home with the use of a cane and 1 step to enter the home  Pt reported independent with ADLs, no hx of C services, SNF, mental health or drug/alcohol placements  Pt reported having a living will, uses [facility] pharmacy in [place] and has Kaylee Delarosa as a PCP  Pt's family normally provides any needed transportation  CM reviewed d/c planning process including the following: identifying help at home, patient preference for d/c planning needs, Discharge Lounge, [facility] Meds to Bed program, availability of treatment team to discuss questions or concerns patient and/or family may have regarding understanding medications and recognizing signs and symptoms once discharged  CM also encouraged patient to follow up with all recommended appointments after discharge  Patient advised of importance for patient and family to participate in managing patients medical well being

## 2021-04-26 NOTE — CONSULTS
Consultation - Cardiology   Víctor Shin 66 y o  female MRN: [id number]  Unit/Bed#: ICU 08 Encounter: [id number]    Assessment/Plan     Principal Problem:    Hypotension  Active Problems:    Hypothyroidism    History of pulmonary embolus (PE)    Compression fracture of T11 vertebra (HCC)    Acute on chronic diastolic (congestive) heart failure (HCC)    Small vessel vasculitis (HCC)    Stage 3 chronic kidney disease (HCC)      Assessment/Plan      1  Hypotension  Levophed intitated, escalating dose  Lactic acid 2 0  COVID 19 negative  Troponin negative X1  EKG- NSR inferolateral ST depression- more pronounced from prior   Concern for urosepsis- w/u per [facility]  Goal MAP greater 65  RV failure plus or minus sepsis    2  Acute on chronic RV failure  Diuretics held d/t hypotension-Bumex 2 mg b i d , Zaroxolyn  Has received 1 liter NSS  ProBNP 38, 524  CT without contrast of the spine-small bilateral pleural effusions and cardiomegaly  Exam-volume overloaded  Diuresis as tolerated, currently hypotensive  Dr Hallie La  has reached out to heart failure for opinion regarding further treatment     3  Elevated D-dimer  History of PE  Empirically placed on heparin  Unable to perform CTA due to ENEDELIA  Check echo- RV markedly dilated with reduced function with severe TR  PA pressure is 55 mm Hg prior  Spoke with critical care medicine-which check V/Q scan    4  AK I/CKD III - creatinine 3 2  Baseline creatinine 1 6-1 7  The setting of volume overload plus or minus sepsis    5  History of Hodgkin's lymphoma  Status post splenectomy, radiation  History of breast cancer 2006 status post mastectomy, Taxotere plus Cytoxan followed by adjuvant armidex  History melanoma status post resection    6  Falls- d/t weakness  CT spine T4 and T11 compression fracture  In setting of volume overload/sepsis/hypotension    7  Moderate to severe AI-optimize volume status as able    8   Diffuse erythematous rash- vasculitis workup      History of Present Illness   Physician Requesting Consult: Eliel Ware MD  Reason for Consult / Principal Problem:  Heart failure    HPI: Edward Delarosa is a 66y o  year old female with moderate pulmonary hypertension, Hodgkin's lymphoma, chronic diastolic heart failure , RV dysfunction, CKD 3 , small vessel vasculitis, PSVT, multiple falls who presents to ED with back pain after fall  She had been  getting progressively weaker at home   She was noted to be hypotensive  CT of the spine showed thoracic compression fractures T4 and T11  Labs noted AK I, proBNP markedly elevated  D-dimer 9 but due to her AK I unable to perform CT PE study the started empirically on IV heparin  She was admitted to ICU  due to her persistent hypotension  She apparently has gained 10 lb in the past few days  Diuretics have been held and she has been given albumin overnight  Started on Levophed, dose has been increased  Concern for urosepsis  Presented with /84  Now persistently hypotensive  She is followed by Dr Teresa [place]  There was recent telephone call to the office for retention of fluid  He had metolazone 2 5 mg x 1  Due to rash- Torsemide was stopped on Bumex 2 mg p o  B i d  was started April 7th  Weight at that time stable 131-134 lbs  Rash continues  TTE November/2020-LVEF 50%  Grade 2 diastolic dysfunction  RV mildly dilated with reduced function  Moderate MR  Moderate TR  Moderate pulmonary hypertension  Pharmacological Myoview stress test 09/2018-no significant perfusion defects  Small, very mild, mid anterior, partially reversible defect likely due to breast attenuation  EF 55%  Inpatient consult to Cardiology  Consult performed by: ARACELY Ma  Consult ordered by: ARACELY Mcfarland          Review of Systems   Constitutional: Positive for activity change, fatigue and unexpected weight change  HENT: Negative  Eyes: Negative  Respiratory: Positive for shortness of breath  Cardiovascular: Positive for leg swelling  Negative for chest pain and palpitations  Gastrointestinal: Negative  Endocrine: Negative  Genitourinary: Negative  Musculoskeletal: Positive for back pain and gait problem  Skin: Negative  Allergic/Immunologic: Negative  Neurological: Positive for weakness  Hematological: Bruises/bleeds easily  Psychiatric/Behavioral: Negative  Historical Information   Past Medical History:   Diagnosis Date    Acute CHF ([place] [address] ) 11/8/2020    Acute kidney injury superimposed on chronic kidney disease ([place] [address] ) 1/12/2019    ENEDELIA (acute kidney injury) ([place] [address] ) 1/12/2019    Anemia     BCC (basal cell carcinoma of skin)     facial    Breast cancer ([place] [address] ) 2006    s/p mastectomy     Calculus, kidney 2013    Cancer [facility])     Breast Cancer    Cancer [facility])     Cervical Cancer    Cervical cancer ([place] [address] ) 7361    Diastolic CHF, acute ([place] [address] ) 11/8/2020    Disease of thyroid gland     Ganglion     Last Assessed:7/9/13    Heart murmur     mitral valve regurgitation    Hodgkin disease ([place] [address] )     Hydronephrosis 2013    Hyperlipidemia     Hypertension     Melanoma ([place] [address] )     NSTEMI (non-ST elevated myocardial infarction) ([place] [address] ) 1/12/2019    Osteopenia     Last Assessed:7/9/13    Osteoporosis     Paroxysmal supraventricular tachycardia ([place] [address] )     Protein-calorie malnutrition ([place] [address] ) 2/12/2020    Renal calculi     Last Assessed:3/19/14    Renal cyst     Shoulder impingement     Last Assessed:1/18/13    Syncope 7/28/2019    Last Assessment & Plan:  Pre-syncopal symptoms leading to short syncopal episode without post-ictal period  Suspect dehydration/hypovolemia  Infectious, cardiopulm or neurologic cause less likely  No arrhythmias or AV block on ECG or telemetry thus far  No PE on CTA  No clinical signs of seizure   No acute pathology or mass on CTH  - s/p IVF; encourage PO intake - continue on telemetry - TTE c/f ne    UTI (urinary tract infection) 2013    Vasculitis ([address] ) 1/12/2019     Past Surgical History:   Procedure Laterality Date    APPENDECTOMY      BREAST BIOPSY Left 09/29/2006    BREAST RECONSTRUCTION      COMPLEX WOUND CLOSURE TO EXTREMITY Left 10/9/2018    Procedure: COMPLEX CLOSURE RECONSTRUCTION;  Surgeon: Harjit Cunha MD;  [facility];  Service: Plastics    CT BONE MARROW BIOPSY AND ASPIRATION  11/15/2019    CYSTOSCOPY W/ RETROGRADES  2009    CYSTOSCOPY W/ URETEROSCOPY  2002    EXTRACORPOREAL SHOCK WAVE LITHOTRIPSY Right 2005    FLAP LOCAL HEAD / NECK Left 10/9/2018    Procedure: FLAP RECONSTRUCTION;  Surgeon: Harjit Cunha MD;  [facility];  Service: Plastics    FULL THICKNESS SKIN GRAFT Left 10/9/2018    Procedure: FULL THICKNESS SKIN GRAFT RECONSTRUCTION;  Surgeon: Harjit Cunha MD;  [facility];  Service: Plastics    HYSTERECTOMY      1983-cervical cancer    INTRAOPERATIVE RADIATION THERAPY ([facility])      Radiation Therapy    KIDNEY SURGERY      MASTECTOMY Left 11/09/2006    OOPHORECTOMY Left     OTHER SURGICAL HISTORY      Chemotherapeutics    REDUCTION MAMMAPLASTY Right 2006    SENTINEL LYMPH NODE BIOPSY      SPLENECTOMY      1985-Hodgkin's Disease    SQUAMOUS CELL CARCINOMA EXCISION Left 10/9/2018    Procedure: NOSE/CHEEK BCC EXCISION; FROZEN SECTION;  Surgeon: Harjit Cunha MD;  [facility];  Service: Plastics     Social History     Substance and Sexual Activity   Alcohol Use Not Currently    Frequency: Monthly or less    Comment: caffeine use; social drinker-1 beer every 2/3 months     Social History     Substance and Sexual Activity   Drug Use Never     Social History     Tobacco Use   Smoking Status Former Smoker    Quit date: 2011    Years since quitting: 10 3   Smokeless Tobacco Never Used     Family History:   Family History   Problem Relation Age of Onset    Leukemia Mother     Colon cancer Paternal Grandmother 80    Heart disease Family     Heart attack Father     Coronary artery disease Father         stent- five    Hypertension Father     Colon cancer Father 80    No Known Problems Maternal Grandmother     No Known Problems Maternal Grandfather     No Known Problems Paternal Grandfather     No Known Problems Paternal Aunt     Stroke Neg Hx     Anuerysm Neg Hx     Clotting disorder Neg Hx     Arrhythmia Neg Hx     Heart failure Neg Hx     Hyperlipidemia Neg Hx        Meds/Allergies   current meds:   Current Facility-Administered Medications   Medication Dose Route Frequency    albumin human (FLEXBUMIN) 5 % injection 12 5 g  12 5 g Intravenous Once    albuterol (PROVENTIL HFA,VENTOLIN HFA) inhaler 2 puff  2 puff Inhalation Q6H PRN    aspirin (ECOTRIN LOW STRENGTH) EC tablet 81 mg  81 mg Oral Daily    cefTRIAXone (ROCEPHIN) 2,000 mg in dextrose 5 % 50 mL IVPB  2,000 mg Intravenous Q24H    heparin (porcine) 25,000 units in 0 45% NaCl 250 mL infusion (premix)  3-30 Units/kg/hr (Order-Specific) Intravenous Titrated    heparin (porcine) injection 2,600 Units  2,600 Units Intravenous Q1H PRN    heparin (porcine) injection 5,200 Units  5,200 Units Intravenous Q1H PRN    levothyroxine tablet 100 mcg  100 mcg Oral Early Morning    lidocaine (PF) (XYLOCAINE-MPF) 1 % injection **ADS Override Pull**        norepinephrine (LEVOPHED) 4 mg (STANDARD CONCENTRATION) IV in sodium chloride 0 9% 250 mL  1-30 mcg/min Intravenous Titrated    vasopressin (PITRESSIN) 20 Units in sodium chloride 0 9 % 100 mL infusion  0 04 Units/min Intravenous Continuous    and PTA meds:    Medications Prior to Admission   Medication    albuterol (PROVENTIL HFA,VENTOLIN HFA) 90 mcg/act inhaler    aspirin (ECOTRIN LOW STRENGTH) 81 mg EC tablet    bumetanide (BUMEX) 2 mg tablet    Fexofenadine HCl (ALLEGRA ALLERGY PO)    levothyroxine 100 mcg tablet    nebivolol (BYSTOLIC) 2 5 mg tablet    cholecalciferol (VITAMIN D3) 1,000 units tablet    clobetasol (TEMOVATE) 0 05 % cream  metolazone (ZAROXOLYN) 2 5 mg tablet    potassium chloride (K-DUR,KLOR-CON) 10 mEq tablet     Allergies   Allergen Reactions    Ciprofloxacin Other (See Comments)     Reaction Date: 24Jun2011; Hives/Uticaria    Sulfamethoxazole-Trimethoprim Hives     Patient does not remember rx    Lasix [Furosemide] Rash    Pantoprazole Rash     vasculitis       Objective   Vitals: Blood pressure 91/50, pulse 86, temperature 97 5 °F (36 4 °C), temperature source Oral, resp  rate 20, height 5' 1 5" (1 562 m), weight 66 5 kg (146 lb 9 7 oz), SpO2 96 %, not currently breastfeeding    Orthostatic Blood Pressures      Most Recent Value   Blood Pressure  91/50 filed at 04/26/2021 0700   Patient Position - Orthostatic VS  Lying filed at 04/26/2021 0409            Intake/Output Summary (Last 24 hours) at 4/26/2021 0098  Last data filed at 4/26/2021 0600  Gross per 24 hour   Intake 1218 22 ml   Output --   Net 1218 22 ml       Invasive Devices     Peripheral Intravenous Line            Peripheral IV 04/25/21 Right Antecubital less than 1 day                Physical Exam: BP 91/50   Pulse 86   Temp 97 5 °F (36 4 °C) (Oral)   Resp 20   Ht 5' 1 5" (1 562 m)   Wt 66 5 kg (146 lb 9 7 oz)   LMP  (LMP Unknown)   SpO2 96%   BMI 27 25 kg/m²      General Appearance:    Alert, cooperative, no distress, appears stated age   Head:    Normocephalic, no scleral icterus   Eyes:    PERRL   Nose:   Nares normal, septum midline, mucosa normal, no drainage    Throat:   Lips, mucosa, and tongue normal   Neck:   Supple, symmetrical, trachea midline     +JVD   Back:     Symmetric   Lungs:     Crackles bases to auscultation bilaterally, respirations unlabored on r/a   Chest Wall:    No tenderness or deformity    Heart:    Regular rate and rhythm, S1 and S2 normal, + rub, systolic, diastolic murmur   Abdomen:     Soft, non-tender   Extremities:   Moderate  edema       Skin:   Skin diffuse red rash   Neurologic:   Alert and oriented to person place and time   No focal deficits       Lab Results:   Recent Results (from the past 72 hour(s))   CBC and differential    Collection Time: 04/25/21  7:47 PM   Result Value Ref Range    WBC 10 18 (H) 4 31 - 10 16 Thousand/uL    RBC 3 01 (L) 3 81 - 5 12 Million/uL    Hemoglobin 8 4 (L) 11 5 - 15 4 g/dL    Hematocrit 26 0 (L) 34 8 - 46 1 %    MCV 86 82 - 98 fL    MCH 27 9 26 8 - 34 3 pg    MCHC 32 3 31 4 - 37 4 g/dL    RDW 16 8 (H) 11 6 - 15 1 %    MPV 9 9 8 9 - 12 7 fL    Platelets 152 (H) 725 - 390 Thousands/uL    nRBC 0 /100 WBCs    Neutrophils Relative 88 (H) 43 - 75 %    Immat GRANS % 1 0 - 2 %    Lymphocytes Relative 3 (L) 14 - 44 %    Monocytes Relative 6 4 - 12 %    Eosinophils Relative 2 0 - 6 %    Basophils Relative 0 0 - 1 %    Neutrophils Absolute 9 00 (H) 1 85 - 7 62 Thousands/µL    Immature Grans Absolute 0 08 0 00 - 0 20 Thousand/uL    Lymphocytes Absolute 0 33 (L) 0 60 - 4 47 Thousands/µL    Monocytes Absolute 0 58 0 17 - 1 22 Thousand/µL    Eosinophils Absolute 0 15 0 00 - 0 61 Thousand/µL    Basophils Absolute 0 04 0 00 - 0 10 Thousands/µL   Protime-INR    Collection Time: 04/25/21  7:47 PM   Result Value Ref Range    Protime 15 1 (H) 11 6 - 14 5 seconds    INR 1 17 0 84 - 1 19   APTT    Collection Time: 04/25/21  7:47 PM   Result Value Ref Range    PTT 33 23 - 37 seconds   Comprehensive metabolic panel    Collection Time: 04/25/21  7:47 PM   Result Value Ref Range    Sodium 132 (L) 136 - 145 mmol/L    Potassium 4 3 3 5 - 5 3 mmol/L    Chloride 97 (L) 100 - 108 mmol/L    CO2 26 21 - 32 mmol/L    ANION GAP 9 4 - 13 mmol/L    BUN 92 (H) 5 - 25 mg/dL    Creatinine 3 21 (H) 0 60 - 1 30 mg/dL    Glucose 133 65 - 140 mg/dL    Calcium 8 8 8 3 - 10 1 mg/dL    Corrected Calcium 10 6 (H) 8 3 - 10 1 mg/dL    AST 34 5 - 45 U/L    ALT 20 12 - 78 U/L    Alkaline Phosphatase 445 (H) 46 - 116 U/L    Total Protein 9 3 (H) 6 4 - 8 2 g/dL    Albumin 1 7 (L) 3 5 - 5 0 g/dL    Total Bilirubin 0 44 0 20 - 1 00 mg/dL    eGFR 14 ml/min/1 73sq m   Troponin I    Collection Time: 04/25/21  7:47 PM   Result Value Ref Range    Troponin I <0 02 <=0 04 ng/mL   NT-BNP PRO    Collection Time: 04/25/21  7:47 PM   Result Value Ref Range    NT-proBNP 38,524 (H) <125 pg/mL   D-Dimer    Collection Time: 04/25/21  7:47 PM   Result Value Ref Range    D-Dimer, Quant 9 20 (H) <0 50 ug/ml FEU   Novel Coronavirus (Covid-19),PCR [facility] - 2 Hour Stat    Collection Time: 04/25/21 10:13 PM    Specimen: Nose; Nares   Result Value Ref Range    SARS-CoV-2 Negative Negative   Urinalysis with microscopic    Collection Time: 04/26/21  1:27 AM   Result Value Ref Range    Clarity, UA Cloudy     Color, UA Yellow     Specific Gunpowder, UA 1 020 1 003 - 1 030    pH, UA 5 5 4 5, 5 0, 5 5, 6 0, 6 5, 7 0, 7 5, 8 0    Glucose, UA Negative Negative mg/dl    Ketones, UA Negative Negative mg/dl    Blood, UA Moderate (A) Negative    Protein, UA 30 (1+) (A) Negative mg/dl    Nitrite, UA Positive (A) Negative    Bilirubin, UA Negative Negative    Urobilinogen, UA 0 2 0 2, 1 0 E U /dl E U /dl    Leukocytes, UA Large (A) Negative    WBC, UA Innumerable (A) None Seen, 2-4 /hpf    RBC, UA 1-2 (A) None Seen, 2-4 /hpf    Bacteria, UA Moderate (A) None Seen, Occasional /hpf    Epithelial Cells None Seen None Seen, Occasional /hpf   Creatinine, urine, random    Collection Time: 04/26/21  1:27 AM   Result Value Ref Range    Creatinine, Ur 32 0 mg/dL   Urea nitrogen, urine    Collection Time: 04/26/21  1:27 AM   Result Value Ref Range    Urea Nitrogen, Ur 294 mg/dL   Osmolality, urine    Collection Time: 04/26/21  1:27 AM   Result Value Ref Range    Osmolality, Ur 346 250 - 900 mmol/KG   APTT    Collection Time: 04/26/21  3:19 AM   Result Value Ref Range    PTT 32 23 - 37 seconds   Lactic acid, plasma    Collection Time: 04/26/21  3:19 AM   Result Value Ref Range    LACTIC ACID 2 0 0 5 - 2 0 mmol/L     [address] [facility] [address] [place], [address] [place] [address]  [phone] transthoracic Echocardiogram  2D, M-mode, Doppler, and Color Doppler     Study date:  2020     Patient: Samantha Berry  MR number: [id number]  Account number: [de-identified]  : 1950  Age: 65 years  Gender: Female  Status: Inpatient  Location: Bedside  Height: 61 in  Weight: 144 lb  BP: 116/ 57 mmHg     Indications: Heart Failure     Diagnoses: I50 9 - Heart failure, unspecified     Sonographer:  SHANTELL Parikh  Primary Physician:  Amy Minor MD  Referring Physician:  Esvin Joy MD  Group:  [facility]  Interpreting Physician:  Azeem Godinez MD     SUMMARY     LEFT VENTRICLE:  Size was normal   Systolic function was at the lower limits of normal  Ejection fraction was estimated to be 50 %  There was mild diffuse hypokinesis  Wall thickness was normal   Features were consistent with a pseudonormal left ventricular filling pattern, with concomitant abnormal relaxation and increased filling pressure (grade 2 diastolic dysfunction)      RIGHT VENTRICLE:  The ventricle was mildly dilated  Systolic function was mildly reduced      MITRAL VALVE:  There was moderate regurgitation      TRICUSPID VALVE:  There was moderate regurgitation  Pulmonary artery systolic pressure was moderately increased  The findings suggest moderate pulmonary hypertension      COMPARISONS:  Comparison was made with the previous study of 2019  Aortic regurgitation has improved      HISTORY: PRIOR HISTORY: Hyperlipidemia, Mitral regurgitation, PSVT, tricuspid regurgitation, Aortic insufficiency, CM, CKD III, Acute CHF, HTN      PROCEDURE: The procedure was performed at the bedside  This was a routine study  Parasternals are technically difficult due to left breast implant  The transthoracic approach was used  The study included complete 2D imaging, M-mode,  complete spectral Doppler, and color Doppler  The heart rate was 91 bpm, at the start of the study   Images were obtained from the parasternal, apical, subcostal, and suprasternal notch acoustic windows  This was a technically difficult  study      LEFT VENTRICLE: Size was normal  Systolic function was at the lower limits of normal  Ejection fraction was estimated to be 50 %  There was mild diffuse hypokinesis  Wall thickness was normal  DOPPLER: Features were consistent with a  pseudonormal left ventricular filling pattern, with concomitant abnormal relaxation and increased filling pressure (grade 2 diastolic dysfunction)      RIGHT VENTRICLE: The ventricle was mildly dilated  Systolic function was mildly reduced  Wall thickness was normal      LEFT ATRIUM: Size was normal      RIGHT ATRIUM: Size was normal      MITRAL VALVE: Valve structure was normal  There was normal leaflet separation  DOPPLER: The transmitral velocity was within the normal range  There was no evidence for stenosis  There was moderate regurgitation      AORTIC VALVE: The valve was trileaflet  Leaflets exhibited normal thickness and normal cuspal separation  DOPPLER: Transaortic velocity was within the normal range  There was no evidence for stenosis  There was no regurgitation      TRICUSPID VALVE: The valve structure was normal  There was normal leaflet separation  DOPPLER: The transtricuspid velocity was within the normal range  There was no evidence for stenosis  There was moderate regurgitation  Pulmonary artery  systolic pressure was moderately increased  Estimated peak PA pressure was 55 mmHg  The findings suggest moderate pulmonary hypertension      PULMONIC VALVE: Leaflets exhibited normal thickness, no calcification, and normal cuspal separation  DOPPLER: The transpulmonic velocity was within the normal range  There was trace regurgitation      PERICARDIUM: There was no pericardial effusion  The pericardium was normal in appearance      AORTA: The root exhibited normal size      SYSTEMIC VEINS: IVC: The inferior vena cava was normal in size and course   Respirophasic changes were normal      SYSTEM MEASUREMENT TABLES     2D  %FS: 21 01 %  Ao Diam: 2 57 cm  EDV(Teich): 85 55 ml  EF(Teich): 42 99 %  ESV(Teich): 48 78 ml  IVSd: 0 79 cm  LA Area: 16 44 cm2  LA Diam: 4 2 cm  LVEDV MOD A4C: 70 91 ml  LVEF MOD A4C: 49 92 %  LVESV MOD A4C: 35 51 ml  LVIDd: 4 35 cm  LVIDs: 3 44 cm  LVLd A4C: 7 01 cm  LVLs A4C: 6 04 cm  LVPWd: 0 79 cm  RA Area: 15 8 cm2  RVIDd: 3 44 cm  SV MOD A4C: 35 4 ml  SV(Teich): 36 78 ml     CF  MR Als  Parth: 0 36 m/s  MR Flow: 22 07 ml/s  MR Rad: 0 31 cm     CW  MR VTI: 143 7 cm  MR Vmax: 4 28 m/s  MR Vmean: 3 58 m/s  MR maxP 3 mmHg  MR meanP 38 mmHg  TR MaxP 52 mmHg  TR Vmax: 3 3 m/s     MM  TAPSE: 1 56 cm     PW  E' Sept: 0 08 m/s  E/E' Sept: 9 84  MV A Parth: 0 3 m/s  MV Dec Day: 7 93 m/s2  MV DecT: 95 22 ms  MV E Parth: 0 75 m/s  MV E/A Ratio: 2 55  MV PHT: 27 61 ms  MVA By PHT: 7 97 cm2     [employer] Accredited Echocardiography Laboratory     Prepared and electronically signed by  Barry Diaz MD  Signed [id number] 12:26:58       Imaging: I have personally reviewed pertinent reports  EKG: NSR inferolateral ST depresion  VTE Prophylaxis: Heparin    Code Status: Level 1 - Full Code  Advance Directive and Living Will:      Power of :    POLST:      Counseling / Coordination of Care  Total floor / unit time spent today 60 minutes  Greater than 50% of total time was spent with the patient and / or family counseling and / or coordination of care

## 2021-04-26 NOTE — PROCEDURES
Central Line Insertion    Date/Time: 4/26/2021 10:47 AM  Performed by: Radha Will PA-C  Authorized by: Radha Will PA-C     Patient location:  Bedside  Consent:     Consent obtained:  Verbal (Verbal consent obtained by Dr Meena Schwartz)    Consent given by:  Patient and spouse    Risks discussed:  Arterial puncture, bleeding and infection  Universal protocol:     Immediately prior to procedure, a time out was called: yes      Patient identity confirmed:  Verbally with patient, arm band and provided demographic data  Pre-procedure details:     Hand hygiene: Hand hygiene performed prior to insertion      Sterile barrier technique: All elements of maximal sterile technique followed      Skin preparation:  ChloraPrep    Skin preparation agent: Skin preparation agent completely dried prior to procedure    Indications:     Central line indications: medications requiring central line and no peripheral vascular access      Site selection rationale:  Unable to place in IJ or SC secondary to patient inability to lay flat and cooperate   Anesthesia (see MAR for exact dosages): Anesthesia method:  Local infiltration    Local anesthetic:  Lidocaine 1% w/o epi  Procedure details:     Location:  Right femoral    Vessel type: vein      Laterality:  Right    Patient position:  Flat    Catheter type:  Triple lumen 20cm    Catheter size:  7 Fr    Landmarks identified: yes      Number of attempts:  1    Successful placement: yes    Post-procedure details:     Post-procedure:  Dressing applied and line sutured    Assessment:  Blood return through all ports and free fluid flow    Post-procedure complications: none      Patient tolerance of procedure:   Tolerated well, no immediate complications

## 2021-04-26 NOTE — ASSESSMENT & PLAN NOTE
Presents with new onset intrascapular pain after coughing fits    Imaging:  · CT thoracic spine 4/25/2021:  T4 compression fracture with approximately 60% loss of height centrally  T11 fracture with severe loss of height  Posterior elements appear grossly intact  Plan:  · Continue monitor neurological exam  · CT imaging results reviewed with patient and significant other at bedside  · Defer bracing given no tenderness to palpation midline  In addition patient with heart failure and concern for pleural effusion in bracing may inhibit respiratory status  · Will obtain baseline upright thoracic x-rays  · Pain control per primary team  · May mobilize as able when medically appropriate  · Patient educated on signs and symptoms of fracture progression including T4 radiculopathy and lower extremity complaints  · Discussion fracture may be related to osteoporosis and coughing fits  Recommend patient follow-up with PCP for DEXA scan and management of osteoporosis  · No neurosurgical intervention anticipated  Patient may follow up on as-needed basis  · May consider kyphoplasty patient has intolerable back pain  · Please call if any questions or concerns

## 2021-04-26 NOTE — ASSESSMENT & PLAN NOTE
· T4 and T11 compression fractures following a fall 1 week prior to admission   · Possibly related to coughing and osteoporosis  · No midline tenderness or radicular symptoms  · Appreciate neurosurgery consult - no bracing as patient has no tenderness and respiratory concerns

## 2021-04-26 NOTE — PROCEDURES
Arterial Line Insertion    Date/Time: 4/26/2021 10:52 AM  Performed by: Sonja Coulter PA-C  Authorized by: Sonja Coulter PA-C     Patient location:  Bedside  Consent:     Consent obtained:  Verbal (Verbal consent obtained by Dr Olivia Queen )    Consent given by:  Patient    Risks discussed:  Bleeding, infection, ischemia and pain  Universal protocol:     Immediately prior to procedure a time out was called: yes      Patient identity confirmed:  Verbally with patient, arm band and provided demographic data  Indications:     Indications: hemodynamic monitoring, continuous blood pressure monitoring and frequent labs / infusion    Pre-procedure details:     Skin preparation:  Chlorhexidine    Preparation: Patient was prepped and draped in sterile fashion    Anesthesia (see MAR for exact dosages): Anesthesia method:  Local infiltration    Local anesthetic:  Lidocaine 1% w/o epi  Procedure details:     Location / Tip of Catheter:  Femoral    Laterality:  Right    Needle gauge:  18 G    Placement technique:  Seldinger    Number of attempts:  1    Successful placement: yes      Transducer: waveform confirmed    Post-procedure details:     Post-procedure:  Sterile dressing applied and sutured    CMS:  Normal    Patient tolerance of procedure:   Tolerated well, no immediate complications    Sonja Coulter PA-C

## 2021-04-27 NOTE — NURSING NOTE
CVVHD tx stopped after approx 40 minutes - multiple small clots visible in venous HD line  Unknown if any reached pt prior to stopping CVVHD  Unable to return blood  ARACELY Agarwal made aware

## 2021-04-27 NOTE — PROGRESS NOTES
Daily Progress Note - Critical Care   Josy Hill 66 y o  female MRN: [id number]  Unit/Bed#: ICU 08 Encounter: [id number]        ----------------------------------------------------------------------------------------  HPI/24hr events: Continues on pressors  Was hypoglycemic overnight requiring D50 x2  Has a persistent AGMA  Given 1 amp (50meq of sodium bicarb) and 500ml isolyte bolus  CT Abd/Pelvis obtained overnight (read pending)  Overall feels better however feels "fluid overloaded"  States rash is improved    I/O: 1 5/920 +648  Total admit +1 8    Infusions:  Levo 4mcg/kg/min  Vaso 0 04u/min  Heparin infusion VTE high    O2: Room Air    Lines/Tubes:  Right femoral TLC  Dian    Reviewed telemetry, had episodes of bursts of narrow complex tachycardia      ---------------------------------------------------------------------------------------  SUBJECTIVE  "I feel fluid overloaded"    Review of Systems   Constitutional: Negative for fever  Respiratory: Positive for shortness of breath  Negative for chest tightness  Cardiovascular: Negative for chest pain  Neurological: Negative for weakness  All other systems reviewed and are negative  Review of systems was reviewed and negative unless stated above in HPI/24-hour events   ---------------------------------------------------------------------------------------  Assessment and Plan:  1  Septic Shock, POA  Suspect urinary  2  ENEDELIA on CKD3 baseline 1 6-1 7  3  AGMA  4  Acute on chronic RV failure  5  Hypoglycemia  6  Hyperkalemia  7  Moderate to severe AI  8  Closed T4 Fx  9  Compression fx of T11 vertebra  10  Normocytic anemia POA  11  hyperphosphatemia  12  Diffuse erythematous rash  13  hypoalbuminemia  14  H/o Hodgkins lymphoma s/p splenectomy/radiation  15  Remote h/o malignant melanoma  16  Recurrent falls          Neuro:   o Closed T4 fx  o CT thoracic spine 4/25/2021:  T4 compression fracture with approximately 60% loss of height centrally  T11 fracture with severe loss of height  Posterior elements appear grossly intact  o Neurosurgery consulted/following    o Serial neuro exams, monitor for T4 radiculopathy/LE complaints, pain control  o Defer bracing at this time given no midline tenderness and may compromise respiratory status  o Pt to f/u w/ PCP for dexa scan and management of osteoporosis    o T/c kyphoplasty if patient has intractable back pain  CV:   o Continue heparin gtts for possible PE  Unable to obtain CT 2/2 ENEDELIA and patient is orthopneic and likely would not tolerate V/Q scan at this time  o Titrate norepi/continue vaso for goal MAP >65  o Continue femoral central   Note femoral line placed 2/2 patient unable to lay flat for IJ central line  o Continue to hold metolazone/bumex  Diurese when BP permits  o Cardiology following  F/w Dr Wagner Euceda as outpatient  o Strict I/O, daily weights    Pulm:  o Currently RA   o Maintain SpO2 >92%  o Continue albuterol inhaler (home med)    GI:   o Advance diet once offical CT read  :   o Trend creatinie  Suspect ATN from sepsis  T/c obstructive uropathy  o F/u CT imaging formal read  o Monitor UO/continue de la vega catheter maintenance  o Serial BMP, monitor for hyperkalemia  o If Cr continues to rise, consider nephrology consult    o Avoid nephrotoxic agents    F/E/N:    Fluid bolus as needed, currently appears volume overloaded  Heme/Onc:   o Continue heparin gtts      Endo:   o Trend blood sugar     o Continue levothyroxine    ID:   o Trend WBC/temp/cultures/procalcitonin  o Continue ceftriaxone -->consider escalation to cefepime if remains on pressors  o F/u CT scan read      MSK/Skin:   o See neuro  o PT/OT when clinically stable  o Monitor skin rash-->vasculitis workup      Disposition: Continue Critical Care   Code Status: Level 1 - Full Code  ---------------------------------------------------------------------------------------  ICU CORE MEASURES    Prophylaxis   VTE Pharmacologic Prophylaxis: Heparin Drip  VTE Mechanical Prophylaxis: sequential compression device  Stress Ulcer Prophylaxis: Prophylaxis Not Indicated     ABCDE Protocol (if indicated)  Plan to perform spontaneous awakening trial today? Not applicable  Plan to perform spontaneous breathing trial today? Not applicable  Obvious barriers to extubation? Not applicable  CAM-ICU: Negative    Invasive Devices Review  Invasive Devices     Central Venous Catheter Line            CVC Central Lines 21 Triple 20cm less than 1 day          Peripheral Intravenous Line            Peripheral IV 21 Right Antecubital 1 day          Arterial Line            Arterial Line 21 Femoral less than 1 day          Drain            Urethral Catheter Temperature probe less than 1 day              Can any invasive devices be discontinued today? Not applicable  ---------------------------------------------------------------------------------------  OBJECTIVE    Vitals   Vitals:    21 0558 21 0600 21 0637 21 0700   BP:    (!) 82/34   BP Location:    Right arm   Pulse: 65 65  61   Resp: (!) 27 19  (!) 24   Temp: (!) 94 3 °F (34 6 °C) (!) 94 3 °F (34 6 °C)     TempSrc:       SpO2: 99% 100%  100%   Weight:   68 1 kg (150 lb 2 1 oz)    Height:         Temp (24hrs), Av 5 °F (36 4 °C), Min:94 3 °F (34 6 °C), Max:99 °F (37 2 °C)  Current: Temperature: (!) 94 3 °F (34 6 °C)    Respiratory:  SpO2: SpO2: 100 %       Invasive/non-invasive ventilation settings   Respiratory    Lab Data (Last 4 hours)    None         O2/Vent Data (Last 4 hours)    None                Physical Exam  Vitals signs and nursing note reviewed  Constitutional:       General: She is not in acute distress  Appearance: She is not toxic-appearing  Comments: Diffuse rash   HENT:      Head: Normocephalic  Neck:      Musculoskeletal: Normal range of motion     Cardiovascular:      Rate and Rhythm: Normal rate and regular rhythm  Pulses: Normal pulses  Pulmonary:      Effort: Pulmonary effort is normal  No accessory muscle usage  Breath sounds: Normal breath sounds  Abdominal:      General: Bowel sounds are normal       Palpations: Abdomen is soft  Tenderness: There is no abdominal tenderness  Comments: Right femoral TLC   Genitourinary:     Comments: Biggs in place  Cloudy urine  Musculoskeletal: Normal range of motion  Skin:     General: Skin is warm and dry  Capillary Refill: Capillary refill takes less than 2 seconds  Comments: Diffuse non-blanchable erythematous rash on all extremities/torso/palms/soles   Neurological:      Mental Status: She is alert  GCS: GCS eye subscore is 4  GCS verbal subscore is 5  GCS motor subscore is 6           Laboratory and Diagnostics:  Results from last 7 days   Lab Units 04/27/21 0252 04/26/21  1037 04/25/21 1947   WBC Thousand/uL 11 69* 11 70* 10 18*   HEMOGLOBIN g/dL 8 2* 8 4* 8 4*   HEMATOCRIT % 26 1* 26 1* 26 0*   PLATELETS Thousands/uL 498* 501* 534*   NEUTROS PCT % 86* 89* 88*   MONOS PCT % 8 7 6     Results from last 7 days   Lab Units 04/27/21 0252 04/26/21  1941 04/26/21  1037 04/25/21 1947   SODIUM mmol/L 136 133* 134* 132*   POTASSIUM mmol/L 5 2 6 0* 5 2 4 3   CHLORIDE mmol/L 99* 100 100 97*   CO2 mmol/L 19* 18* 21 26   ANION GAP mmol/L 18* 15* 13 9   BUN mg/dL 97* 98* 90* 92*   CREATININE mg/dL 3 89* 3 65* 3 29* 3 21*   CALCIUM mg/dL 8 2* 8 3 8 4 8 8   GLUCOSE RANDOM mg/dL 89 42* 87 133   ALT U/L 18  --   --  20   AST U/L 38  --   --  34   ALK PHOS U/L 350*  --   --  445*   ALBUMIN g/dL 1 8*  --   --  1 7*   TOTAL BILIRUBIN mg/dL 0 67  --   --  0 44     Results from last 7 days   Lab Units 04/27/21  0252 04/26/21  1037   MAGNESIUM mg/dL 2 7* 2 6   PHOSPHORUS mg/dL  --  6 8*      Results from last 7 days   Lab Units 04/27/21  0517 04/26/21  2349 04/26/21  1804 04/26/21  1037 04/26/21  0319 04/25/21  1947   INR   -- --   --   --   --  1 17   PTT seconds 172* 110* 84* 99* 32 33      Results from last 7 days   Lab Units 04/25/21  1947   TROPONIN I ng/mL <0 02     Results from last 7 days   Lab Units 04/26/21  0319   LACTIC ACID mmol/L 2 0     ABG:    VBG:    Results from last 7 days   Lab Units 04/27/21  0252 04/26/21  0319   PROCALCITONIN ng/ml 3 19* 0 28*       Micro  Results from last 7 days   Lab Units 04/26/21  1037 04/26/21  8868   BLOOD CULTURE  Received in Microbiology Lab  Culture in Progress  Received in Microbiology Lab  Culture in Progress  EKG: NSR  Rate 69  QTc 482  Imaging:I have personally reviewed pertinent reports  CT Abd/Pelvis: Peniding read  CT Thoracic:   T4 compression fracture with 65% loss of height  T11 compression fracture with near complete loss of height (90+ percent)  Right apical pulmonary scarring  Small bilateral effusions  Cardiomegaly  Previously seen massively hydronephrotic right kidney is again noted          Intake and Output  I/O       04/25 0701 - 04/26 0700 04/26 0701 - 04/27 0700 04/27 0701 - 04/28 0700    P  O   0     I V  (mL/kg) 718 2 (10 8) 1568 3 (23)     IV Piggyback 500      Total Intake(mL/kg) 1218 2 (18 3) 1568 3 (23)     Urine (mL/kg/hr)  920 (0 6)     Total Output  920     Net +1218 2 +648  3                UOP: 25 ml/hr     Height and Weights   Height: 5' 1 5" (156 2 cm)  IBW (Ideal Body Weight): 48 95 kg  Body mass index is 27 91 kg/m²  Weight (last 2 days)     Date/Time   Weight    04/27/21 0637   68 1 (150 13)    04/26/21 0548   66 5 (146 61)    04/26/21 0409   66 5 (146 61)    04/25/21 1818   69 1 (152 34)                Nutrition       Diet Orders   (From admission, onward)             Start     Ordered    04/26/21 0919  Diet NPO  Diet effective now     Question Answer Comment   Diet Type NPO    RD to adjust diet per protocol?  Yes        04/26/21 0918    04/26/21 0550  Room Service  Once     Question:  Type of Service  Answer:  Room Service - Appropriate with Assistance    04/26/21 0533                    Active Medications  Scheduled Meds:  Current Facility-Administered Medications   Medication Dose Route Frequency Provider Last Rate    acetaminophen  650 mg Oral Q6H PRN Arnolya Castro PA-C      albumin human  12 5 g Intravenous Once Pratibha Beards, CRNP      albuterol  2 puff Inhalation Q6H PRN Pratibha Beards, CRNP      aluminum-magnesium hydroxide-simethicone  30 mL Oral Q4H PRN Joy Gondola, CRNP      aspirin  81 mg Oral Daily Pratibha Beards, CRNP      cefTRIAXone  2,000 mg Intravenous Q24H Pratibha Beards, CRNP 2,000 mg (04/27/21 0232)    dextrose  50 mL Intravenous Daily PRN Joy Gondola, CRNP      heparin (porcine)  3-30 Units/kg/hr (Order-Specific) Intravenous Titrated Pratibha Beards, CRNP Stopped (04/27/21 0612)    heparin (porcine)  2,600 Units Intravenous Q1H PRN Pratibha Beards, CRNP      heparin (porcine)  5,200 Units Intravenous Q1H PRN Pratibha Beards, CRNP      levothyroxine  100 mcg Oral Early Morning Pratibha Beards, CRNP      lidocaine  1 patch Topical Daily Shen Castro PA-C      norepinephrine  1-30 mcg/min Intravenous Titrated Pratibha Beards, CRNP 10 mcg/min (04/27/21 0703)    vasopressin (PITRESSIN) in 0 9 % sodium chloride 100 mL  0 04 Units/min Intravenous Continuous Garber Savanna PA-C 0 04 Units/min (04/27/21 0232)     Continuous Infusions:  heparin (porcine), 3-30 Units/kg/hr (Order-Specific), Last Rate: Stopped (04/27/21 0612)  norepinephrine, 1-30 mcg/min, Last Rate: 10 mcg/min (04/27/21 0703)  vasopressin (PITRESSIN) in 0 9 % sodium chloride 100 mL, 0 04 Units/min, Last Rate: 0 04 Units/min (04/27/21 0232)      PRN Meds:   acetaminophen, 650 mg, Q6H PRN  albuterol, 2 puff, Q6H PRN  aluminum-magnesium hydroxide-simethicone, 30 mL, Q4H PRN  dextrose, 50 mL, Daily PRN  heparin (porcine), 2,600 Units, Q1H PRN  heparin (porcine), 5,200 Units, Q1H PRN        Allergies   Allergies   Allergen Reactions    Ciprofloxacin Other (See Comments)     Reaction Date: [id number]; Hives/Uticaria    Sulfamethoxazole-Trimethoprim Hives     Patient does not remember rx    Lasix [Furosemide] Rash    Pantoprazole Rash     vasculitis     ---------------------------------------------------------------------------------------  Advance Directive and Living Will:      Power of :    POLST:    ---------------------------------------------------------------------------------------  Care Time Delivered:   see attending attestation    Ashanti Soni PA-C      Portions of the record may have been created with voice recognition software  Occasional wrong word or "sound a like" substitutions may have occurred due to the inherent limitations of voice recognition software    Read the chart carefully and recognize, using context, where substitutions have occurred

## 2021-04-27 NOTE — CONSULTS
UROLOGY CONSULTATION NOTE     Patient Identifiers: Barrera Castro (MRN [id number])  Service Requesting Consultation: ICU  Service Providing Consultation:  Urology, Bonnie Wadsworth MD    Date of Service: 4/27/2021    Reason for Consultation: RIGHT XGP kidney/sepsis      ASSESSMENT:     63yoF with RIGHT XGP kidney and sepsis     PLAN:     Patient is awake and conversive  She is oriented and understands our conversation and is able to communicate for herself  Her  is at the bedside as well as the urology nurse practitioner and the patient's nurse  I have discussed with her the findings of her CT scan which have certainly progressed since her last inpatient stay and prior urology consultation  I discussed my concern about the significant dilation, the indwelling obstructing stones and the likely significantly infected tissue leading to urosepsis  Despite intravenous broad-spectrum antibiotics and a recent interventional radiology drainage procedure, the patient continues to be critically ill and required blood pressure support with three agents  Her cardiac comorbidities make the situation more tenuous  I discussed with her and her  the options for treatment which include continued supportive care, continued broad-spectrum antibiotics and drain placement  We discussed that if the patient were to continue to decompensate, the intensive care team would likely offer her intubation and more aggressive management  Patient reports that she has a living will and would defer breathing tube  Her  will work to provide documentation of her living will  I discussed that repeat imaging can be performed and additional drains can be placed and any areas of the kidney that appear to be persistently obstructed  We discussed that surgical management with exploratory laparotomy and debridement of the significantly infected and necrotic right kidney is an option for the management of XGP kidney  Given the patient's comorbidities, I think this would be a very dangerous operation and she would have a low likelihood of survival     I discussed with the patient and her  and inquired as to whether not they would be interested in entertaining a surgical option if needed  At this point time, they both confirm that they would defer that aggressive surgical management  This option can certainly be revisited at a later date if desired  At this point, I would continue to maximize the patient's supportive care and broad-spectrum antibiotics  If she does not clinically improve over the next 24-48 hours, would consider repeat CT imaging and likely repeat interventional radiology drainage  Both the patient and her  understand the severity of the situation  I discussed this with the critical care team   Thank you for allowing me to participate in this patients care  Please do not hesitate to call with any additional questions  Imelda Street MD    History of Present Illness:     Kaye Ghotra is a 66 y o  old with a history of staghorn calculi  Patient was seen by Dr Fady Burch in 2017/2018/2019 and had deferred surgical management  She was seen during in-hospital consultation in January  At that visit, a similar discussion was held regarding management of XGP kidney and nephrectomy  At that point time the patient was not as critically ill  For this hospitalization, the patient presented to the emergency room with hypotension and low mean arterial pressures despite fluid resuscitation  She was noted to have signs of exacerbation of her heart failure and acute kidney injury  Patient was then admitted to the emergency room  CT scan demonstrated progression of her CT findings of right XGP kidney  She has required pressor support  Today she underwent interventional radiology drainage of dannielle purulence from the kidney  She remains awake and conversive a critically ill      Past Medical, Past Surgical History:     Past Medical History:   Diagnosis Date    Acute CHF ([facility] ) 11/8/2020    [facility] 1/12/2019    ENEDELIA (acute kidney injury) ([facility] ) 1/12/2019    Anemia     BCC (basal cell carcinoma of skin)     facial    Breast cancer ([facility] ) 2006    s/p mastectomy     Calculus, kidney 2013    Cancer [facility])     Breast Cancer    Cancer [facility])     Cervical Cancer    Cervical cancer ([place] [address] ) 1788    Diastolic CHF, acute ([facility] ) 11/8/2020    Disease of thyroid gland     Ganglion     Last Assessed:7/9/13    Heart murmur     mitral valve regurgitation    Hodgkin disease ([place] [address] )     Hydronephrosis 2013    Hyperlipidemia     Hypertension     Melanoma ([place] [address] )     NSTEMI (non-ST elevated myocardial infarction) ([facility] ) 1/12/2019    Osteopenia     Last Assessed:7/9/13    Osteoporosis     Paroxysmal supraventricular tachycardia ([place] [address] )     Protein-calorie malnutrition ([place] [address] ) 2/12/2020    Renal calculi     Last Assessed:3/19/14    Renal cyst     Shoulder impingement     Last Assessed:1/18/13    Syncope 7/28/2019    Last Assessment & Plan:  Pre-syncopal symptoms leading to short syncopal episode without post-ictal period  Suspect dehydration/hypovolemia  Infectious, cardiopulm or neurologic cause less likely  No arrhythmias or AV block on ECG or telemetry thus far  No PE on CTA  No clinical signs of seizure   No acute pathology or mass on CTH  - s/p IVF; encourage PO intake - continue on telemetry - TTE c/f ne    UTI (urinary tract infection) 2013    Vasculitis ([place] [address] ) 1/12/2019   :    Past Surgical History:   Procedure Laterality Date    APPENDECTOMY      BREAST BIOPSY Left 09/29/2006    BREAST RECONSTRUCTION      COMPLEX WOUND CLOSURE TO EXTREMITY Left 10/9/2018    Procedure: COMPLEX CLOSURE RECONSTRUCTION;  Surgeon: Hugo Emanuel MD;  [facility];  Service: Plastics    CT BONE MARROW BIOPSY AND ASPIRATION  11/15/2019    CYSTOSCOPY W/ RETROGRADES  2009    CYSTOSCOPY W/ URETEROSCOPY  2002    EXTRACORPOREAL SHOCK WAVE LITHOTRIPSY Right 2005    FLAP LOCAL HEAD / NECK Left 10/9/2018    Procedure: FLAP RECONSTRUCTION;  Surgeon: Oliva Faye MD;  [facility];  Service: Plastics    FULL THICKNESS SKIN GRAFT Left 10/9/2018    Procedure: FULL THICKNESS SKIN GRAFT RECONSTRUCTION;  Surgeon: Oliva Faye MD;  [facility];  Service: Plastics    HYSTERECTOMY      1983-cervical cancer    INTRAOPERATIVE RADIATION THERAPY ([facility])      Radiation Therapy    KIDNEY SURGERY      MASTECTOMY Left 11/09/2006    OOPHORECTOMY Left     OTHER SURGICAL HISTORY      Chemotherapeutics    REDUCTION MAMMAPLASTY Right 2006    SENTINEL LYMPH NODE BIOPSY      SPLENECTOMY      1985-Hodgkin's Disease    SQUAMOUS CELL CARCINOMA EXCISION Left 10/9/2018    Procedure: NOSE/CHEEK [address] EXCISION; FROZEN SECTION;  Surgeon: Oliva Faye MD;  [facility];  Service: Plastics   :    Medications, Allergies:     Current Facility-Administered Medications:     acetaminophen (TYLENOL) tablet 650 mg, 650 mg, Oral, Q6H PRN, Radha Will PA-C, 650 mg at 04/26/21 1650    albumin human (FLEXBUMIN) 5 % injection 12 5 g, 12 5 g, Intravenous, Once, ARACELY Ray    albuterol (PROVENTIL HFA,VENTOLIN HFA) inhaler 2 puff, 2 puff, Inhalation, Q6H PRN, ARACELY Ray    ALPRAZolam Juliana Buys) tablet 0 25 mg, 0 25 mg, Oral, BID PRN, Radha Will PA-C, 0 25 mg at 04/27/21 1028    aluminum-magnesium hydroxide-simethicone (MYLANTA) oral suspension 30 mL, 30 mL, Oral, Q4H PRN, ARACELY Sigala, 30 mL at 04/26/21 1938    aspirin (ECOTRIN LOW STRENGTH) EC tablet 81 mg, 81 mg, Oral, Daily, ARACELY Ray, 81 mg at 04/27/21 1028    cefTRIAXone (ROCEPHIN) 2,000 mg in dextrose 5 % 50 mL IVPB, 2,000 mg, Intravenous, Q24H, ARACELY Ray, Stopped at 04/27/21 0302    dextrose 50 % IV solution 50 mL, 50 mL, Intravenous, Daily PRN, Ariane Isbell, ALEXEYNP, 50 mL at 04/27/21 0917    dextrose 50 % IV solution 50 mL, 50 mL, Intravenous, Once, MARYANN Bolden-C    heparin (porcine) 25,000 units in 0 45% NaCl 250 mL infusion (premix), 3-30 Units/kg/hr (Order-Specific), Intravenous, Titrated, ALEXEY GonzalezNP, Stopped at 04/27/21 1428    heparin (porcine) injection 2,600 Units, 2,600 Units, Intravenous, Q1H PRN, Pratibha Martin, CRNP    heparin (porcine) injection 5,200 Units, 5,200 Units, Intravenous, Q1H PRN, Pratibha Martin CRNP, 5,200 Units at 04/26/21 0512    hydrocortisone sodium succinate (PF) (Solu-CORTEF) injection 50 mg, 50 mg, Intravenous, Q6H [address], Shen Castro PA-C    levothyroxine tablet 100 mcg, 100 mcg, Oral, Early Morning, ALEXEY GonzalezNP, 100 mcg at 04/27/21 0517    lidocaine (LIDODERM) 5 % patch 1 patch, 1 patch, Topical, Daily, Shen Castro PA-C, Stopped at 04/27/21 1031    milrinone (PRIMACOR) 20 mg in 100 mL infusion (premix), 0 25 mcg/kg/min, Intravenous, Continuous, Shen Castro PA-C, Last Rate: 5 1 mL/hr at 04/27/21 1011, 0 25 mcg/kg/min at 04/27/21 1011    norepinephrine (LEVOPHED) 8 mg (DOUBLE CONCENTRATION) IV in sodium chloride 0 9% 250 mL, 1-30 mcg/min, Intravenous, Titrated, MARYANN Estrada-C, Last Rate: 50 6 mL/hr at 04/27/21 1511, 27 mcg/min at 04/27/21 1511    sodium bicarbonate 150 mEq in dextrose 5 % 1,000 mL infusion, 50 mL/hr, Intravenous, Continuous, Tiffanie Olivier PA-C    vasopressin (PITRESSIN) 20 Units in sodium chloride 0 9 % 100 mL infusion, 0 04 Units/min, Intravenous, Continuous, Alpha Savanna PA-C, Last Rate: 12 mL/hr at 04/27/21 1207, 0 04 Units/min at 04/27/21 1207    Allergies: Allergies   Allergen Reactions    Ciprofloxacin Other (See Comments)     Reaction Date: 24Jun2011;    Hives/Uticaria    Sulfamethoxazole-Trimethoprim Hives     Patient does not remember rx    Lasix [Furosemide] Rash    Pantoprazole Rash vasculitis   :    Social and Family History:   Social History:   Social History     Tobacco Use    Smoking status: Former Smoker     Quit date: 2011     Years since quitting: 10 3    Smokeless tobacco: Never Used   Substance Use Topics    Alcohol use: Not Currently     Frequency: Monthly or less     Comment: caffeine use; social drinker-1 beer every 2/3 months    Drug use: Never     Social History     Tobacco Use   Smoking Status Former Smoker    Quit date: 2011    Years since quitting: 10 3   Smokeless Tobacco Never Used       Family History:  Family History   Problem Relation Age of Onset    Leukemia Mother     Colon cancer Paternal Grandmother 80    Heart disease Family     Heart attack Father     Coronary artery disease Father         stent- five    Hypertension Father     Colon cancer Father 80    No Known Problems Maternal Grandmother     No Known Problems Maternal Grandfather     No Known Problems Paternal Grandfather     No Known Problems Paternal Aunt     Stroke Neg Hx     Anuerysm Neg Hx     Clotting disorder Neg Hx     Arrhythmia Neg Hx     Heart failure Neg Hx     Hyperlipidemia Neg Hx    :     Review of Systems:     General: Fever, chills, or night sweats: positive  Neurologic:  Patient denies dysarthria or visual changes  Cardiac: Negative for chest pain, positive for diffuse peripheral edema  Pulmonary:  Positive for shortness of breath  Gastrointestinal: Abdominal pain positive  Nausea, vomiting, or diarrhea negative,  Genitourinary: See HPI above  Patient does not have hematuria  Musculoskeletal:  Patient denies mobility changes but is weak  Dermatologic:  Positive rash  All other systems queried were negative      Physical Exam:   General: Patient is uncomfortable but awake and conversive, she appears well oriented  Neurologic:  There did not appear to be significant neurologic deficits  BP (!) 96/46   Pulse 94   Temp (!) 97 3 °F (36 3 °C)   Resp 22   Ht 5' 1 5" (1 562 m)   Wt 68 1 kg (150 lb 2 1 oz)   LMP  (LMP Unknown)   SpO2 100%   BMI 27 91 kg/m²   Cardiac: Peripheral edema: positive in all 4 extremities  Pulmonary:  Labored breathing with course rales  Abdomen: Soft, without peritoneal signs  Tenderness in the right flank  No masses, tenderness, hernias noted  Genitourinary: Positive CVA tenderness, right nephrostomy in place with bloody purulent drainage  YAO:  Scant output   Musculoskeletal:  Full range of motion in all 4 extremities but weak  Dermatologic: diffuse upper extremity rash which is erythematous but nontender    Labs:     Lab Results   Component Value Date    HGB 7 7 (L) 04/27/2021    HCT 24 2 (L) 04/27/2021    WBC 13 58 (H) 04/27/2021     (H) 04/27/2021   ]    Lab Results   Component Value Date    K 5 7 (H) 04/27/2021    CL 99 (L) 04/27/2021    CO2 14 (L) 04/27/2021     (H) 04/27/2021    CREATININE 4 11 (H) 04/27/2021    CALCIUM 8 1 (L) 04/27/2021    GLUCOSE 72 04/27/2021     Urine Culture >100,000 cfu/ml Gram Negative Cal Enteric LikeAbnormal                 Specimen Collected: 04/26/21 01:27   Last Resulted: 04/27/21 12:54        Imaging:   I personally reviewed the images and report of the following studies, and reviewed them with the patient:    4/26/21  CT ABDOMEN AND PELVIS WITHOUT IV CONTRAST     INDICATION:  Sepsis  Suspected pyelonephritis with possible stone      COMPARISON:  CT abdomen and pelvis 1/30/2020      TECHNIQUE:  CT examination of the abdomen and pelvis was performed without intravenous contrast   Axial, sagittal, and coronal 2D reformatted images were created from the source data and submitted for interpretation       Radiation dose length product (DLP) for this visit:  653 mGy-cm     This examination, like all CT scans performed in the [facility], was performed utilizing techniques to minimize radiation dose exposure, including the use of iterative   reconstruction and automated exposure control       Enteric contrast was not administered       FINDINGS:     The lack of intravenous contrast limits evaluation of the viscera  The images are degraded due to artifact from position of the patient's arms      ABDOMEN     LOWER CHEST:  Left breast prosthesis  Coronary artery calcifications  Left greater than right small bilateral pleural effusions  Bibasilar atelectasis      LIVER/BILIARY TREE:  Right upper quadrant mass abutting the posterior aspect of the right hepatic lobe  Otherwise unremarkable      GALLBLADDER: Distended  Cholelithiasis  No gross gallbladder wall thickening      SPLEEN:  Unremarkable      PANCREAS:  Unremarkable      ADRENAL GLANDS:  Unremarkable      KIDNEYS/URETERS: There is a large complex heterogeneous right upper quadrant mass in the subdiaphragmatic region contiguous with the superior pole of the right kidney and abutting the posterior aspect of the right hepatic lobe measuring approximately 7 5   x 9 1 x 9 9 cm (AP x TR x CC) (series 2 image 18, series 601 image 92)  This mass is noted to extend through the intercostal space between the 10th and 11th ribs extending into the right posterior lateral chest wall (series 2 image 20)  The mass   demonstrates mixed areas of soft tissue and fluid density        Similar appearance of chronic cystic right upper pole caliectasis, measuring higher than simple fluid attenuation which could be due in part to extensive beam hardening artifact, however pyelitis is considered  Associated marked cortical thinning in   keeping with chronicity as well as extensive calculi including multiple staghorn calculi, similar to prior study  For example calculus in the right lower pole measuring 3 0 x 1 5 cm (series 2 image 36)  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      Left kidney demonstrates nonobstructing calculi measuring up to 8 mm  No left hydronephrosis       STOMACH AND BOWEL:  No disproportionate dilatation of small or large bowel      APPENDIX:  Prior appendectomy      ABDOMINOPELVIC CAVITY:  Small volume of ascites mostly perihepatic, and diffuse mesenteric edema  No pneumoperitoneum        Multiple prominent to mildly enlarged bilateral inguinal lymph nodes for example on the left measuring 1 8 x 1 3 cm (series 2 image 76) and on the right measuring 1 3 x 1 5 cm (series 282), enlarged from prior CT 1/30/2020      VESSELS:  No abdominal aortic aneurysm  Advanced aortoiliac atherosclerotic changes      PELVIS     REPRODUCTIVE ORGANS:  Post hysterectomy      URINARY BLADDER:  Bladder is incompletely decompressed containing a Biggs catheter, correlate clinically for catheter function  Air within the bladder may be related to instrumentation  Limited visualization of the bladder due to streak artifact from   pelvic clips      ABDOMINAL WALL/INGUINAL REGIONS:  Diffuse body wall edema and subcutaneous fluid  Similar left flank abdominal diastases with small large bowel coursing through the defect      OSSEOUS STRUCTURES:  Severe compression fracture of T11 vertebral body similar to prior CT 4/25/2021      IMPRESSION:     1  Complex heterogeneous right upper quadrant/subdiaphragmatic mass contiguous with the upper pole of the right kidney and abutting the posterior right hepatic lobe measuring 7 5 x 9 1 x 9 9 cm, which extends into the right posterolateral chest wall  Evaluation is limited on this unenhanced study  Considerations include infectious etiology/abscess and neoplasm  Recommend contrast-enhanced CT hematuria protocol or MR abdomen  If the patient cannot receive intravenous contrast, renal ultrasound or   unenhanced MR abdomen may also be considered for further characterization      2    Similar appearance of chronic cystic right upper pole caliectasis measuring higher than simple fluid attenuation which could be due in part to extensive beam hardening artifact, however pyelitis is considered  Associated marked cortical thinning in   keeping with chronicity as well as extensive right renal calculi including multiple staghorn calculi, similar to prior study  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

## 2021-04-27 NOTE — CONSULTS
Interventional Radiology  Consultation 4/27/2021     Consults  Jah Pineda   1950   [id number]      Assessment/Plan:  66year old female with urosepsis in the setting of staghorn calculi, now with new perinephric fluid collection  -US guided Drain placement      Problem List     * (Principal) Hypotension    Bilateral kidney stones    Hypertension    Hyperlipidemia    Melanoma of skin of trunk (HCC)    Mitral regurgitation    Pulmonary hypertension (HCC)    Acquired complex renal cyst    Elevated serum protein level    Hypothyroidism    Osteoporosis    Vitamin D deficiency    Basal cell carcinoma (BCC) of left side of nose    BCC (basal cell carcinoma), face    Overview Signed 8/13/2018  3:38 PM by Lauren Baldwin MA     Added automatically from request for surgery [id number]         History of breast cancer    History of Hodgkin's disease    History of basal cell cancer    Paroxysmal supraventricular tachycardia (HCC)    Hypersensitivity angiitis (HCC)    History of pulmonary embolus (PE)    History of DVT of lower extremity    Non-rheumatic tricuspid valve insufficiency    Thrombocytosis (HCC)    Nonrheumatic aortic valve insufficiency    Iron deficiency anemia    Cardiomyopathy ([address] )    Compression fracture of T11 vertebra ([address] )    History of cervical cancer    Myeloproliferative disorder ([address] )    Calculus of gallbladder without cholecystitis without obstruction    Acute on chronic diastolic (congestive) heart failure (HCC)    Wt Readings from Last 3 Encounters:   04/27/21 68 1 kg (150 lb 2 1 oz)   03/04/21 60 3 kg (133 lb)   02/26/21 59 kg (130 lb)                 Small vessel vasculitis (HCC)    Stage 3 chronic kidney disease (HCC)    Lab Results   Component Value Date    EGFR 10 04/27/2021    EGFR 11 04/27/2021    EGFR 11 04/27/2021    CREATININE 4 11 (H) 04/27/2021    CREATININE 3 98 (H) 04/27/2021    CREATININE 3 89 (H) 04/27/2021         Closed T4 fracture ([address] )    Fall            Subjective: Patient ID: Chino Esquivel is a 66 y o  female  History of Present Illness  66year old female with urosepsis in the setting of staghorn calculi, now with new perinephric fluid collection  Review of Systems  Not performed    Past Medical History:   Diagnosis Date    Acute CHF ([address] ) 11/8/2020    Acute kidney injury superimposed on chronic kidney disease ([address] ) 1/12/2019    ENEDELIA (acute kidney injury) ([address] ) 1/12/2019    Anemia     BCC (basal cell carcinoma of skin)     facial    Breast cancer ([address] ) 2006    s/p mastectomy     Calculus, kidney 2013    Cancer [facility])     Breast Cancer    Cancer [facility])     Cervical Cancer    Cervical cancer ([address] ) 7853    Diastolic CHF, acute ([address] ) 11/8/2020    Disease of thyroid gland     Ganglion     Last Assessed:7/9/13    Heart murmur     mitral valve regurgitation    Hodgkin disease ([address] )     Hydronephrosis 2013    Hyperlipidemia     Hypertension     Melanoma ([address] )     NSTEMI (non-ST elevated myocardial infarction) ([address] ) 1/12/2019    Osteopenia     Last Assessed:7/9/13    Osteoporosis     Paroxysmal supraventricular tachycardia ([address] )     Protein-calorie malnutrition ([address] ) 2/12/2020    Renal calculi     Last Assessed:3/19/14    Renal cyst     Shoulder impingement     Last Assessed:1/18/13    Syncope 7/28/2019    Last Assessment & Plan:  Pre-syncopal symptoms leading to short syncopal episode without post-ictal period  Suspect dehydration/hypovolemia  Infectious, cardiopulm or neurologic cause less likely  No arrhythmias or AV block on ECG or telemetry thus far  No PE on CTA  No clinical signs of seizure   No acute pathology or mass on CTH  - s/p IVF; encourage PO intake - continue on telemetry - TTE c/f ne    UTI (urinary tract infection) 2013    Vasculitis ([address] ) 1/12/2019        Past Surgical History:   Procedure Laterality Date    APPENDECTOMY      BREAST BIOPSY Left 09/29/2006    BREAST RECONSTRUCTION      COMPLEX WOUND CLOSURE TO EXTREMITY Left 10/9/2018    Procedure: COMPLEX CLOSURE RECONSTRUCTION;  Surgeon: Tequila Henry MD;  Location: [facility];  Service: Plastics    CT BONE MARROW BIOPSY AND ASPIRATION  11/15/2019    CYSTOSCOPY W/ RETROGRADES  2009    CYSTOSCOPY W/ URETEROSCOPY  2002    EXTRACORPOREAL SHOCK WAVE LITHOTRIPSY Right 2005    FLAP LOCAL HEAD / NECK Left 10/9/2018    Procedure: FLAP RECONSTRUCTION;  Surgeon: Tequila Henry MD;  [facility]: [facility];  Service: Plastics    FULL THICKNESS SKIN GRAFT Left 10/9/2018    Procedure: FULL THICKNESS SKIN GRAFT RECONSTRUCTION;  Surgeon: Tequila Henry MD;  [facility]: [facility];  Service: Plastics    HYSTERECTOMY      1983-cervical cancer    INTRAOPERATIVE RADIATION THERAPY ([facility])      Radiation Therapy    KIDNEY SURGERY      MASTECTOMY Left 11/09/2006    OOPHORECTOMY Left     OTHER SURGICAL HISTORY      Chemotherapeutics    REDUCTION MAMMAPLASTY Right 2006    SENTINEL LYMPH NODE BIOPSY      SPLENECTOMY      1985-Hodgkin's Disease    SQUAMOUS CELL CARCINOMA EXCISION Left 10/9/2018    Procedure: NOSE/CHEEK BCC EXCISION; FROZEN SECTION;  Surgeon: Tequila Henry MD;  Location: [facility];  Service: Plastics        Social History     Tobacco Use   Smoking Status Former Smoker    Quit date: 2011    Years since quitting: 10 3   Smokeless Tobacco Never Used        Social History     Substance and Sexual Activity   Alcohol Use Not Currently    Frequency: Monthly or less    Comment: caffeine use; social drinker-1 beer every 2/3 months        Social History     Substance and Sexual Activity   Drug Use Never        Allergies   Allergen Reactions    Ciprofloxacin Other (See Comments)     Reaction Date: 24Jun2011;    Hives/Uticaria    Sulfamethoxazole-Trimethoprim Hives     Patient does not remember rx    Lasix [Furosemide] Rash    Pantoprazole Rash     vasculitis       Current Facility-Administered Medications   Medication Dose Route Frequency Provider Last Rate Last Admin    acetaminophen (TYLENOL) tablet 650 mg  650 mg Oral Q6H PRN Mraianne Guillen PA-C   650 mg at 04/26/21 1650    albumin human (FLEXBUMIN) 5 % injection 12 5 g  12 5 g Intravenous Once Holden Sailors, CRNP        albuterol (PROVENTIL HFA,VENTOLIN HFA) inhaler 2 puff  2 puff Inhalation Q6H PRN [place] Sailors, CRNP        ALPRAZolam [place]) tablet 0 25 mg  0 25 mg Oral BID PRN Marianne Guillen PA-C   0 25 mg at 04/27/21 1028    aluminum-magnesium hydroxide-simethicone (MYLANTA) oral suspension 30 mL  30 mL Oral Q4H PRN Amanda Richardson, ARACELY   30 mL at 04/26/21 1938    aspirin (ECOTRIN LOW STRENGTH) EC tablet 81 mg  81 mg Oral Daily Holden Sailors, CRNP   81 mg at 04/27/21 1028    cefTRIAXone (ROCEPHIN) 2,000 mg in dextrose 5 % 50 mL IVPB  2,000 mg Intravenous Q24H Holden Sailors, CRNP   Stopped at 04/27/21 0302    dextrose 50 % IV solution 50 mL  50 mL Intravenous Daily PRN Ariane Isbell CRNP   50 mL at 04/27/21 0917    dextrose 50 % IV solution 50 mL  50 mL Intravenous Once Marianne Guillen PA-C        heparin (porcine) 25,000 units in 0 45% NaCl 250 mL infusion (premix)  3-30 Units/kg/hr (Order-Specific) Intravenous Titrated Holden Sailors, CRNP   Stopped at 04/27/21 1428    heparin (porcine) injection 2,600 Units  2,600 Units Intravenous Q1H PRN Holden Sailors, CRNP        heparin (porcine) injection 5,200 Units  5,200 Units Intravenous Q1H PRN Holden Sailors, CRNP   5,200 Units at 04/26/21 0512    hydrocortisone sodium succinate (PF) (Solu-CORTEF) injection 100 mg  100 mg Intravenous Once Marianne Guillen PA-C        hydrocortisone sodium succinate (PF) (Solu-CORTEF) injection 50 mg  50 mg Intravenous Q6H [facility] Marianne Guillen PA-C        levothyroxine tablet 100 mcg  100 mcg Oral Early Morning Holden Sailors, CRNP   100 mcg at 04/27/21 0517    lidocaine (LIDODERM) 5 % patch 1 patch  1 patch Topical Daily Marianne Guillen PA-C   Stopped at 04/27/21 1031    milrinone (PRIMACOR) 20 mg in 100 mL infusion (premix)  0 25 mcg/kg/min Intravenous Continuous Birder Sas, PA-C 5 1 mL/hr at 04/27/21 1011 0 25 mcg/kg/min at 04/27/21 1011    norepinephrine (LEVOPHED) 8 mg (DOUBLE CONCENTRATION) IV in sodium chloride 0 9% 250 mL  1-30 mcg/min Intravenous Titrated [facility], PA-C 48 8 mL/hr at 04/27/21 1440 26 mcg/min at 04/27/21 1440    sodium bicarbonate tablet 650 mg  650 mg Oral TID after meals [facility], PA-C   650 mg at 04/27/21 1217    vasopressin (PITRESSIN) 20 Units in sodium chloride 0 9 % 100 mL infusion  0 04 Units/min Intravenous Continuous [place], PA-C 12 mL/hr at 04/27/21 1207 0 04 Units/min at 04/27/21 1207          Objective:    Vitals:    04/27/21 1400 04/27/21 1411 04/27/21 1421 04/27/21 1440   BP: (!) 98/44 (!) 101/44 (!) 99/43 (!) 91/43   BP Location:       Pulse:       Resp:       Temp:       TempSrc:       SpO2:       Weight:       Height:            Physical Exam  Not performed    No results found for: BNP   Lab Results   Component Value Date    WBC 11 30 (H) 04/27/2021    HGB 8 8 (L) 04/27/2021    HCT 26 (L) 04/27/2021    MCV 88 04/27/2021     (H) 04/27/2021     Lab Results   Component Value Date    INR 2 05 (H) 04/27/2021    INR 1 17 04/25/2021    INR 1 18 02/18/2021    PROTIME 23 2 (H) 04/27/2021    PROTIME 15 1 (H) 04/25/2021    PROTIME 15 1 (H) 02/18/2021     Lab Results   Component Value Date    PTT 63 (H) 04/27/2021         I have personally reviewed pertinent imaging and laboratory results  Code Status: Level 1 - Full Code  Advance Directive and Living Will:      Power of :    POLST:      IR has been consulted to evaluate the patient, determine the appropriate procedure, and whether or not a procedure can and should be performed  Thank you for allowing me to participate in the care of Isai Efren  Please don't hesitate to call, text, email, or TigerText with any questions       This text is generated with voice recognition software  There may be translation, syntax,  or grammatical errors  If you have any questions, please contact the dictating provider

## 2021-04-27 NOTE — NURSING NOTE
Blood glucose 76  Also no urine output since de la vega inserted at 1245  CCM team aware  Will start pt on bicarb drip

## 2021-04-27 NOTE — CONSULTS
[address] [place] 66 y o  female MRN: [id number]  Unit/Bed#: ICU 08 Encounter: [id number]    ASSESSMENT and PLAN:    66 y o  female with a PMH of hodgkins lymphoma, HFPEF, CKD III, small vessel vasculitis, multiple falls who presented to the ED with back pain after multiple falls at home  In ED was noted to be hypotensive with MAPS 55-60 despite 1L IVF  CT T spine noted with T4 and T11 compression fractures, lab work noted with ENEDELIA, BNP markedly elevated  Nephrology consult for acute kidney injury    Acute Kidney Injury  -- Present on admission (POA) ; ENEDELIA Stage III ;  admission creatinine: 3 2 mg/dL  -- Urinalysis:  Cloudy, specific gravity 1 02, moderate blood, positive nitrate, 1+ protein, innumerable wbc's, bacteria moderate  -- Imaging:  Noncontrast CT of the abdomen and pelvis ordered will follow up  Chest x-ray appears to be clear  -- Serologies:  Check CIRO, ANCA, urine eosinophils, complement C3 and C4  -- Etiology:  Suspect ischemic acute tubular necrosis in the setting of severe hypotension, history of Hodgkin lymphoma that was treated with surgery do not suspect tumor lysis and, or acute interstitial nephritis, or an underlying vasculitis  -- Status:  Renal function continues to worsen with metabolic acidosis and hyperkalemia as well as anasarca  -- Plan:   · Will follow-up repeat labs  · Consent for CVVHD obtained and placed in the chart  Risks and benefits were discussed with the patient  · Patient reports to me that she would not going to be on long-term dialysis    She would be agreeable to a short course of dialysis if needed  · Discussed with the critical care team  · If blood pressure stabilizes can consider a dose of IV Lasix bolus followed by a continuous furosemide infusion  · Can start oral sodium bicarbonate tablets  · Check serologies  · Discussed with critical care  · I suspect we may need to start CVVHD later today    Rash  --started shortly after starting metolazone she was telling me but appeared to have a lower extremity rash that has been there present for the past 3 weeks  Has a history of small-vessel vasculitis diagnosed on biopsy  --repeat serologies  --seen by Dermatology in the past   Biopsy showed small vessel vasculitis, in November of 2020  --no evidence of peripheral eosinophilia on his CBC, making acute interstitial nephritis less likely  --consider consultation with Rheumatology    Chronic kidney disease stage IIIB  --she reports she has an outpatient nephrologist who she sees at [place], does not appear to be following with a [facility] nephrologist, could not find any record in Care everywhere from a [facility] group  --baseline creatinine appears to be in the mid 1s (1 5-1 8 mg/dL)    Sepsis  --suspected urosepsis  --abnormal urinalysis  --currently on IV ceftriaxone  --SARs-CoV 2 negative  --urine and blood culture are pending    Hyperkalemia  --in the setting of acute kidney injury, metabolic acidosis    Myeloproliferative disorder  --primary thrombocytosis  --JAK2 mutation  --oncologist: Dr Simmons Staff  --history of stage II Hodgkin's lymphoma, status post staging laparotomy, splenectomy as well as extended field radiation therapy    Blood pressure/volume status  --currently on vasopressin and norepinephrine drip  --extravascular volume overloaded    Hypercalcemia  --trend daily ionized calcium level    High anion gap metabolic acidosis  --in the setting of severe hypotension resulting in a likely type a lactic acidosis along with acute kidney injury      SUMMARY OF RECOMMENDATIONS:  Please see above    HISTORY OF PRESENT ILLNESS:  Requesting Physician: Jenna Arana MD  Reason for Consult:  Acute kidney injury with underlying chronic kidney disease    Zhang Manzano is a 66 y o  female who was admitted to [facility] after presenting with shortness of breath and back pain after a fall   A renal consultation is requested today for assistance in the management of acute kidney injury  The patient has an extensive past medical history which includes moderate pulmonary hypertension, Hodgkin's lymphoma treated with splenectomy and radiation, chronic diastolic heart failure, RV dysfunction, chronic kidney disease stage III, small vessel vasculitis diagnosed back in November of 2020 who presented for a fall  Currently seen in the intensive care unit with severe hypotension currently on 2 pressor supports along with Milrinone for cardiac support  She is currently awake looks tired and fatigued  Her baseline creatinine fluctuates anywhere from 1 5-1 8 mg/dL  She reports she sees a nephrologist but cannot remember the name  She presented with a creatinine in the low threes which has been progressively worsening      PAST MEDICAL HISTORY:  Past Medical History:   Diagnosis Date    Acute CHF ([facility] ) 11/8/2020    Acute kidney injury superimposed on chronic kidney disease ([facility] ) 1/12/2019    ENEDELIA (acute kidney injury) ([facility] ) 1/12/2019    Anemia     BCC (basal cell carcinoma of skin)     facial    Breast cancer ([facility] ) 2006    s/p mastectomy     Calculus, kidney 2013    Cancer [facility])     Breast Cancer    Cancer [facility])     Cervical Cancer    Cervical cancer ([facility] ) 7683    Diastolic CHF, acute ([facility] ) 11/8/2020    Disease of thyroid gland     Ganglion     Last Assessed:7/9/13    Heart murmur     mitral valve regurgitation    Hodgkin disease ([facility] )     Hydronephrosis 2013    Hyperlipidemia     Hypertension     Melanoma ([facility] )     NSTEMI (non-ST elevated myocardial infarction) ([facility] ) 1/12/2019    Osteopenia     Last Assessed:7/9/13    Osteoporosis     Paroxysmal supraventricular tachycardia ([facility] )     Protein-calorie malnutrition ([facility] ) 2/12/2020    Renal calculi     Last Assessed:3/19/14    Renal cyst     Shoulder impingement     Last Assessed:1/18/13    Syncope 7/28/2019    Last Assessment & Plan: Pre-syncopal symptoms leading to short syncopal episode without post-ictal period  Suspect dehydration/hypovolemia  Infectious, cardiopulm or neurologic cause less likely  No arrhythmias or AV block on ECG or telemetry thus far  No PE on CTA  No clinical signs of seizure   No acute pathology or mass on CTH  - s/p IVF; encourage PO intake - continue on telemetry - TTE c/f ne    UTI (urinary tract infection) 2013    Vasculitis ([facility] [address] [facility] 1/12/2019       PAST SURGICAL HISTORY:  Past Surgical History:   Procedure Laterality Date    APPENDECTOMY      BREAST BIOPSY Left 09/29/2006    BREAST RECONSTRUCTION      COMPLEX WOUND CLOSURE TO EXTREMITY Left 10/9/2018    Procedure: COMPLEX CLOSURE RECONSTRUCTION;  Surgeon: Carmencita Merino MD;  [facility];  Service: Plastics    CT BONE MARROW BIOPSY AND ASPIRATION  11/15/2019    CYSTOSCOPY W/ RETROGRADES  2009    CYSTOSCOPY W/ URETEROSCOPY  2002    EXTRACORPOREAL SHOCK WAVE LITHOTRIPSY Right 2005    FLAP LOCAL HEAD / NECK Left 10/9/2018    Procedure: FLAP RECONSTRUCTION;  Surgeon: Carmencita Merino MD;  [facility];  Service: Plastics    FULL THICKNESS SKIN GRAFT Left 10/9/2018    Procedure: FULL THICKNESS SKIN GRAFT RECONSTRUCTION;  Surgeon: Carmencita Merino MD;  [facility];  Service: Plastics    HYSTERECTOMY      1983-cervical cancer    INTRAOPERATIVE RADIATION THERAPY ([facility])      Radiation Therapy    KIDNEY SURGERY      MASTECTOMY Left 11/09/2006    OOPHORECTOMY Left     OTHER SURGICAL HISTORY      Chemotherapeutics    REDUCTION MAMMAPLASTY Right 2006    SENTINEL LYMPH NODE BIOPSY      SPLENECTOMY      1985-Hodgkin's Disease    SQUAMOUS CELL CARCINOMA EXCISION Left 10/9/2018    Procedure: NOSE/CHEEK [address] EXCISION; FROZEN SECTION;  Surgeon: Carmencita Merino MD;  [facility];  Service: Plastics       ALLERGIES:  Allergies   Allergen Reactions    Ciprofloxacin Other (See Comments)     Reaction Date: [id number];    Hives/Uticaria    Sulfamethoxazole-Trimethoprim Hives     Patient does not remember rx    Lasix [Furosemide] Rash    Pantoprazole Rash     vasculitis       SOCIAL HISTORY:  Social History     Substance and Sexual Activity   Alcohol Use Not Currently    Frequency: Monthly or less    Comment: caffeine use; social drinker-1 beer every 2/3 months     Social History     Substance and Sexual Activity   Drug Use Never     Social History     Tobacco Use   Smoking Status Former Smoker    Quit date: 2011    Years since quitting: 10 3   Smokeless Tobacco Never Used       FAMILY HISTORY:  Family History   Problem Relation Age of Onset    Leukemia Mother     Colon cancer Paternal Grandmother 80    Heart disease Family     Heart attack Father     Coronary artery disease Father         stent- five    Hypertension Father     Colon cancer Father 80    No Known Problems Maternal Grandmother     No Known Problems Maternal Grandfather     No Known Problems Paternal Grandfather     No Known Problems Paternal Aunt     Stroke Neg Hx     Anuerysm Neg Hx     Clotting disorder Neg Hx     Arrhythmia Neg Hx     Heart failure Neg Hx     Hyperlipidemia Neg Hx        MEDICATIONS:    Current Facility-Administered Medications:     acetaminophen (TYLENOL) tablet 650 mg, 650 mg, Oral, Q6H PRN, Abbi Bolaños PA-C, 650 mg at 04/26/21 1650    albumin human (FLEXBUMIN) 5 % injection 12 5 g, 12 5 g, Intravenous, Once, ARACELY Fregoso    albuterol (PROVENTIL HFA,VENTOLIN HFA) inhaler 2 puff, 2 puff, Inhalation, Q6H PRN, ARACELY Fregoso    ALPRAZolam Marvie See) tablet 0 25 mg, 0 25 mg, Oral, BID PRN, Abbi Bolaños PA-C, 0 25 mg at 04/27/21 1028    aluminum-magnesium hydroxide-simethicone (MYLANTA) oral suspension 30 mL, 30 mL, Oral, Q4H PRN, ARACELY Sigala, 30 mL at 04/26/21 1938    aspirin (ECOTRIN LOW STRENGTH) EC tablet 81 mg, 81 mg, Oral, Daily, ARACELY Fregoso, 81 mg at 04/27/21 1028    cefTRIAXone (ROCEPHIN) 2,000 mg in dextrose 5 % 50 mL IVPB, 2,000 mg, Intravenous, Q24H, Stanley Saint, CRNP, Stopped at 04/27/21 0302    dextrose 50 % IV solution 50 mL, 50 mL, Intravenous, Daily PRN, ALEXEY SigalaNP, 50 mL at 04/27/21 0917    dextrose 50 % IV solution 50 mL, 50 mL, Intravenous, Once, Gretchen Hollins PA-C    heparin (porcine) 25,000 units in 0 45% NaCl 250 mL infusion (premix), 3-30 Units/kg/hr (Order-Specific), Intravenous, Titrated, Stanley Saint, CRNP, Last Rate: 9 8 mL/hr at 04/27/21 0712, 15 Units/kg/hr at 04/27/21 0712    heparin (porcine) injection 2,600 Units, 2,600 Units, Intravenous, Q1H PRN, Stanley Saint, CRNP    heparin (porcine) injection 5,200 Units, 5,200 Units, Intravenous, Q1H PRN, Stanley Saint, CRNP, 5,200 Units at 04/26/21 4154    levothyroxine tablet 100 mcg, 100 mcg, Oral, Early Morning, Stanley Saint, CRNP, 100 mcg at 04/27/21 0517    lidocaine (LIDODERM) 5 % patch 1 patch, 1 patch, Topical, Daily, Gretchen Hollins PA-C, Stopped at 04/27/21 1031    milrinone (PRIMACOR) 20 mg in 100 mL infusion (premix), 0 25 mcg/kg/min, Intravenous, Continuous, Gretchen Hollins PA-C, Last Rate: 5 1 mL/hr at 04/27/21 1011, 0 25 mcg/kg/min at 04/27/21 1011    norepinephrine (LEVOPHED) 8 mg (DOUBLE CONCENTRATION) IV in sodium chloride 0 9% 250 mL, 1-30 mcg/min, Intravenous, Titrated, Tiffanie Mariano PA-C, Last Rate: 30 mL/hr at 04/27/21 1040, 16 mcg/min at 04/27/21 1040    sodium bicarbonate tablet 650 mg, 650 mg, Oral, TID after meals, Rivas Carbajal PA-C    vasopressin (PITRESSIN) 20 Units in sodium chloride 0 9 % 100 mL infusion, 0 04 Units/min, Intravenous, Continuous, Deena Edward PA-C, Last Rate: 12 mL/hr at 04/27/21 0232, 0 04 Units/min at 04/27/21 0232    REVIEW OF SYSTEMS:  Constitutional: Negative for fatigue, anorexia, fever, chills, diaphoresis  HENT: Negative for postnasal drip  Eyes: Negative for visual disturbance     Respiratory: Negative for cough,++ shortness of breath and wheezing  Cardiovascular: Negative for chest pain, palpitations but positive for leg swelling  Gastrointestinal: Negative for abdominal pain, constipation, diarrhea, nausea and vomiting  Genitourinary: No dysuria, hematuria  Endocrine: Negative for polyuria  Musculoskeletal: Negative for arthralgias, back pain and joint swelling  Skin:  Positive for rash  Neurological: Negative for focal weakness, headaches, dizziness  Hematological: Negative for easy bruising or bleeding  Psychiatric/Behavioral: Negative for confusion and sleep disturbance  All the systems were reviewed and were negative except as documented on the HPI  PHYSICAL EXAM:  Current Weight: Weight - Scale: 68 1 kg (150 lb 2 1 oz)  First Weight: Weight - Scale: 69 1 kg (152 lb 5 4 oz)  Vitals:    04/27/21 1000 04/27/21 1015 04/27/21 1030 04/27/21 1040   BP: (!) 87/43 (!) 93/42 (!) 96/44 (!) 98/44   BP Location:       Pulse: 73 75 75 79   Resp: 20 (!) 33 (!) 26 (!) 29   Temp: (!) 93 9 °F (34 4 °C) (!) 93 9 °F (34 4 °C) (!) 94 1 °F (34 5 °C) (!) 94 1 °F (34 5 °C)   TempSrc: (P) Bladder Bladder Bladder Bladder   SpO2:       Weight:       Height:           Intake/Output Summary (Last 24 hours) at 4/27/2021 1049  Last data filed at 4/27/2021 1000  Gross per 24 hour   Intake 1645 28 ml   Output 945 ml   Net 700 28 ml     Physical Exam  Vitals signs reviewed  Constitutional:       General: She is not in acute distress  Appearance: She is well-developed  HENT:      Head: Normocephalic and atraumatic  Eyes:      General: No scleral icterus  Conjunctiva/sclera: Conjunctivae normal       Pupils: Pupils are equal, round, and reactive to light  Neck:      Musculoskeletal: Normal range of motion and neck supple  Cardiovascular:      Rate and Rhythm: Normal rate and regular rhythm  Heart sounds: S1 normal and S2 normal  No murmur  No friction rub  No gallop      Pulmonary: Effort: Pulmonary effort is normal  No respiratory distress  Breath sounds: Normal breath sounds  No wheezing or rales  Abdominal:      General: Bowel sounds are normal       Palpations: Abdomen is soft  Tenderness: There is no abdominal tenderness  There is no rebound  Musculoskeletal: Normal range of motion  General: Swelling present  Right lower leg: Edema present  Left lower leg: Edema present  Skin:     Findings: Rash present  Neurological:      Mental Status: She is alert and oriented to person, place, and time     Psychiatric:         Behavior: Behavior normal            Invasive Devices:   Urethral Catheter Temperature probe (Active)   Reasons to continue Urinary Catheter  Accurate I&O assessment in critically ill patients (48 hr  max) 04/27/21 0903   Site Assessment Clean;Skin intact 04/27/21 0800   Collection Container Standard drainage bag 04/27/21 0800   Securement Method Securing device (Describe) 04/27/21 0800   Output (mL) 5 mL 04/27/21 1000     Lab Results:   Results from last 7 days   Lab Units 04/27/21  0819 04/27/21  0252 04/26/21  1941 04/26/21  1037 04/25/21  1947   WBC Thousand/uL 11 30* 11 69*  --  11 70* 10 18*   HEMOGLOBIN g/dL 8 5* 8 2*  --  8 4* 8 4*   HEMATOCRIT % 26 7* 26 1*  --  26 1* 26 0*   PLATELETS Thousands/uL 483* 498*  --  501* 534*   POTASSIUM mmol/L 5 9* 5 2 6 0* 5 2 4 3   CHLORIDE mmol/L 99* 99* 100 100 97*   CO2 mmol/L 11* 19* 18* 21 26   BUN mg/dL 102* 97* 98* 90* 92*   CREATININE mg/dL 3 98* 3 89* 3 65* 3 29* 3 21*   CALCIUM mg/dL 8 4 8 2* 8 3 8 4 8 8   MAGNESIUM mg/dL 2 8* 2 7*  --  2 6  --    PHOSPHORUS mg/dL 9 4*  --   --  6 8*  --    ALK PHOS U/L 364* 350*  --   --  445*   ALT U/L 15 18  --   --  20   AST U/L 41 38  --   --  34

## 2021-04-27 NOTE — NURSING NOTE
Unable to obtain spo2 and question bladder temp accuracy  Will obtain rectal temp  Dr Beena Diaz aware

## 2021-04-27 NOTE — PROCEDURES
Temporary HD Catheter    Date/Time: 4/27/2021 6:14 PM  Performed by: Nabeel George PA-C  Authorized by: Nabeel George PA-C     Patient location:  Bedside  Consent:     Consent obtained:  Written    Consent given by:  Patient and spouse    Risks discussed:  Arterial puncture, bleeding, infection and incorrect placement    Alternatives discussed:  No treatment and delayed treatment  Universal protocol:     Immediately prior to procedure, a time out was called: yes      Patient identity confirmed:  Verbally with patient, arm band and provided demographic data  Pre-procedure details:     Hand hygiene: Hand hygiene performed prior to insertion      Sterile barrier technique: All elements of maximal sterile technique followed      Skin preparation:  ChloraPrep    Skin preparation agent: Skin preparation agent completely dried prior to procedure    Indications:     Central line indications: dialysis    Procedure details:     Location:  Left femoral    Vessel type: vein      Laterality:  Left    Approach: percutaneous technique used      Patient position:  Flat    Catheter type:  Triple lumen    Catheter size:  13 5 Fr    Landmarks identified: yes      Ultrasound guidance: no      Number of attempts:  1    Successful placement: yes      Vessel of catheter tip end:  Confirmed wire with US   Post-procedure details:     Post-procedure:  Dressing applied and line sutured    Assessment:  Blood return through all ports and free fluid flow    Post-procedure complications: none      Patient tolerance of procedure:   Tolerated well, no immediate complications    Nabeel George PA-C

## 2021-04-27 NOTE — BRIEF OP NOTE (RAD/CATH)
INTERVENTIONAL RADIOLOGY PROCEDURE NOTE    Date: 4/27/2021    Procedure: Right perinephric drain placement    Preoperative diagnosis:   1  Sepsis ([facility] )    2  Thoracic compression fracture ([facility] [address] )    3  Acute-on-chronic kidney injury ([facility] [address] )    4  Anemia    5  Hypotension    6  Acute on chronic diastolic (congestive) heart failure ([facility] [address] )    7  ENEDELIA (acute kidney injury) ([facility] [address] )    8  Bilateral kidney stones    9  Pyelonephritis of right kidney         Postoperative diagnosis: Same  Surgeon: Benigno Mccartney MD     Assistant: None  No qualified resident was available  Blood loss: Minimal    Specimens: approximately 100 CC of purulent followed by bloody fluid removed  Findings: Large right perinephric fluid collection, 10 Bulgarian drain placed  Complications: None immediate      Anesthesia: local and IV Fentanyl

## 2021-04-27 NOTE — PROGRESS NOTES
Critical Care Services- Interval Progress Note   Christian Rubinr 66 y o  female MRN: [id number]  Unit/Bed#: ICU 08 Encounter: [id number]  Assessment and Plan  Interval Events:     · Patient continued to have increasing pressor requirements and required the initiation of a third pressor  Started on stress dosed steroids Since this morning, she was initiated on a bicarb drip for persistent metabolic acidosis  · She has become anuric despite a bumex challenge and has refractory hyperkalemia despite medical management  · IR came to bedside and performed US guided drainage/culture of a perinephric fluid collection which revealed bloody purulent fluid  · Milrinone was trialed however discontinued  · Urology was consulted and evaluated patient  Patient is deferring aggressive surgical management of the infected/necrotic right kidney due to very high risk procedure  · Has had persistent hypoglycemia requiring initiation of a D10 gtts  · Hgb drop 8 5 to 7 7      Problem List:  1  Refractory shock: suspect multifactorial: septic, component cardiogenic +/- distributive 2/2 PE?  2  Anuric ENEDELIA w/ hyperkalemia  3  Persistant metabolic acidosis/lactic acidosis  4  Acute on Chronic RV failure  5  Pyelonephritis w/ complex infected/necrotic cyst     Plan:  Neuro: fentanyl prn, xanax  CV: Continue levo, vaso, epi gtts  Cont solucortef 50 q6  Maintain MAP >65  Given hgb drop, d/c heparin infusion  Pulm: Remains RA  Maintain >92  GI: Trend LFTs  Resume diet  : Will initiate CRRT  Continue bicarb gtts  Will trial volume remove to help reduce preload  Monitor UO, currently anuric  Trend lactic  ID: f/u cultures, trend WBC/temp/procalcitonin  Continue ABX  FEN: Trend labs [id number]  Dr Malena Carroll spoke w/ patient and her  at bedside in detail explaining patient's multi-organ dysfunction and the severity of her critical illness   She seems to understand and wishes to continue current treatment plan however in the event that her respiratory status would decline, she would not want to be intubated nor would she want CPR  Code status has been changed in the computer to level 3 DNR/DNI  Upon my evaluation, this patient had a high probability of imminent or life-threatening deterioration due to refractory shock, which required my direct attention, intervention, and personal management  I have personally provided 50 minutes (1700 to 1750) on 4/27/2021 of critical care time, exclusive of procedures, teaching, family meetings, and any prior time recorded by providers other than myself  Time includes review of laboratory data, review of imaging/radiology results, discussion with consultants, monitoring for potential decompensation    Interventions were performed as documented above    -------------------------------------------------------------------------------------------------------------------------------------  Hemodynamic Monitoring:  Vital Signs:   HR: 89  BP: 89/43  MAP: 66  RR: 24    Cardiac Monitoring:   Telemetry rhythm: NSR      Laboratory   Results from last 7 days   Lab Units 04/27/21  1452 04/27/21  1451 04/27/21  0819 04/27/21  0252 04/26/21  1037 04/25/21  1947   WBC Thousand/uL 13 58*  --  11 30* 11 69* 11 70* 10 18*   HEMOGLOBIN g/dL 7 7*  --  8 5* 8 2* 8 4* 8 4*   I STAT HEMOGLOBIN g/dl  --  8 8*  --   --   --   --    HEMATOCRIT % 24 2*  --  26 7* 26 1* 26 1* 26 0*   HEMATOCRIT, ISTAT %  --  26*  --   --   --   --    PLATELETS Thousands/uL 459*  --  483* 498* 501* 534*   NEUTROS PCT % 89*  --  87* 86* 89* 88*   MONOS PCT % 7  --  7 8 7 6     Results from last 7 days   Lab Units 04/27/21  1452 04/27/21  1451 04/27/21  1104 04/27/21  0819 04/27/21  0252 04/26/21  1941 04/26/21  1037 04/25/21 1947   SODIUM mmol/L 137  --  137 135* 136 133* 134* 132*   POTASSIUM mmol/L 6 0*  --  5 7* 5 9* 5 2 6 0* 5 2 4 3   CHLORIDE mmol/L 98*  --  99* 99* 99* 100 100 97*   CO2 mmol/L 13*  --  12* 11* 19* 18* 21 26   CO2, I-STAT mmol/L  --  14*  --   --   --   --   --   --    ANION GAP mmol/L 26*  --  26* 25* 18* 15* 13 9   BUN mg/dL 101*  --  102* 102* 97* 98* 90* 92*   CREATININE mg/dL 4 34*  --  4 11* 3 98* 3 89* 3 65* 3 29* 3 21*   CALCIUM mg/dL 8 7  --  8 1* 8 4 8 2* 8 3 8 4 8 8   GLUCOSE RANDOM mg/dL 77  --  115 72 89 42* 87 133   ALT U/L  --   --   --  15 18  --   --  20   AST U/L  --   --   --  41 38  --   --  34   ALK PHOS U/L  --   --   --  364* 350*  --   --  445*   ALBUMIN g/dL  --   --   --  1 9* 1 8*  --   --  1 7*   TOTAL BILIRUBIN mg/dL  --   --   --  0 87 0 67  --   --  0 44     Results from last 7 days   Lab Units 04/27/21  0819 04/27/21  0252 04/26/21  1037   MAGNESIUM mg/dL 2 8* 2 7* 2 6   PHOSPHORUS mg/dL 9 4*  --  6 8*      Results from last 7 days   Lab Units 04/27/21  1300 04/27/21  0819 04/27/21  0517 04/26/21  2349 04/26/21  1804 04/26/21  1037 04/26/21  0319 04/25/21  1947   INR   --  2 05*  --   --   --   --   --  1 17   PTT seconds 63*  --  172* 110* 84* 99* 32 33      Results from last 7 days   Lab Units 04/25/21  1947   TROPONIN I ng/mL <0 02     Results from last 7 days   Lab Units 04/27/21  1450 04/27/21  1104 04/27/21  0819 04/26/21  0319   LACTIC ACID mmol/L 11 1* 11 6* 10 7* 2 0     ABG:    VBG:    Results from last 7 days   Lab Units 04/27/21  0252 04/26/21  0319   PROCALCITONIN ng/ml 3 19* 0 28*       Micro  Results from last 7 days   Lab Units 04/26/21  1037 04/26/21  0652 04/26/21  0127   BLOOD CULTURE  No Growth at 24 hrs  No Growth at 24 hrs   --    URINE CULTURE   --   --  >100,000 cfu/ml Gram Negative Cal Enteric Like*       Diagnostic Imaging / Data: I have personally reviewed pertinent reports        Medications:  Current Facility-Administered Medications   Medication Dose Route Frequency    acetaminophen (TYLENOL) tablet 650 mg  650 mg Oral Q6H PRN    albumin human (FLEXBUMIN) 5 % injection 12 5 g  12 5 g Intravenous Once    albuterol (PROVENTIL HFA,VENTOLIN HFA) inhaler 2 puff  2 puff Inhalation Q6H PRN    ALPRAZolam (XANAX) tablet 0 25 mg  0 25 mg Oral BID PRN    aluminum-magnesium hydroxide-simethicone (MYLANTA) oral suspension 30 mL  30 mL Oral Q4H PRN    aspirin (ECOTRIN LOW STRENGTH) EC tablet 81 mg  81 mg Oral Daily    cefTRIAXone (ROCEPHIN) 2,000 mg in dextrose 5 % 50 mL IVPB  2,000 mg Intravenous Q24H    dextrose 50 % IV solution 50 mL  50 mL Intravenous Daily PRN    dextrose 50 % IV solution 50 mL  50 mL Intravenous Once    dextrose infusion 10 %  30 mL/hr Intravenous Continuous    EPINEPHrine 6000 mcg (DOUBLE CONCENTRATION) IV in sodium chloride 0 9% 250 mL  1-10 mcg/min Intravenous Titrated    fentanyl citrate (PF) 100 MCG/2ML **ADS Override Pull**        fentanyl citrate (PF) 100 MCG/2ML 50 mcg  50 mcg Intravenous Q2H PRN    hydrocortisone sodium succinate (PF) (Solu-CORTEF) injection 50 mg  50 mg Intravenous Q6H JAVED    levothyroxine tablet 100 mcg  100 mcg Oral Early Morning    lidocaine (LIDODERM) 5 % patch 1 patch  1 patch Topical Daily    lidocaine (PF) (XYLOCAINE-MPF) 1 % injection **ADS Override Pull**        norepinephrine (LEVOPHED) 8 mg (DOUBLE CONCENTRATION) IV in sodium chloride 0 9% 250 mL  1-30 mcg/min Intravenous Titrated    NxStage K 2/Ca 3 dialysis solution (RFP-400) 20,000 mL  20,000 mL Dialysis Continuous    sodium bicarbonate 150 mEq in dextrose 5 % 1,000 mL infusion  75 mL/hr Intravenous Continuous    vasopressin (PITRESSIN) 20 Units in sodium chloride 0 9 % 100 mL infusion  0 04 Units/min Intravenous Continuous       SIGNATURE: Juan Cleveland PA-C    Portions of the record may have been created with voice recognition software  Occasional wrong word or "sound a like" substitutions may have occurred due to the inherent limitations of voice recognition software    Read the chart carefully and recognize, using context, where substitutions have occurred

## 2021-04-27 NOTE — NURSING NOTE
CVVHD started  Per GEORGIA Mariano PA-C, obtain lactic acid, BMP, and CBC at 2000, then CVVHD labwork at midnight

## 2021-04-27 NOTE — PROGRESS NOTES
Assessment/Plan:  66year old female with incidental finding of nontender T4, T11 fracture on CT scan  · Nontender thoracic spine and trauma will sign off  No additional injuries found on tertiary evaluation and she is neuro intact  · Follow-up with PCP for osteoporosis  · Neurosurgery note appreciated  · Consider geriatric consult  · Please call trauma with additional concerns    Recurrent falls  Septic shock  Acute and chronic RV failure  Hypoalbuminemia  ENEDELIA  History of Hodgkin's lymphoma  History of malignant melanoma  · Treatment per primary team which is critical care, Nephrology, Cardiology and neurosurgery    TERTIARY TRAUMA SURVEY NOTE    Prophylaxis: Sequential compression device (Venodyne)  and Heparin    Disposition:  Per primary    Code status:  Level 1 - Full Code    Consultants:  [facility]  [address] [place] [address]'s neuro surgery    Is the patient 72 years or older?: YES:    1  Before the illness or injury that brought you to the Emergency, did you need someone to help you on a regular basis? 1=Yes   2  Since the illness or injury that brought you to the Emergency, have you needed more help than usual to take care of yourself? 1=Yes   3  Have you been hospitalized for one or more nights during the past 6 months (excluding a stay in the Emergency Department)? 1=Yes   4  In general, do you see well? 0=Yes   5  In general, do you have serious problems with your memory? 0=No   6  Do you take more than three different medications everyday?  1=Yes   TOTAL   3     Did you order a geriatric consult if the score was 2 or greater?: no, decision will be left to primary team      SUBJECTIVE:     Transfer from: N/A  Outside Films Received: not applicable  Tertiary Exam Due on: 4/27/2021    Mechanism of Injury:Fall    Details related to Injury: +LOC:  no    Chief Complaint:  No back pain    HPI/Last 24 hour events:   Patient on critical care service and have an episode of hypoglycemia overnight    Active medications:           Current Facility-Administered Medications:     acetaminophen (TYLENOL) tablet 650 mg, 650 mg, Oral, Q6H PRN, 650 mg at 04/26/21 1650    albumin human (FLEXBUMIN) 5 % injection 12 5 g, 12 5 g, Intravenous, Once    albuterol (PROVENTIL HFA,VENTOLIN HFA) inhaler 2 puff, 2 puff, Inhalation, Q6H PRN    ALPRAZolam (XANAX) tablet 0 25 mg, 0 25 mg, Oral, BID PRN, 0 25 mg at 04/27/21 1028    aluminum-magnesium hydroxide-simethicone (MYLANTA) oral suspension 30 mL, 30 mL, Oral, Q4H PRN, 30 mL at 04/26/21 1938    aspirin (ECOTRIN LOW STRENGTH) EC tablet 81 mg, 81 mg, Oral, Daily, 81 mg at 04/27/21 1028    cefTRIAXone (ROCEPHIN) 2,000 mg in dextrose 5 % 50 mL IVPB, 2,000 mg, Intravenous, Q24H, Stopped at 04/27/21 0302    dextrose 50 % IV solution 50 mL, 50 mL, Intravenous, Daily PRN, 50 mL at 04/27/21 0917    dextrose 50 % IV solution 50 mL, 50 mL, Intravenous, Once    heparin (porcine) 25,000 units in 0 45% NaCl 250 mL infusion (premix), 3-30 Units/kg/hr (Order-Specific), Intravenous, Titrated, 15 Units/kg/hr at 04/27/21 0712    heparin (porcine) injection 2,600 Units, 2,600 Units, Intravenous, Q1H PRN    heparin (porcine) injection 5,200 Units, 5,200 Units, Intravenous, Q1H PRN, 5,200 Units at 04/26/21 0512    levothyroxine tablet 100 mcg, 100 mcg, Oral, Early Morning, 100 mcg at 04/27/21 0517    lidocaine (LIDODERM) 5 % patch 1 patch, 1 patch, Topical, Daily, Stopped at 04/27/21 1031    milrinone (PRIMACOR) 20 mg in 100 mL infusion (premix), 0 25 mcg/kg/min, Intravenous, Continuous, 0 25 mcg/kg/min at 04/27/21 1011    norepinephrine (LEVOPHED) 8 mg (DOUBLE CONCENTRATION) IV in sodium chloride 0 9% 250 mL, 1-30 mcg/min, Intravenous, Titrated, 17 mcg/min at 04/27/21 1101    sodium bicarbonate tablet 650 mg, 650 mg, Oral, TID after meals    vasopressin (PITRESSIN) 20 Units in sodium chloride 0 9 % 100 mL infusion, 0 04 Units/min, Intravenous, Continuous, 0 04 Units/min at 04/27/21 0232      OBJECTIVE:     Vitals:   Vitals:    04/27/21 1100   BP: 107/54   Pulse: 83   Resp: 22   Temp: (!) 94 3 °F (34 6 °C)   SpO2:        Physical Exam:   GENERAL APPEARANCE:    NEURO:  GCS 15  HEENT:  NC/AT  CV:  RRR  LUNGS:  CTAB diminished bilaterally  GI:  Soft, nontender, no guarding  :  Biggs in place  MSK:  & VI x4  SKIN:  Warm and dry  Back:  Nontender to palpation cervical thoracic or lumbar spine  No step-offs  No evidence of trauma  I/O:   I/O       04/25 0701 - 04/26 0700 04/26 0701 - 04/27 0700 04/27 0701 - 04/28 0700    P  O   0 0    I V  (mL/kg) 718 2 (10 8) 1568 3 (23) 216 8 (3 2)    IV Piggyback 500 50     Total Intake(mL/kg) 1218 2 (18 3) 1618 3 (23 8) 216 8 (3 2)    Urine (mL/kg/hr)  920 (0 6) 25 (0 1)    Stool   0    Total Output  920 25    Net +1218 2 +698 3 +191 8           Unmeasured Stool Occurrence   1 x          Invasive Devices: Invasive Devices     Central Venous Catheter Line            CVC Central Lines 04/26/21 Triple 20cm 1 day          Peripheral Intravenous Line            Peripheral IV 04/25/21 Right Antecubital 1 day          Arterial Line            Arterial Line 04/26/21 Femoral 1 day          Drain            Urethral Catheter Temperature probe 1 day                  Imaging:   Xr Chest 1 View Portable    Result Date: 4/26/2021  Impression: No acute cardiopulmonary disease  Workstation performed: [id number]     Xr Thoracic Spine 2 Views    Result Date: 4/26/2021  Impression: At least 2 if not 3 thoracic spine fractures  Please see the follow-up CT report Workstation performed: [id number]     Ct Thoracic Spine Without Contrast    Result Date: 4/26/2021  Impression: T4 compression fracture with 65% loss of height  T11 compression fracture with near complete loss of height (90+ percent)  Right apical pulmonary scarring  Small bilateral effusions  Cardiomegaly  Previously seen massively hydronephrotic right kidney is again noted     I personally discussed this study with Ghislaine Coello on 4/26/2021 at 12:22 AM  Workstation performed: [id number]       Labs:   CBC:   Lab Results   Component Value Date    WBC 11 30 (H) 04/27/2021    HGB 8 5 (L) 04/27/2021    HCT 26 7 (L) 04/27/2021    MCV 88 04/27/2021     (H) 04/27/2021    MCH 28 1 04/27/2021    MCHC 31 8 04/27/2021    RDW 17 2 (H) 04/27/2021    MPV 9 8 04/27/2021    NRBC 0 04/27/2021     CMP:   Lab Results   Component Value Date    CL 99 (L) 04/27/2021    CO2 11 (L) 04/27/2021     (H) 04/27/2021    CREATININE 3 98 (H) 04/27/2021    CALCIUM 8 4 04/27/2021    AST 41 04/27/2021    ALT 15 04/27/2021    ALKPHOS 364 (H) 04/27/2021    EGFR 11 04/27/2021     Phosphorus:   Lab Results   Component Value Date    PHOS 9 4 (H) 04/27/2021     Magnesium:   Lab Results   Component Value Date    MG 2 8 (H) 04/27/2021     Urinalysis: No results found for: COLORU, CLARITYU, SPECGRAV, PHUR, LEUKOCYTESUR, NITRITE, PROTEINUA, GLUCOSEU, KETONESU, BILIRUBINUR, BLOODU  Coagulation:   Lab Results   Component Value Date    INR 2 05 (H) 04/27/2021     Troponin: No results found for: Jaqui Allred

## 2021-04-28 ENCOUNTER — TELEPHONE (OUTPATIENT)
Dept: HEMATOLOGY ONCOLOGY | Facility: CLINIC | Age: 71
End: 2021-04-28

## 2021-04-28 LAB
ATRIAL RATE: 69 BPM
BACTERIA UR CULT: ABNORMAL
KAPPA LC FREE SER-MCNC: 715.2 MG/L (ref 3.3–19.4)
KAPPA LC FREE/LAMBDA FREE SER: 1.78 {RATIO} (ref 0.26–1.65)
LAMBDA LC FREE SERPL-MCNC: 401.2 MG/L (ref 5.7–26.3)
MYELOPEROXIDASE AB SER IA-ACNC: <9 U/ML (ref 0–9)
P AXIS: 71 DEGREES
PR INTERVAL: 128 MS
PROTEINASE3 AB SER IA-ACNC: <3.5 U/ML (ref 0–3.5)
QRS AXIS: 40 DEGREES
QRSD INTERVAL: 82 MS
QT INTERVAL: 450 MS
QTC INTERVAL: 482 MS
RYE IGE QN: NEGATIVE
T WAVE AXIS: 12 DEGREES
VENTRICULAR RATE: 69 BPM

## 2021-04-28 PROCEDURE — 93010 ELECTROCARDIOGRAM REPORT: CPT | Performed by: INTERNAL MEDICINE

## 2021-04-28 NOTE — DISCHARGE SUMMARY
Discharge Summary - Isai Schwartz 66 y o  female MRN: [id number]    Unit/Bed#: ICU 08 Encounter: [id number] PCP: Meena Ortega MD    Admission Date:   Admission Orders (From admission, onward)     Ordered        04/26/21 0020  Inpatient Admission  Once         04/26/21 0009  Inpatient Admission  Once,   Status:  Canceled                     Admitting Diagnosis: Anemia [D64 9]  Hypotension [I95 9]  Thoracic compression fracture ([address] ) [S22 000A]  Acute-on-chronic kidney injury ([address] ) [N17 9, N18 9]  Acute on chronic diastolic (congestive) heart failure ([address] ) [I50 33]    HPI: Isai Schwartz is a 66 y o  female with a PMH of hodgkins lymphoma, HFPEF, CKD III, small vessel vasculitis, multiple falls who presented to the ED with back pain after multiple falls at home  In ED was noted to be hypotensive with MAPS 55-60 despite 1L IVF  CT T spine noted with T4 and T11 compression fractures, lab work noted with ENEDELIA, BNP markedly elevated  She was above her prior document weights and reported worsening peripheral edema but on satting well on room air  Her dDimer was elevated at 9, but due to her ENEDELIA she was unable to have a formal CT PE study performed and she was started on empiric heparin  Due to her continued hypotension she was admitted to the ICU for management    Procedures Performed:   Orders Placed This Encounter   Procedures   [facility]    ED ECG Documentation Only    Temporary HD Catheter       Summary of Hospital Course: The patient was started on vasopressors requiring high dose levophed and vasopressin  ECHO concerning for R heart failure, LVEF 55 %  She was incidentally found to have T4 and T11 fx  She was seen by neurosurgery and no intervention was warranted  She was started on ceftriaxone for presumed urosepsis  She was persistently hypoglycemic, and required several amps D50  On 4/28 she developed profound metabolic acidosis   She had increasing vasopressor requirements, and epinephrine was added  CT abdomen was obtained, and showed R renal mass concerning for infectious etiology  IR was consulted and drain was placed  Renal function continued to decline and the patient was started on CVVH  Urology was consulted, surgical intervention was deferred at that time  Around  on  the patient went into to agonal rhythm after becoming significantly bradycardic  She became unresponsive  She was given 1mg atropine and amp bicarb  Despite this cardiac rhythm progressed to asystole, she was pronounced decreased at   Her  Giovanni Scheuermann was called and made aware  Significant Findings, Care, Treatment and Services Provided:      CT T Spine: T4 compression fracture with 65% loss of height  T11 compression fracture with near complete loss of height (90+ percent)  Right apical pulmonary scarring  Small bilateral effusions  Cardiomegaly   Previously seen massively hydronephrotic right kidney is again noted   Echo- LV systolic function normal, EF 55%, RV markedly dilated, reduce RV systolic function, moderate MV regurg, severe TV regurg, Moderate to severe AV regurg, Trace pericardial effusion    Bumex trial, CT Abd: complex kidney cyst - IR drainage started CRRT        Disposition:      Final Diagnosis: septic shock     Resolved Problems  Date Reviewed: 3/4/2021    None              Date, Time and Cause of Death    Preliminary Cause of Death: Septic shock ([facility] )  Entered by: ARACELY Bentley [EW]     Attribution     EW1 1 ARACELY Zaman 21 22:02          Death Note:    INPATIENT DEATH NOTE  Chino Esquivel 66 y o  female MRN: [id number]  Unit/Bed#: ICU 08 Encounter: [id number]    Date, Time and Cause of Death    Date of Death: 21  Time of Death:  9:47 PM  Preliminary Cause of Death: Septic shock ([facility] )  Entered by: ARACELY Bentley [EW 1]     Attribution     EW1 1 Lopez Zaman 21 22:02           Patient's Information  Pronounced by: ARACELY Nolen  Did the patient's death occur in the ED?: No  Did the patient's death occur in the OR?: No  Did the patient's death occur less than 10 days post-op?: No  Did the patient's death occur within 24 hours of admission?: No  Was code status DNR at the time of death?: Yes    PHYSICAL EXAM:  Unresponsive to noxious stimuli, Spontaneous respirations absent, Breath sounds absent, Carotid pulse absent, Heart sounds absent, Pupillary light reflex absent and Corneal blink reflex absent    Medical Examiner notification criteria:  NONE APPLICABLE   Medical Examiner's office notified?:  Yes   Medical Examiner accepted case?:  No  Name of Medical Examiner: Iam Arreola    Family Notification  Was the family notified?: Yes  Date Notified: 21  Time Notified:   Notified by: Bigg Nolen  Name of Family Notified of Death: Chung Contreras Person Relationship to Patient: Spouse  Family Notification Route: Telephone  Was the family told to contact a  home?: Yes  Name of  Home[de-identified] [place]    Autopsy Options:  Post-mortem examination declined by next of kin    Primary Service Attending Physician notified?:  yes - Attending:  Davida Pena MD    Physician/Resident responsible for completing Discharge Summary:  ARACELY Nolen

## 2021-04-28 NOTE — DEATH NOTE
INPATIENT DEATH NOTE  Emmanuel Betancur 66 y o  female MRN: [id number]  Unit/Bed#: ICU 08 Encounter: [id number]    Date, Time and Cause of Death    Date of Death: 21  Time of Death:  9:47 PM  Preliminary Cause of Death: Septic shock ([facility] [facility] )  Entered by: ARACELY Gutiérrez [EW1 1]     Attribution     EW1 1 ARACELY Kamara 21 22:02           Patient's Information  Pronounced by: ARACELY Gutiérrez  Did the patient's death occur in the ED?: No  Did the patient's death occur in the OR?: No  Did the patient's death occur less than 10 days post-op?: No  Did the patient's death occur within 24 hours of admission?: No  Was code status DNR at the time of death?: Yes    PHYSICAL EXAM:  Unresponsive to noxious stimuli, Spontaneous respirations absent, Breath sounds absent, Carotid pulse absent, Heart sounds absent, Pupillary light reflex absent and Corneal blink reflex absent    Medical Examiner notification criteria:  NONE APPLICABLE   Medical Examiner's office notified?:  Yes   Medical Examiner accepted case?:  No  Name of Medical Examiner: [place] Linneus    Family Notification  Was the family notified?: Yes  Date Notified: 21  Time Notified:   Notified by: Bigg Gutiérrez  Name of Family Notified of Death: Sukhjinder Munoz Person Relationship to Patient: Spouse  Family Notification Route: Telephone  Was the family told to contact a  home?: Yes  Name of  Home[de-identified] [facility]    Autopsy Options:  Post-mortem examination declined by next of kin    Primary Service Attending Physician notified?:  yes - Attending:  Charity Gonzalez MD    Physician/Resident responsible for completing Discharge Summary:  ARACELY Gutiérrez

## 2021-04-29 ENCOUNTER — EPISODE CHANGES (OUTPATIENT)
Dept: CASE MANAGEMENT | Facility: HOSPITAL | Age: 71
End: 2021-04-29

## 2021-04-29 LAB
C-ANCA TITR SER IF: ABNORMAL TITER
MYELOPEROXIDASE AB SER IA-ACNC: <9 U/ML (ref 0–9)
P-ANCA ATYPICAL TITR SER IF: ABNORMAL TITER
P-ANCA TITR SER IF: ABNORMAL TITER
PROTEINASE3 AB SER IA-ACNC: <3.5 U/ML (ref 0–3.5)

## 2021-05-01 LAB
BACTERIA BLD CULT: NORMAL
BACTERIA BLD CULT: NORMAL
BACTERIA SPEC BFLD CULT: ABNORMAL
BACTERIA SPEC BFLD CULT: ABNORMAL
CRYOGLOB SER QL 1D COLD INC: POSITIVE
GRAM STN SPEC: ABNORMAL
GRAM STN SPEC: ABNORMAL

## 2021-05-03 LAB
C-ANCA TITR SER IF: NORMAL TITER
MYELOPEROXIDASE AB SER IA-ACNC: <9 U/ML (ref 0–9)
P-ANCA ATYPICAL TITR SER IF: NORMAL TITER
P-ANCA TITR SER IF: NORMAL TITER
PROTEINASE3 AB SER IA-ACNC: <3.5 U/ML (ref 0–3.5)

## 2021-07-16 RX ORDER — NEBIVOLOL HYDROCHLORIDE 5 MG/1
TABLET ORAL
Qty: 90 TABLET | Refills: 3 | OUTPATIENT
Start: 2021-07-16

## 2023-03-21 NOTE — TELEPHONE ENCOUNTER
SPEP was not completed by the lab as requested, like they said they would  Please call [facility] lab regarding this  If they cannot do it, patient will need to go back to the lab  Diagnostic Uncertainty

## 2023-06-06 NOTE — TELEPHONE ENCOUNTER
I tried calling   If she is no better which is what it sounds like, would do torsemide 20 mg twice a day and double her potassium chloride and check BMP Monday Zoryve Counseling:  I discussed with the patient that Zoryve is not for use in the eyes, mouth or vagina. The most commonly reported side effects include diarrhea, headache, insomnia, application site pain, upper respiratory tract infections, and urinary tract infections.  All of the patient's questions and concerns were addressed.

## 2023-11-30 NOTE — PROGRESS NOTES
Pt is here for venofer infusion 
Pt tolerated venofer infusion well without any infusion  Next appt reviewed 
Dr. Pricilla Pederson

## 2023-12-11 NOTE — TELEPHONE ENCOUNTER
Call need to set up IV Venofer infusions weekly x 3 weeks at [facility] still in OR PACU then floor
